# Patient Record
Sex: MALE | Race: WHITE | Employment: UNEMPLOYED | URBAN - METROPOLITAN AREA
[De-identification: names, ages, dates, MRNs, and addresses within clinical notes are randomized per-mention and may not be internally consistent; named-entity substitution may affect disease eponyms.]

---

## 2020-09-15 ENCOUNTER — HOSPITAL ENCOUNTER (INPATIENT)
Age: 85
LOS: 2 days | Discharge: HOME | DRG: 684 | End: 2020-09-17
Attending: INTERNAL MEDICINE | Admitting: INTERNAL MEDICINE
Payer: MEDICARE

## 2020-09-15 PROBLEM — E87.5 HYPERKALEMIA: Status: ACTIVE | Noted: 2020-09-15

## 2020-09-15 PROBLEM — E11.9 TYPE 2 DIABETES MELLITUS, WITH LONG-TERM CURRENT USE OF INSULIN (HCC): Status: ACTIVE | Noted: 2020-09-15

## 2020-09-15 PROBLEM — E78.5 HLD (HYPERLIPIDEMIA): Status: ACTIVE | Noted: 2020-09-15

## 2020-09-15 PROBLEM — Z79.4 TYPE 2 DIABETES MELLITUS, WITH LONG-TERM CURRENT USE OF INSULIN (HCC): Status: ACTIVE | Noted: 2020-09-15

## 2020-09-15 PROBLEM — I25.10 CAD (CORONARY ARTERY DISEASE): Status: ACTIVE | Noted: 2020-09-15

## 2020-09-15 PROBLEM — I10 HTN (HYPERTENSION): Status: ACTIVE | Noted: 2020-09-15

## 2020-09-15 PROBLEM — N17.9 AKI (ACUTE KIDNEY INJURY) (HCC): Status: ACTIVE | Noted: 2020-09-15

## 2020-09-15 PROBLEM — N18.9 CKD (CHRONIC KIDNEY DISEASE): Status: ACTIVE | Noted: 2020-09-15

## 2020-09-15 LAB
ANION GAP SERPL CALCULATED.3IONS-SCNC: 11 MEQ/L (ref 9–15)
BASOPHILS ABSOLUTE: 0.1 K/UL (ref 0–0.2)
BASOPHILS RELATIVE PERCENT: 0.8 %
BUN BLDV-MCNC: 99 MG/DL (ref 8–23)
CALCIUM SERPL-MCNC: 10.2 MG/DL (ref 8.5–9.9)
CHLORIDE BLD-SCNC: 115 MEQ/L (ref 95–107)
CHP ED QC CHECK: NORMAL
CO2: 16 MEQ/L (ref 20–31)
CREAT SERPL-MCNC: 4.58 MG/DL (ref 0.7–1.2)
EKG ATRIAL RATE: 57 BPM
EKG P AXIS: 67 DEGREES
EKG P-R INTERVAL: 224 MS
EKG Q-T INTERVAL: 450 MS
EKG QRS DURATION: 148 MS
EKG QTC CALCULATION (BAZETT): 438 MS
EKG R AXIS: -44 DEGREES
EKG T AXIS: 26 DEGREES
EKG VENTRICULAR RATE: 57 BPM
EOSINOPHILS ABSOLUTE: 0.2 K/UL (ref 0–0.7)
EOSINOPHILS RELATIVE PERCENT: 2.6 %
GFR AFRICAN AMERICAN: 14.8
GFR NON-AFRICAN AMERICAN: 12.2
GLUCOSE BLD-MCNC: 133 MG/DL (ref 70–99)
GLUCOSE BLD-MCNC: 181 MG/DL
GLUCOSE BLD-MCNC: 181 MG/DL (ref 60–115)
GLUCOSE BLD-MCNC: 67 MG/DL (ref 60–115)
HCT VFR BLD CALC: 26.2 % (ref 42–52)
HEMOGLOBIN: 8.7 G/DL (ref 14–18)
LYMPHOCYTES ABSOLUTE: 1.4 K/UL (ref 1–4.8)
LYMPHOCYTES RELATIVE PERCENT: 18.3 %
MCH RBC QN AUTO: 32.6 PG (ref 27–31.3)
MCHC RBC AUTO-ENTMCNC: 33.3 % (ref 33–37)
MCV RBC AUTO: 98.2 FL (ref 80–100)
MONOCYTES ABSOLUTE: 0.6 K/UL (ref 0.2–0.8)
MONOCYTES RELATIVE PERCENT: 7.9 %
NEUTROPHILS ABSOLUTE: 5.2 K/UL (ref 1.4–6.5)
NEUTROPHILS RELATIVE PERCENT: 70.4 %
PDW BLD-RTO: 15.7 % (ref 11.5–14.5)
PERFORMED ON: ABNORMAL
PERFORMED ON: NORMAL
PLATELET # BLD: 153 K/UL (ref 130–400)
POTASSIUM SERPL-SCNC: 6.5 MEQ/L (ref 3.4–4.9)
RBC # BLD: 2.67 M/UL (ref 4.7–6.1)
SODIUM BLD-SCNC: 142 MEQ/L (ref 135–144)
WBC # BLD: 7.4 K/UL (ref 4.8–10.8)

## 2020-09-15 PROCEDURE — 85025 COMPLETE CBC W/AUTO DIFF WBC: CPT

## 2020-09-15 PROCEDURE — 80048 BASIC METABOLIC PNL TOTAL CA: CPT

## 2020-09-15 PROCEDURE — 2060000000 HC ICU INTERMEDIATE R&B

## 2020-09-15 PROCEDURE — 6360000002 HC RX W HCPCS: Performed by: PHYSICIAN ASSISTANT

## 2020-09-15 PROCEDURE — 96374 THER/PROPH/DIAG INJ IV PUSH: CPT

## 2020-09-15 PROCEDURE — 99285 EMERGENCY DEPT VISIT HI MDM: CPT

## 2020-09-15 PROCEDURE — 96375 TX/PRO/DX INJ NEW DRUG ADDON: CPT

## 2020-09-15 PROCEDURE — G0378 HOSPITAL OBSERVATION PER HR: HCPCS

## 2020-09-15 PROCEDURE — 6370000000 HC RX 637 (ALT 250 FOR IP): Performed by: PHYSICIAN ASSISTANT

## 2020-09-15 PROCEDURE — 36415 COLL VENOUS BLD VENIPUNCTURE: CPT

## 2020-09-15 PROCEDURE — 2580000003 HC RX 258: Performed by: PHYSICIAN ASSISTANT

## 2020-09-15 PROCEDURE — 96365 THER/PROPH/DIAG IV INF INIT: CPT

## 2020-09-15 PROCEDURE — 93005 ELECTROCARDIOGRAM TRACING: CPT | Performed by: PHYSICIAN ASSISTANT

## 2020-09-15 PROCEDURE — 2500000003 HC RX 250 WO HCPCS: Performed by: PHYSICIAN ASSISTANT

## 2020-09-15 RX ORDER — NICOTINE POLACRILEX 4 MG
15 LOZENGE BUCCAL PRN
Status: DISCONTINUED | OUTPATIENT
Start: 2020-09-15 | End: 2020-09-16 | Stop reason: SDUPTHER

## 2020-09-15 RX ORDER — DEXTROSE MONOHYDRATE 25 G/50ML
25 INJECTION, SOLUTION INTRAVENOUS ONCE
Status: COMPLETED | OUTPATIENT
Start: 2020-09-15 | End: 2020-09-15

## 2020-09-15 RX ORDER — DEXTROSE MONOHYDRATE 25 G/50ML
12.5 INJECTION, SOLUTION INTRAVENOUS PRN
Status: DISCONTINUED | OUTPATIENT
Start: 2020-09-15 | End: 2020-09-16 | Stop reason: SDUPTHER

## 2020-09-15 RX ORDER — SODIUM POLYSTYRENE SULFONATE 15 G/60ML
15 SUSPENSION ORAL; RECTAL ONCE
Status: COMPLETED | OUTPATIENT
Start: 2020-09-15 | End: 2020-09-15

## 2020-09-15 RX ORDER — DEXTROSE MONOHYDRATE 50 MG/ML
100 INJECTION, SOLUTION INTRAVENOUS PRN
Status: DISCONTINUED | OUTPATIENT
Start: 2020-09-15 | End: 2020-09-16 | Stop reason: SDUPTHER

## 2020-09-15 RX ADMIN — SODIUM POLYSTYRENE SULFONATE 15 G: 15 SUSPENSION ORAL; RECTAL at 21:32

## 2020-09-15 RX ADMIN — SODIUM BICARBONATE 50 MEQ: 84 INJECTION, SOLUTION INTRAVENOUS at 21:31

## 2020-09-15 RX ADMIN — INSULIN HUMAN 10 UNITS: 100 INJECTION, SOLUTION PARENTERAL at 21:32

## 2020-09-15 RX ADMIN — CALCIUM GLUCONATE 1 G: 98 INJECTION, SOLUTION INTRAVENOUS at 22:32

## 2020-09-15 RX ADMIN — DEXTROSE MONOHYDRATE 25 G: 25 INJECTION, SOLUTION INTRAVENOUS at 21:32

## 2020-09-15 SDOH — HEALTH STABILITY: MENTAL HEALTH: HOW OFTEN DO YOU HAVE A DRINK CONTAINING ALCOHOL?: NEVER

## 2020-09-15 ASSESSMENT — ENCOUNTER SYMPTOMS
VOMITING: 0
TROUBLE SWALLOWING: 0
DIARRHEA: 0
BACK PAIN: 0
NAUSEA: 0
SHORTNESS OF BREATH: 0
WHEEZING: 0
CONSTIPATION: 0
ABDOMINAL PAIN: 0
SORE THROAT: 0

## 2020-09-15 NOTE — ED TRIAGE NOTES
Dr. Weston Olea.  Is his Primary
Patient was sent over by Dr. Isa Dee for high potassium. Patient is alert and orientated x 4. Pink, warm and dry. VSS. Afebrile. Respirations are equal and unlabored. Will continue to monitor.
stretcher

## 2020-09-16 ENCOUNTER — APPOINTMENT (OUTPATIENT)
Dept: ULTRASOUND IMAGING | Age: 85
DRG: 684 | End: 2020-09-16
Payer: MEDICARE

## 2020-09-16 LAB
ALBUMIN SERPL-MCNC: 3.4 G/DL (ref 3.5–4.6)
ALP BLD-CCNC: 37 U/L (ref 35–104)
ALT SERPL-CCNC: 10 U/L (ref 0–41)
ANION GAP SERPL CALCULATED.3IONS-SCNC: 11 MEQ/L (ref 9–15)
ANION GAP SERPL CALCULATED.3IONS-SCNC: 12 MEQ/L (ref 9–15)
ANION GAP SERPL CALCULATED.3IONS-SCNC: 12 MEQ/L (ref 9–15)
AST SERPL-CCNC: 11 U/L (ref 0–40)
BILIRUB SERPL-MCNC: <0.2 MG/DL (ref 0.2–0.7)
BUN BLDV-MCNC: 82 MG/DL (ref 8–23)
BUN BLDV-MCNC: 92 MG/DL (ref 8–23)
BUN BLDV-MCNC: 93 MG/DL (ref 8–23)
CALCIUM SERPL-MCNC: 10.1 MG/DL (ref 8.5–9.9)
CALCIUM SERPL-MCNC: 9.8 MG/DL (ref 8.5–9.9)
CALCIUM SERPL-MCNC: 9.9 MG/DL (ref 8.5–9.9)
CHLORIDE BLD-SCNC: 113 MEQ/L (ref 95–107)
CHLORIDE BLD-SCNC: 114 MEQ/L (ref 95–107)
CHLORIDE BLD-SCNC: 115 MEQ/L (ref 95–107)
CO2: 15 MEQ/L (ref 20–31)
CO2: 16 MEQ/L (ref 20–31)
CO2: 16 MEQ/L (ref 20–31)
CREAT SERPL-MCNC: 4.28 MG/DL (ref 0.7–1.2)
CREAT SERPL-MCNC: 4.34 MG/DL (ref 0.7–1.2)
CREAT SERPL-MCNC: 4.54 MG/DL (ref 0.7–1.2)
GFR AFRICAN AMERICAN: 14.9
GFR AFRICAN AMERICAN: 15.7
GFR AFRICAN AMERICAN: 16
GFR NON-AFRICAN AMERICAN: 12.3
GFR NON-AFRICAN AMERICAN: 13
GFR NON-AFRICAN AMERICAN: 13.2
GLOBULIN: 2.6 G/DL (ref 2.3–3.5)
GLUCOSE BLD-MCNC: 109 MG/DL (ref 60–115)
GLUCOSE BLD-MCNC: 114 MG/DL (ref 60–115)
GLUCOSE BLD-MCNC: 122 MG/DL (ref 70–99)
GLUCOSE BLD-MCNC: 123 MG/DL (ref 60–115)
GLUCOSE BLD-MCNC: 136 MG/DL (ref 60–115)
GLUCOSE BLD-MCNC: 147 MG/DL (ref 60–115)
GLUCOSE BLD-MCNC: 148 MG/DL (ref 70–99)
GLUCOSE BLD-MCNC: 162 MG/DL (ref 70–99)
HBA1C MFR BLD: 6.7 % (ref 4.8–5.9)
IRON SATURATION: 49 % (ref 11–46)
IRON: 101 UG/DL (ref 59–158)
PARATHYROID HORMONE INTACT: 45.4 PG/ML (ref 15–65)
PERFORMED ON: ABNORMAL
PERFORMED ON: NORMAL
PERFORMED ON: NORMAL
PHOSPHORUS: 3.8 MG/DL (ref 2.3–4.8)
POTASSIUM REFLEX MAGNESIUM: 5.3 MEQ/L (ref 3.4–4.9)
POTASSIUM SERPL-SCNC: 5.3 MEQ/L (ref 3.4–4.9)
POTASSIUM SERPL-SCNC: 5.9 MEQ/L (ref 3.4–4.9)
SODIUM BLD-SCNC: 140 MEQ/L (ref 135–144)
SODIUM BLD-SCNC: 142 MEQ/L (ref 135–144)
SODIUM BLD-SCNC: 142 MEQ/L (ref 135–144)
TOTAL IRON BINDING CAPACITY: 205 UG/DL (ref 178–450)
TOTAL PROTEIN: 6 G/DL (ref 6.3–8)

## 2020-09-16 PROCEDURE — 83550 IRON BINDING TEST: CPT

## 2020-09-16 PROCEDURE — 83036 HEMOGLOBIN GLYCOSYLATED A1C: CPT

## 2020-09-16 PROCEDURE — 84100 ASSAY OF PHOSPHORUS: CPT

## 2020-09-16 PROCEDURE — 6370000000 HC RX 637 (ALT 250 FOR IP): Performed by: NURSE PRACTITIONER

## 2020-09-16 PROCEDURE — 2580000003 HC RX 258: Performed by: NURSE PRACTITIONER

## 2020-09-16 PROCEDURE — 6370000000 HC RX 637 (ALT 250 FOR IP): Performed by: INTERNAL MEDICINE

## 2020-09-16 PROCEDURE — 6360000002 HC RX W HCPCS: Performed by: INTERNAL MEDICINE

## 2020-09-16 PROCEDURE — 6360000002 HC RX W HCPCS: Performed by: NURSE PRACTITIONER

## 2020-09-16 PROCEDURE — G0378 HOSPITAL OBSERVATION PER HR: HCPCS

## 2020-09-16 PROCEDURE — 83540 ASSAY OF IRON: CPT

## 2020-09-16 PROCEDURE — 76775 US EXAM ABDO BACK WALL LIM: CPT

## 2020-09-16 PROCEDURE — 36415 COLL VENOUS BLD VENIPUNCTURE: CPT

## 2020-09-16 PROCEDURE — 2060000000 HC ICU INTERMEDIATE R&B

## 2020-09-16 PROCEDURE — 51798 US URINE CAPACITY MEASURE: CPT

## 2020-09-16 PROCEDURE — 96372 THER/PROPH/DIAG INJ SC/IM: CPT

## 2020-09-16 PROCEDURE — 80053 COMPREHEN METABOLIC PANEL: CPT

## 2020-09-16 PROCEDURE — 83970 ASSAY OF PARATHORMONE: CPT

## 2020-09-16 PROCEDURE — 82652 VIT D 1 25-DIHYDROXY: CPT

## 2020-09-16 RX ORDER — ATORVASTATIN CALCIUM 10 MG/1
10 TABLET, FILM COATED ORAL DAILY
COMMUNITY

## 2020-09-16 RX ORDER — M-VIT,TX,IRON,MINS/CALC/FOLIC 27MG-0.4MG
1 TABLET ORAL DAILY
COMMUNITY

## 2020-09-16 RX ORDER — ISOSORBIDE MONONITRATE 30 MG/1
30 TABLET, EXTENDED RELEASE ORAL DAILY
COMMUNITY

## 2020-09-16 RX ORDER — ATORVASTATIN CALCIUM 10 MG/1
10 TABLET, FILM COATED ORAL NIGHTLY
Status: DISCONTINUED | OUTPATIENT
Start: 2020-09-16 | End: 2020-09-17 | Stop reason: HOSPADM

## 2020-09-16 RX ORDER — AMLODIPINE BESYLATE 5 MG/1
5 TABLET ORAL DAILY
Status: DISCONTINUED | OUTPATIENT
Start: 2020-09-16 | End: 2020-09-17 | Stop reason: HOSPADM

## 2020-09-16 RX ORDER — SODIUM CHLORIDE 0.9 % (FLUSH) 0.9 %
10 SYRINGE (ML) INJECTION PRN
Status: DISCONTINUED | OUTPATIENT
Start: 2020-09-16 | End: 2020-09-17 | Stop reason: HOSPADM

## 2020-09-16 RX ORDER — ALLOPURINOL 100 MG/1
100 TABLET ORAL DAILY
COMMUNITY

## 2020-09-16 RX ORDER — ACETAMINOPHEN 650 MG/1
650 SUPPOSITORY RECTAL EVERY 6 HOURS PRN
Status: DISCONTINUED | OUTPATIENT
Start: 2020-09-16 | End: 2020-09-17 | Stop reason: HOSPADM

## 2020-09-16 RX ORDER — LISINOPRIL 5 MG/1
5 TABLET ORAL DAILY
Status: ON HOLD | COMMUNITY
End: 2020-09-17 | Stop reason: HOSPADM

## 2020-09-16 RX ORDER — ONDANSETRON 2 MG/ML
4 INJECTION INTRAMUSCULAR; INTRAVENOUS EVERY 6 HOURS PRN
Status: DISCONTINUED | OUTPATIENT
Start: 2020-09-16 | End: 2020-09-17 | Stop reason: HOSPADM

## 2020-09-16 RX ORDER — ASPIRIN 81 MG/1
81 TABLET ORAL DAILY
COMMUNITY

## 2020-09-16 RX ORDER — NICOTINE POLACRILEX 4 MG
15 LOZENGE BUCCAL PRN
Status: DISCONTINUED | OUTPATIENT
Start: 2020-09-16 | End: 2020-09-17 | Stop reason: HOSPADM

## 2020-09-16 RX ORDER — CALCITRIOL 0.25 UG/1
0.25 CAPSULE, LIQUID FILLED ORAL DAILY
COMMUNITY

## 2020-09-16 RX ORDER — SODIUM POLYSTYRENE SULFONATE 15 G/60ML
15 SUSPENSION ORAL; RECTAL ONCE
Status: COMPLETED | OUTPATIENT
Start: 2020-09-16 | End: 2020-09-16

## 2020-09-16 RX ORDER — CLOPIDOGREL BISULFATE 75 MG/1
75 TABLET ORAL DAILY
Status: DISCONTINUED | OUTPATIENT
Start: 2020-09-16 | End: 2020-09-17 | Stop reason: HOSPADM

## 2020-09-16 RX ORDER — FERROUS SULFATE 325(65) MG
325 TABLET ORAL
COMMUNITY

## 2020-09-16 RX ORDER — ISOSORBIDE MONONITRATE 30 MG/1
30 TABLET, EXTENDED RELEASE ORAL DAILY
Status: DISCONTINUED | OUTPATIENT
Start: 2020-09-16 | End: 2020-09-17 | Stop reason: HOSPADM

## 2020-09-16 RX ORDER — CARVEDILOL 12.5 MG/1
12.5 TABLET ORAL 2 TIMES DAILY WITH MEALS
Status: DISCONTINUED | OUTPATIENT
Start: 2020-09-16 | End: 2020-09-17 | Stop reason: HOSPADM

## 2020-09-16 RX ORDER — HEPARIN SODIUM 5000 [USP'U]/ML
5000 INJECTION, SOLUTION INTRAVENOUS; SUBCUTANEOUS EVERY 8 HOURS SCHEDULED
Status: DISCONTINUED | OUTPATIENT
Start: 2020-09-16 | End: 2020-09-17 | Stop reason: HOSPADM

## 2020-09-16 RX ORDER — DEXTROSE MONOHYDRATE 25 G/50ML
12.5 INJECTION, SOLUTION INTRAVENOUS PRN
Status: DISCONTINUED | OUTPATIENT
Start: 2020-09-16 | End: 2020-09-17 | Stop reason: HOSPADM

## 2020-09-16 RX ORDER — CLOPIDOGREL BISULFATE 75 MG/1
75 TABLET ORAL DAILY
COMMUNITY

## 2020-09-16 RX ORDER — ASPIRIN 81 MG/1
81 TABLET, CHEWABLE ORAL DAILY
Status: DISCONTINUED | OUTPATIENT
Start: 2020-09-16 | End: 2020-09-17 | Stop reason: HOSPADM

## 2020-09-16 RX ORDER — CARVEDILOL 12.5 MG/1
12.5 TABLET ORAL 2 TIMES DAILY WITH MEALS
COMMUNITY

## 2020-09-16 RX ORDER — BUMETANIDE 0.5 MG/1
0.5 TABLET ORAL DAILY
COMMUNITY

## 2020-09-16 RX ORDER — ACETAMINOPHEN 325 MG/1
650 TABLET ORAL EVERY 6 HOURS PRN
Status: DISCONTINUED | OUTPATIENT
Start: 2020-09-16 | End: 2020-09-17 | Stop reason: HOSPADM

## 2020-09-16 RX ORDER — INSULIN GLARGINE 100 [IU]/ML
25 INJECTION, SOLUTION SUBCUTANEOUS EVERY MORNING
COMMUNITY

## 2020-09-16 RX ORDER — PROMETHAZINE HYDROCHLORIDE 12.5 MG/1
12.5 TABLET ORAL EVERY 6 HOURS PRN
Status: DISCONTINUED | OUTPATIENT
Start: 2020-09-16 | End: 2020-09-17 | Stop reason: HOSPADM

## 2020-09-16 RX ORDER — ASCORBIC ACID 500 MG
1000 TABLET ORAL DAILY
COMMUNITY

## 2020-09-16 RX ORDER — SODIUM CHLORIDE 0.9 % (FLUSH) 0.9 %
10 SYRINGE (ML) INJECTION EVERY 12 HOURS SCHEDULED
Status: DISCONTINUED | OUTPATIENT
Start: 2020-09-16 | End: 2020-09-17 | Stop reason: HOSPADM

## 2020-09-16 RX ORDER — DEXTROSE MONOHYDRATE 50 MG/ML
100 INJECTION, SOLUTION INTRAVENOUS PRN
Status: DISCONTINUED | OUTPATIENT
Start: 2020-09-16 | End: 2020-09-17 | Stop reason: HOSPADM

## 2020-09-16 RX ORDER — AMLODIPINE BESYLATE 5 MG/1
5 TABLET ORAL DAILY
COMMUNITY

## 2020-09-16 RX ADMIN — ASPIRIN 81 MG: 81 TABLET, CHEWABLE ORAL at 08:49

## 2020-09-16 RX ADMIN — ISOSORBIDE MONONITRATE 30 MG: 30 TABLET, EXTENDED RELEASE ORAL at 08:49

## 2020-09-16 RX ADMIN — INSULIN LISPRO 2 UNITS: 100 INJECTION, SOLUTION INTRAVENOUS; SUBCUTANEOUS at 12:47

## 2020-09-16 RX ADMIN — HEPARIN SODIUM 5000 UNITS: 5000 INJECTION INTRAVENOUS; SUBCUTANEOUS at 08:49

## 2020-09-16 RX ADMIN — CLOPIDOGREL BISULFATE 75 MG: 75 TABLET ORAL at 08:49

## 2020-09-16 RX ADMIN — CARVEDILOL 12.5 MG: 12.5 TABLET, FILM COATED ORAL at 08:49

## 2020-09-16 RX ADMIN — SODIUM POLYSTYRENE SULFONATE 15 G: 15 SUSPENSION ORAL; RECTAL at 11:18

## 2020-09-16 RX ADMIN — HEPARIN SODIUM 5000 UNITS: 5000 INJECTION INTRAVENOUS; SUBCUTANEOUS at 22:54

## 2020-09-16 RX ADMIN — ATORVASTATIN CALCIUM 10 MG: 10 TABLET, FILM COATED ORAL at 20:44

## 2020-09-16 RX ADMIN — AMLODIPINE BESYLATE 5 MG: 5 TABLET ORAL at 08:49

## 2020-09-16 RX ADMIN — HEPARIN SODIUM 5000 UNITS: 5000 INJECTION INTRAVENOUS; SUBCUTANEOUS at 17:49

## 2020-09-16 RX ADMIN — Medication 10 ML: at 20:44

## 2020-09-16 RX ADMIN — EPOETIN ALFA-EPBX 6000 UNITS: 10000 INJECTION, SOLUTION INTRAVENOUS; SUBCUTANEOUS at 11:18

## 2020-09-16 RX ADMIN — CARVEDILOL 12.5 MG: 12.5 TABLET, FILM COATED ORAL at 17:49

## 2020-09-16 ASSESSMENT — ENCOUNTER SYMPTOMS
NAUSEA: 0
ABDOMINAL PAIN: 0
EYE DISCHARGE: 0
APNEA: 0
COUGH: 0
BLOOD IN STOOL: 0
ABDOMINAL DISTENTION: 0
PHOTOPHOBIA: 0
BACK PAIN: 0
SHORTNESS OF BREATH: 0
VOICE CHANGE: 0
ANAL BLEEDING: 0
VOMITING: 0

## 2020-09-16 NOTE — CONSULTS
Renal consult dictated    Hyperkalemia related to CKD-DM and diet  DM type-2  OHDx CAD stents Hx MI  Hyperlipidemia  Anemia    Plan diet management           Hold ACE no benefit at this time lauro low dose              For BP or cardiac stand-point          Obtain PTH iron and PHO4           Jessy-exalte again  No need for dialysis at present but will follow on discharge  Dietician to see for 2Gm potassium diet  One dose Procrit

## 2020-09-16 NOTE — ED NOTES
Patient report called to [address] [place] [address] Patient connected to tele monitor.       Michelle Hahn RN  09/15/20 [id number]

## 2020-09-16 NOTE — ED PROVIDER NOTES
[address] [facility]  eMERGENCY dEPARTMENT eNCOUnter      Pt Name: Gustavo Rivas  MRN: [id number]  [place] 10/9/1933  Date of evaluation: 9/15/2020  Provider: Dima Broussard PA-C    CHIEF COMPLAINT       Chief Complaint   Patient presents with    Other     sent over by Dr. office for high potassium          HISTORY OF PRESENT ILLNESS   (Location/Symptom, Timing/Onset,Context/Setting, Quality, Duration, Modifying Factors, Severity)  Note limiting factors. Gustavo Rivas is a 80 y.o. male who presents to the emergency department patient sent by Dr. Jennifer Gallegos nephrologist at [facility] for increasing potassium. Patient is unaware of what the numbers were he notes that he used to walk 2 to 3 miles a day he can walk less than a mile notes that his legs \"will not walk any further\" patient denies fever chills nausea vomiting symptoms moderate severity nothing improves or worsen symptoms. Patient denies chest pain shortness of breath. Patient does note that he has had dark stools he does take iron. Denies obvious rectal bleeding    HPI    NursingNotes were reviewed. REVIEW OF SYSTEMS    (2-9 systems for level 4, 10 or more for level 5)     Review of Systems   Constitutional: Negative for activity change, appetite change, chills, fever and unexpected weight change. HENT: Negative for ear discharge, nosebleeds and voice change. Eyes: Negative for photophobia and discharge. Respiratory: Negative for apnea, cough and shortness of breath. Cardiovascular: Positive for leg swelling. Negative for chest pain. Gastrointestinal: Negative for abdominal distention, abdominal pain, anal bleeding, blood in stool, nausea and vomiting. Endocrine: Negative for cold intolerance, heat intolerance and polyphagia. Genitourinary: Negative for dysuria and genital sores. Musculoskeletal: Negative for back pain, joint swelling and neck pain. Skin: Negative for pallor.    Allergic/Immunologic: Negative for immunocompromised state.   Neurological: Negative for dizziness, seizures, syncope, facial asymmetry, speech difficulty, numbness and headaches. Hematological: Does not bruise/bleed easily. Psychiatric/Behavioral: Negative for behavioral problems, self-injury and sleep disturbance. All other systems reviewed and are negative. Except as noted above the remainder of the review of systems was reviewed and negative. PAST MEDICAL HISTORY   History reviewed. No pertinent past medical history. SURGICALHISTORY     History reviewed. No pertinent surgical history. CURRENT MEDICATIONS       Current Discharge Medication List      CONTINUE these medications which have NOT CHANGED    Details   bumetanide (BUMEX) 0.5 MG tablet Take 0.5 mg by mouth daily      isosorbide mononitrate (IMDUR) 30 MG extended release tablet Take 30 mg by mouth daily      clopidogrel (PLAVIX) 75 MG tablet Take 75 mg by mouth daily      carvedilol (COREG) 12.5 MG tablet Take 12.5 mg by mouth 2 times daily (with meals)      atorvastatin (LIPITOR) 10 MG tablet Take 10 mg by mouth daily      calcitRIOL (ROCALTROL) 0.25 MCG capsule Take 0.25 mcg by mouth daily      allopurinol (ZYLOPRIM) 100 MG tablet Take 100 mg by mouth daily      insulin glargine (LANTUS) 100 UNIT/ML injection vial Inject 25 Units into the skin every morning      lisinopril (PRINIVIL;ZESTRIL) 5 MG tablet Take 5 mg by mouth daily      amLODIPine (NORVASC) 5 MG tablet Take 5 mg by mouth daily      vitamin C (ASCORBIC ACID) 500 MG tablet Take 1,000 mg by mouth daily      aspirin 81 MG EC tablet Take 81 mg by mouth daily      Multiple Vitamins-Minerals (THERAPEUTIC MULTIVITAMIN-MINERALS) tablet Take 1 tablet by mouth daily      ferrous sulfate (IRON 325) 325 (65 Fe) MG tablet Take 325 mg by mouth daily (with breakfast)             ALLERGIES     Patient has no known allergies. FAMILY HISTORY     History reviewed. No pertinent family history.        SOCIAL HISTORY       Social History Socioeconomic History    Marital status:      Spouse name: None    Number of children: None    Years of education: None    Highest education level: None   Occupational History    None   Social Needs    Financial resource strain: None    Food insecurity     Worry: None     Inability: None    Transportation needs     Medical: None     Non-medical: None   Tobacco Use    Smoking status: Never Smoker    Smokeless tobacco: Never Used   Substance and Sexual Activity    Alcohol use: Never     Frequency: Never    Drug use: Never    Sexual activity: None   Lifestyle    Physical activity     Days per week: None     Minutes per session: None    Stress: None   Relationships    Social connections     Talks on phone: None     Gets together: None     Attends Synagogue service: None     Active member of club or organization: None     Attends meetings of clubs or organizations: None     Relationship status: None    Intimate partner violence     Fear of current or ex partner: None     Emotionally abused: None     Physically abused: None     Forced sexual activity: None   Other Topics Concern    None   Social History Narrative    None       SCREENINGS    Pratima Coma Scale  Eye Opening: Spontaneous  Best Verbal Response: Oriented  Best Motor Response: Obeys commands  Pratima Coma Scale Score: 15 @FLOW([id number])@      PHYSICAL EXAM    (up to 7 for level 4, 8 or more for level 5)     ED Triage Vitals [09/15/20 1916]   BP Temp Temp Source Pulse Resp SpO2 Height Weight   107/65 97.8 °F (36.6 °C) Tympanic 58 18 97 % 5' 9\" (1.753 m) 210 lb (95.3 kg)       Physical Exam  Vitals signs and nursing note reviewed. Constitutional:       General: He is not in acute distress. Appearance: He is well-developed. HENT:      Head: Normocephalic and atraumatic. Right Ear: External ear normal. There is no impacted cerumen. Left Ear: External ear normal. There is no impacted cerumen.       Nose: Nose normal. Mouth/Throat:      Mouth: Mucous membranes are moist.      Pharynx: No oropharyngeal exudate or posterior oropharyngeal erythema. Eyes:      General:         Right eye: No discharge. Left eye: No discharge. Extraocular Movements: Extraocular movements intact. Pupils: Pupils are equal, round, and reactive to light. Neck:      Musculoskeletal: Normal range of motion and neck supple. Cardiovascular:      Rate and Rhythm: Normal rate and regular rhythm. Heart sounds: Normal heart sounds. Pulmonary:      Effort: Pulmonary effort is normal. No respiratory distress. Breath sounds: Normal breath sounds. No stridor. Abdominal:      General: Bowel sounds are normal. There is no distension. Palpations: Abdomen is soft. Tenderness: There is no abdominal tenderness. Genitourinary:     Rectum: Guaiac result negative. Musculoskeletal: Normal range of motion. Right lower leg: Edema present. Left lower leg: Edema present. Skin:     General: Skin is warm. Findings: No erythema. Neurological:      Mental Status: He is alert and oriented to person, place, and time. Psychiatric:         Mood and Affect: Mood normal.         DIAGNOSTIC RESULTS     EKG: All EKG's are interpreted by the Emergency Department Physician who either signs or Co-signsthis chart in the absence of a cardiologist.    EKG sinus bradycardia rate 57- ST segment elevation.  ms    RADIOLOGY:   Non-plain filmimages such as CT, Ultrasound and MRI are read by the radiologist. Plain radiographic images are visualized and preliminarily interpreted by the emergency physician with the below findings:         Interpretation per the Radiologist below, if available at the time ofthis note:    2011 [facility]   Final Result      MODERATE TO MARKED RIGHT RENAL ATROPHY. NORMAL-APPEARING LEFT KIDNEY.       NO EVIDENCE OF URINARY TRACT OBSTRUCTION, OR OTHER FINDINGS OF CONCERN IDENTIFIED, All other components within normal limits   IRON AND TIBC - Abnormal; Notable for the following components:    Iron Saturation 49 (*)     All other components within normal limits   BASIC METABOLIC PANEL - Abnormal; Notable for the following components:    Potassium 5.3 (*)     Chloride 113 (*)     CO2 16 (*)     Glucose 148 (*)     BUN 82 (*)     CREATININE 4.28 (*)     GFR Non- 13.2 (*)     GFR  16.0 (*)     All other components within normal limits    Narrative:     CALL  [address] tel. [phone],  BUN results called to and read back by Glen Morelos, 09/16/2020 19:10, by  Lucila Mccann   COMPREHENSIVE METABOLIC PANEL W/ REFLEX TO MG FOR LOW K - Abnormal; Notable for the following components:    Potassium reflex Magnesium 5.1 (*)     Chloride 113 (*)     CO2 16 (*)     Glucose 104 (*)     BUN 86 (*)     CREATININE 4.20 (*)     GFR Non- 13.5 (*)     GFR  16.3 (*)     Total Protein 6.2 (*)     All other components within normal limits    Narrative:     CALL  [address] tel. [phone],  BUN results called to and read back by Renetta Rogers, 09/17/2020 08:03, by  Wandy Larios   CBC WITH AUTO DIFFERENTIAL - Abnormal; Notable for the following components:    RBC 2.65 (*)     Hemoglobin 8.6 (*)     Hematocrit 26.2 (*)     MCH 32.5 (*)     MCHC 32.8 (*)     RDW 15.5 (*)     All other components within normal limits   BASIC METABOLIC PANEL - Abnormal; Notable for the following components:    Potassium 5.1 (*)     All other components within normal limits    Narrative:     CALL  [address] tel. [phone],  BUN results called to and read back by Renetta Rogers, 09/17/2020 08:03, by  Wandy Larios   POCT GLUCOSE - Abnormal; Notable for the following components:    POC Glucose 181 (*)     All other components within normal limits   POCT GLUCOSE - Abnormal; Notable for the following components:    POC Glucose 136 (*)     All other components within normal limits   POCT GLUCOSE - Abnormal; Notable for the following components:    POC Glucose 147 (*)     All other components within normal limits   POCT GLUCOSE - Abnormal; Notable for the following components:    POC Glucose 123 (*)     All other components within normal limits   POCT GLUCOSE - Abnormal; Notable for the following components:    POC Glucose 134 (*)     All other components within normal limits   POCT GLUCOSE - Normal   PHOSPHORUS   PTH, INTACT   VITAMIN D 1,25 DIHYDROXY   SODIUM, URINE, RANDOM   CREATININE, RANDOM URINE   URINALYSIS   POCT GLUCOSE   POCT GLUCOSE   POCT GLUCOSE   POCT GLUCOSE   POCT GLUCOSE   POCT GLUCOSE   POCT GLUCOSE   POCT GLUCOSE   POCT GLUCOSE   POCT GLUCOSE   POCT GLUCOSE   POCT GLUCOSE   POCT GLUCOSE   POCT GLUCOSE   POCT GLUCOSE   POCT GLUCOSE   POCT GLUCOSE   POCT GLUCOSE   POCT GLUCOSE       All other labs were within normal range or not returned as of this dictation. EMERGENCY DEPARTMENT COURSE and DIFFERENTIAL DIAGNOSIS/MDM:   Vitals:    Vitals:    09/16/20 0719 09/16/20 2052 09/17/20 0451 09/17/20 0727   BP: (!) 129/53 (!) 112/50  (!) 131/54   Pulse: 67 57  58   Resp: 16 18 17   Temp: 97.7 °F (36.5 °C) 98.5 °F (36.9 °C)  97.9 °F (36.6 °C)   TempSrc: Oral Oral  Oral   SpO2: 100% 98%  100%   Weight:   214 lb (97.1 kg)    Height:                MDM  Number of Diagnoses or Management Options  Diagnosis management comments: Patient noted to have elevated creatinine as well as hyperkalemia discussed with Dr. Shen Gardiner from nephrology at [facility] he is familiar with patient notes that patient's creatinine increased a few weeks ago he did discontinued and change medication including Bumex and ACE inhibitors. He notes patient's potassium was 6.2 on his blood draw. Hours is 6.5 nonhemolyzed per lab report discussed with Dr. Pipo Stovall will admit patient. Dr. Sia Linda requests is calcium gluconate 1 g.   Addition to honey implemented hyperkalemia moderate protocol       Amount and/or Complexity of Data Reviewed  Clinical lab tests: reviewed and ordered  Discuss the patient with other providers: yes        CRITICAL CARE TIME   Total Critical Care time was 34  minutes, excluding separately reportableprocedures. There was a high probability of clinicallysignificant/life threatening deterioration in the patient's condition which required my urgent intervention. CONSULTS:  IP CONSULT TO NEPHROLOGY  IP CONSULT TO DIETITIAN  IP CONSULT TO DIETITIAN    PROCEDURES:  Unless otherwise noted below, none     Procedures    FINAL IMPRESSION      1. APRYL (acute kidney injury) ([address]    2. Hyperkalemia          DISPOSITION/PLAN   DISPOSITION Admitted 09/15/2020 10:07:19 PM      PATIENT REFERRED TO:  Wilson Alberts MD  [facility] [address]  [facility] [address] [facility] 6, DO  [address] [facility]  [address]  [address] [facility]  [phone]    Schedule an appointment as soon as possible for a visit on 10/2/2020  For Hospital F/U Appt. @ 9:00am.  Office moved, new address [address] [facility] [address]. Bring MEDS to this visit.        DISCHARGE MEDICATIONS:  Current Discharge Medication List             (Please note that portions of this note were completed with a voice recognition program.  Efforts were made to edit the dictations but occasionally words are mis-transcribed.)    Nelson Cisneros PA-C (electronically signed)  Attending Emergency Physician       Nelson Cisneros PA-C  09/16/20 [address], [place]  09/17/20 3753

## 2020-09-16 NOTE — PROGRESS NOTES
ROS is reviewed and negative except for as above  Medications:  Reviewed    Infusion Medications:    dextrose       Scheduled Medications:    sodium chloride flush  10 mL Intravenous 2 times per day    heparin (porcine)  5,000 Units Subcutaneous 3 times per day    isosorbide mononitrate  30 mg Oral Daily    clopidogrel  75 mg Oral Daily    carvedilol  12.5 mg Oral BID WC    atorvastatin  10 mg Oral Nightly    amLODIPine  5 mg Oral Daily    aspirin  81 mg Oral Daily    insulin lispro  0-12 Units Subcutaneous TID     insulin lispro  0-6 Units Subcutaneous Nightly    epoetin emerson-epbx  6,000 Units Subcutaneous Once per day on Mon Wed Fri     PRN Meds: sodium chloride flush, acetaminophen **OR** acetaminophen, promethazine **OR** ondansetron, glucose, dextrose, glucagon (rDNA), dextrose    Labs:   Recent Labs     09/15/20  2000   WBC 7.4   HGB 8.7*   HCT 26.2*        Recent Labs     09/15/20  2000 09/16/20  0120 09/16/20  0610 09/16/20  0950    142 142  --    K 6.5* 5.9* 5.3*  --    * 114* 115*  --    CO2 16* 16* 15*  --    BUN 99* 93* 92*  --    CREATININE 4.58* 4.54* 4.34*  --    CALCIUM 10.2* 9.9 10.1*  --    PHOS  --   --   --  3.8     Recent Labs     09/16/20  0610   AST 11   ALT 10   BILITOT <0.2   ALKPHOS 37     No results for input(s): INR in the last 72 hours. No results for input(s): Gr[employer] in the last 72 hours. Urinalysis:   No results found for: [place], BACTERIA, RBCUA, BLOODU, [address], [address]    Radiology:   Most recent    Chest CT      WITH CONTRAST:No results found for this or any previous visit. WITHOUT CONTRAST: No results found for this or any previous visit. CXR      2-view: No results found for this or any previous visit. Portable: No results found for this or any previous visit. Echo No results found for this or any previous visit.           Assessment/Plan:    Active Hospital Problems    Diagnosis Date Noted    APRYL (acute kidney injury) ([facility] [N17.9] 09/15/2020    CKD (chronic kidney disease) [N18.9] 09/15/2020    HTN (hypertension) [I10] 09/15/2020    CAD (coronary artery disease) [I25.10] 09/15/2020    Type 2 diabetes mellitus, with long-term current use of insulin ([facility] [E11.9, Z79.4] 09/15/2020    HLD (hyperlipidemia) [E78.5] 09/15/2020    Hyperkalemia [E87.5] 09/15/2020     Acute renal failure with chronic kidney disease: Repeat BMP now. Urine sodium urine creatinine urinalysis. Renal ultrasound. Hold nephrotoxic agents. Hold lisinopril. PTH and iron panel ordered    Hyperkalemia with acute renal failure. Repeat BMP. Temporizing measures with Kayexalate already given. No T wave changes on EKG currently nephrology following. Type 2 diabetes: Sliding scale insulin, home insulin    History of coronary disease with hypertension: Norvasc, statin, Coreg, hold ACE inhibitor continue Imdur    DVT prophylaxis Heparin    Additional work up or/and treatment plan may be added today or then after based on clinical progression. I am managing a portion of pt care. Some medical issues are handled byother specialists. Additional work up and treatment should be done in out pt setting by pt PCP and other out pt providers. In addition to examining and evaluating pt, I spent additional time explaining care, normaland abnormal findings, and treatment plan. All of pt questions were answered. Counseling, diet and education were provided. Case will be discussed with nursing staff when appropriate. Family will be updated if and whenappropriate.       Electronically signed by Alden Dumas DO on 9/16/2020 at 4:52 PM

## 2020-09-16 NOTE — ED NOTES
Patient resting in bed with call light in reach. Breathes are even and unlabored. Skin is warm and dry. Vital signs are stable. Patient remains on bedside monitor. Patient denies any needs at this time.       Sean Hager RN  09/15/20 6893

## 2020-09-16 NOTE — H&P
[facility]  MEDICINE    HISTORY AND PHYSICAL EXAM    PATIENT NAME:  Bay Mo    MRN:  [id number]  SERVICE DATE:  9/15/2020   SERVICE TIME:  10:21 PM    Primary Care Physician: Genesis Armenta MD         SUBJECTIVE  CHIEF COMPLAINT:  APRYL  Hyperkalemia   HPI:  This is a 80 y.o. male w known CKD and hx hyperkalemia, CAD s/p stent, DM2, HTN, who presents with elevated potassium. Had labs w his PCP,  Received call today to go to ED for treatment of high potassium and worsening renal function. States kidneys have been working about 20% for past 3 years. When his labs were drawn last, he was down to 11%. Meds were changed - stopped water pill and cut \"the orange pill\" in half. He has good UO,  No edema or increased abdominal girth, No palpitations or muscle cramps/spasm. He is admitted for management of APRYL w hyperkalemia. PAST MEDICAL HISTORY:    HTN, CKD stage 4, CAD, DM2, Hyperkalemia,     PAST SURGICAL HISTORY:  History reviewed. No pertinent surgical history. FAMILY HISTORY:  History reviewed. No pertinent family history. SOCIAL HISTORY:   Non smoker.    Social History     Socioeconomic History    Marital status:      Spouse name: Not on file    Number of children: Not on file    Years of education: Not on file    Highest education level: Not on file   Occupational History    Not on file   Social Needs    Financial resource strain: Not on file    Food insecurity     Worry: Not on file     Inability: Not on file    Transportation needs     Medical: Not on file     Non-medical: Not on file   Tobacco Use    Smoking status: Never Smoker    Smokeless tobacco: Never Used   Substance and Sexual Activity    Alcohol use: Never     Frequency: Never    Drug use: Never    Sexual activity: Not on file   Lifestyle    Physical activity     Days per week: Not on file     Minutes per session: Not on file    Stress: Not on file   Relationships    Social connections     Talks on phone: Not on file     Gets together: Not on file     Attends [employer] service: Not on file     Active member of club or organization: Not on file     Attends meetings of clubs or organizations: Not on file     Relationship status: Not on file    Intimate partner violence     Fear of current or ex partner: Not on file     Emotionally abused: Not on file     Physically abused: Not on file     Forced sexual activity: Not on file   Other Topics Concern    Not on file   Social History Narrative    Not on file     MEDICATIONS:   Prior to Admission medications    Not on File       ALLERGIES: Patient has no known allergies. REVIEW OF SYSTEM:   Review of Systems   Constitutional: Negative for appetite change, chills, diaphoresis and fever. HENT: Negative for sore throat and trouble swallowing. Eyes: Negative for visual disturbance. Respiratory: Negative for shortness of breath and wheezing. Cardiovascular: Negative for chest pain, palpitations and leg swelling. Gastrointestinal: Negative for abdominal pain, constipation, diarrhea, nausea and vomiting. Endocrine: Negative for polyuria. Genitourinary: Negative for dysuria and hematuria. Musculoskeletal: Negative for back pain and myalgias. Skin: Negative for rash and wound. Neurological: Negative for seizures, syncope and headaches. Hematological: Negative for adenopathy. Psychiatric/Behavioral: Negative for agitation and confusion. The patient is not nervous/anxious. OBJECTIVE  PHYSICAL EXAM: BP (!) 110/59   Pulse 57   Temp 97.8 °F (36.6 °C) (Tympanic)   Resp 16   Ht 5' 9\" (1.753 m)   Wt 210 lb (95.3 kg)   SpO2 97%   BMI 31.01 kg/m²     Physical Exam  Constitutional:       General: He is not in acute distress. Appearance: He is obese. He is not diaphoretic. HENT:      Head: Normocephalic. Nose: No congestion.       Mouth/Throat:      Mouth: Mucous membranes are moist.   Eyes:      Conjunctiva/sclera: Conjunctivae normal. Pupils: Pupils are equal, round, and reactive to light. Neck:      Musculoskeletal: No muscular tenderness. Vascular: No carotid bruit. Cardiovascular:      Rate and Rhythm: Normal rate and regular rhythm. Pulses: Normal pulses. Heart sounds: Normal heart sounds. No murmur. Pulmonary:      Effort: Pulmonary effort is normal.      Breath sounds: No wheezing or rhonchi. Abdominal:      General: There is distension. Tenderness: There is no abdominal tenderness. Musculoskeletal:      Right lower leg: No edema. Left lower leg: No edema. Lymphadenopathy:      Cervical: No cervical adenopathy. Skin:     General: Skin is warm and dry. Capillary Refill: Capillary refill takes less than 2 seconds. Neurological:      Mental Status: He is alert and oriented to person, place, and time. Psychiatric:         Mood and Affect: Mood normal.         Behavior: Behavior normal.       DATA:     Diagnostic tests reviewed for today's visit:    Most recent labs and imaging results reviewed.      LABS:    Recent Results (from the past 24 hour(s))   Basic Metabolic Panel    Collection Time: 09/15/20  8:00 PM   Result Value Ref Range    Sodium 142 135 - 144 mEq/L    Potassium 6.5 (HH) 3.4 - 4.9 mEq/L    Chloride 115 (H) 95 - 107 mEq/L    CO2 16 (L) 20 - 31 mEq/L    Anion Gap 11 9 - 15 mEq/L    Glucose 133 (H) 70 - 99 mg/dL    BUN 99 (HH) 8 - 23 mg/dL    CREATININE 4.58 (H) 0.70 - 1.20 mg/dL    GFR Non-African American 12.2 (L) >60    GFR  14.8 (L) >60    Calcium 10.2 (H) 8.5 - 9.9 mg/dL   CBC Auto Differential    Collection Time: 09/15/20  8:00 PM   Result Value Ref Range    WBC 7.4 4.8 - 10.8 K/uL    RBC 2.67 (L) 4.70 - 6.10 M/uL    Hemoglobin 8.7 (L) 14.0 - 18.0 g/dL    Hematocrit 26.2 (L) 42.0 - 52.0 %    MCV 98.2 80.0 - 100.0 fL    MCH 32.6 (H) 27.0 - 31.3 pg    MCHC 33.3 33.0 - 37.0 %    RDW 15.7 (H) 11.5 - 14.5 %    Platelets 528 049 - 362 K/uL    Neutrophils % 70.4 % Lymphocytes % 18.3 %    Monocytes % 7.9 %    Eosinophils % 2.6 %    Basophils % 0.8 %    Neutrophils Absolute 5.2 1.4 - 6.5 K/uL    Lymphocytes Absolute 1.4 1.0 - 4.8 K/uL    Monocytes Absolute 0.6 0.2 - 0.8 K/uL    Eosinophils Absolute 0.2 0.0 - 0.7 K/uL    Basophils Absolute 0.1 0.0 - 0.2 K/uL   EKG 12 Lead - Chest Pain    Collection Time: 09/15/20  8:13 PM   Result Value Ref Range    Ventricular Rate 57 BPM    Atrial Rate 57 BPM    P-R Interval 224 ms    QRS Duration 148 ms    Q-T Interval 450 ms    QTc Calculation (Bazett) 438 ms    P Axis 67 degrees    R Axis -44 degrees    T Axis 26 degrees   POCT Glucose    Collection Time: 09/15/20  9:58 PM   Result Value Ref Range    POC Glucose 181 (H) 60 - 115 mg/dl    Performed on ACCU-CHEK    POCT Glucose    Collection Time: 09/15/20 10:00 PM   Result Value Ref Range    Glucose 181 mg/dL    QC OK? OK        IMAGING:  No results found. VTE Prophylaxis: low molecular weight heparin -  continue    ASSESSMENT AND PLAN  Active Problems:    APRYL    //   CKD     Long standing renal disease. Baseline B/Scr  52 / 3.4   GFR 17      Apparently recent labs were worse - unable to access in records. K 6.5 - sent here for treatment   B/Scr now 99 / 4.58   Plan: Admit,  Nephrology consult,  Treat K for goal < 5.0      Hyperkalemia    6.5   No palpitations, tetany, cramps or spasm    Plan: Treated in ED -  Awaiting recheck. HTN (hypertension)   Carvedilol,   BP normal,  Heart rate 60s  No HA, blurred vision, CP, edema or dizziness   Normal heart sounds  Good pulses. Plan: continue betablocker. CAD (coronary artery disease)  betablocker,  Statin   Nitrate   No chest pain   Past stent placement  On plavix. Plan: continue meds. Type 2 diabetes mellitus, with long-term current use of insulin   States AM    - 170   Last A1c <7.0 per him. Plan: SSI while inhouse    Resume home home regimen at discharge.          HLD (hyperlipidemia)  Stable on statin

## 2020-09-16 NOTE — CARE COORDINATION
[facility] Case Management Initial Discharge Assessment    Met with Patient to discuss discharge plan. PCP: Kendell Gonslaves MD                                Date of Last Visit: Friday     If no PCP, list provided? N/A    Discharge Planning    Living Arrangements: independently at home    Who do you live with? [address]     Who helps you with your care:  self    If lives at home:     Do you have any barriers navigating in your home? no    Patient can perform ADL? Yes    Current Services (outpatient and in home) :  Cleaning Help    Dialysis: No    Is transportation available to get to your appointments? Yes- SON DRIVES OR HIS WIFE    DME Equipment:  yes - [facility]    Respiratory equipment: None    Respiratory provider:  no     Pharmacy:  yes - [address] with Medication Assistance Program?  No      Patient agreeable to [facility]? Declined    Patient agreeable to SNF/Rehab? N/A    Other discharge needs identified? N/A    Freedom of choice list provided with basic dialogue that supports the patient's individualized plan of care/goals and shares the quality data associated with the providers. Yes    Does Patient Have a High-Risk for Readmission Diagnosis (CHF, PN, MI, COPD)? No      The plan for Transition of Care is related to the following treatment goals: IMPROVED RENAL FUNCTION    Initial Discharge Plan? (Note: please see concurrent daily documentation for any updates after initial note). HOME, DECLINED [facility].  DENIES NEEDS    The Patient and/or patient representative: PATIENT was provided with choice of any post-acute providers for care and equipment and agrees with discharge plan  Yes    LOW RISK FOR READMIT    Electronically signed by Anthony Holly RN on 9/16/2020 at 2:09 PM

## 2020-09-17 VITALS
HEIGHT: 69 IN | SYSTOLIC BLOOD PRESSURE: 131 MMHG | TEMPERATURE: 97.9 F | BODY MASS INDEX: 31.7 KG/M2 | RESPIRATION RATE: 17 BRPM | OXYGEN SATURATION: 100 % | HEART RATE: 58 BPM | DIASTOLIC BLOOD PRESSURE: 54 MMHG | WEIGHT: 214 LBS

## 2020-09-17 LAB
ALBUMIN SERPL-MCNC: 3.5 G/DL (ref 3.5–4.6)
ALP BLD-CCNC: 38 U/L (ref 35–104)
ALT SERPL-CCNC: 10 U/L (ref 0–41)
ANION GAP SERPL CALCULATED.3IONS-SCNC: 12 MEQ/L (ref 9–15)
AST SERPL-CCNC: 11 U/L (ref 0–40)
BASOPHILS ABSOLUTE: 0.1 K/UL (ref 0–0.2)
BASOPHILS RELATIVE PERCENT: 0.8 %
BILIRUB SERPL-MCNC: 0.3 MG/DL (ref 0.2–0.7)
BUN BLDV-MCNC: 86 MG/DL (ref 8–23)
CALCIUM SERPL-MCNC: 9.8 MG/DL (ref 8.5–9.9)
CHLORIDE BLD-SCNC: 113 MEQ/L (ref 95–107)
CO2: 16 MEQ/L (ref 20–31)
CREAT SERPL-MCNC: 4.2 MG/DL (ref 0.7–1.2)
EOSINOPHILS ABSOLUTE: 0.2 K/UL (ref 0–0.7)
EOSINOPHILS RELATIVE PERCENT: 2.4 %
GFR AFRICAN AMERICAN: 16.3
GFR NON-AFRICAN AMERICAN: 13.5
GLOBULIN: 2.7 G/DL (ref 2.3–3.5)
GLUCOSE BLD-MCNC: 104 MG/DL (ref 70–99)
GLUCOSE BLD-MCNC: 106 MG/DL (ref 60–115)
GLUCOSE BLD-MCNC: 134 MG/DL (ref 60–115)
HCT VFR BLD CALC: 26.2 % (ref 42–52)
HEMOGLOBIN: 8.6 G/DL (ref 14–18)
LYMPHOCYTES ABSOLUTE: 1.4 K/UL (ref 1–4.8)
LYMPHOCYTES RELATIVE PERCENT: 21 %
MCH RBC QN AUTO: 32.5 PG (ref 27–31.3)
MCHC RBC AUTO-ENTMCNC: 32.8 % (ref 33–37)
MCV RBC AUTO: 99.1 FL (ref 80–100)
MONOCYTES ABSOLUTE: 0.5 K/UL (ref 0.2–0.8)
MONOCYTES RELATIVE PERCENT: 7.6 %
NEUTROPHILS ABSOLUTE: 4.4 K/UL (ref 1.4–6.5)
NEUTROPHILS RELATIVE PERCENT: 68.2 %
PDW BLD-RTO: 15.5 % (ref 11.5–14.5)
PERFORMED ON: ABNORMAL
PERFORMED ON: NORMAL
PLATELET # BLD: 153 K/UL (ref 130–400)
POTASSIUM REFLEX MAGNESIUM: 5.1 MEQ/L (ref 3.4–4.9)
POTASSIUM SERPL-SCNC: 5.1 MEQ/L (ref 3.4–4.9)
RBC # BLD: 2.65 M/UL (ref 4.7–6.1)
SODIUM BLD-SCNC: 141 MEQ/L (ref 135–144)
TOTAL PROTEIN: 6.2 G/DL (ref 6.3–8)
WBC # BLD: 6.5 K/UL (ref 4.8–10.8)

## 2020-09-17 PROCEDURE — 93010 ELECTROCARDIOGRAM REPORT: CPT | Performed by: INTERNAL MEDICINE

## 2020-09-17 PROCEDURE — 80053 COMPREHEN METABOLIC PANEL: CPT

## 2020-09-17 PROCEDURE — G0378 HOSPITAL OBSERVATION PER HR: HCPCS

## 2020-09-17 PROCEDURE — 36415 COLL VENOUS BLD VENIPUNCTURE: CPT

## 2020-09-17 PROCEDURE — 85025 COMPLETE CBC W/AUTO DIFF WBC: CPT

## 2020-09-17 PROCEDURE — 6370000000 HC RX 637 (ALT 250 FOR IP): Performed by: NURSE PRACTITIONER

## 2020-09-17 RX ADMIN — ASPIRIN 81 MG: 81 TABLET, CHEWABLE ORAL at 08:23

## 2020-09-17 RX ADMIN — CARVEDILOL 12.5 MG: 12.5 TABLET, FILM COATED ORAL at 08:23

## 2020-09-17 RX ADMIN — ISOSORBIDE MONONITRATE 30 MG: 30 TABLET, EXTENDED RELEASE ORAL at 08:23

## 2020-09-17 RX ADMIN — AMLODIPINE BESYLATE 5 MG: 5 TABLET ORAL at 08:23

## 2020-09-17 RX ADMIN — CLOPIDOGREL BISULFATE 75 MG: 75 TABLET ORAL at 08:23

## 2020-09-17 ASSESSMENT — PAIN SCALES - GENERAL
PAINLEVEL_OUTOF10: 0
PAINLEVEL_OUTOF10: 0

## 2020-09-17 NOTE — DISCHARGE SUMMARY
Hospital Medicine Discharge Summary    Evie Griffin  :  10/9/1933  MRN:  [id number]    Admit date:  9/15/2020  Discharge date:  2020    Admitting Physician: Prashanth Shah MD  Primary Care Physician:  Alexa Guerra MD      Discharge Diagnoses: Active Problems:    APRYL (acute kidney injury) ([facility]    CKD (chronic kidney disease)    HTN (hypertension)    CAD (coronary artery disease)    Type 2 diabetes mellitus, with long-term current use of insulin (HCC)    HLD (hyperlipidemia)    Hyperkalemia  Resolved Problems:    * No resolved hospital problems. *      Hospital Course:   Evie Griffin is a 80 y.o. male that was admitted and treated at [facility] for hyperkalemia and acute kidney injury from his nephrology office. Patient apparently has been taking an ACE inhibitor for nephro protection in the setting of diabetes and was found to have a potassium of 6.3 as well as a greater than 4 on lab work by his nephrologist.  He was directed to the emergency department for admission and evaluation. Patient was seen by nephrology and temporizing measures were given for potassium including insulin D50 Kayexalate and sodium bicarbonate. Patient had no EKG changes during this time. Patient will be discharged on [facility] per nephrology and will follow up with a BMP in 1 week. Patient is educated about maintaining proper hydration avoiding nephrotoxic agents. Patient was received low potassium education from dietitian. Patient was seen by the following consultants while admitted to [facility]:   Consults:  Yamilex Katz 761 TO DIETITIAN  IP CONSULT TO DIETITIAN    Physical Exam:   General appearance: No apparent distress, appears stated age and cooperative. HEENT: Pupils equal, round, and reactive to light. Conjunctivae/corneas clear. Neck: Supple, with full range of motion. No jugular venous distention. Trachea midline. Respiratory:  Normal respiratory effort.

## 2020-09-17 NOTE — PROGRESS NOTES
Nutrition Education    Consult recieved for diet education. Met with pt to discuss 2 gm potassium restriction. Pt stated he has been watching his potassium for about 4 years and is fairly familiar with foods high in potassium. Reviewed potassium content of foods with information broken down into High, moderate and low potassium foods. Discussed limiting poassium to around 500-600 mg per meal.  Written information provided for reference and RD contact information provided. · Verbally reviewed information with patient  · Educated on 2 Gm Potassium diet  · Written educational materials provided. · Contact name and number provided. · Refer to Patient Education activity for more details.     Electronically signed by Alex Garibay RD, LD on 9/17/20 at 12:53 PM EDT

## 2020-09-17 NOTE — CONSULTS
[address]                      [address], [address] [facility]                                  CONSULTATION    PATIENT NAME: Lisseth Gregory                         :        10/09/1933  MED REC NO:   [id number]                            ROOM:       W175  ACCOUNT NO:   [de-identified]                           ADMIT DATE: 09/15/2020  PROVIDER:     Susie Haywrad DO    CONSULT DATE:  2020    RENAL CONSULT    HISTORY OF PRESENT ILLNESS:  An 70-year-old  male in no  distress, being seen because of progressive azotemia. The patient is  followed by Dr. Nicolasa Deluna, primary care and Dr. Olen Litten, nephrologist in  [facility]. The patient was advised to go to the hospital by primary  care due to an elevated potassium and progressive decline in renal  function. The patient on questioning states that his GFR ranges between 15 and 20  mL per minute. He was told that until his renal function gets below 10  mL per minute, he would not require dialysis support. The patient is  diabetic and his blood sugar was normal on admission. The patient's hemoglobin was 8.7 with a white count of 7400. The patient does have a history of underlying organic heart disease,  diabetes mellitus type 2, hyperlipidemia, and hypertension. The patient denies any gross hematuria, kidney stone disease, or  frequent urinary tract infections. He does urinate with a slow stream.    PAST MEDICAL HISTORY:  As noted above CKD IV, hypertension,  hyperlipidemia, diabetes mellitus type 2, and presently on admission  hyperkalemia. PAST SURGICAL HISTORY:  Negative. FAMILY HISTORY:  Noncontributory. HABITS:  No smoking. No alcohol. No opioids or nonsteroidals. MEDICATIONS:  At time of his admission; Bumex, Imdur, Plavix, Coreg,  Lipitor, Rocaltrol, Zyloprim, Lantus, Zestril, amlodipine, multiple  vitamins. ALLERGIES TO MEDICATIONS:  None.     REVIEW OF SYSTEMS: Noncontributory. PHYSICAL EXAMINATION:  VITAL SIGNS:  The patient is 5 feet 9 inches, 215 pounds, blood pressure  is 124/60, heart rate 60, respirations 17, afebrile. HEENT:  Normocephalic. Pupils equal and react to light. Extraocular  muscles intact. NECK:  Supple. No JVD, bruits, or adenopathy. CHEST:  Lungs are clear. CARDIOVASCULAR:  Heart is regular with 1/6 systolic murmur. ABDOMEN:  Slightly obese. No guarding or rigidity. No distention. Bowel sounds are present. EXTREMITIES:  Show no edema. SKIN:  Warm and dry. IMPRESSION:  1.  CKD IV due to diabetes and hypertension. 2.  Hyperkalemia related to diet, CKD III, possible ACE inhibitor use. 3.  Organic heart disease, history of myocardial infarction. 4.  Hypertension. 5.  Diabetes mellitus type 2.  6.  Hyperlipidemia. PLAN:  The patient will be given Kayexalate one more treatment for the  potassium elevation. Dietitian to see for 2K potassium diet  restriction. Discontinue ACE inhibitor as this is not effective at  present dose for cardiac disorder and unwilling to use for hypertension  in light of his hyperkalemic episode. Control blood sugars and blood  pressure. Increase activity. If stable, follow as an outpatient. We  will give one dose of Procrit today in light of his anemia. Obtain iron  levels, PTH, phosphorus.       Tejinder Farris DO    D: 09/16/2020 16:17:57       T: 09/16/2020 16:25:22     [email or web address]  Job#: [id number]     Doc#: [id number]    CC:

## 2020-09-17 NOTE — ADT AUTH CERT
Renal Failure, Acute - Care Day 2 (9/16/2020) by Maynor Santos RN         Review Status  Review Entered    Completed  9/16/2020 15:18        Criteria Review       Care Day: 2 Care Date: 9/16/2020 Level of Care: Telemetry    Guideline Day 2    Level Of Care    (X) Floor    9/16/2020 3:18 PM EDT by Elke Wyatt      inpt floor tele unit renal diet tele monitor up with assist    Clinical Status    ( ) * Electrolyte abnormalities absent or improved    9/16/2020 3:18 PM EDT by Tawanna Escobar      k 5.3  cl 115  co2 15  bun 92  creat 4.34  gfr 13  gluc 122  ca 10.1  tp 6  alb 3.4    ( ) * Acid-base abnormalities absent or improved    9/16/2020 3:18 PM EDT by Elke Wyatt      not improved    (X) * Hypotension absent    9/16/2020 3:18 PM EDT by Tawanna Escobar      temp 97.7, pulse 67, tele nsr, resp 16, bp 129/53, pulse ox 100 % on ra    Activity    (X) Activity as tolerated    9/16/2020 3:18 PM EDT by Elke Wyatt      up with assist    Routes    (X) Parenteral or oral hydration    9/16/2020 3:18 PM EDT by Leydi Escobar      oral hydration    (X) Renal diet as tolerated    9/16/2020 3:18 PM EDT by Leydi Escobar      deit renal    Interventions    (X) Monitor electrolytes, renal function tests, acid-base, and volume status    Medications    (X) Possible medical therapies    9/16/2020 3:18 PM EDT by Leydi Escobar      kayexalate 15 gm po x1 this day   iv- hl'd  prn none given thus far this day    * Milestone    Additional Notes    9/16/2020  care day 2       Physical Exam    Alert, oriented, no distress, normal lung sounds bilaterally with no wheezing, normal heart sounds with no murmur, 2+ lower extremity edema bilaterally, no abdominal distention       Meds- names, dose, route, rate    Scheduled Meds:    heparin (porcine), 5,000 Units, Subcutaneous, 3 times per day    isosorbide mononitrate, 30 mg, Oral, Daily    clopidogrel, 75 mg, Oral, Daily    carvedilol, 12.5 mg, Oral, BID WC    atorvastatin, 10 mg, Oral, Nightly    amLODIPine, 5 mg, Oral, Daily    aspirin, 81 mg, Oral, Daily    insulin lispro, 0-12 Units, Subcutaneous, TID WC    insulin lispro, 0-6 Units, Subcutaneous, Nightly    epoetin emerson-epbx, 6,000 Units, Subcutaneous, Once per day on Mon Wed Fri       Treatment plan attending/ consults    Renal impression    Hyperkalemia related to CKD-DM and diet    DM type-2    OHDx CAD stents Hx MI    Hyperlipidemia    Anemia         Plan diet management            Hold ACE no benefit at this time lauro low dose            Obtain PTH iron and PHO4            Jessy-exalte again  No need for dialysis at present but will follow on discharge  Dietician to see for 2Gm potassium diet    One dose Procrit

## 2020-09-17 NOTE — PROGRESS NOTES
BILITOT 0.3     Iron:  No results for input(s): IRONS, FERRITIN in the last 72 hours. Invalid input(s): LABIRONS  Urinalysis: No results for input(s): UA in the last 72 hours.     Objective:  Vitals: BP (!) 131/54   Pulse 58   Temp 97.9 °F (36.6 °C) (Oral)   Resp 17   Ht 5' 9\" (1.753 m)   Wt 214 lb (97.1 kg)   SpO2 100%   BMI 31.60 kg/m²    Wt Readings from Last 3 Encounters:   09/17/20 214 lb (97.1 kg)      24HR INTAKE/OUTPUT:      Intake/Output Summary (Last 24 hours) at 9/17/2020 0847  Last data filed at 9/17/2020 0519  Gross per 24 hour   Intake 1090 ml   Output 1550 ml   Net -460 ml       General: alert, in no apparent distress  HEENT: normocephalic, atraumatic, anicteric  Neck: supple, no mass  Lungs: non-labored respirations, clear to auscultation bilaterally  Heart: regular rate and rhythm, no murmurs or rubs  Abdomen: soft, non-tender, non-distended  Ext: no cyanosis, no peripheral edema  Neuro: alert and oriented, no gross abnormalities  Psych: normal mood and affect  Skin: no rash      Electronically signed by Denzel Raymond MD

## 2020-09-18 LAB — VITAMIN D 1,25-DIHYDROXY: 25.5 PG/ML (ref 19.9–79.3)

## 2023-01-25 PROBLEM — M15.4 EROSIVE (OSTEO)ARTHRITIS: Status: ACTIVE | Noted: 2023-01-25

## 2023-01-25 PROBLEM — I25.10 CAD (CORONARY ARTERY DISEASE): Status: ACTIVE | Noted: 2023-01-25

## 2023-01-25 PROBLEM — I10 ESSENTIAL HYPERTENSION: Status: ACTIVE | Noted: 2023-01-25

## 2023-01-25 PROBLEM — M62.449: Status: ACTIVE | Noted: 2023-01-25

## 2023-01-25 PROBLEM — N25.81 HYPERPARATHYROIDISM DUE TO RENAL INSUFFICIENCY (MULTI): Status: ACTIVE | Noted: 2023-01-25

## 2023-01-25 PROBLEM — I73.9 PVD (PERIPHERAL VASCULAR DISEASE) (CMS-HCC): Status: ACTIVE | Noted: 2023-01-25

## 2023-01-25 PROBLEM — E83.52 HYPERCALCEMIA: Status: ACTIVE | Noted: 2023-01-25

## 2023-01-25 PROBLEM — E78.5 HYPERLIPIDEMIA: Status: ACTIVE | Noted: 2023-01-25

## 2023-01-25 PROBLEM — N18.4 STAGE 4 CHRONIC KIDNEY DISEASE (MULTI): Status: ACTIVE | Noted: 2023-01-25

## 2023-01-25 PROBLEM — H35.89 RETINAL PIGMENT EPITHELIAL MOTTLING OF MACULA: Status: ACTIVE | Noted: 2023-01-25

## 2023-01-25 PROBLEM — E11.40 DIABETIC NEUROPATHY (MULTI): Status: ACTIVE | Noted: 2023-01-25

## 2023-01-25 PROBLEM — E87.5 HYPERKALEMIA, DIMINISHED RENAL EXCRETION: Status: ACTIVE | Noted: 2023-01-25

## 2023-01-25 PROBLEM — R91.1 LUNG NODULE: Status: ACTIVE | Noted: 2023-01-25

## 2023-01-25 PROBLEM — C44.91 BASAL CELL CARCINOMA: Status: ACTIVE | Noted: 2023-01-25

## 2023-01-25 PROBLEM — I87.2 VENOUS INSUFFICIENCY: Status: ACTIVE | Noted: 2023-01-25

## 2023-01-25 PROBLEM — H52.203 ASTIGMATISM, BILATERAL: Status: ACTIVE | Noted: 2023-01-25

## 2023-01-25 PROBLEM — H52.4 BILATERAL PRESBYOPIA: Status: ACTIVE | Noted: 2023-01-25

## 2023-01-25 PROBLEM — R60.0 EDEMA LEG: Status: ACTIVE | Noted: 2023-01-25

## 2023-01-25 PROBLEM — H18.413 ARCUS SENILIS, BILATERAL: Status: ACTIVE | Noted: 2023-01-25

## 2023-01-25 PROBLEM — D18.01 ANGIOMA OF SKIN: Status: ACTIVE | Noted: 2023-01-25

## 2023-01-25 PROBLEM — E78.5 DYSLIPIDEMIA: Status: ACTIVE | Noted: 2023-01-25

## 2023-01-25 PROBLEM — N18.9 ANEMIA IN CKD (CHRONIC KIDNEY DISEASE): Status: ACTIVE | Noted: 2023-01-25

## 2023-01-25 PROBLEM — Z96.1 BILATERAL PSEUDOPHAKIA: Status: ACTIVE | Noted: 2023-01-25

## 2023-01-25 PROBLEM — B35.1 ONYCHOMYCOSIS: Status: ACTIVE | Noted: 2023-01-25

## 2023-01-25 PROBLEM — D63.1 ANEMIA IN CKD (CHRONIC KIDNEY DISEASE): Status: ACTIVE | Noted: 2023-01-25

## 2023-01-25 PROBLEM — C61 MALIGNANT NEOPLASM OF PROSTATE (MULTI): Status: ACTIVE | Noted: 2023-01-25

## 2023-01-25 PROBLEM — H43.813 PVD (POSTERIOR VITREOUS DETACHMENT), BOTH EYES: Status: ACTIVE | Noted: 2023-01-25

## 2023-01-25 PROBLEM — E11.9 TYPE 2 DIABETES MELLITUS (MULTI): Status: ACTIVE | Noted: 2023-01-25

## 2023-01-25 PROBLEM — N40.0 BPH (BENIGN PROSTATIC HYPERPLASIA): Status: ACTIVE | Noted: 2023-01-25

## 2023-01-25 PROBLEM — I25.5 ISCHEMIC CARDIOMYOPATHY: Status: ACTIVE | Noted: 2023-01-25

## 2023-01-25 RX ORDER — SODIUM BICARBONATE 650 MG/1
650 TABLET ORAL 2 TIMES DAILY
COMMUNITY

## 2023-01-25 RX ORDER — ASPIRIN 81 MG/1
81 TABLET ORAL DAILY
COMMUNITY

## 2023-01-25 RX ORDER — IBUPROFEN 100 MG/5ML
1000 SUSPENSION, ORAL (FINAL DOSE FORM) ORAL DAILY
COMMUNITY

## 2023-01-25 RX ORDER — CLOPIDOGREL BISULFATE 75 MG/1
75 TABLET ORAL DAILY
COMMUNITY
End: 2024-03-14 | Stop reason: SDUPTHER

## 2023-01-25 RX ORDER — ALLOPURINOL 100 MG/1
100 TABLET ORAL DAILY
COMMUNITY
End: 2023-11-27 | Stop reason: SDUPTHER

## 2023-01-25 RX ORDER — TORSEMIDE 20 MG/1
20 TABLET ORAL DAILY
COMMUNITY
Start: 2022-08-16

## 2023-01-25 RX ORDER — LISINOPRIL 5 MG/1
5 TABLET ORAL DAILY
COMMUNITY
End: 2024-06-07

## 2023-01-25 RX ORDER — ISOSORBIDE MONONITRATE 30 MG/1
30 TABLET, EXTENDED RELEASE ORAL DAILY
COMMUNITY
Start: 2017-01-12

## 2023-01-25 RX ORDER — PATIROMER 8.4 G/1
8.4 POWDER, FOR SUSPENSION ORAL DAILY
COMMUNITY
Start: 2021-03-17

## 2023-01-25 RX ORDER — GLUCOSAMINE/CHONDR SU A SOD 750-600 MG
1500 TABLET ORAL 2 TIMES DAILY
COMMUNITY

## 2023-01-25 RX ORDER — AMLODIPINE BESYLATE 5 MG/1
5 TABLET ORAL DAILY
COMMUNITY
End: 2023-11-01 | Stop reason: SDUPTHER

## 2023-01-25 RX ORDER — ATORVASTATIN CALCIUM 10 MG/1
10 TABLET, FILM COATED ORAL NIGHTLY
COMMUNITY
Start: 2020-09-24

## 2023-01-25 RX ORDER — CALCITRIOL 0.25 UG/1
0.25 CAPSULE ORAL DAILY
COMMUNITY
Start: 2016-12-30 | End: 2023-12-26 | Stop reason: SDUPTHER

## 2023-01-25 RX ORDER — CARVEDILOL 6.25 MG/1
6.25 TABLET ORAL 2 TIMES DAILY
COMMUNITY
End: 2024-03-14 | Stop reason: SDUPTHER

## 2023-01-25 RX ORDER — NITROGLYCERIN 0.4 MG/1
0.4 TABLET SUBLINGUAL EVERY 5 MIN PRN
COMMUNITY
Start: 2021-07-19 | End: 2024-04-23 | Stop reason: SDUPTHER

## 2023-01-26 PROBLEM — E66.811 CLASS 1 OBESITY WITH BODY MASS INDEX (BMI) OF 30.0 TO 30.9 IN ADULT: Status: ACTIVE | Noted: 2023-01-26

## 2023-01-26 PROBLEM — E66.9 CLASS 1 OBESITY WITH BODY MASS INDEX (BMI) OF 30.0 TO 30.9 IN ADULT: Status: ACTIVE | Noted: 2023-01-26

## 2023-03-08 ENCOUNTER — OFFICE VISIT (OUTPATIENT)
Dept: PRIMARY CARE | Facility: CLINIC | Age: 88
End: 2023-03-08
Payer: MEDICARE

## 2023-03-08 VITALS
BODY MASS INDEX: 26.48 KG/M2 | HEART RATE: 56 BPM | DIASTOLIC BLOOD PRESSURE: 58 MMHG | TEMPERATURE: 97.8 F | HEIGHT: 70 IN | WEIGHT: 185 LBS | SYSTOLIC BLOOD PRESSURE: 124 MMHG | RESPIRATION RATE: 14 BRPM

## 2023-03-08 DIAGNOSIS — N18.4 STAGE 4 CHRONIC KIDNEY DISEASE (MULTI): ICD-10-CM

## 2023-03-08 DIAGNOSIS — Z00.00 ROUTINE GENERAL MEDICAL EXAMINATION AT HEALTH CARE FACILITY: Primary | ICD-10-CM

## 2023-03-08 DIAGNOSIS — C61 MALIGNANT NEOPLASM OF PROSTATE (MULTI): ICD-10-CM

## 2023-03-08 DIAGNOSIS — E11.42 DIABETIC POLYNEUROPATHY ASSOCIATED WITH TYPE 2 DIABETES MELLITUS (MULTI): ICD-10-CM

## 2023-03-08 DIAGNOSIS — M77.8 RIGHT WRIST TENDONITIS: ICD-10-CM

## 2023-03-08 DIAGNOSIS — I73.9 PVD (PERIPHERAL VASCULAR DISEASE) (CMS-HCC): ICD-10-CM

## 2023-03-08 DIAGNOSIS — E11.9 TYPE 2 DIABETES MELLITUS WITHOUT COMPLICATION, WITHOUT LONG-TERM CURRENT USE OF INSULIN (MULTI): ICD-10-CM

## 2023-03-08 DIAGNOSIS — N25.81 HYPERPARATHYROIDISM DUE TO RENAL INSUFFICIENCY (MULTI): ICD-10-CM

## 2023-03-08 LAB — POC HEMOGLOBIN A1C: 7.9 % (ref 4.2–6.5)

## 2023-03-08 PROCEDURE — 1036F TOBACCO NON-USER: CPT | Performed by: FAMILY MEDICINE

## 2023-03-08 PROCEDURE — 1159F MED LIST DOCD IN RCRD: CPT | Performed by: FAMILY MEDICINE

## 2023-03-08 PROCEDURE — 83036 HEMOGLOBIN GLYCOSYLATED A1C: CPT | Performed by: FAMILY MEDICINE

## 2023-03-08 PROCEDURE — 3078F DIAST BP <80 MM HG: CPT | Performed by: FAMILY MEDICINE

## 2023-03-08 PROCEDURE — 1170F FXNL STATUS ASSESSED: CPT | Performed by: FAMILY MEDICINE

## 2023-03-08 PROCEDURE — G0439 PPPS, SUBSEQ VISIT: HCPCS | Performed by: FAMILY MEDICINE

## 2023-03-08 PROCEDURE — 3074F SYST BP LT 130 MM HG: CPT | Performed by: FAMILY MEDICINE

## 2023-03-08 RX ORDER — DICLOFENAC SODIUM 10 MG/G
4 GEL TOPICAL 4 TIMES DAILY PRN
Qty: 100 G | Refills: 2 | Status: SHIPPED | OUTPATIENT
Start: 2023-03-08 | End: 2023-04-07

## 2023-03-08 ASSESSMENT — ACTIVITIES OF DAILY LIVING (ADL)
TAKING_MEDICATION: INDEPENDENT
DOING_HOUSEWORK: NEEDS ASSISTANCE
MANAGING_FINANCES: INDEPENDENT
DRESSING: INDEPENDENT
BATHING: INDEPENDENT
GROCERY_SHOPPING: NEEDS ASSISTANCE

## 2023-03-08 ASSESSMENT — ENCOUNTER SYMPTOMS
LOSS OF SENSATION IN FEET: 1
OCCASIONAL FEELINGS OF UNSTEADINESS: 1
DEPRESSION: 0

## 2023-03-08 ASSESSMENT — PATIENT HEALTH QUESTIONNAIRE - PHQ9
SUM OF ALL RESPONSES TO PHQ9 QUESTIONS 1 AND 2: 0
2. FEELING DOWN, DEPRESSED OR HOPELESS: NOT AT ALL
1. LITTLE INTEREST OR PLEASURE IN DOING THINGS: NOT AT ALL

## 2023-03-08 NOTE — ASSESSMENT & PLAN NOTE
A1c is a little worse at 7.9 but at age adjusted goal.  He will work on exercise and diet and follow up in 6 months

## 2023-03-08 NOTE — PROGRESS NOTES
"Subjective   Reason for Visit: Jamaal Balbuena is an 89 y.o. male here for a Medicare Wellness visit.     Past Medical, Surgical, and Family History reviewed and updated in chart.    Reviewed all medications by prescribing practitioner or clinical pharmacist (such as prescriptions, OTCs, herbal therapies and supplements) and documented in the medical record.    Wrist Injury   Incident onset: about 6 months. There was no injury mechanism. The pain is present in the right hand. The pain radiates to the right arm (follows extensor tendon). The pain is moderate. The pain has been Fluctuating since the incident. The symptoms are aggravated by movement.       Patient Self Assessment of Health Status  Patient Self Assessment: Good    Nutrition and Exercise  Current Diet: Well Balanced Diet  Adequate Fluid Intake: Yes  Caffeine: No  Exercise Frequency: No Exercise    Functional Ability/Level of Safety  Cognitive Impairment Observed: No cognitive impairment observed    Home Safety Risk Factors: None    Patient Care Team:  Mark Sandoval MD as PCP - General  Mark Sandoval MD as PCP - [employer] PCP     Review of Systems    Objective   Vitals:  /58   Pulse 56   Temp 36.6 °C (97.8 °F)   Resp 14   Ht 1.778 m (5' 10\")   Wt 83.9 kg (185 lb)   BMI 26.54 kg/m²       Physical Exam  Constitutional:       Appearance: Normal appearance.   HENT:      Head: Normocephalic.   Eyes:      Conjunctiva/sclera: Conjunctivae normal.   Cardiovascular:      Rate and Rhythm: Normal rate and regular rhythm.   Pulmonary:      Effort: Pulmonary effort is normal.      Breath sounds: Normal breath sounds.   Musculoskeletal:      Cervical back: Neck supple.      Right lower leg: Edema present.      Left lower leg: Edema present.      Comments: There is pain demonstrated in the base of the right thumb on the extensor side radiating up the forearm about correction.  There is no redness or edema or induration.  There is a positive " Finkelstein test when he makes a fist with the thumb on the inside.  He also has pain on extending the thumb.   Skin:     General: Skin is warm and dry.   Neurological:      Mental Status: He is alert.         Assessment/Plan   Problem List Items Addressed This Visit       Diabetic neuropathy (CMS/[facility])    Current Assessment & Plan     This problem is well controlled. We will continue the current treatment plan.            Hyperparathyroidism due to renal insufficiency (CMS/[facility])    Current Assessment & Plan     This problem is well controlled. We will continue the current treatment plan.            Malignant neoplasm of prostate (CMS/[facility])    Current Assessment & Plan     resolved         PVD (peripheral vascular disease) (CMS/HCC)    Current Assessment & Plan     This problem is well controlled. We will continue the current treatment plan.            Stage 4 chronic kidney disease (CMS/HCC)    Current Assessment & Plan     GFR has dropped to 14 but he is following with nephrology on this already and will keep follow up next week         Type 2 diabetes mellitus (CMS/[facility])    Current Assessment & Plan     A1c is a little worse at 7.9 but at age adjusted goal.  He will work on exercise and diet and follow up in 6 months         Relevant Orders    POCT glycosylated hemoglobin (Hb A1C) manually resulted (Completed)     Other Visit Diagnoses       Routine general medical examination at health care facility    -  Primary    Right wrist tendonitis        Relevant Medications    diclofenac sodium (Voltaren) 1 % gel gel          This is classic tenosynovitis of the right extensor tendon. He will apply ice, rest and I will use some Voltaren gel. This is topical and not systemically absorbed so should be okay given his ckd

## 2023-03-08 NOTE — ASSESSMENT & PLAN NOTE
GFR has dropped to 14 but he is following with nephrology on this already and will keep follow up next week

## 2023-03-13 LAB
ANION GAP IN SER/PLAS: 14 MMOL/L (ref 10–20)
CALCIUM (MG/DL) IN SER/PLAS: 10.7 MG/DL (ref 8.6–10.3)
CARBON DIOXIDE, TOTAL (MMOL/L) IN SER/PLAS: 24 MMOL/L (ref 21–32)
CHLORIDE (MMOL/L) IN SER/PLAS: 109 MMOL/L (ref 98–107)
CREATININE (MG/DL) IN SER/PLAS: 3.92 MG/DL (ref 0.5–1.3)
GFR MALE: 14 ML/MIN/1.73M2
GLUCOSE (MG/DL) IN SER/PLAS: 291 MG/DL (ref 74–99)
PHOSPHATE (MG/DL) IN SER/PLAS: 3.8 MG/DL (ref 2.5–4.9)
POTASSIUM (MMOL/L) IN SER/PLAS: 4.5 MMOL/L (ref 3.5–5.3)
SODIUM (MMOL/L) IN SER/PLAS: 142 MMOL/L (ref 136–145)
UREA NITROGEN (MG/DL) IN SER/PLAS: 94 MG/DL (ref 6–23)

## 2023-03-14 LAB — PARATHYRIN INTACT (PG/ML) IN SER/PLAS: 96.8 PG/ML (ref 18.5–88)

## 2023-05-19 ENCOUNTER — PATIENT OUTREACH (OUTPATIENT)
Dept: CARE COORDINATION | Facility: CLINIC | Age: 88
End: 2023-05-19
Payer: MEDICARE

## 2023-05-19 NOTE — PROGRESS NOTES
Called to get his wife scheduled for [facility] - he is a patient as well. Spoke with him about my service.     I introduced myself as the Patient Navigator. I am here to help with any obstacles that are in the way of receiving the proper care. I am able to assist with appointments issues, medication coverage issues, community programs which can include help with meals, medical supplies, senior programs, housing issues and/or transportation.    I went over any need to help schedule any referral and/or testing that was ordered at appointment.     At this time the patient states no need for any assistance. I gave my direct number [phone] if anything arises in the future they may contact me directly.

## 2023-06-20 ENCOUNTER — APPOINTMENT (OUTPATIENT)
Dept: LAB | Facility: LAB | Age: 88
End: 2023-06-20
Payer: MEDICARE

## 2023-06-20 LAB
ANION GAP IN SER/PLAS: 13 MMOL/L (ref 10–20)
CALCIUM (MG/DL) IN SER/PLAS: 9.9 MG/DL (ref 8.6–10.3)
CARBON DIOXIDE, TOTAL (MMOL/L) IN SER/PLAS: 24 MMOL/L (ref 21–32)
CHLORIDE (MMOL/L) IN SER/PLAS: 108 MMOL/L (ref 98–107)
CREATININE (MG/DL) IN SER/PLAS: 4.11 MG/DL (ref 0.5–1.3)
GFR MALE: 13 ML/MIN/1.73M2
GLUCOSE (MG/DL) IN SER/PLAS: 229 MG/DL (ref 74–99)
PARATHYRIN INTACT (PG/ML) IN SER/PLAS: 124.7 PG/ML (ref 18.5–88)
PHOSPHATE (MG/DL) IN SER/PLAS: 3.7 MG/DL (ref 2.5–4.9)
POTASSIUM (MMOL/L) IN SER/PLAS: 4.3 MMOL/L (ref 3.5–5.3)
SODIUM (MMOL/L) IN SER/PLAS: 141 MMOL/L (ref 136–145)
UREA NITROGEN (MG/DL) IN SER/PLAS: 89 MG/DL (ref 6–23)

## 2023-07-10 ENCOUNTER — OFFICE VISIT (OUTPATIENT)
Dept: PRIMARY CARE | Facility: CLINIC | Age: 88
End: 2023-07-10
Payer: MEDICARE

## 2023-07-10 VITALS
HEIGHT: 70 IN | HEART RATE: 56 BPM | SYSTOLIC BLOOD PRESSURE: 143 MMHG | DIASTOLIC BLOOD PRESSURE: 71 MMHG | TEMPERATURE: 98.2 F | BODY MASS INDEX: 27.77 KG/M2 | WEIGHT: 194 LBS

## 2023-07-10 DIAGNOSIS — I73.9 PVD (PERIPHERAL VASCULAR DISEASE) (CMS-HCC): ICD-10-CM

## 2023-07-10 DIAGNOSIS — N25.81 HYPERPARATHYROIDISM DUE TO RENAL INSUFFICIENCY (MULTI): Primary | ICD-10-CM

## 2023-07-10 DIAGNOSIS — N18.4 STAGE 4 CHRONIC KIDNEY DISEASE (MULTI): ICD-10-CM

## 2023-07-10 DIAGNOSIS — E83.52 HYPERCALCEMIA: ICD-10-CM

## 2023-07-10 DIAGNOSIS — E11.9 TYPE 2 DIABETES MELLITUS WITHOUT COMPLICATION, WITHOUT LONG-TERM CURRENT USE OF INSULIN (MULTI): ICD-10-CM

## 2023-07-10 DIAGNOSIS — I15.0 RENOVASCULAR HYPERTENSION: ICD-10-CM

## 2023-07-10 PROBLEM — I10 HTN (HYPERTENSION): Status: ACTIVE | Noted: 2020-09-15

## 2023-07-10 PROBLEM — R60.0 EDEMA LEG: Status: RESOLVED | Noted: 2023-01-25 | Resolved: 2023-07-10

## 2023-07-10 PROBLEM — E66.9 CLASS 1 OBESITY WITH BODY MASS INDEX (BMI) OF 30.0 TO 30.9 IN ADULT: Status: RESOLVED | Noted: 2023-01-26 | Resolved: 2023-07-10

## 2023-07-10 PROBLEM — M15.4 EROSIVE (OSTEO)ARTHRITIS: Status: RESOLVED | Noted: 2023-01-25 | Resolved: 2023-07-10

## 2023-07-10 PROBLEM — E66.811 CLASS 1 OBESITY WITH BODY MASS INDEX (BMI) OF 30.0 TO 30.9 IN ADULT: Status: RESOLVED | Noted: 2023-01-26 | Resolved: 2023-07-10

## 2023-07-10 PROBLEM — E78.5 HLD (HYPERLIPIDEMIA): Status: ACTIVE | Noted: 2020-09-15

## 2023-07-10 PROBLEM — E78.5 DYSLIPIDEMIA: Status: RESOLVED | Noted: 2023-01-25 | Resolved: 2023-07-10

## 2023-07-10 LAB — POC HEMOGLOBIN A1C: 7.5 % (ref 4.2–6.5)

## 2023-07-10 PROCEDURE — 1159F MED LIST DOCD IN RCRD: CPT | Performed by: FAMILY MEDICINE

## 2023-07-10 PROCEDURE — 3077F SYST BP >= 140 MM HG: CPT | Performed by: FAMILY MEDICINE

## 2023-07-10 PROCEDURE — 83036 HEMOGLOBIN GLYCOSYLATED A1C: CPT | Performed by: FAMILY MEDICINE

## 2023-07-10 PROCEDURE — 3078F DIAST BP <80 MM HG: CPT | Performed by: FAMILY MEDICINE

## 2023-07-10 PROCEDURE — 1036F TOBACCO NON-USER: CPT | Performed by: FAMILY MEDICINE

## 2023-07-10 PROCEDURE — 99214 OFFICE O/P EST MOD 30 MIN: CPT | Performed by: FAMILY MEDICINE

## 2023-07-10 RX ORDER — FLASH GLUCOSE SENSOR
KIT MISCELLANEOUS
Qty: 6 EACH | Refills: 3 | Status: SHIPPED | OUTPATIENT
Start: 2023-07-10 | End: 2024-06-03

## 2023-07-10 ASSESSMENT — ENCOUNTER SYMPTOMS
POLYDIPSIA: 0
VISUAL CHANGE: 0

## 2023-07-10 NOTE — ASSESSMENT & PLAN NOTE
Blood pressure remains at age-adjusted goal with 4 separate agents.  We will continue at their current dosages

## 2023-07-10 NOTE — ASSESSMENT & PLAN NOTE
We reviewed notes from his cardiologist who is monitoring but has not made any changes in his medication regimen.

## 2023-07-10 NOTE — ASSESSMENT & PLAN NOTE
He follows with nephrology and has labs done every 2 to 3 months.  I reviewed the last several sets of labs showing a fairly consistent GFR between 12 and 15.  He will continue to avoid nephrotoxins and monitor with nephrology

## 2023-07-10 NOTE — ASSESSMENT & PLAN NOTE
Lab Results   Component Value Date    HGBA1C 7.5 (A) 07/10/2023     This remains at age adjusted goal with diet intervention alone.  He is using a CGM and getting consistent readings

## 2023-09-19 LAB
ANION GAP IN SER/PLAS: 15 MMOL/L (ref 10–20)
CALCIUM (MG/DL) IN SER/PLAS: 10.8 MG/DL (ref 8.6–10.3)
CARBON DIOXIDE, TOTAL (MMOL/L) IN SER/PLAS: 24 MMOL/L (ref 21–32)
CHLORIDE (MMOL/L) IN SER/PLAS: 109 MMOL/L (ref 98–107)
CREATININE (MG/DL) IN SER/PLAS: 4.09 MG/DL (ref 0.5–1.3)
GFR MALE: 13 ML/MIN/1.73M2
GLUCOSE (MG/DL) IN SER/PLAS: 199 MG/DL (ref 74–99)
PHOSPHATE (MG/DL) IN SER/PLAS: 4.3 MG/DL (ref 2.5–4.9)
POTASSIUM (MMOL/L) IN SER/PLAS: 3.7 MMOL/L (ref 3.5–5.3)
SODIUM (MMOL/L) IN SER/PLAS: 144 MMOL/L (ref 136–145)
UREA NITROGEN (MG/DL) IN SER/PLAS: 82 MG/DL (ref 6–23)

## 2023-09-20 LAB — PARATHYRIN INTACT (PG/ML) IN SER/PLAS: 92 PG/ML (ref 18.5–88)

## 2023-10-17 DIAGNOSIS — I10 HYPERTENSION, UNSPECIFIED TYPE: ICD-10-CM

## 2023-10-18 RX ORDER — AMLODIPINE BESYLATE 5 MG/1
5 TABLET ORAL DAILY
Qty: 90 TABLET | Refills: 3 | OUTPATIENT
Start: 2023-10-18

## 2023-11-01 DIAGNOSIS — I10 HYPERTENSION, UNSPECIFIED TYPE: ICD-10-CM

## 2023-11-02 RX ORDER — AMLODIPINE BESYLATE 5 MG/1
5 TABLET ORAL DAILY
Qty: 90 TABLET | Refills: 3 | Status: SHIPPED | OUTPATIENT
Start: 2023-11-02 | End: 2024-03-05 | Stop reason: SDUPTHER

## 2023-11-09 ENCOUNTER — OFFICE VISIT (OUTPATIENT)
Dept: PRIMARY CARE | Facility: CLINIC | Age: 88
End: 2023-11-09
Payer: MEDICARE

## 2023-11-09 DIAGNOSIS — N18.5 CKD (CHRONIC KIDNEY DISEASE), STAGE V (MULTI): ICD-10-CM

## 2023-11-09 DIAGNOSIS — J47.9 BRONCHIECTASIS, UNCOMPLICATED (MULTI): ICD-10-CM

## 2023-11-09 DIAGNOSIS — Z23 NEED FOR INFLUENZA VACCINATION: ICD-10-CM

## 2023-11-09 DIAGNOSIS — G31.9 DEGENERATIVE DISEASE OF NERVOUS SYSTEM, UNSPECIFIED (CMS-HCC): Primary | ICD-10-CM

## 2023-11-09 PROCEDURE — 1036F TOBACCO NON-USER: CPT | Performed by: FAMILY MEDICINE

## 2023-11-09 PROCEDURE — 3078F DIAST BP <80 MM HG: CPT | Performed by: FAMILY MEDICINE

## 2023-11-09 PROCEDURE — 1159F MED LIST DOCD IN RCRD: CPT | Performed by: FAMILY MEDICINE

## 2023-11-09 PROCEDURE — 90662 IIV NO PRSV INCREASED AG IM: CPT | Performed by: FAMILY MEDICINE

## 2023-11-09 PROCEDURE — G0008 ADMIN INFLUENZA VIRUS VAC: HCPCS | Performed by: FAMILY MEDICINE

## 2023-11-09 PROCEDURE — 3077F SYST BP >= 140 MM HG: CPT | Performed by: FAMILY MEDICINE

## 2023-11-27 DIAGNOSIS — I10 ESSENTIAL HYPERTENSION: Primary | ICD-10-CM

## 2023-11-27 RX ORDER — ALLOPURINOL 100 MG/1
100 TABLET ORAL DAILY
Qty: 90 TABLET | Refills: 3 | Status: SHIPPED | OUTPATIENT
Start: 2023-11-27 | End: 2024-11-26

## 2023-12-01 ENCOUNTER — OFFICE VISIT (OUTPATIENT)
Dept: PRIMARY CARE | Facility: CLINIC | Age: 88
End: 2023-12-01
Payer: MEDICARE

## 2023-12-01 ENCOUNTER — LAB (OUTPATIENT)
Dept: LAB | Facility: LAB | Age: 88
End: 2023-12-01
Payer: MEDICARE

## 2023-12-01 ENCOUNTER — ANCILLARY PROCEDURE (OUTPATIENT)
Dept: RADIOLOGY | Facility: CLINIC | Age: 88
End: 2023-12-01
Payer: MEDICARE

## 2023-12-01 VITALS
RESPIRATION RATE: 18 BRPM | OXYGEN SATURATION: 95 % | BODY MASS INDEX: 26.4 KG/M2 | DIASTOLIC BLOOD PRESSURE: 56 MMHG | SYSTOLIC BLOOD PRESSURE: 113 MMHG | HEART RATE: 80 BPM | WEIGHT: 184 LBS | TEMPERATURE: 98 F

## 2023-12-01 DIAGNOSIS — M25.561 ACUTE PAIN OF RIGHT KNEE: Primary | ICD-10-CM
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
## 2023-12-01 DIAGNOSIS — M25.561 ACUTE PAIN OF RIGHT KNEE: ICD-10-CM

## 2023-12-01 DIAGNOSIS — N18.5 CKD (CHRONIC KIDNEY DISEASE), STAGE V (MULTI): ICD-10-CM

## 2023-12-01 DIAGNOSIS — M79.662 PAIN IN LEFT SHIN: ICD-10-CM

## 2023-12-01 LAB
25(OH)D3 SERPL-MCNC: 27 NG/ML (ref 30–100)
ANION GAP SERPL CALC-SCNC: 13 MMOL/L (ref 10–20)
BASOPHILS # BLD AUTO: 0.07 X10*3/UL (ref 0–0.1)
BASOPHILS NFR BLD AUTO: 0.8 %
BUN SERPL-MCNC: 86 MG/DL (ref 6–23)
CALCIUM SERPL-MCNC: 10.9 MG/DL (ref 8.6–10.3)
CHLORIDE SERPL-SCNC: 110 MMOL/L (ref 98–107)
CO2 SERPL-SCNC: 26 MMOL/L (ref 21–32)
CREAT SERPL-MCNC: 3.77 MG/DL (ref 0.5–1.3)
EOSINOPHIL # BLD AUTO: 0 X10*3/UL (ref 0–0.4)
EOSINOPHIL NFR BLD AUTO: 0 %
ERYTHROCYTE [DISTWIDTH] IN BLOOD BY AUTOMATED COUNT: 13.8 % (ref 11.5–14.5)
GFR SERPL CREATININE-BSD FRML MDRD: 15 ML/MIN/1.73M*2
GLUCOSE SERPL-MCNC: 193 MG/DL (ref 74–99)
HCT VFR BLD AUTO: 30.5 % (ref 41–52)
HGB BLD-MCNC: 9.7 G/DL (ref 13.5–17.5)
IMM GRANULOCYTES # BLD AUTO: 0.02 X10*3/UL (ref 0–0.5)
IMM GRANULOCYTES NFR BLD AUTO: 0.2 % (ref 0–0.9)
LYMPHOCYTES # BLD AUTO: 1.25 X10*3/UL (ref 0.8–3)
LYMPHOCYTES NFR BLD AUTO: 14.1 %
MCH RBC QN AUTO: 32 PG (ref 26–34)
MCHC RBC AUTO-ENTMCNC: 31.8 G/DL (ref 32–36)
MCV RBC AUTO: 101 FL (ref 80–100)
MONOCYTES # BLD AUTO: 0.64 X10*3/UL (ref 0.05–0.8)
MONOCYTES NFR BLD AUTO: 7.2 %
NEUTROPHILS # BLD AUTO: 6.91 X10*3/UL (ref 1.6–5.5)
NEUTROPHILS NFR BLD AUTO: 77.7 %
NRBC BLD-RTO: 0 /100 WBCS (ref 0–0)
PLATELET # BLD AUTO: 173 X10*3/UL (ref 150–450)
POTASSIUM SERPL-SCNC: 4.5 MMOL/L (ref 3.5–5.3)
RBC # BLD AUTO: 3.03 X10*6/UL (ref 4.5–5.9)
SODIUM SERPL-SCNC: 144 MMOL/L (ref 136–145)
WBC # BLD AUTO: 8.9 X10*3/UL (ref 4.4–11.3)

## 2023-12-01 PROCEDURE — 85025 COMPLETE CBC W/AUTO DIFF WBC: CPT

## 2023-12-01 PROCEDURE — 72110 X-RAY EXAM L-2 SPINE 4/>VWS: CPT | Performed by: RADIOLOGY

## 2023-12-01 PROCEDURE — 72220 X-RAY EXAM SACRUM TAILBONE: CPT

## 2023-12-01 PROCEDURE — 99214 OFFICE O/P EST MOD 30 MIN: CPT | Performed by: STUDENT IN AN ORGANIZED HEALTH CARE EDUCATION/TRAINING PROGRAM

## 2023-12-01 PROCEDURE — 72110 X-RAY EXAM L-2 SPINE 4/>VWS: CPT

## 2023-12-01 PROCEDURE — 80048 BASIC METABOLIC PNL TOTAL CA: CPT

## 2023-12-01 PROCEDURE — 36415 COLL VENOUS BLD VENIPUNCTURE: CPT

## 2023-12-01 PROCEDURE — 1036F TOBACCO NON-USER: CPT | Performed by: STUDENT IN AN ORGANIZED HEALTH CARE EDUCATION/TRAINING PROGRAM

## 2023-12-01 PROCEDURE — 3074F SYST BP LT 130 MM HG: CPT | Performed by: STUDENT IN AN ORGANIZED HEALTH CARE EDUCATION/TRAINING PROGRAM

## 2023-12-01 PROCEDURE — 73564 X-RAY EXAM KNEE 4 OR MORE: CPT | Mod: RIGHT SIDE | Performed by: RADIOLOGY

## 2023-12-01 PROCEDURE — 72220 X-RAY EXAM SACRUM TAILBONE: CPT | Performed by: RADIOLOGY

## 2023-12-01 PROCEDURE — 1159F MED LIST DOCD IN RCRD: CPT | Performed by: STUDENT IN AN ORGANIZED HEALTH CARE EDUCATION/TRAINING PROGRAM

## 2023-12-01 PROCEDURE — 73590 X-RAY EXAM OF LOWER LEG: CPT | Mod: LEFT SIDE | Performed by: RADIOLOGY

## 2023-12-01 PROCEDURE — 3078F DIAST BP <80 MM HG: CPT | Performed by: STUDENT IN AN ORGANIZED HEALTH CARE EDUCATION/TRAINING PROGRAM

## 2023-12-01 PROCEDURE — 1160F RVW MEDS BY RX/DR IN RCRD: CPT | Performed by: STUDENT IN AN ORGANIZED HEALTH CARE EDUCATION/TRAINING PROGRAM

## 2023-12-01 PROCEDURE — 73564 X-RAY EXAM KNEE 4 OR MORE: CPT | Mod: RT

## 2023-12-01 PROCEDURE — 82306 VITAMIN D 25 HYDROXY: CPT

## 2023-12-01 PROCEDURE — 73590 X-RAY EXAM OF LOWER LEG: CPT | Mod: LT

## 2023-12-01 ASSESSMENT — ENCOUNTER SYMPTOMS
COUGH: 0
NUMBNESS: 1
HEADACHES: 0
DIZZINESS: 0
BACK PAIN: 0
LIGHT-HEADEDNESS: 0
LEG PAIN: 1
WEAKNESS: 1
SHORTNESS OF BREATH: 0

## 2023-12-01 NOTE — PROGRESS NOTES
Subjective   Jamaal Balbuena is a 89 y.o. male who presents for Leg Pain (Bilateral leg pain and weakness since approx 11/18/23. He was walking a mile a day before that and now can not walk. He feels his brain isn't telling his legs what to do. He has had many falls.).  Leg Pain   Associated symptoms include numbness.     Pt here for gait problem. Says that he previously could walk a mile per day, but suddenly he could not walk on 11/18/23. Has sharp pain in R knee, worse with standing.     He has fallen several times since 11/18/23, last yesterday. Fell on back yesterday, but his R leg was bent under him. Also fell in shower on 11/18/23, hit L lorenz on some objects in shower. Denies syncope, pre-syncope, headaches, dizziness, lightheadedness or head trauma. Reports clumsiness and lack of proprioception in feet. Endorses weakness below knees b/l, mild anorexia, weight loss. Reports chronic foot numbness. Denies low back pain, saddle anesthesia, fevers, bowel/bladder incontinence/retention. No new medication changes. Was in [place] 11/17-11/27.    Review of Systems   Respiratory:  Negative for cough and shortness of breath.    Cardiovascular:  Negative for chest pain.   Musculoskeletal:  Positive for gait problem. Negative for back pain.   Neurological:  Positive for weakness and numbness. Negative for dizziness, syncope, light-headedness and headaches.       Visit Vitals  /56 (BP Location: Right arm, Patient Position: Sitting)   Pulse 80   Temp 36.7 °C (98 °F)   Resp 18       Objective   Physical Exam  Vitals reviewed.   Constitutional:       General: He is not in acute distress.     Appearance: Normal appearance.      Comments: In wheelchair   HENT:      Mouth/Throat:      Mouth: Mucous membranes are moist.      Pharynx: Oropharynx is clear.   Eyes:      Conjunctiva/sclera: Conjunctivae normal.      Pupils: Pupils are equal, round, and reactive to light.   Cardiovascular:      Rate and Rhythm: Normal rate and  regular rhythm.      Pulses: Normal pulses.   Pulmonary:      Effort: Pulmonary effort is normal.      Breath sounds: Normal breath sounds.   Musculoskeletal:      Lumbar back: No tenderness or bony tenderness.      Right knee: Bony tenderness (peripatellar, fibular head) present. No swelling, deformity or effusion. Normal range of motion. Tenderness present over the medial joint line and lateral joint line. No LCL laxity or MCL laxity.      Instability Tests: Anterior drawer test negative. Posterior drawer test negative. Medial Maco test positive and lateral Maco test positive.      Left knee: Normal.      Right lower leg: No edema.      Left lower leg: Bony tenderness (mid-distal shaft tibia, mid fibula) present. No edema.      Comments: Negative slump test b/l.   Skin:     General: Skin is warm and dry.   Neurological:      Mental Status: He is alert.      Gait: Gait abnormal (hesistant to bear weight on R leg, short, shuffling gait).      Comments: Hip abduction/adduction and flexion/extension are 5/5 b/l. Knee flexion/extension is 5/5 b/l. Ankle plantarflexion/dorsiflexion is 5/5 b/l. Decreased sensation to light touch b/l distal to ankles.          Assessment/Plan   Problem List Items Addressed This Visit       CKD (chronic kidney disease), stage V (CMS/HCC)    Relevant Orders    Vitamin D 25-Hydroxy,Total (for eval of Vitamin D levels)     Other Visit Diagnoses       Acute pain of right knee    -  Primary    Relevant Orders    XR knee right 4+ views    Pain in left shin        Relevant Orders    XR tibia fibula left 2 views    Fall, initial encounter        Relevant Orders    Referral to Falls Clinic    CBC and Auto Differential    Basic metabolic panel    Vitamin D 25-Hydroxy,Total (for eval of Vitamin D levels)    XR sacrum coccyx 2+ views          89-year-old male with gait problem and recurrent falls.  Falls do not sound cardiac in nature.  Will order laboratory work-up with CBC, BMP, and vitamin  D.  Could have a potential compression fracture causing intermittent lower extremity weakness and numbness.  Lumbosacral x-rays ordered.  Cannot rule out R knee or L tibia/fibula fractures, x-rays ordered due to point tenderness.  Recommend referral to falls clinic for comprehensive approach to fall prevention including evaluation and physical therapy.  Discussed potential option of acute rehab at skilled nursing facility with patient and son.  If he has any further falls, recommend going to the ER for evaluation and likely placement at acute rehab.    Sinan Miller MD  PGY-3  Family Medicine

## 2023-12-04 NOTE — RESULT ENCOUNTER NOTE
Jamaal Balbuena was called and informed renal function is actually slightly improved and anemia is stable.

## 2023-12-04 NOTE — PROGRESS NOTES
I saw and evaluated the patient. I personally obtained the key and critical portions of the history and physical exam or was physically present for key and critical portions performed by the resident/fellow. I reviewed the resident/fellow's documentation and discussed the patient with the resident/fellow. I agree with the resident/fellow's medical decision making as documented in the note.    Mark Sandoval MD

## 2023-12-04 NOTE — RESULT ENCOUNTER NOTE
Jamaal Balbuena was called and informed about the nondisplaced sacral - coxxyx fracture.  He is not currently having any pain there so I advised that he proceed with the planned physical therapy.  I also gave him the results of all the other films which were normal including the lumbar film which did not show any compression fractures

## 2023-12-26 ENCOUNTER — OFFICE VISIT (OUTPATIENT)
Dept: NEPHROLOGY | Facility: CLINIC | Age: 88
End: 2023-12-26
Payer: MEDICARE

## 2023-12-26 VITALS
BODY MASS INDEX: 28.44 KG/M2 | SYSTOLIC BLOOD PRESSURE: 131 MMHG | WEIGHT: 192 LBS | HEIGHT: 69 IN | HEART RATE: 71 BPM | DIASTOLIC BLOOD PRESSURE: 64 MMHG | TEMPERATURE: 99 F

## 2023-12-26 DIAGNOSIS — E08.22 DIABETES MELLITUS DUE TO UNDERLYING CONDITION WITH DIABETIC CHRONIC KIDNEY DISEASE, UNSPECIFIED CKD STAGE, UNSPECIFIED WHETHER LONG TERM INSULIN USE (MULTI): ICD-10-CM

## 2023-12-26 DIAGNOSIS — E21.3 HYPERPARATHYROIDISM (MULTI): ICD-10-CM

## 2023-12-26 DIAGNOSIS — I10 ESSENTIAL HYPERTENSION: ICD-10-CM

## 2023-12-26 DIAGNOSIS — N18.4 CKD (CHRONIC KIDNEY DISEASE) STAGE 4, GFR 15-29 ML/MIN (MULTI): Primary | ICD-10-CM

## 2023-12-26 PROCEDURE — 3078F DIAST BP <80 MM HG: CPT | Performed by: INTERNAL MEDICINE

## 2023-12-26 PROCEDURE — 99214 OFFICE O/P EST MOD 30 MIN: CPT | Performed by: INTERNAL MEDICINE

## 2023-12-26 PROCEDURE — 1160F RVW MEDS BY RX/DR IN RCRD: CPT | Performed by: INTERNAL MEDICINE

## 2023-12-26 PROCEDURE — 1036F TOBACCO NON-USER: CPT | Performed by: INTERNAL MEDICINE

## 2023-12-26 PROCEDURE — 1159F MED LIST DOCD IN RCRD: CPT | Performed by: INTERNAL MEDICINE

## 2023-12-26 PROCEDURE — 3075F SYST BP GE 130 - 139MM HG: CPT | Performed by: INTERNAL MEDICINE

## 2023-12-26 RX ORDER — CALCITRIOL 0.25 UG/1
0.25 CAPSULE ORAL EVERY OTHER DAY
Qty: 15 CAPSULE | Refills: 3 | Status: SHIPPED | OUTPATIENT
Start: 2023-12-26 | End: 2024-04-17

## 2023-12-26 NOTE — PROGRESS NOTES
Jamaal OLGUIN Sree   89 y.o.    @@  [facility]/Room: [id number]/Room/bed info not found    Subjective:   The patient is being seen for a routine clinic follow-up of chronic kidney disease. Recently, the disease has been stable. Disease complications:  No hyperkalemia, no hypocalcemia, no hyperphosphatemia, no metabolic acidosis, no coagulopathy, no uremic encephalopathy, no neuropathy and no renal osteodystrophy. The patient is currently asymptomatic. No associated symptoms are reported.       Meds:   Current Outpatient Medications   Medication Sig Dispense Refill    allopurinol (Zyloprim) 100 mg tablet Take 1 tablet (100 mg) by mouth once daily. 90 tablet 3    amLODIPine (Norvasc) 5 mg tablet Take 1 tablet (5 mg) by mouth once daily. 90 tablet 3    ascorbic acid (Vitamin C) 1,000 mg tablet Take 1 tablet (1,000 mg) by mouth once daily.      aspirin 81 mg EC tablet Take 1 tablet (81 mg) by mouth once daily.      atorvastatin (Lipitor) 10 mg tablet Take 1 tablet (10 mg) by mouth once daily at bedtime.      calcitriol (Rocaltrol) 0.25 mcg capsule Take 1 capsule (0.25 mcg) by mouth once daily.      carvedilol (Coreg) 6.25 mg tablet Take 1 tablet (6.25 mg) by mouth 2 times a day.      clopidogrel (Plavix) 75 mg tablet Take 1 tablet (75 mg) by mouth once daily.      FreeStyle Alphonso sensor system (FreeStyle Alphonso 14 Day Sensor) kit Use as instructed 6 each 3    glucosamine HCl 1,500 mg tablet Take 1,500 mg by mouth in the morning and at bedtime.      isosorbide mononitrate ER (Imdur) 30 mg 24 hr tablet Take 1 tablet (30 mg) by mouth once daily.      lisinopril 5 mg tablet Take 1 tablet (5 mg) by mouth once daily.      nitroglycerin (Nitrostat) 0.4 mg SL tablet Place 1 tablet (0.4 mg) under the tongue.      patiromer calcium sorbitex (Veltassa) 8.4 gram powder in packet Take 8.4 g by mouth.      sodium bicarbonate 650 mg tablet Take 1 tablet (650 mg) by mouth 2 times a day. Take 2 tablets PO twice daily with meals      torsemide  (Demadex) 20 mg tablet Take 1 tablet (20 mg) by mouth once daily.       No current facility-administered medications for this visit.          ROS:  The patient is awake and oriented. No dizziness or lightheadedness. No chills and no fever. No headaches. No nausea and no vomiting. No shortness of breath. No cough. No sputum. No chest pain. No chest tightness. No abdominal pain. No diarrhea and no constipation. No hematemesis or hemoptysis. No hematuria. No rectal bleeding. No melena. No epistaxis. No urinary symptoms. No flank pain. No leg edema. No leg pain. No weakness. No itching. Overall, the rest of the review of systems is also negative.  12 point review of systems otherwise negative as stated in HPI.        Physical Examination:        Vitals:    12/26/23 1408   BP: 131/64   Pulse: 71   Temp: 37.2 °C (99 °F)     General: The patient is awake, oriented, and is not in any distress.  Head and Neck: Normocephalic. No periorbital edema.  Eyes: non-icteric  Respiratory: Symmetric air entry. Symmetric chest expansion.No respiratory distress.  Cardiovascular: Symmetric peripheral pulses.  Skin: No maculopapular rash.  Abdomen: soft, nt/nd  Musculoskeletal: No peripheral edema in both left and right upper extremities.  No edema in either left or right lower extremities.  Neuro Exam: Speech is fluent. Moves extremities.    Imaging:  === 10/08/20 ===    US RENAL COMPLETE    - Impression -  Overall stable appearance of the kidneys including asymmetric right  renal atrophy with findings of medical renal disease, without  hydronephrosis.       Blood Labs:  No results found for this or any previous visit (from the past 24 hour(s)).   Lab Results   Component Value Date    PTH 92.0 (H) 09/19/2023    PROTUR 9 12/08/2022    PHOS 4.3 09/19/2023      Lab Results   Component Value Date    GLUCOSE 193 (H) 12/01/2023    CALCIUM 10.9 (H) 12/01/2023     12/01/2023    K 4.5 12/01/2023    CO2 26 12/01/2023     (H) 12/01/2023     BUN 86 (H) 12/01/2023    CREATININE 3.77 (H) 12/01/2023         Assessment and Plan:  1. CKD stage 4. His last creatinine level is 3.77. Creatinine level is stable. Potassium level is fine. Volume status is good. Blood pressure is under control.  Previously, I talked to him about need for renal replacement therapy. He is very clear that he does not want any kind of renal replacement therapy.     2. Hypertension. Blood pressure is controlled. Continue the current medications.     3. Diabetes. No proteinuria based on a spot urine protein to creatinine ratio.     4. Dyslipidemia. The patient is on a statin and he is tolerating his statin well.     5. Metabolic acidosis. Normal bicarb level on Sodium bicarb     6.  Hypercalcemia.  He has secondary hyperparathyroidism for which she is calcitriol.  I decreased his calcitriol dose.    He will be seen in my office in about 3-4 months for followup.           Onel Murillo MD
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
## 2024-01-10 ENCOUNTER — APPOINTMENT (OUTPATIENT)
Dept: PRIMARY CARE | Facility: CLINIC | Age: 89
End: 2024-01-10
Payer: MEDICARE

## 2024-01-11 ENCOUNTER — APPOINTMENT (OUTPATIENT)
Dept: OTOLARYNGOLOGY | Facility: CLINIC | Age: 89
End: 2024-01-11
Payer: MEDICARE

## 2024-01-11 ENCOUNTER — APPOINTMENT (OUTPATIENT)
Dept: GERIATRIC MEDICINE | Facility: CLINIC | Age: 89
End: 2024-01-11
Payer: MEDICARE

## 2024-03-05 DIAGNOSIS — I10 HYPERTENSION, UNSPECIFIED TYPE: ICD-10-CM

## 2024-03-05 RX ORDER — AMLODIPINE BESYLATE 5 MG/1
5 TABLET ORAL DAILY
Qty: 90 TABLET | Refills: 0 | Status: SHIPPED | OUTPATIENT
Start: 2024-03-05 | End: 2024-06-03

## 2024-03-13 DIAGNOSIS — I25.10 CORONARY ARTERY DISEASE INVOLVING NATIVE CORONARY ARTERY OF NATIVE HEART, UNSPECIFIED WHETHER ANGINA PRESENT: ICD-10-CM

## 2024-03-13 DIAGNOSIS — I10 PRIMARY HYPERTENSION: ICD-10-CM

## 2024-03-13 NOTE — TELEPHONE ENCOUNTER
The patient called into the office and left a voicemail stating that he has been out of his medications for three weeks. He stated that the pharmacy told him that they have been trying to contact our office. The patient did not leave the name of medications he is talking about. Called and was unable to get a hold of the patient. Left a voicemail for the patient to call the office back. Ric ALONZO

## 2024-03-14 RX ORDER — CARVEDILOL 6.25 MG/1
6.25 TABLET ORAL 2 TIMES DAILY
Qty: 180 TABLET | Refills: 3 | Status: SHIPPED | OUTPATIENT
Start: 2024-03-14 | End: 2025-03-14

## 2024-03-14 RX ORDER — CLOPIDOGREL BISULFATE 75 MG/1
75 TABLET ORAL DAILY
Qty: 90 TABLET | Refills: 3 | Status: SHIPPED | OUTPATIENT
Start: 2024-03-14 | End: 2025-03-14

## 2024-03-14 NOTE — TELEPHONE ENCOUNTER
Patient returned call stating he needs refills on Carvedilol and Clopidogrel. Please authorize. Thank you.  Renate Mclain MA

## 2024-04-03 ENCOUNTER — HOSPITAL ENCOUNTER (EMERGENCY)
Age: 89
Discharge: HOME | End: 2024-04-03
Payer: MEDICARE

## 2024-04-03 ENCOUNTER — APPOINTMENT (OUTPATIENT)
Dept: CT IMAGING | Age: 89
End: 2024-04-03
Payer: MEDICARE

## 2024-04-03 VITALS
RESPIRATION RATE: 16 BRPM | BODY MASS INDEX: 27.43 KG/M2 | HEART RATE: 56 BPM | TEMPERATURE: 97.7 F | SYSTOLIC BLOOD PRESSURE: 113 MMHG | OXYGEN SATURATION: 96 % | DIASTOLIC BLOOD PRESSURE: 58 MMHG | HEIGHT: 68 IN | WEIGHT: 181 LBS

## 2024-04-03 DIAGNOSIS — S09.90XA CLOSED HEAD INJURY, INITIAL ENCOUNTER: Primary | ICD-10-CM

## 2024-04-03 DIAGNOSIS — S01.01XA LACERATION OF SCALP, INITIAL ENCOUNTER: ICD-10-CM

## 2024-04-03 PROCEDURE — 70450 CT HEAD/BRAIN W/O DYE: CPT

## 2024-04-03 PROCEDURE — 99285 EMERGENCY DEPT VISIT HI MDM: CPT

## 2024-04-03 ASSESSMENT — ENCOUNTER SYMPTOMS
RHINORRHEA: 0
ABDOMINAL PAIN: 0
SHORTNESS OF BREATH: 0
COUGH: 0
VOMITING: 0
NAUSEA: 0
BLOOD IN STOOL: 0
SORE THROAT: 0

## 2024-04-03 ASSESSMENT — PAIN - FUNCTIONAL ASSESSMENT: PAIN_FUNCTIONAL_ASSESSMENT: 0-10

## 2024-04-03 ASSESSMENT — PAIN DESCRIPTION - ONSET: ONSET: ON-GOING

## 2024-04-03 ASSESSMENT — PAIN DESCRIPTION - PAIN TYPE: TYPE: ACUTE PAIN

## 2024-04-03 ASSESSMENT — PAIN SCALES - GENERAL: PAINLEVEL_OUTOF10: 3

## 2024-04-03 ASSESSMENT — LIFESTYLE VARIABLES
HOW OFTEN DO YOU HAVE A DRINK CONTAINING ALCOHOL: NEVER
HOW MANY STANDARD DRINKS CONTAINING ALCOHOL DO YOU HAVE ON A TYPICAL DAY: PATIENT DOES NOT DRINK

## 2024-04-03 ASSESSMENT — PAIN DESCRIPTION - FREQUENCY: FREQUENCY: CONTINUOUS

## 2024-04-03 ASSESSMENT — PAIN DESCRIPTION - DESCRIPTORS: DESCRIPTORS: ACHING

## 2024-04-03 ASSESSMENT — PAIN DESCRIPTION - LOCATION: LOCATION: HEAD

## 2024-04-03 ASSESSMENT — PAIN DESCRIPTION - ORIENTATION: ORIENTATION: POSTERIOR

## 2024-04-03 NOTE — ED PROVIDER NOTES
The patient has been accepted under pink sheet to [facility] by Dr. Jared Wise, Eyal DICK MD  04/03/24 8242    
light.   Neck:      Trachea: No tracheal deviation.      Comments: No midline C, T, L spinous process tenderness, no paraspinal muscle tenderness, no signs of trauma  Cardiovascular:      Heart sounds: Normal heart sounds.   Pulmonary:      Effort: Pulmonary effort is normal. No respiratory distress.      Breath sounds: Normal breath sounds. No stridor.   Abdominal:      General: Bowel sounds are normal. There is no distension.      Palpations: Abdomen is soft. There is no mass.      Tenderness: There is no abdominal tenderness. There is no guarding or rebound.   Musculoskeletal:         General: Normal range of motion.      Cervical back: Normal range of motion and neck supple. No tenderness.   Skin:     General: Skin is warm and dry.      Capillary Refill: Capillary refill takes less than 2 seconds.      Findings: No rash.   Neurological:      Mental Status: He is alert and oriented to person, place, and time.      Deep Tendon Reflexes: Reflexes are normal and symmetric.   Psychiatric:         Behavior: Behavior normal.         Thought Content: Thought content normal.         Judgment: Judgment normal.         DIAGNOSTIC RESULTS     EKG:All EKG's are interpreted by the Emergency Department Physician who either signs or Co-signs this chart in the absence of a cardiologist.        RADIOLOGY:   Non-plain film images such as CT, Ultrasound and MRI are read by theradiologist. Plain radiographic images are visualized and preliminarily interpreted by the emergency physician with the below findings:    Interpretation per theRadiologist below, if available at the time of this note:    CT Head W/O Contrast   Final Result   1. No acute intracranial abnormality.   2. Moderate age-appropriate atrophy and mild small vessel ischemia.   3. Prominent chronic sphenoid sinusitis.                 LABS:  Labs Reviewed - No data to display    All other labs were within normal range or not returned as of this dictation.    EMERGENCY

## 2024-04-04 NOTE — ED NOTES
Discharge instructions reviewed with pt.   Pt verbalized understanding with no questions or concerns.  Resps even, non labored.  Skin p/w/d.  No acute distress  noted.  Pt is wheeled out via wheelchair with the assistance of son.  VSS  ABCs intact.

## 2024-04-16 DIAGNOSIS — N18.4 CKD (CHRONIC KIDNEY DISEASE) STAGE 4, GFR 15-29 ML/MIN (MULTI): ICD-10-CM

## 2024-04-16 DIAGNOSIS — E08.22 DIABETES MELLITUS DUE TO UNDERLYING CONDITION WITH DIABETIC CHRONIC KIDNEY DISEASE, UNSPECIFIED CKD STAGE, UNSPECIFIED WHETHER LONG TERM INSULIN USE (MULTI): ICD-10-CM

## 2024-04-16 DIAGNOSIS — I10 ESSENTIAL HYPERTENSION: ICD-10-CM

## 2024-04-16 DIAGNOSIS — E21.3 HYPERPARATHYROIDISM (MULTI): ICD-10-CM

## 2024-04-17 RX ORDER — CALCITRIOL 0.25 UG/1
CAPSULE ORAL
Qty: 45 CAPSULE | Refills: 3 | Status: SHIPPED | OUTPATIENT
Start: 2024-04-17 | End: 2024-04-30 | Stop reason: WASHOUT

## 2024-04-23 ENCOUNTER — OFFICE VISIT (OUTPATIENT)
Dept: CARDIOLOGY | Facility: CLINIC | Age: 89
End: 2024-04-23
Payer: MEDICARE

## 2024-04-23 VITALS
WEIGHT: 191.6 LBS | HEIGHT: 69 IN | SYSTOLIC BLOOD PRESSURE: 124 MMHG | HEART RATE: 70 BPM | DIASTOLIC BLOOD PRESSURE: 54 MMHG | BODY MASS INDEX: 28.38 KG/M2

## 2024-04-23 DIAGNOSIS — R60.9 EDEMA, UNSPECIFIED TYPE: ICD-10-CM

## 2024-04-23 DIAGNOSIS — I10 PRIMARY HYPERTENSION: ICD-10-CM

## 2024-04-23 DIAGNOSIS — I25.10 CORONARY ARTERY DISEASE INVOLVING NATIVE CORONARY ARTERY OF NATIVE HEART, UNSPECIFIED WHETHER ANGINA PRESENT: ICD-10-CM

## 2024-04-23 PROCEDURE — 1036F TOBACCO NON-USER: CPT | Performed by: INTERNAL MEDICINE

## 2024-04-23 PROCEDURE — 3074F SYST BP LT 130 MM HG: CPT | Performed by: INTERNAL MEDICINE

## 2024-04-23 PROCEDURE — 3078F DIAST BP <80 MM HG: CPT | Performed by: INTERNAL MEDICINE

## 2024-04-23 PROCEDURE — 1159F MED LIST DOCD IN RCRD: CPT | Performed by: INTERNAL MEDICINE

## 2024-04-23 PROCEDURE — 99214 OFFICE O/P EST MOD 30 MIN: CPT | Performed by: INTERNAL MEDICINE

## 2024-04-23 RX ORDER — CARVEDILOL 6.25 MG/1
6.25 TABLET ORAL 2 TIMES DAILY
Qty: 180 TABLET | Refills: 1 | Status: CANCELLED | OUTPATIENT
Start: 2024-04-23 | End: 2025-04-23

## 2024-04-23 RX ORDER — LISINOPRIL 5 MG/1
TABLET ORAL
Qty: 90 TABLET | Refills: 1 | Status: CANCELLED | OUTPATIENT
Start: 2024-04-23

## 2024-04-23 RX ORDER — AMLODIPINE BESYLATE 5 MG/1
TABLET ORAL
Qty: 90 TABLET | Refills: 1 | Status: CANCELLED | OUTPATIENT
Start: 2024-04-23

## 2024-04-23 RX ORDER — NITROGLYCERIN 0.4 MG/1
0.4 TABLET SUBLINGUAL EVERY 5 MIN PRN
Qty: 45 TABLET | Refills: 5 | Status: SHIPPED | OUTPATIENT
Start: 2024-04-23

## 2024-04-23 RX ORDER — ISOSORBIDE MONONITRATE 30 MG/1
TABLET, EXTENDED RELEASE ORAL
Qty: 90 TABLET | Refills: 1 | Status: CANCELLED | OUTPATIENT
Start: 2024-04-23

## 2024-04-23 RX ORDER — CLOPIDOGREL BISULFATE 75 MG/1
75 TABLET ORAL DAILY
Qty: 90 TABLET | Refills: 1 | Status: CANCELLED | OUTPATIENT
Start: 2024-04-23 | End: 2025-04-23

## 2024-04-23 RX ORDER — TORSEMIDE 20 MG/1
TABLET ORAL
Qty: 90 TABLET | Refills: 1 | Status: CANCELLED | OUTPATIENT
Start: 2024-04-23

## 2024-04-23 RX ORDER — ATORVASTATIN CALCIUM 10 MG/1
10 TABLET, FILM COATED ORAL NIGHTLY
Qty: 90 TABLET | Refills: 1 | Status: CANCELLED | OUTPATIENT
Start: 2024-04-23

## 2024-04-23 NOTE — PROGRESS NOTES
Patient:  Jamaal Balbuena  YOB: 1933  MRN: [id number]       Impression/Plan:     Diagnoses and all orders for this visit:  Primary hypertension  -     Well controlled    Coronary artery disease involving native coronary artery of native heart, unspecified whether angina present  -   No angina/chf. Reasonable functional capacity for age.    Edema, unspecified type  -     Related to venous insufficiency, fairly sedentary lifestyle and amlodipine.  Blood pressures at home he says have been reasonable.  Would decrease amlodipine to 2.5 a day.  He will resume it at 5 a day if his blood pressures in the afternoon are in excess of 1 4.  He will continue using support stockings for the venous insufficiency component.      Chief Complaint/Active Symptoms:       Jamaal Balbuena is a 89 y.o. male who presents with coronary angioplasty at the time of infarct in 2014. He also has ischemic cardiomyopathy ejection fraction 40 to 45%. He has not wished further evaluation unless significantly symptomatic. He also has progressive kidney disease creatinine is in excess of 3. He has chronic lower extremity edema likely related to his kidney disease and use of amlodipine but he feels well on the medical regimen and prefers to put up with the edema he says it is minimally uncomfortable to him. He tolerates low-dose lisinopril only as any increase causes hyperkalemia.     9/19/2023 at last office visit here was walking a mile a day.  He thinks he was walking a little bit slower at that time.  Also decided at that time not to pursue dialysis should kidney function worsen    12/1/2023 sodium 144 potassium 4.5 BUN of 86 creatinine 3.77 actually improved in comparison to previously.  Hemoglobin stable at 9.7 platelet count 173    4/3/2024 presented to [facility] with a fall after falling backwards when he stood up from the table hitting his head.  No loss of consciousness.  CT of the brain unremarkable.  Blood  pressure 122/60 heart rate 55 respiratory rate 18.  Wound treated with local pressure and dressing did not require staples or suture.    He denies angina, dyspnea, palpitation, lightheadedness or syncope.  He has had no symptoms of claudication or neurologic deterioration.  There have been no hospitalizations or emergency room visits since last office visit.    He describes his fall is clearly mechanical.  He was sitting on a chair with wheels and when he bent over to  something the chair slipped and he fell backwards.  He had no loss of consciousness or palpitations.  Has had no cardiovascular symptoms except edema.  He says it bothers him quite a bit stockings seem to help.  He has had no infections or ulcerations.                 Review of Systems: Unremarkable except as noted above    Meds     Current Outpatient Medications   Medication Instructions    allopurinol (ZYLOPRIM) 100 mg, oral, Daily    amLODIPine (NORVASC) 5 mg, oral, Daily    ascorbic acid (VITAMIN C) 1,000 mg, oral, Daily    aspirin 81 mg, oral, Daily    atorvastatin (LIPITOR) 10 mg, oral, Nightly    calcitriol (Rocaltrol) 0.25 mcg capsule To be filled by Provider    carvedilol (COREG) 6.25 mg, oral, 2 times daily    clopidogrel (PLAVIX) 75 mg, oral, Daily    FreeStyle Alphonso sensor system (FreeStyle Alphonso 14 Day Sensor) kit Use as instructed    glucosamine HCl 1,500 mg, oral, 2 times daily    isosorbide mononitrate ER (IMDUR) 30 mg, oral, Daily    lisinopril 5 mg, oral, Daily    nitroglycerin (NITROSTAT) 0.4 mg, sublingual, Every 5 min PRN    sodium bicarbonate 650 mg, oral, 2 times daily, Take 2 tablets PO twice daily with meals    torsemide (DEMADEX) 20 mg, oral, Daily    Veltassa 8.4 g, oral, Daily        Allergies     Allergies   Allergen Reactions    Ace Inhibitors Other    Other Other     Angiotensin Receptor Blockers         Annotated Problems     Specialty Problems          Cardiology Problems    HLD (hyperlipidemia)    HTN  "(hypertension)    CAD (coronary artery disease)    Ischemic cardiomyopathy    PVD (peripheral vascular disease) (CMS-HCC)    Venous insufficiency        Problem List     Patient Active Problem List    Diagnosis Date Noted    Degenerative disease of nervous system, unspecified (CMS-HCC) 11/09/2023    CKD (chronic kidney disease), stage V (Multi) 11/09/2023    Bronchiectasis, uncomplicated (Multi) 11/09/2023    Anemia in CKD (chronic kidney disease) 01/25/2023    Angioma of skin 01/25/2023    Arcus senilis, bilateral 01/25/2023    Astigmatism, bilateral 01/25/2023    Basal cell carcinoma 01/25/2023    Bilateral presbyopia 01/25/2023    Bilateral pseudophakia 01/25/2023    BPH (benign prostatic hyperplasia) 01/25/2023    CAD (coronary artery disease) 01/25/2023    Diabetic neuropathy (Multi) 01/25/2023    Hyperkalemia, diminished renal excretion 01/25/2023    Ischemic cardiomyopathy 01/25/2023    Hyperparathyroidism due to renal insufficiency (Multi) 01/25/2023    Lung nodule 01/25/2023    Malignant neoplasm of prostate (Multi) 01/25/2023    Onychomycosis 01/25/2023    PVD (peripheral vascular disease) (CMS-HCC) 01/25/2023    PVD (posterior vitreous detachment), both eyes 01/25/2023    Type 2 diabetes mellitus (Multi) 01/25/2023    Venous insufficiency 01/25/2023    Hypercalcemia 01/25/2023    Contracture of muscle of hand 01/25/2023    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/2023    HTN (hypertension) 09/15/2020    HLD (hyperlipidemia) 09/15/2020    Stage 4 chronic kidney disease (Multi) 09/15/2020       Objective:     Vitals:    04/23/24 1354   BP: 124/54   BP Location: Right arm   Patient Position: Sitting   Pulse: 70   Weight: 86.9 kg (191 lb 9.6 oz)   Height: 1.753 m (5' 9\")      Wt Readings from Last 4 Encounters:   04/23/24 86.9 kg (191 lb 9.6 oz)   12/26/23 87.1 kg (192 lb)   12/01/23 83.5 kg (184 lb)   09/26/23 90.4 kg (199 lb 6 oz)           LAB:     Lab Results   Component Value Date    WBC 8.9 " 12/01/2023    HGB 9.7 (L) 12/01/2023    HCT 30.5 (L) 12/01/2023     12/01/2023    ALT 16 01/02/2022    AST 24 01/02/2022     12/01/2023    K 4.5 12/01/2023     (H) 12/01/2023    CREATININE 3.77 (H) 12/01/2023    BUN 86 (H) 12/01/2023    CO2 26 12/01/2023    INR 1.0 12/27/2021    HGBA1C 7.5 (A) 07/10/2023       Diagnostic Studies:     Patient was never admitted.      Radiology:     No orders to display       Physical Exam     General Appearance: alert and oriented to person, place and time, in no acute distress  Cardiovascular: normal rate, regular rhythm, normal S1 and S2, no murmurs, rubs, clicks, or gallops,  no JVD  Pulmonary/Chest: clear to auscultation bilaterally- no wheezes, rales or rhonchi, normal air movement, no respiratory distress  Abdomen: soft, non-tender, non-distended, normal bowel sounds, no masses   Extremities: no cyanosis, clubbing. 2-3+  Skin: warm and dry, no rash or erythema  Eyes: EOMI  Neck: supple and non-tender without mass, no thyromegaly   Neurological: alert, oriented, normal speech, no focal findings or movement disorder noted          Scribe Attestation  By signing my name below, Kimmie HAIR CMA   , Scribe   attest that this documentation has been prepared under the direction and in the presence of Kelvin Oro MD.

## 2024-04-23 NOTE — PATIENT INSTRUCTIONS
Please bring all medicines, vitamins, and herbal supplements with you in original bottles to every appointment!    Prescriptions will not be filled unless you are compliant with your follow up appointments or have a follow up appointment scheduled as per instruction of your physician. Refills should be requested at the time of your visit.    TAKE AMLODIPINE 5 MG 1/2 TABLET (2.5 MG) DAILY.  RESUME 1 FULL TABLET IF TOP NUMBER BP CONSISTENTLY GREATER THAN 140    PER YOUR REQUEST MEDICATION RENEWALS WERE NOT DONE AT YOUR APPOINTMENT WITH THE EXCEPTION OF YOUR NITROGLYCERIN

## 2024-04-30 ENCOUNTER — OFFICE VISIT (OUTPATIENT)
Dept: NEPHROLOGY | Facility: CLINIC | Age: 89
End: 2024-04-30
Payer: MEDICARE

## 2024-04-30 VITALS
SYSTOLIC BLOOD PRESSURE: 118 MMHG | BODY MASS INDEX: 27.99 KG/M2 | HEIGHT: 69 IN | HEART RATE: 54 BPM | WEIGHT: 189 LBS | DIASTOLIC BLOOD PRESSURE: 59 MMHG

## 2024-04-30 DIAGNOSIS — I10 ESSENTIAL HYPERTENSION: ICD-10-CM

## 2024-04-30 DIAGNOSIS — E08.22 DIABETES MELLITUS DUE TO UNDERLYING CONDITION WITH DIABETIC CHRONIC KIDNEY DISEASE, UNSPECIFIED CKD STAGE, UNSPECIFIED WHETHER LONG TERM INSULIN USE (MULTI): ICD-10-CM

## 2024-04-30 DIAGNOSIS — N18.4 CKD (CHRONIC KIDNEY DISEASE) STAGE 4, GFR 15-29 ML/MIN (MULTI): Primary | ICD-10-CM

## 2024-04-30 DIAGNOSIS — E21.3 HYPERPARATHYROIDISM (MULTI): ICD-10-CM

## 2024-04-30 PROCEDURE — 1036F TOBACCO NON-USER: CPT | Performed by: INTERNAL MEDICINE

## 2024-04-30 PROCEDURE — 3074F SYST BP LT 130 MM HG: CPT | Performed by: INTERNAL MEDICINE

## 2024-04-30 PROCEDURE — 99214 OFFICE O/P EST MOD 30 MIN: CPT | Performed by: INTERNAL MEDICINE

## 2024-04-30 PROCEDURE — 1159F MED LIST DOCD IN RCRD: CPT | Performed by: INTERNAL MEDICINE

## 2024-04-30 PROCEDURE — 3078F DIAST BP <80 MM HG: CPT | Performed by: INTERNAL MEDICINE

## 2024-04-30 NOTE — PROGRESS NOTES
Jamaal SHARIF Balbuena   89 y.o.    @@  [facility]/Room: [id number]/Room/bed info not found    Subjective:   The patient is being seen for a routine clinic follow-up of chronic kidney disease. Recently, the disease has been stable. Disease complications:  No hyperkalemia, no hypocalcemia, no hyperphosphatemia, no metabolic acidosis, no coagulopathy, no uremic encephalopathy, no neuropathy and no renal osteodystrophy. The patient is currently asymptomatic. No associated symptoms are reported.       Meds:   Current Outpatient Medications   Medication Sig Dispense Refill    allopurinol (Zyloprim) 100 mg tablet Take 1 tablet (100 mg) by mouth once daily. 90 tablet 3    amLODIPine (Norvasc) 5 mg tablet Take 1 tablet (5 mg) by mouth once daily. (Patient taking differently: Take 1 tablet (5 mg) by mouth once daily. 1/2 tablet daily) 90 tablet 0    ascorbic acid (Vitamin C) 1,000 mg tablet Take 1 tablet (1,000 mg) by mouth once daily.      aspirin 81 mg EC tablet Take 1 tablet (81 mg) by mouth once daily.      atorvastatin (Lipitor) 10 mg tablet Take 1 tablet (10 mg) by mouth once daily at bedtime.      carvedilol (Coreg) 6.25 mg tablet Take 1 tablet (6.25 mg) by mouth 2 times a day. 180 tablet 3    clopidogrel (Plavix) 75 mg tablet Take 1 tablet (75 mg) by mouth once daily. 90 tablet 3    FreeStyle Alphonso sensor system (FreeStyle Alphonso 14 Day Sensor) kit Use as instructed 6 each 3    glucosamine HCl 1,500 mg tablet Take 1,500 mg by mouth in the morning and at bedtime.      isosorbide mononitrate ER (Imdur) 30 mg 24 hr tablet Take 1 tablet (30 mg) by mouth once daily.      lisinopril 5 mg tablet Take 1 tablet (5 mg) by mouth once daily.      nitroglycerin (Nitrostat) 0.4 mg SL tablet Place 1 tablet (0.4 mg) under the tongue every 5 minutes if needed for chest pain. 45 tablet 5    patiromer calcium sorbitex (Veltassa) 8.4 gram powder in packet Take 8.4 g by mouth early in the morning..      sodium bicarbonate 650 mg tablet Take 1 tablet (650  mg) by mouth 2 times a day. Take 2 tablets PO twice daily with meals      torsemide (Demadex) 20 mg tablet Take 1 tablet (20 mg) by mouth once daily.       No current facility-administered medications for this visit.          ROS:  The patient is awake and oriented. No dizziness or lightheadedness. No chills and no fever. No headaches. No nausea and no vomiting. No shortness of breath. No cough. No sputum. No chest pain. No chest tightness. No abdominal pain. No diarrhea and no constipation. No hematemesis or hemoptysis. No hematuria. No rectal bleeding. No melena. No epistaxis. No urinary symptoms. No flank pain. No leg edema. No leg pain. No weakness. No itching. Overall, the rest of the review of systems is also negative.  12 point review of systems otherwise negative as stated in HPI.        Physical Examination:        Vitals:    04/30/24 1330   BP: 118/59   Pulse: 54     General: The patient is awake, oriented, and is not in any distress.  Head and Neck: Normocephalic. No periorbital edema.  Eyes: non-icteric  Respiratory: Symmetric air entry. Symmetric chest expansion.No respiratory distress.  Cardiovascular: Symmetric peripheral pulses.  Skin: No maculopapular rash.  Abdomen: soft, nt/nd  Musculoskeletal: No peripheral edema in both left and right upper extremities.  No edema in either left or right lower extremities.  Neuro Exam: Speech is fluent. Moves extremities.    Imaging:  === 10/08/20 ===    US RENAL COMPLETE    - Impression -  Overall stable appearance of the kidneys including asymmetric right  renal atrophy with findings of medical renal disease, without  hydronephrosis.       Blood Labs:  No results found for this or any previous visit (from the past 24 hour(s)).   Lab Results   Component Value Date    PTH 92.0 (H) 09/19/2023    PROTUR 9 12/08/2022    PHOS 4.3 09/19/2023      Lab Results   Component Value Date    GLUCOSE 193 (H) 12/01/2023    CALCIUM 10.9 (H) 12/01/2023     12/01/2023    K  4.5 12/01/2023    CO2 26 12/01/2023     (H) 12/01/2023    BUN 86 (H) 12/01/2023    CREATININE 3.77 (H) 12/01/2023         Assessment and Plan:  1. CKD stage 4. His last creatinine level from December 2023 is 3.77.  Has her basic metabolic panel to be done today.    2. Hypertension. Blood pressure is controlled. Continue the current medications.     3. Diabetes. No proteinuria based on a spot urine protein to creatinine ratio.     4. Dyslipidemia. The patient is on a statin and he is tolerating his statin well.     5. Metabolic acidosis. Normal bicarb level on Sodium bicarb        He will be seen in my office in about 3-4 months for followup.           Onel Murillo MD  Senior Attending Physician  Director of Onco-Nephrology Program  Division of Nephrology & Hypertension  [facility]

## 2024-05-01 ENCOUNTER — LAB (OUTPATIENT)
Dept: LAB | Facility: LAB | Age: 89
End: 2024-05-01
Payer: MEDICARE

## 2024-05-01 DIAGNOSIS — E08.22 DIABETES MELLITUS DUE TO UNDERLYING CONDITION WITH DIABETIC CHRONIC KIDNEY DISEASE, UNSPECIFIED CKD STAGE, UNSPECIFIED WHETHER LONG TERM INSULIN USE (MULTI): ICD-10-CM

## 2024-05-01 DIAGNOSIS — N18.4 CKD (CHRONIC KIDNEY DISEASE) STAGE 4, GFR 15-29 ML/MIN (MULTI): ICD-10-CM

## 2024-05-01 DIAGNOSIS — I10 ESSENTIAL HYPERTENSION: ICD-10-CM

## 2024-05-01 DIAGNOSIS — E21.3 HYPERPARATHYROIDISM (MULTI): ICD-10-CM

## 2024-05-01 DIAGNOSIS — E55.9 VITAMIN D DEFICIENCY: Primary | ICD-10-CM

## 2024-05-01 LAB
25(OH)D3 SERPL-MCNC: 18 NG/ML (ref 30–100)
ANION GAP SERPL CALC-SCNC: 17 MMOL/L (ref 10–20)
BUN SERPL-MCNC: 95 MG/DL (ref 6–23)
CALCIUM SERPL-MCNC: 10.2 MG/DL (ref 8.6–10.3)
CHLORIDE SERPL-SCNC: 113 MMOL/L (ref 98–107)
CO2 SERPL-SCNC: 17 MMOL/L (ref 21–32)
CREAT SERPL-MCNC: 3.89 MG/DL (ref 0.5–1.3)
CREAT UR-MCNC: 40.2 MG/DL (ref 20–370)
EGFRCR SERPLBLD CKD-EPI 2021: 14 ML/MIN/1.73M*2
GLUCOSE SERPL-MCNC: 178 MG/DL (ref 74–99)
POTASSIUM SERPL-SCNC: 4.7 MMOL/L (ref 3.5–5.3)
PROT UR-ACNC: 13 MG/DL (ref 5–25)
PROT/CREAT UR: 0.32 MG/MG CREAT (ref 0–0.17)
PTH-INTACT SERPL-MCNC: 143.4 PG/ML (ref 18.5–88)
SODIUM SERPL-SCNC: 142 MMOL/L (ref 136–145)

## 2024-05-01 PROCEDURE — 36415 COLL VENOUS BLD VENIPUNCTURE: CPT

## 2024-05-01 PROCEDURE — 80048 BASIC METABOLIC PNL TOTAL CA: CPT

## 2024-05-01 PROCEDURE — 83970 ASSAY OF PARATHORMONE: CPT

## 2024-05-01 PROCEDURE — 82570 ASSAY OF URINE CREATININE: CPT

## 2024-05-01 PROCEDURE — 84156 ASSAY OF PROTEIN URINE: CPT

## 2024-05-01 PROCEDURE — 82306 VITAMIN D 25 HYDROXY: CPT

## 2024-05-02 RX ORDER — ERGOCALCIFEROL 1.25 MG/1
50000 CAPSULE ORAL
Qty: 6 CAPSULE | Refills: 2 | Status: SHIPPED | OUTPATIENT
Start: 2024-05-02

## 2024-06-02 DIAGNOSIS — E11.9 TYPE 2 DIABETES MELLITUS WITHOUT COMPLICATION, WITHOUT LONG-TERM CURRENT USE OF INSULIN (MULTI): ICD-10-CM

## 2024-06-03 RX ORDER — FLASH GLUCOSE SENSOR
KIT MISCELLANEOUS
Qty: 6 EACH | Refills: 1 | Status: SHIPPED | OUTPATIENT
Start: 2024-06-03

## 2024-06-03 RX ORDER — EPINEPHRINE 0.22MG
AEROSOL WITH ADAPTER (ML) INHALATION
COMMUNITY

## 2024-06-05 DIAGNOSIS — I10 ESSENTIAL HYPERTENSION: Primary | ICD-10-CM

## 2024-06-07 RX ORDER — LISINOPRIL 5 MG/1
5 TABLET ORAL DAILY
Qty: 90 TABLET | Refills: 3 | Status: SHIPPED | OUTPATIENT
Start: 2024-06-07

## 2024-07-12 DIAGNOSIS — I10 ESSENTIAL HYPERTENSION: Primary | ICD-10-CM

## 2024-07-15 RX ORDER — TORSEMIDE 20 MG/1
20 TABLET ORAL DAILY
Qty: 90 TABLET | Refills: 3 | Status: SHIPPED | OUTPATIENT
Start: 2024-07-15

## 2024-07-27 ENCOUNTER — HOSPITAL ENCOUNTER (EMERGENCY)
Facility: HOSPITAL | Age: 89
Discharge: HOME | End: 2024-07-27
Payer: MEDICARE

## 2024-07-27 VITALS
HEART RATE: 56 BPM | HEIGHT: 69 IN | RESPIRATION RATE: 17 BRPM | TEMPERATURE: 97.2 F | DIASTOLIC BLOOD PRESSURE: 66 MMHG | SYSTOLIC BLOOD PRESSURE: 142 MMHG | OXYGEN SATURATION: 97 % | BODY MASS INDEX: 28.29 KG/M2 | WEIGHT: 191 LBS

## 2024-07-27 DIAGNOSIS — R04.0 EPISTAXIS: Primary | ICD-10-CM

## 2024-07-27 PROCEDURE — 2500000001 HC RX 250 WO HCPCS SELF ADMINISTERED DRUGS (ALT 637 FOR MEDICARE OP)

## 2024-07-27 PROCEDURE — 99282 EMERGENCY DEPT VISIT SF MDM: CPT

## 2024-07-27 RX ORDER — OXYMETAZOLINE HCL 0.05 %
2 SPRAY, NON-AEROSOL (ML) NASAL EVERY 12 HOURS PRN
Status: DISCONTINUED | OUTPATIENT
Start: 2024-07-27 | End: 2024-07-27 | Stop reason: HOSPADM

## 2024-07-27 RX ADMIN — OXYMETAZOLINE HCL 2 SPRAY: 0.05 SPRAY NASAL at 20:35

## 2024-07-27 ASSESSMENT — LIFESTYLE VARIABLES
HAVE YOU EVER FELT YOU SHOULD CUT DOWN ON YOUR DRINKING: NO
TOTAL SCORE: 0
EVER HAD A DRINK FIRST THING IN THE MORNING TO STEADY YOUR NERVES TO GET RID OF A HANGOVER: NO
HAVE PEOPLE ANNOYED YOU BY CRITICIZING YOUR DRINKING: NO
EVER FELT BAD OR GUILTY ABOUT YOUR DRINKING: NO

## 2024-07-27 ASSESSMENT — COLUMBIA-SUICIDE SEVERITY RATING SCALE - C-SSRS
1. IN THE PAST MONTH, HAVE YOU WISHED YOU WERE DEAD OR WISHED YOU COULD GO TO SLEEP AND NOT WAKE UP?: NO
6. HAVE YOU EVER DONE ANYTHING, STARTED TO DO ANYTHING, OR PREPARED TO DO ANYTHING TO END YOUR LIFE?: NO
2. HAVE YOU ACTUALLY HAD ANY THOUGHTS OF KILLING YOURSELF?: NO

## 2024-07-27 ASSESSMENT — PAIN - FUNCTIONAL ASSESSMENT: PAIN_FUNCTIONAL_ASSESSMENT: 0-10

## 2024-07-27 ASSESSMENT — PAIN SCALES - GENERAL: PAINLEVEL_OUTOF10: 0 - NO PAIN

## 2024-07-28 NOTE — ED PROVIDER NOTES
HPI   Chief Complaint   Patient presents with    Epistaxis (Nose Bleed)     Nose bleed on and off for 2 months. Nose is not currently bleeding. Pt is on Plavix.        History provided by: Patient and family    Limitations to history: None    CC: Nosebleed    HPI: 89-year-old male with a history of CAD on Plavix, hypertension, hyperlipidemia, TIA, and CKD presents emergency department to be evaluated for epistaxis.  Patient and his family states that he has had intermittent right nare epistaxis for the last 2 months.  It has been occurring at least weekly.  His most recent episode of episode of cyanosis occurred earlier today and lasted for about an hour.  It has resolved without any intervention.  Patient is on Plavix due to his history of TIA and stroke.  He states that it always presents from the right nare.  Denies any blood going down his throat or any hematemesis or hemoptysis.  Denies lightheadedness and dizziness.  Denies weakness and fatigue.  Denies trauma to the area.  He does admit that he gets cold at night chronically so he does use a heater in his room.  He has never tried a humidifier.  Denies history of easy bleeding or bruising.  Denies blood in the urine or stool.  Denies chest pain and difficulty breathing.  Denies all other systemic symptoms    ROS: Negative unless mentioned in HPI    Social Hx: Denies tobacco, alcohol, drug use    Medical Hx: Allergy to ACE inhibitors.  Immunizations up-to-date.    Physical exam physical exam:    Constitutional: Patient is well-nourished and well-developed.  Sitting comfortably in the room and in no distress.  Oriented to person, place, time, and situation.    HEENT: Head is normocephalic, atraumatic. Patient's airway is patent.  Tympanic membranes are clear bilaterally.  Nasal mucosa clear.  No active epistaxis or blood in the oropharynx mouth with normal mucosa.  Throat is not erythematous and there are no oropharyngeal exudates, uvula is midline.  No obvious  facial deformities.    Eyes: Clear bilaterally.  Pupils are equal round and reactive to light and accommodation.  Extraocular movements intact.      Cardiac: Regular rate, regular rhythm.  Heart sounds S1, S2.  No murmurs, rubs, or gallops.  PMI nondisplaced.  No JVD.    Respiratory: Regular respiratory rate and effort.  Breath sounds are clear and equal bilaterally, no adventitious lung sounds.  Patient is speaking in full sentences and is in no apparent respiratory distress. No use of accessory muscles.      Gastrointestinal: Abdomen is soft, nondistended, and nontender.  There are no obvious deformities.  No rebound tenderness or guarding.  Bowel sounds are normal active.    Genitourinary: No CVA or flank tenderness.    Musculoskeletal: No reproducible tenderness.  No obvious skin or bony deformities.  Patient has equal range of motion in all extremities and no strength deficiencies.  No muscle or joint tenderness. No back or neck tenderness.  Capillary refill less than 3 seconds.  Strong peripheral pulses.  No sensory deficits.    Neurological: Patient is alert and oriented.  No focal deficits.  5/5 strength in all extremities.  Cranial nerves II through XII intact. GCS15.     Skin: Skin is normal color for race and is warm, dry, and intact.  No evidence of trauma.  No lesions, rashes, bruising, jaundice, or masses.    Psych: Appropriate mood and affect.  No apparent risk to self or others.    Heme/lymph: No adenopathy, lymphadenopathy, or splenomegaly    Physical exam is otherwise negative unless stated above or in history of present illness.  Patient updated on plan for lab testing, IV insertion, radiology imaging, and medications to be administered while in the ER (if indicated). Patient updated on expected wait times for testing and results. Patient provided my name and told to ask any staff member for questions or concerns if they should arise. Electronic medical record reviewed.     MDM    Upon initial  assessment, patient was healthy non-toxic appearing and in no apparent distress.     Patient presented to the emergency department with the chief complaint of epistaxis.Head is normocephalic, atraumatic. Patient's airway is patent.  Tympanic membranes are clear bilaterally.  Nasal mucosa clear.  No active epistaxis or blood in the oropharynx mouth with normal mucosa.  Throat is not erythematous and there are no oropharyngeal exudates, uvula is midline.  No obvious facial deformities. on arrival to the emergency department, vital signs were within normal limits    Low suspicion for significant anemia given that the patient states that the epistaxis has been intermittent and mild.    At this time patient does not require any further intervention given that there is no active epistaxis.  I discussed ways to address epistaxis if it returns including applying pressure and leaning forward and patient be discharged with Afrin to be used if the bleeding still persist even after pressure.  If the bleeding continues to persist after the Afrin I recommended they present to the emergency department for further evaluation.  Will have registration set him up with an appointment to follow-up with ENT.  Discussed getting a humidifier for the patient's bedroom and turning down the space heater as this could be drying out the air causing his epistaxis.  All questions and concerns addressed.  Reasons to return to ER discussed.  Patient verbalized understanding and agreement with the treatment plan and they remained hemodynamically stable in the ER.    This note was dictated using a speech recognition program.  While an attempt was made at proof-reading to minimize errors, minor errors in transcription may be present                Patient History   Past Medical History:   Diagnosis Date    Atherosclerotic heart disease of native coronary artery without angina pectoris     CAD, multiple vessel    Encounter for screening for eye and ear  disorders     Diabetic retinopathy screening    Old myocardial infarction     History of myocardial infarction    Personal history of other diseases of the circulatory system 08/27/2014    History of hypertension    Personal history of other diseases of the circulatory system     History of cardiac disorder    Personal history of other diseases of urinary system     History of kidney disease    Personal history of other endocrine, nutritional and metabolic disease     History of diabetes mellitus    Personal history of other specified conditions     History of bradycardia    Personal history of other specified conditions     History of chest pain    Personal history of transient ischemic attack (TIA), and cerebral infarction without residual deficits     History of stroke    Presence of intraocular lens 05/19/2017    Pseudophakia     Past Surgical History:   Procedure Laterality Date    CATARACT EXTRACTION  05/19/2017    Cataract Extraction    MR ABDOMEN ANGIO W IV CONTRAST  9/16/2014    MR ABDOMEN ANGIO W IV CONTRAST 9/16/2014 [facility]    MR PELVIS ANGIO W IV CONTRAST  9/16/2014    MR PELVIS ANGIO W IV CONTRAST 9/16/2014 [facility]    OTHER SURGICAL HISTORY  08/27/2014    Enteroscopic Polypectomy    OTHER SURGICAL HISTORY  04/12/2022    Mohs surgery    OTHER SURGICAL HISTORY  06/01/2022    Cath Placement Of Stent 2    OTHER SURGICAL HISTORY  11/14/2021    Complete colonoscopy    OTHER SURGICAL HISTORY  11/14/2021    Tonsillectomy with adenoidectomy    OTHER SURGICAL HISTORY  11/14/2021    Cataract surgery    OTHER SURGICAL HISTORY  11/14/2021    Tooth extraction    TONSILLECTOMY  08/27/2014    Tonsillectomy     Family History   Problem Relation Name Age of Onset    Other (malignant neoplasm) Mother      Other (cardiac disorder) Father       Social History     Tobacco Use    Smoking status: Never    Smokeless tobacco: Never   Substance Use Topics    Alcohol use: Never    Drug use: Never        Physical Exam   ED Triage Vitals [07/27/24 1951]   Temperature Heart Rate Respirations BP   36.2 °C (97.2 °F) 56 17 142/66      Pulse Ox Temp Source Heart Rate Source Patient Position   97 % Temporal Monitor Sitting      BP Location FiO2 (%)     Right arm --       Physical Exam      ED Course & MDM   Diagnoses as of 07/27/24 2011   Epistaxis                       Shimon Coma Scale Score: 15                        Medical Decision Making      Procedure  Procedures     Abilio Sahni PA-C  07/27/24 2015

## 2024-07-30 ENCOUNTER — APPOINTMENT (OUTPATIENT)
Dept: OTOLARYNGOLOGY | Facility: CLINIC | Age: 89
End: 2024-07-30
Payer: MEDICARE

## 2024-07-30 VITALS — BODY MASS INDEX: 29.68 KG/M2 | HEIGHT: 69 IN | WEIGHT: 200.4 LBS

## 2024-07-30 DIAGNOSIS — R04.0 EPISTAXIS: Primary | ICD-10-CM

## 2024-07-30 PROCEDURE — 31231 NASAL ENDOSCOPY DX: CPT | Performed by: OTOLARYNGOLOGY

## 2024-07-30 PROCEDURE — 1159F MED LIST DOCD IN RCRD: CPT | Performed by: OTOLARYNGOLOGY

## 2024-07-30 PROCEDURE — 1036F TOBACCO NON-USER: CPT | Performed by: OTOLARYNGOLOGY

## 2024-07-30 PROCEDURE — 99203 OFFICE O/P NEW LOW 30 MIN: CPT | Performed by: OTOLARYNGOLOGY

## 2024-07-30 PROCEDURE — 1126F AMNT PAIN NOTED NONE PRSNT: CPT | Performed by: OTOLARYNGOLOGY

## 2024-07-30 PROCEDURE — 1160F RVW MEDS BY RX/DR IN RCRD: CPT | Performed by: OTOLARYNGOLOGY

## 2024-07-30 ASSESSMENT — PAIN SCALES - GENERAL: PAINLEVEL: 0-NO PAIN

## 2024-07-30 NOTE — PROGRESS NOTES
"Chief Complaint:  Epistaxis    Referring Provider: Abilio Sahni PA-C    History of Present Illness:    Jamaal Balbuena is a 89 y.o. male, presenting for evaluation of epistaxis.  He states for the last 2 months he has been having recurrent nosebleeds on the right side.  This past weekend he presented to the emergency room for evaluation after 2 episodes of epistaxis back tobacco for 48 hours.  He was discharged on fluticasone per his report.  He has not had any bleeding since then.  He has not trialed any nasal emollients or other interventions.  He is on Plavix for cerebrovascular issues.  He has a follow-up with his cardiologist next week.  No other pertinent medical history.  No other procedures on the nose.    ?  Review of Systems:    Review of symptoms was negative except for those stated including Cardiopulmonary, Genitourinary, Gastrointestinal, Psychological, Sleep pattern, Endocrine, Eyes, Neurologic, Musculoskeletal, Skin, Hematologic/Lymphatic and Allergic/Immunologic.     Medical History:    I have reviewed the patient's updated past medical history, surgical history, family history, social history, as well as current medications and allergies as of 7/30/2024. Changes to these items have been updated and marked as reviewed in the electronic medical record.    Physical Exam:    Vitals:  height is 1.753 m (5' 9\") and weight is 90.9 kg (200 lb 6.4 oz).   General: Patient doing well overall and is in no apparent distress.  Psych: Pleasant affect, and answers questions appropriately.  Head & Face: Symmetric facial movements  Eyes: Pupils equal, round, reactive.  Extraocular movements intact without gaze restrictions or nystagmus. No epiphora.  Ears:  External auditory canals are normal.  Tympanic membranes are clear.  No middle ear effusion is seen.  All middle ear landmarks are normal.  Nose: Anterior rhinoscopy revealed normal sinonasal mucosa. More posterior areas of the nasal cavity could not be " completely examined.  Oral Cavity/Oropharynx:  Without lesions or masses to visual exam.  Neck: Supple without lymphadenopathy.  Lungs: Non-labored, and without evidence of stridor.  Cardiac: Pulses are strong, well-perfused.  Extremities: Without gross evidence of clubbing, cyanosis, or edema.  Neuro: Cranial nerves II-XII grossly intact; Intact facial movements.      Procedure:  Rigid nasal endoscopy (34508)  Pre-procedure diagnosis/Indication for procedure:  To evaluate areas not visualized on anterior rhinoscopy   Anesthesia:  None  Description:  A 0-degree 3-mm rigid nasal endoscope was used to examine the left and right nasal cavities.  The nasal valve areas were examined for abnormalities or collapse.  The inferior and middle turbinates were evaluated.  The middle and superior meatuses, and the sphenoethmoid recesses were examined and inspected for mucopurulence and polyps. Once the endoscope was withdrawn, the patient was noted to have tolerated the procedure well without complications and was returned to ambulatory status.    Findings:    There is an exposed blood vessel on the right nasal septum fairly anteriorly.  There is small crust overlying the vessel consistent with his recent bleeding episodes.  There is no active bleeding.  The left septum is normal.  The rest of the nasal cavity is normal.  The middle meatus is clear.  The nasopharynx is clear.  The sphenoethmoidal recess is clear.  No masses or lesions.  He tolerated the procedure well.    Assessment:     Jamaal Balbuena is a 89 y.o. male with epistaxis.    Plan:      Will plan to have Jamaal use saline gel 2-3 times per day and I instructed him on the proper methodology to stop an episode of epistaxis if he experiences 1.  Will see him back in 2 to 3 weeks.  I explained to him that he would be a candidate for an office cauterization of the blood vessel however it would be safest if we asked his cardiologist to keep him off of his Plavix for 48  hours prior to the procedure.      Bud Harrison MD, M.Eng.   of Otolaryngology - Head & Neck Surgery  Division of Rhinology and Endoscopic Skull Base Surgery  [facility]

## 2024-08-20 ENCOUNTER — APPOINTMENT (OUTPATIENT)
Dept: CARDIOLOGY | Facility: CLINIC | Age: 89
End: 2024-08-20
Payer: MEDICARE

## 2024-08-20 ENCOUNTER — OFFICE VISIT (OUTPATIENT)
Dept: OTOLARYNGOLOGY | Facility: CLINIC | Age: 89
End: 2024-08-20
Payer: MEDICARE

## 2024-08-20 VITALS
HEIGHT: 69 IN | SYSTOLIC BLOOD PRESSURE: 120 MMHG | BODY MASS INDEX: 28.58 KG/M2 | DIASTOLIC BLOOD PRESSURE: 60 MMHG | WEIGHT: 193 LBS | HEART RATE: 61 BPM

## 2024-08-20 DIAGNOSIS — R04.0 EPISTAXIS: ICD-10-CM

## 2024-08-20 DIAGNOSIS — R04.0 EPISTAXIS: Primary | ICD-10-CM

## 2024-08-20 DIAGNOSIS — I10 PRIMARY HYPERTENSION: Primary | ICD-10-CM

## 2024-08-20 DIAGNOSIS — I25.10 CORONARY ARTERY DISEASE INVOLVING NATIVE CORONARY ARTERY OF NATIVE HEART, UNSPECIFIED WHETHER ANGINA PRESENT: ICD-10-CM

## 2024-08-20 DIAGNOSIS — R60.9 EDEMA, UNSPECIFIED TYPE: ICD-10-CM

## 2024-08-20 PROCEDURE — G2211 COMPLEX E/M VISIT ADD ON: HCPCS | Performed by: INTERNAL MEDICINE

## 2024-08-20 PROCEDURE — 1036F TOBACCO NON-USER: CPT | Performed by: INTERNAL MEDICINE

## 2024-08-20 PROCEDURE — 99214 OFFICE O/P EST MOD 30 MIN: CPT | Performed by: INTERNAL MEDICINE

## 2024-08-20 PROCEDURE — 1159F MED LIST DOCD IN RCRD: CPT | Performed by: OTOLARYNGOLOGY

## 2024-08-20 PROCEDURE — 1159F MED LIST DOCD IN RCRD: CPT | Performed by: INTERNAL MEDICINE

## 2024-08-20 PROCEDURE — 3074F SYST BP LT 130 MM HG: CPT | Performed by: INTERNAL MEDICINE

## 2024-08-20 PROCEDURE — 99213 OFFICE O/P EST LOW 20 MIN: CPT | Performed by: OTOLARYNGOLOGY

## 2024-08-20 PROCEDURE — 3078F DIAST BP <80 MM HG: CPT | Performed by: INTERNAL MEDICINE

## 2024-08-20 RX ORDER — ATORVASTATIN CALCIUM 10 MG/1
10 TABLET, FILM COATED ORAL NIGHTLY
Qty: 90 TABLET | Refills: 1 | Status: SHIPPED | OUTPATIENT
Start: 2024-08-20

## 2024-08-20 RX ORDER — ISOSORBIDE MONONITRATE 30 MG/1
TABLET, EXTENDED RELEASE ORAL
Qty: 90 TABLET | Refills: 1 | Status: SHIPPED | OUTPATIENT
Start: 2024-08-20

## 2024-08-20 RX ORDER — CARVEDILOL 6.25 MG/1
6.25 TABLET ORAL 2 TIMES DAILY
Qty: 180 TABLET | Refills: 1 | Status: SHIPPED | OUTPATIENT
Start: 2024-08-20 | End: 2025-08-20

## 2024-08-20 RX ORDER — CLOPIDOGREL BISULFATE 75 MG/1
75 TABLET ORAL DAILY
Qty: 90 TABLET | Refills: 1 | Status: SHIPPED | OUTPATIENT
Start: 2024-08-20 | End: 2025-08-20

## 2024-08-20 NOTE — PATIENT INSTRUCTIONS
STOP ASPIRIN    STOP AMLODIPINE    Please bring all medicines, vitamins, and herbal supplements with you in original bottles to every appointment! This is the best way to ensure your medication list in your chart is accurate.    Prescriptions will not be filled unless you are compliant with your follow up appointments or have a follow up appointment scheduled as per instruction of your physician. Refills should be requested at the time of your visit.

## 2024-08-20 NOTE — PROGRESS NOTES
Patient:  Jamaal Balbuena  YOB: 1933  MRN: [id number]       Impression/Plan:     Diagnoses and all orders for this visit:  Primary hypertension  Edema, unspecified type  -     Blood pressure is well-controlled in fact could discontinue amlodipine and I suspect pressure will still remain less than 135 systolic.  The amlodipine is contributing to the edema and I think he would be well served without it  -     He has follow-up with Dr. Murillo in the next few weeks and certainly if blood pressure is elevated a prescription for 2.5 amlodipine could be resumed.  Coronary artery disease involving native coronary artery of native heart, unspecified whether angina present  -     Reasonable functional capacity for 89 years of age continue current medications except would not continue dual antiplatelet therapy at this point as noted below.  Will discontinue aspirin  Epistaxis  -    Describes somewhat improved.  I think he would likely do better on single agent antiplatelet as opposed to dual antiplatelet therapy at this point in time with probable less likelihood of epistaxis.  Will discontinue aspirin.  He could safely hold his Plavix for 2 or 3 days if cauterization were necessary.  Anemia: Hemoglobin 9.5 quite similar to that of December of last year likely related to renal insufficiency      Chief Complaint/Active Symptoms:       Jamaal Balbuena is a 89 y.o. male who presents with coronary angioplasty at the time of infarct in 2014. He also has ischemic cardiomyopathy ejection fraction 40 to 45%. He has not wished further evaluation unless significantly symptomatic. He also has progressive kidney disease creatinine is in excess of 3. He has chronic lower extremity edema likely related to his kidney disease and use of amlodipine but he feels well on the medical regimen and prefers to put up with the edema he says it is minimally uncomfortable to him. He tolerates low-dose lisinopril only as any increase causes  hyperkalemia.     I had last seen him 4/23/2024 at which time from a cardiovascular standpoint was free of symptoms.  He had had a mechanical fall earlier in the month without sequelae.  His amlodipine was decreased at that time because of lower extremity edema with the plan of increasing of blood pressure were to increase further he he was to continue using support stockings.    Seen by nephrology 4/30/2024 vital signs were stable blood pressure well-controlled no further medication adjustments were made at that time    5/1/2024 sodium 142 potassium 4.7 BUN of 95 creatinine 3.89.  Most recent CBC December 2023 hemoglobin 9.7    7/27/2024 in emergency room with epistaxis he did not require packing or intervention is no bleeding in the emergency room and he was referred on to ENT.  Follow-up there he was encouraged to use a saline gel a few times a day.  He is going to have follow-up in 2 or 3 weeks with consideration of the office cauterization.  Also recommended he stop Plavix 48 hours prior to an office cauterization.    [facility] 8[facility]  LFT normal.  AIC 7.7      Hgb 9.5 Plt 172   Creat 3.6 Normal lytes     He denies angina, dyspnea, palpitation, lightheadedness or syncope.  He has had no symptoms of claudication or neurologic deterioration.  There have been no hospitalizations or emergency room visits since last office visit.    Has been doing fairly well from a cardiovascular standpoint he describes walking a mile a day but not all at 1 time.  He can walk a half a mile without stopping but walks slowly.  He has not had shortness of breath but does tire out easily.  No chest tightness or pressure no palpitation.  He does note continued lower extremity edema he says sometimes it seems a bit less.  He has been taking the 2.5 amlodipine as can be seen blood pressure is quite well-controlled at this time.    He has much less epistaxis but still intermittent.  He has a follow-up with ENT later today.                Review of Systems: Unremarkable except as noted above    Meds     Current Outpatient Medications   Medication Instructions    allopurinol (ZYLOPRIM) 100 mg, oral, Daily    ascorbic acid (VITAMIN C) 500 mg, oral, Daily    atorvastatin (LIPITOR) 10 mg, oral, Nightly    carvedilol (COREG) 6.25 mg, oral, 2 times daily    clopidogrel (PLAVIX) 75 mg, oral, Daily    FreeStyle Alphonso 14 Day Sensor kit USE AS INSTRUCTED    glucosamine HCl 1,500 mg, oral, 2 times daily    isosorbide mononitrate ER (IMDUR) 30 mg, oral, Daily    lisinopril 5 mg, oral, Daily    nitroglycerin (NITROSTAT) 0.4 mg, sublingual, Every 5 min PRN    sodium bicarbonate 650 mg, oral, 2 times daily, Take 2 tablets PO twice daily with meals    torsemide (DEMADEX) 20 mg, oral, Daily    Veltassa 8.4 g, oral, Daily        Allergies     Allergies   Allergen Reactions    Ace Inhibitors Other    Other Other     Angiotensin Receptor Blockers         Annotated Problems     Specialty Problems          Cardiology Problems    HLD (hyperlipidemia)    HTN (hypertension)    CAD (coronary artery disease)    Ischemic cardiomyopathy    PVD (peripheral vascular disease) (CMS-HCC)    Venous insufficiency        Problem List     Patient Active Problem List    Diagnosis Date Noted    Edema 04/23/2024    Degenerative disease of nervous system, unspecified (CMS-HCC) 11/09/2023    CKD (chronic kidney disease), stage V (Multi) 11/09/2023    Bronchiectasis, uncomplicated (Multi) 11/09/2023    Anemia in CKD (chronic kidney disease) 01/25/2023    Angioma of skin 01/25/2023    Arcus senilis, bilateral 01/25/2023    Astigmatism, bilateral 01/25/2023    Basal cell carcinoma 01/25/2023    Bilateral presbyopia 01/25/2023    Bilateral pseudophakia 01/25/2023    BPH (benign prostatic hyperplasia) 01/25/2023    CAD (coronary artery disease) 01/25/2023    Diabetic neuropathy (Multi) 01/25/2023    Hyperkalemia, diminished renal excretion 01/25/2023    Ischemic cardiomyopathy 01/25/2023     "Hyperparathyroidism due to renal insufficiency (Multi) 01/25/2023    Lung nodule 01/25/2023    Malignant neoplasm of prostate (Multi) 01/25/2023    Onychomycosis 01/25/2023    PVD (peripheral vascular disease) (CMS-[facility]) 01/25/2023    PVD (posterior vitreous detachment), both eyes 01/25/2023    Type 2 diabetes mellitus (Multi) 01/25/2023    Venous insufficiency 01/25/2023    Hypercalcemia 01/25/2023    Contracture of muscle of hand 01/25/2023    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/2023    HTN (hypertension) 09/15/2020    HLD (hyperlipidemia) 09/15/2020    Stage 4 chronic kidney disease (Multi) 09/15/2020       Objective:     Vitals:    08/20/24 1227   BP: 120/60   BP Location: Left arm   Patient Position: Sitting   Pulse: 61   Weight: 87.5 kg (193 lb)   Height: 1.753 m (5' 9\")      Wt Readings from Last 4 Encounters:   08/20/24 87.5 kg (193 lb)   07/30/24 90.9 kg (200 lb 6.4 oz)   07/27/24 86.6 kg (191 lb)   04/30/24 85.7 kg (189 lb)           LAB:     Lab Results   Component Value Date    WBC 8.9 12/01/2023    HGB 9.7 (L) 12/01/2023    HCT 30.5 (L) 12/01/2023     12/01/2023    ALT 16 01/02/2022    AST 24 01/02/2022     05/01/2024    K 4.7 05/01/2024     (H) 05/01/2024    CREATININE 3.89 (H) 05/01/2024    BUN 95 (HH) 05/01/2024    CO2 17 (L) 05/01/2024    INR 1.0 12/27/2021    HGBA1C 7.5 (A) 07/10/2023       Diagnostic Studies:     No results found.      Radiology:     No orders to display       Physical Exam     General Appearance: alert and oriented to person, place and time, in no acute distress  Cardiovascular: normal rate, regular rhythm, normal S1 and S2, no murmurs, rubs, clicks, or gallops,  no JVD  Pulmonary/Chest: clear to auscultation bilaterally- no wheezes, rales or rhonchi, normal air movement, no respiratory distress  Abdomen: soft, non-tender, non-distended, normal bowel sounds, no masses   Extremities: no cyanosis, clubbing.  2+ edema though seems less than " previously  Skin: warm and dry, no rash or erythema  Eyes: EOMI  Neck: supple and non-tender without mass, no thyromegaly   Neurological: alert, oriented, normal speech, no focal findings or movement disorder noted

## 2024-08-26 NOTE — PROGRESS NOTES
Chief Complaint:  Epistaxis    Referring Provider: No ref. provider found    History of Present Illness:    Jamaal Balbuena is a 89 y.o. male, presenting for evaluation of Epistaxis.  Since his last visit he has been doing well.  No additional bleeding episodes.  His nasal emollient regimen is working.    ?  Review of Systems:    Review of symptoms was negative except for those stated including Cardiopulmonary, Genitourinary, Gastrointestinal, Psychological, Sleep pattern, Endocrine, Eyes, Neurologic, Musculoskeletal, Skin, Hematologic/Lymphatic and Allergic/Immunologic.     Medical History:    I have reviewed the patient's updated past medical history, surgical history, family history, social history, as well as current medications and allergies as of 8/20/2024. Changes to these items have been updated and marked as reviewed in the electronic medical record.    Physical Exam:    Vitals:  vitals were not taken for this visit.   General: Patient doing well overall and is in no apparent distress.  Psych: Pleasant affect, and answers questions appropriately.  Head & Face: Symmetric facial movements  Eyes: Pupils equal, round, reactive.  Extraocular movements intact without gaze restrictions or nystagmus. No epiphora.  Ears:  External auditory canals are normal.  Tympanic membranes are clear.  No middle ear effusion is seen.  All middle ear landmarks are normal.  Nose: Anterior rhinoscopy revealed normal sinonasal mucosa. More posterior areas of the nasal cavity could not be completely examined.  Oral Cavity/Oropharynx:  Without lesions or masses to visual exam.  Neck: Supple without lymphadenopathy.  Lungs: Non-labored, and without evidence of stridor.  Cardiac: Pulses are strong, well-perfused.  Extremities: Without gross evidence of clubbing, cyanosis, or edema.  Neuro: Cranial nerves II-XII grossly intact; Intact facial movements.    Assessment:     Jamaal Balbuena is a 89 y.o. male with epistaxis now responding to nasal  emollients.    Plan:      Jamaal is doing well.  He will continue with his saline gel and saline and follow-up with me in 2 months or sooner if needed.      Bud Harrison MD, M.Eng.   of Otolaryngology - Head & Neck Surgery  Division of Rhinology and Endoscopic Skull Base Surgery  [facility]

## 2024-08-29 ENCOUNTER — APPOINTMENT (OUTPATIENT)
Dept: NEPHROLOGY | Facility: CLINIC | Age: 89
End: 2024-08-29
Payer: MEDICARE

## 2024-08-29 VITALS
BODY MASS INDEX: 28.73 KG/M2 | HEART RATE: 47 BPM | SYSTOLIC BLOOD PRESSURE: 144 MMHG | DIASTOLIC BLOOD PRESSURE: 69 MMHG | WEIGHT: 194 LBS | HEIGHT: 69 IN

## 2024-08-29 DIAGNOSIS — E55.9 VITAMIN D DEFICIENCY: ICD-10-CM

## 2024-08-29 DIAGNOSIS — E08.22 DIABETES MELLITUS DUE TO UNDERLYING CONDITION WITH DIABETIC CHRONIC KIDNEY DISEASE, UNSPECIFIED CKD STAGE, UNSPECIFIED WHETHER LONG TERM INSULIN USE (MULTI): ICD-10-CM

## 2024-08-29 DIAGNOSIS — N18.4 CKD (CHRONIC KIDNEY DISEASE) STAGE 4, GFR 15-29 ML/MIN (MULTI): Primary | ICD-10-CM

## 2024-08-29 DIAGNOSIS — I10 ESSENTIAL HYPERTENSION: ICD-10-CM

## 2024-08-29 DIAGNOSIS — E21.3 HYPERPARATHYROIDISM (MULTI): ICD-10-CM

## 2024-08-29 PROCEDURE — 3077F SYST BP >= 140 MM HG: CPT | Performed by: INTERNAL MEDICINE

## 2024-08-29 PROCEDURE — 1036F TOBACCO NON-USER: CPT | Performed by: INTERNAL MEDICINE

## 2024-08-29 PROCEDURE — 3078F DIAST BP <80 MM HG: CPT | Performed by: INTERNAL MEDICINE

## 2024-08-29 PROCEDURE — 99213 OFFICE O/P EST LOW 20 MIN: CPT | Performed by: INTERNAL MEDICINE

## 2024-08-29 PROCEDURE — 1159F MED LIST DOCD IN RCRD: CPT | Performed by: INTERNAL MEDICINE

## 2024-08-29 NOTE — PROGRESS NOTES
Jamaal OLGUIN Sree   89 y.o.    @@  [facility]/Room: [id number]/Room/bed info not found    Subjective:   The patient is being seen for a routine clinic follow-up of chronic kidney disease. Recently, the disease has been stable. Disease complications:  No hyperkalemia, no hypocalcemia, no hyperphosphatemia, no metabolic acidosis, no coagulopathy, no uremic encephalopathy, no neuropathy and no renal osteodystrophy. The patient is currently asymptomatic. No associated symptoms are reported.       Meds:   Current Outpatient Medications   Medication Sig Dispense Refill    allopurinol (Zyloprim) 100 mg tablet Take 1 tablet (100 mg) by mouth once daily. 90 tablet 3    ascorbic acid (Vitamin C) 1,000 mg tablet Take 0.5 tablets (500 mg) by mouth once daily.      atorvastatin (Lipitor) 10 mg tablet Take 1 tablet (10 mg) by mouth once daily at bedtime. 90 tablet 1    carvedilol (Coreg) 6.25 mg tablet Take 1 tablet (6.25 mg) by mouth 2 times a day. 180 tablet 1    clopidogrel (Plavix) 75 mg tablet Take 1 tablet (75 mg) by mouth once daily. 90 tablet 1    FreeStyle Alphonso 14 Day Sensor kit USE AS INSTRUCTED 6 each 1    glucosamine HCl 1,500 mg tablet Take 1,500 mg by mouth in the morning and at bedtime.      isosorbide mononitrate ER (Imdur) 30 mg 24 hr tablet 1 TABLET DAILY 90 tablet 1    lisinopril 5 mg tablet TAKE 1 TABLET DAILY 90 tablet 3    nitroglycerin (Nitrostat) 0.4 mg SL tablet Place 1 tablet (0.4 mg) under the tongue every 5 minutes if needed for chest pain. 45 tablet 5    patiromer calcium sorbitex (Veltassa) 8.4 gram powder in packet Take 8.4 g by mouth early in the morning..      sodium bicarbonate 650 mg tablet Take 1 tablet (650 mg) by mouth 2 times a day. Take 2 tablets PO twice daily with meals      torsemide (Demadex) 20 mg tablet TAKE 1 TABLET ONCE DAILY 90 tablet 3     No current facility-administered medications for this visit.          ROS:  The patient is awake and oriented. No dizziness or lightheadedness. No chills  and no fever. No headaches. No nausea and no vomiting. No shortness of breath. No cough. No sputum. No chest pain. No chest tightness. No abdominal pain. No diarrhea and no constipation. No hematemesis or hemoptysis. No hematuria. No rectal bleeding. No melena. No epistaxis. No urinary symptoms. No flank pain. No leg edema. No leg pain. No weakness. No itching. Overall, the rest of the review of systems is also negative.  12 point review of systems otherwise negative as stated in HPI.        Physical Examination:        Vitals:    08/29/24 1223   BP: 144/69   Pulse: (!) 47     General: The patient is awake, oriented, and is not in any distress.  Head and Neck: Normocephalic. No periorbital edema.  Eyes: non-icteric  Respiratory: Symmetric air entry. Symmetric chest expansion.No respiratory distress.  Cardiovascular: Symmetric peripheral pulses.  Skin: No maculopapular rash.  Abdomen: soft, nt/nd  Musculoskeletal: No peripheral edema in both left and right upper extremities.  No edema in either left or right lower extremities.  Neuro Exam: Speech is fluent. Moves extremities.    Imaging:  === 10/08/20 ===    US RENAL COMPLETE    - Impression -  Overall stable appearance of the kidneys including asymmetric right  renal atrophy with findings of medical renal disease, without  hydronephrosis.       Blood Labs:  No results found for this or any previous visit (from the past 24 hour(s)).   Lab Results   Component Value Date    .4 (H) 05/01/2024    PROTUR 9 12/08/2022    PHOS 4.3 09/19/2023      Lab Results   Component Value Date    GLUCOSE 178 (H) 05/01/2024    CALCIUM 10.2 05/01/2024     05/01/2024    K 4.7 05/01/2024    CO2 17 (L) 05/01/2024     (H) 05/01/2024    BUN 95 (HH) 05/01/2024    CREATININE 3.89 (H) 05/01/2024         Assessment and Plan:  1. CKD stage 4. His last creatinine level is 3.6.  Labile kidney function.  Normal potassium level.    2. Hypertension. Blood pressure is higher than the  goal.  He checks his blood pressure at home and his systolic blood pressure is usually around 120s to 130.  I asked him to bring his home blood pressure readings for me.    3. Diabetes. No proteinuria based on a spot urine protein to creatinine ratio.     4. Dyslipidemia. The patient is on a statin and he is tolerating his statin well.     5. Metabolic acidosis.  Continue sodium bicarb.     He will be seen in my office in about 3-4 months for followup.           Onel Murillo MD  Senior Attending Physician  Director of Onco-Nephrology Program  Division of Nephrology & Hypertension  [facility]

## 2024-10-09 ENCOUNTER — APPOINTMENT (OUTPATIENT)
Dept: PRIMARY CARE | Facility: CLINIC | Age: 89
End: 2024-10-09
Payer: MEDICARE

## 2024-10-09 VITALS
RESPIRATION RATE: 18 BRPM | DIASTOLIC BLOOD PRESSURE: 74 MMHG | TEMPERATURE: 98.3 F | SYSTOLIC BLOOD PRESSURE: 166 MMHG | WEIGHT: 191 LBS | HEART RATE: 70 BPM | BODY MASS INDEX: 28.29 KG/M2 | HEIGHT: 69 IN

## 2024-10-09 DIAGNOSIS — J47.9 BRONCHIECTASIS, UNCOMPLICATED (MULTI): ICD-10-CM

## 2024-10-09 DIAGNOSIS — E11.9 TYPE 2 DIABETES MELLITUS WITHOUT COMPLICATION, WITHOUT LONG-TERM CURRENT USE OF INSULIN (MULTI): ICD-10-CM

## 2024-10-09 DIAGNOSIS — Z23 NEED FOR VACCINATION: ICD-10-CM

## 2024-10-09 DIAGNOSIS — G31.9 DEGENERATIVE DISEASE OF NERVOUS SYSTEM, UNSPECIFIED: ICD-10-CM

## 2024-10-09 DIAGNOSIS — Z00.00 ROUTINE GENERAL MEDICAL EXAMINATION AT HEALTH CARE FACILITY: Primary | ICD-10-CM

## 2024-10-09 DIAGNOSIS — Z13.5 SCREENING FOR DIABETIC RETINOPATHY: ICD-10-CM

## 2024-10-09 DIAGNOSIS — C61 MALIGNANT NEOPLASM OF PROSTATE (MULTI): ICD-10-CM

## 2024-10-09 DIAGNOSIS — I73.9 PVD (PERIPHERAL VASCULAR DISEASE) (CMS-HCC): ICD-10-CM

## 2024-10-09 DIAGNOSIS — N18.5 STAGE 5 CHRONIC KIDNEY DISEASE NOT ON CHRONIC DIALYSIS (MULTI): ICD-10-CM

## 2024-10-09 DIAGNOSIS — E11.319 TYPE 2 DIABETES MELLITUS WITH RETINOPATHY, WITHOUT LONG-TERM CURRENT USE OF INSULIN, MACULAR EDEMA PRESENCE UNSPECIFIED, UNSPECIFIED LATERALITY, UNSPECIFIED RETINOPATHY SEVERITY: ICD-10-CM

## 2024-10-09 DIAGNOSIS — I10 HTN (HYPERTENSION), BENIGN: ICD-10-CM

## 2024-10-09 DIAGNOSIS — N18.5 CKD (CHRONIC KIDNEY DISEASE), STAGE V (MULTI): ICD-10-CM

## 2024-10-09 LAB — POC HEMOGLOBIN A1C: 8.1 % (ref 4.2–6.5)

## 2024-10-09 ASSESSMENT — PATIENT HEALTH QUESTIONNAIRE - PHQ9
1. LITTLE INTEREST OR PLEASURE IN DOING THINGS: NOT AT ALL
SUM OF ALL RESPONSES TO PHQ9 QUESTIONS 1 AND 2: 2
SUM OF ALL RESPONSES TO PHQ9 QUESTIONS 1 AND 2: 0
2. FEELING DOWN, DEPRESSED OR HOPELESS: NOT AT ALL
10. IF YOU CHECKED OFF ANY PROBLEMS, HOW DIFFICULT HAVE THESE PROBLEMS MADE IT FOR YOU TO DO YOUR WORK, TAKE CARE OF THINGS AT HOME, OR GET ALONG WITH OTHER PEOPLE: NOT DIFFICULT AT ALL
2. FEELING DOWN, DEPRESSED OR HOPELESS: SEVERAL DAYS
1. LITTLE INTEREST OR PLEASURE IN DOING THINGS: SEVERAL DAYS
10. IF YOU CHECKED OFF ANY PROBLEMS, HOW DIFFICULT HAVE THESE PROBLEMS MADE IT FOR YOU TO DO YOUR WORK, TAKE CARE OF THINGS AT HOME, OR GET ALONG WITH OTHER PEOPLE: NOT DIFFICULT AT ALL

## 2024-10-09 ASSESSMENT — ENCOUNTER SYMPTOMS
OCCASIONAL FEELINGS OF UNSTEADINESS: 1
LOSS OF SENSATION IN FEET: 1
DEPRESSION: 0

## 2024-10-09 ASSESSMENT — ACTIVITIES OF DAILY LIVING (ADL)
DOING_HOUSEWORK: NEEDS ASSISTANCE
MANAGING_FINANCES: NEEDS ASSISTANCE
DRESSING: INDEPENDENT
GROCERY_SHOPPING: NEEDS ASSISTANCE
BATHING: INDEPENDENT
TAKING_MEDICATION: NEEDS ASSISTANCE

## 2024-10-09 NOTE — ASSESSMENT & PLAN NOTE
Unfortunately blood pressure is uncontrolled.  It is probably due to the fact that his cardiologist discontinued the calcium channel blocker and he self discontinued the ACE inhibitor just last week.  I strongly advised him to resume the ACE inhibitor as elevated blood pressure is most likely to further progress his chronic kidney disease.

## 2024-10-09 NOTE — ASSESSMENT & PLAN NOTE
Kidney disease has been stable but severe for the last several years.  His current GFR is 14 with a creatinine of 3.89.  I reviewed notes from his nephrologist with whom he visits regularly.  He recommended continuing low-dose ACE inhibitor which I have strongly advised the patient to resume.

## 2024-10-09 NOTE — PROGRESS NOTES
"Subjective   Reason for Visit: Jamaal Balbuena is an 89 y.o. male here for a Medicare Wellness visit.     Past Medical, Surgical, and Family History reviewed and updated in chart.    Reviewed all medications by prescribing practitioner or clinical pharmacist (such as prescriptions, OTCs, herbal therapies and supplements) and documented in the medical record.    This patient is accompanied by his adult daughter who contributes to the history.  In addition to his Medicare physical he is here to follow-up on his chronic kidney disease, hypertension, diabetes.  He has been reading about side effects of lisinopril and decided to self discontinued this about a week ago.        Patient Care Team:  Mark Sandoval MD as PCP - General  Mark Sandoval MD as PCP - [employer]age PCP  Kelvin Oro MD as Consulting Physician (Cardiology)  Onel Murillo MD as Nephrologist (Nephrology)     Review of Systems    Objective   Vitals:  Ht 1.753 m (5' 9\")   BMI 28.65 kg/m²       Physical Exam  Constitutional:       Appearance: Normal appearance.   HENT:      Head: Normocephalic.   Eyes:      Conjunctiva/sclera: Conjunctivae normal.   Cardiovascular:      Rate and Rhythm: Normal rate and regular rhythm.      Heart sounds: Normal heart sounds.   Pulmonary:      Effort: Pulmonary effort is normal.      Breath sounds: Normal breath sounds.   Musculoskeletal:      Cervical back: Neck supple.   Skin:     General: Skin is warm and dry.   Neurological:      Mental Status: He is alert.         Assessment/Plan   Assessment & Plan  Routine general medical examination at health care facility    Orders:    1 Year Follow Up In [facility] - PCP - Wellness Exam; Future    Type 2 diabetes mellitus without complication, without long-term current use of insulin (Multi)  A1c has risen to 8.1%.  He is not taking any medications.  Based on age adjusted goals the risks may outweigh the benefits of initiating hypoglycemic medications at " this time.  We will continue to monitor  Orders:    POCT glycosylated hemoglobin (Hb A1C) manually resulted    Screening for diabetic retinopathy  Screening in the office is positive for diabetic retinopathy.  He will be referred to ophthalmology for further evaluation and discussion of treatment options  Orders:    Diabetic Retinopathy Luminetics; Future    Diabetic Retinopathy Luminetics    Need for vaccination    Orders:    Flu vaccine, high dose    CKD (chronic kidney disease), stage V (Multi)  Kidney disease has been stable but severe for the last several years.  His current GFR is 14 with a creatinine of 3.89.  I reviewed notes from his nephrologist with whom he visits regularly.  He recommended continuing low-dose ACE inhibitor which I have strongly advised the patient to resume.       Degenerative disease of nervous system, unspecified         PVD (peripheral vascular disease) (CMS-[facility])  Continue Plavix for prophylaxis       Bronchiectasis, uncomplicated (Multi)  This is not clinically symptomatic currently       Malignant neoplasm of prostate (Multi)  He is in permanent remission after treatment       Type 2 diabetes mellitus with retinopathy, without long-term current use of insulin, macular edema presence unspecified, unspecified laterality, unspecified retinopathy severity    Orders:    Referral to Ophthalmology; Future    Stage 5 chronic kidney disease not on chronic dialysis (Multi)  >>ASSESSMENT AND PLAN FOR CKD (CHRONIC KIDNEY DISEASE), STAGE V (MULTI) WRITTEN ON 10/9/2024  5:15 PM BY ALESSANDRO RINCON MD           HTN (hypertension), benign  Unfortunately blood pressure is uncontrolled.  It is probably due to the fact that his cardiologist discontinued the calcium channel blocker and he self discontinued the ACE inhibitor just last week.  I strongly advised him to resume the ACE inhibitor as elevated blood pressure is most likely to further progress his chronic kidney disease.            Health  Counseling    Advance Directives Discussion  16 - 20 minutes were spent discussing Advanced Care Planning (including a Living Will, Medical Power Of , as well as specific end of life choices and/or directives). The details of that discussion were documented in Advanced Directives Discussion section of the medical record.

## 2024-10-09 NOTE — ASSESSMENT & PLAN NOTE
A1c has risen to 8.1%.  He is not taking any medications.  Based on age adjusted goals the risks may outweigh the benefits of initiating hypoglycemic medications at this time.  We will continue to monitor  Orders:    POCT glycosylated hemoglobin (Hb A1C) manually resulted

## 2024-10-09 NOTE — ASSESSMENT & PLAN NOTE
>>ASSESSMENT AND PLAN FOR CKD (CHRONIC KIDNEY DISEASE), STAGE V (MULTI) WRITTEN ON 10/9/2024  5:15 PM BY ALESSANDRO RINCON MD

## 2024-10-09 NOTE — PATIENT INSTRUCTIONS
I recommend that you restart taking the lisinopril to bring the BP back down and also to protect your kidneys

## 2024-10-22 ENCOUNTER — APPOINTMENT (OUTPATIENT)
Dept: OTOLARYNGOLOGY | Facility: CLINIC | Age: 89
End: 2024-10-22
Payer: MEDICARE

## 2024-10-29 ENCOUNTER — APPOINTMENT (OUTPATIENT)
Dept: GENERAL RADIOLOGY | Age: 89
DRG: 064 | End: 2024-10-29
Payer: MEDICARE

## 2024-10-29 ENCOUNTER — APPOINTMENT (OUTPATIENT)
Dept: CT IMAGING | Age: 89
DRG: 064 | End: 2024-10-29
Payer: MEDICARE

## 2024-10-29 ENCOUNTER — HOSPITAL ENCOUNTER (INPATIENT)
Age: 89
LOS: 6 days | Discharge: NURSING, REHAB OR LONG-TERM CARE | DRG: 064 | End: 2024-11-05
Attending: STUDENT IN AN ORGANIZED HEALTH CARE EDUCATION/TRAINING PROGRAM | Admitting: INTERNAL MEDICINE
Payer: MEDICARE

## 2024-10-29 DIAGNOSIS — R41.82 ALTERED MENTAL STATUS, UNSPECIFIED ALTERED MENTAL STATUS TYPE: Primary | ICD-10-CM

## 2024-10-29 DIAGNOSIS — D64.9 CHRONIC ANEMIA: ICD-10-CM

## 2024-10-29 DIAGNOSIS — I63.512 CEREBROVASCULAR ACCIDENT (CVA) DUE TO STENOSIS OF LEFT MIDDLE CEREBRAL ARTERY (HCC): ICD-10-CM

## 2024-10-29 DIAGNOSIS — R55 SYNCOPE, UNSPECIFIED SYNCOPE TYPE: ICD-10-CM

## 2024-10-29 DIAGNOSIS — N18.9 CHRONIC KIDNEY DISEASE, UNSPECIFIED CKD STAGE: ICD-10-CM

## 2024-10-29 DIAGNOSIS — E87.0 HYPERNATREMIA: ICD-10-CM

## 2024-10-29 DIAGNOSIS — R29.898 RIGHT LEG WEAKNESS: ICD-10-CM

## 2024-10-29 LAB
ALBUMIN SERPL-MCNC: 4.4 G/DL (ref 3.5–4.6)
ALP SERPL-CCNC: 124 U/L (ref 35–104)
ALT SERPL-CCNC: 9 U/L (ref 0–41)
ANION GAP SERPL CALCULATED.3IONS-SCNC: 12 MEQ/L (ref 9–15)
AST SERPL-CCNC: 23 U/L (ref 0–40)
B-OH-BUTYR SERPL-SCNC: 4.4 MG/DL (ref 0.2–2.8)
BACTERIA URNS QL MICRO: NEGATIVE /HPF
BASOPHILS # BLD: 0.1 K/UL (ref 0–0.2)
BASOPHILS NFR BLD: 0.4 %
BILIRUB SERPL-MCNC: 0.6 MG/DL (ref 0.2–0.7)
BILIRUB UR QL STRIP: NEGATIVE
BUN SERPL-MCNC: 70 MG/DL (ref 8–23)
CALCIUM SERPL-MCNC: 10.8 MG/DL (ref 8.5–9.9)
CHLORIDE SERPL-SCNC: 113 MEQ/L (ref 95–107)
CK SERPL-CCNC: 656 U/L (ref 0–190)
CLARITY UR: CLEAR
CO2 SERPL-SCNC: 20 MEQ/L (ref 20–31)
COLOR UR: YELLOW
CREAT SERPL-MCNC: 3.95 MG/DL (ref 0.7–1.2)
EOSINOPHIL # BLD: 0 K/UL (ref 0–0.7)
EOSINOPHIL NFR BLD: 0 %
EPI CELLS #/AREA URNS AUTO: ABNORMAL /HPF (ref 0–5)
ERYTHROCYTE [DISTWIDTH] IN BLOOD BY AUTOMATED COUNT: 14 % (ref 11.5–14.5)
GLOBULIN SER CALC-MCNC: 3.4 G/DL (ref 2.3–3.5)
GLUCOSE SERPL-MCNC: 242 MG/DL (ref 70–99)
GLUCOSE UR STRIP-MCNC: 500 MG/DL
HCT VFR BLD AUTO: 36.8 % (ref 42–52)
HGB BLD-MCNC: 11.6 G/DL (ref 14–18)
HGB UR QL STRIP: ABNORMAL
HYALINE CASTS #/AREA URNS AUTO: ABNORMAL /HPF (ref 0–5)
KETONES UR STRIP-MCNC: NEGATIVE MG/DL
LEUKOCYTE ESTERASE UR QL STRIP: NEGATIVE
LYMPHOCYTES # BLD: 1 K/UL (ref 1–4.8)
LYMPHOCYTES NFR BLD: 7.9 %
MCH RBC QN AUTO: 31 PG (ref 27–31.3)
MCHC RBC AUTO-ENTMCNC: 31.5 % (ref 33–37)
MCV RBC AUTO: 98.4 FL (ref 79–92.2)
MONOCYTES # BLD: 0.8 K/UL (ref 0.2–0.8)
MONOCYTES NFR BLD: 6.5 %
NEUTROPHILS # BLD: 10.7 K/UL (ref 1.4–6.5)
NEUTS SEG NFR BLD: 84.9 %
NITRITE UR QL STRIP: NEGATIVE
PH UR STRIP: 5.5 [PH] (ref 5–9)
PLATELET # BLD AUTO: 181 K/UL (ref 130–400)
POTASSIUM SERPL-SCNC: 4.4 MEQ/L (ref 3.4–4.9)
PROT SERPL-MCNC: 7.8 G/DL (ref 6.3–8)
PROT UR STRIP-MCNC: 100 MG/DL
RBC # BLD AUTO: 3.74 M/UL (ref 4.7–6.1)
RBC #/AREA URNS AUTO: ABNORMAL /HPF (ref 0–5)
SODIUM SERPL-SCNC: 145 MEQ/L (ref 135–144)
SP GR UR STRIP: 1.01 (ref 1–1.03)
URINE REFLEX TO CULTURE: ABNORMAL
UROBILINOGEN UR STRIP-ACNC: 0.2 E.U./DL
WBC # BLD AUTO: 12.6 K/UL (ref 4.8–10.8)
WBC #/AREA URNS AUTO: ABNORMAL /HPF (ref 0–5)

## 2024-10-29 PROCEDURE — 2580000003 HC RX 258: Performed by: STUDENT IN AN ORGANIZED HEALTH CARE EDUCATION/TRAINING PROGRAM

## 2024-10-29 PROCEDURE — 93005 ELECTROCARDIOGRAM TRACING: CPT | Performed by: STUDENT IN AN ORGANIZED HEALTH CARE EDUCATION/TRAINING PROGRAM

## 2024-10-29 PROCEDURE — 82550 ASSAY OF CK (CPK): CPT

## 2024-10-29 PROCEDURE — 80053 COMPREHEN METABOLIC PANEL: CPT

## 2024-10-29 PROCEDURE — 81001 URINALYSIS AUTO W/SCOPE: CPT

## 2024-10-29 PROCEDURE — 82010 KETONE BODYS QUAN: CPT

## 2024-10-29 PROCEDURE — 85025 COMPLETE CBC W/AUTO DIFF WBC: CPT

## 2024-10-29 PROCEDURE — 99285 EMERGENCY DEPT VISIT HI MDM: CPT

## 2024-10-29 PROCEDURE — 70450 CT HEAD/BRAIN W/O DYE: CPT

## 2024-10-29 PROCEDURE — 71045 X-RAY EXAM CHEST 1 VIEW: CPT

## 2024-10-29 RX ORDER — 0.9 % SODIUM CHLORIDE 0.9 %
500 INTRAVENOUS SOLUTION INTRAVENOUS ONCE
Status: COMPLETED | OUTPATIENT
Start: 2024-10-29 | End: 2024-10-30

## 2024-10-29 RX ADMIN — SODIUM CHLORIDE 500 ML: 9 INJECTION, SOLUTION INTRAVENOUS at 23:42

## 2024-10-29 ASSESSMENT — LIFESTYLE VARIABLES
HOW MANY STANDARD DRINKS CONTAINING ALCOHOL DO YOU HAVE ON A TYPICAL DAY: PATIENT DOES NOT DRINK
HOW OFTEN DO YOU HAVE A DRINK CONTAINING ALCOHOL: NEVER

## 2024-10-29 ASSESSMENT — PAIN - FUNCTIONAL ASSESSMENT: PAIN_FUNCTIONAL_ASSESSMENT: ADULT NONVERBAL PAIN SCALE (NPVS)

## 2024-10-30 ENCOUNTER — APPOINTMENT (OUTPATIENT)
Dept: GENERAL RADIOLOGY | Age: 89
DRG: 064 | End: 2024-10-30
Payer: MEDICARE

## 2024-10-30 ENCOUNTER — APPOINTMENT (OUTPATIENT)
Age: 89
DRG: 064 | End: 2024-10-30
Attending: INTERNAL MEDICINE
Payer: MEDICARE

## 2024-10-30 PROBLEM — N25.81 HYPERPARATHYROIDISM DUE TO RENAL INSUFFICIENCY (HCC): Status: ACTIVE | Noted: 2024-10-30

## 2024-10-30 PROBLEM — R41.82 ALTERED MENTAL STATUS: Status: ACTIVE | Noted: 2024-10-30
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
## 2024-10-30 LAB
ALBUMIN SERPL-MCNC: 3.6 G/DL (ref 3.5–4.6)
ALP SERPL-CCNC: 106 U/L (ref 35–104)
ALT SERPL-CCNC: 10 U/L (ref 0–41)
ANION GAP SERPL CALCULATED.3IONS-SCNC: 14 MEQ/L (ref 9–15)
AST SERPL-CCNC: 26 U/L (ref 0–40)
BILIRUB DIRECT SERPL-MCNC: <0.2 MG/DL (ref 0–0.4)
BILIRUB INDIRECT SERPL-MCNC: ABNORMAL MG/DL (ref 0–0.6)
BILIRUB SERPL-MCNC: 0.6 MG/DL (ref 0.2–0.7)
BNP BLD-MCNC: NORMAL PG/ML
BUN SERPL-MCNC: 64 MG/DL (ref 8–23)
CALCIUM SERPL-MCNC: 10.3 MG/DL (ref 8.5–9.9)
CHLORIDE SERPL-SCNC: 116 MEQ/L (ref 95–107)
CK SERPL-CCNC: 823 U/L (ref 0–190)
CK SERPL-CCNC: 825 U/L (ref 0–190)
CO2 SERPL-SCNC: 15 MEQ/L (ref 20–31)
CREAT SERPL-MCNC: 3.58 MG/DL (ref 0.7–1.2)
ECHO AO ROOT DIAM: 3.9 CM
ECHO AO ROOT INDEX: 1.94 CM/M2
ECHO AV AREA PEAK VELOCITY: 3.1 CM2
ECHO AV AREA VTI: 3.3 CM2
ECHO AV AREA/BSA PEAK VELOCITY: 1.5 CM2/M2
ECHO AV AREA/BSA VTI: 1.6 CM2/M2
ECHO AV MEAN GRADIENT: 3 MMHG
ECHO AV MEAN VELOCITY: 0.8 M/S
ECHO AV PEAK GRADIENT: 5 MMHG
ECHO AV PEAK VELOCITY: 1.2 M/S
ECHO AV VELOCITY RATIO: 0.75
ECHO AV VTI: 25.4 CM
ECHO BSA: 2.05 M2
ECHO EST RA PRESSURE: 3 MMHG
ECHO LA DIAMETER INDEX: 2.44 CM/M2
ECHO LA DIAMETER: 4.9 CM
ECHO LA TO AORTIC ROOT RATIO: 1.26
ECHO LA VOL A-L A2C: 80 ML (ref 18–58)
ECHO LA VOL A-L A4C: 85 ML (ref 18–58)
ECHO LA VOL MOD A2C: 78 ML (ref 18–58)
ECHO LA VOL MOD A4C: 84 ML (ref 18–58)
ECHO LA VOLUME AREA LENGTH: 96 ML
ECHO LA VOLUME INDEX A-L A2C: 40 ML/M2 (ref 16–34)
ECHO LA VOLUME INDEX A-L A4C: 42 ML/M2 (ref 16–34)
ECHO LA VOLUME INDEX AREA LENGTH: 48 ML/M2 (ref 16–34)
ECHO LA VOLUME INDEX MOD A2C: 39 ML/M2 (ref 16–34)
ECHO LA VOLUME INDEX MOD A4C: 42 ML/M2 (ref 16–34)
ECHO LV EF PHYSICIAN: 30 %
ECHO LV FRACTIONAL SHORTENING: 19 % (ref 28–44)
ECHO LV INTERNAL DIMENSION DIASTOLE INDEX: 2.84 CM/M2
ECHO LV INTERNAL DIMENSION DIASTOLIC: 5.7 CM (ref 4.2–5.9)
ECHO LV INTERNAL DIMENSION SYSTOLIC INDEX: 2.29 CM/M2
ECHO LV INTERNAL DIMENSION SYSTOLIC: 4.6 CM
ECHO LV IVSD: 1.4 CM (ref 0.6–1)
ECHO LV IVSS: 1.5 CM
ECHO LV MASS 2D: 357.5 G (ref 88–224)
ECHO LV MASS INDEX 2D: 177.8 G/M2 (ref 49–115)
ECHO LV POSTERIOR WALL DIASTOLIC: 1.4 CM (ref 0.6–1)
ECHO LV POSTERIOR WALL SYSTOLIC: 1.8 CM
ECHO LV RELATIVE WALL THICKNESS RATIO: 0.49
ECHO LVOT AREA: 4.2 CM2
ECHO LVOT AV VTI INDEX: 0.8
ECHO LVOT DIAM: 2.3 CM
ECHO LVOT MEAN GRADIENT: 1 MMHG
ECHO LVOT PEAK GRADIENT: 2 MMHG
ECHO LVOT PEAK VELOCITY: 0.9 M/S
ECHO LVOT STROKE VOLUME INDEX: 41.7 ML/M2
ECHO LVOT SV: 83.9 ML
ECHO LVOT VTI: 20.2 CM
ECHO MV A VELOCITY: 0.42 M/S
ECHO MV E DECELERATION TIME (DT): 539.3 MS
ECHO MV E VELOCITY: 0.51 M/S
ECHO MV E/A RATIO: 1.21
ECHO MV REGURGITANT PEAK GRADIENT: 58 MMHG
ECHO MV REGURGITANT PEAK VELOCITY: 3.8 M/S
ECHO PV MAX VELOCITY: 0.8 M/S
ECHO PV PEAK GRADIENT: 3 MMHG
ECHO RIGHT VENTRICULAR SYSTOLIC PRESSURE (RVSP): 34 MMHG
ECHO RV INTERNAL DIMENSION: 1.3 CM
ECHO RV TAPSE: 2.7 CM (ref 1.7–?)
ECHO TV REGURGITANT MAX VELOCITY: 2.77 M/S
ECHO TV REGURGITANT PEAK GRADIENT: 31 MMHG
EKG ATRIAL RATE: 83 BPM
EKG P AXIS: 58 DEGREES
EKG P-R INTERVAL: 252 MS
EKG Q-T INTERVAL: 432 MS
EKG QRS DURATION: 152 MS
EKG QTC CALCULATION (BAZETT): 507 MS
EKG R AXIS: -64 DEGREES
EKG T AXIS: -2 DEGREES
EKG VENTRICULAR RATE: 83 BPM
FOLATE: 6.2 NG/ML (ref 4.8–24.2)
GLUCOSE BLD-MCNC: 112 MG/DL (ref 70–99)
GLUCOSE BLD-MCNC: 161 MG/DL (ref 70–99)
GLUCOSE BLD-MCNC: 168 MG/DL (ref 70–99)
GLUCOSE BLD-MCNC: 178 MG/DL (ref 70–99)
GLUCOSE BLD-MCNC: 259 MG/DL (ref 70–99)
GLUCOSE SERPL-MCNC: 183 MG/DL (ref 70–99)
PERFORMED ON: ABNORMAL
PHOSPHATE SERPL-MCNC: 2.5 MG/DL (ref 2.3–4.8)
POTASSIUM SERPL-SCNC: 4.3 MEQ/L (ref 3.4–4.9)
PREALB SERPL-MCNC: 18.3 MG/DL (ref 20–40)
PROCALCITONIN SERPL IA-MCNC: 0.18 NG/ML (ref 0–0.15)
PROT SERPL-MCNC: 6.6 G/DL (ref 6.3–8)
PTH-INTACT SERPL-MCNC: 108.9 PG/ML (ref 15–65)
SODIUM SERPL-SCNC: 145 MEQ/L (ref 135–144)
TSH REFLEX: 0.74 UIU/ML (ref 0.44–3.86)
VITAMIN B-12: 416 PG/ML (ref 232–1245)

## 2024-10-30 PROCEDURE — 80076 HEPATIC FUNCTION PANEL: CPT

## 2024-10-30 PROCEDURE — 6370000000 HC RX 637 (ALT 250 FOR IP): Performed by: INTERNAL MEDICINE

## 2024-10-30 PROCEDURE — 84100 ASSAY OF PHOSPHORUS: CPT

## 2024-10-30 PROCEDURE — 36415 COLL VENOUS BLD VENIPUNCTURE: CPT

## 2024-10-30 PROCEDURE — 99223 1ST HOSP IP/OBS HIGH 75: CPT | Performed by: INTERNAL MEDICINE

## 2024-10-30 PROCEDURE — 93010 ELECTROCARDIOGRAM REPORT: CPT | Performed by: INTERNAL MEDICINE

## 2024-10-30 PROCEDURE — 84443 ASSAY THYROID STIM HORMONE: CPT

## 2024-10-30 PROCEDURE — 82746 ASSAY OF FOLIC ACID SERUM: CPT

## 2024-10-30 PROCEDURE — 6360000002 HC RX W HCPCS: Performed by: INTERNAL MEDICINE

## 2024-10-30 PROCEDURE — 73120 X-RAY EXAM OF HAND: CPT

## 2024-10-30 PROCEDURE — 2580000003 HC RX 258: Performed by: INTERNAL MEDICINE

## 2024-10-30 PROCEDURE — 99222 1ST HOSP IP/OBS MODERATE 55: CPT | Performed by: INTERNAL MEDICINE

## 2024-10-30 PROCEDURE — 83970 ASSAY OF PARATHORMONE: CPT

## 2024-10-30 PROCEDURE — 97163 PT EVAL HIGH COMPLEX 45 MIN: CPT

## 2024-10-30 PROCEDURE — 80048 BASIC METABOLIC PNL TOTAL CA: CPT

## 2024-10-30 PROCEDURE — 93005 ELECTROCARDIOGRAM TRACING: CPT | Performed by: INTERNAL MEDICINE

## 2024-10-30 PROCEDURE — 95816 EEG AWAKE AND DROWSY: CPT

## 2024-10-30 PROCEDURE — 84134 ASSAY OF PREALBUMIN: CPT

## 2024-10-30 PROCEDURE — 83880 ASSAY OF NATRIURETIC PEPTIDE: CPT

## 2024-10-30 PROCEDURE — 82607 VITAMIN B-12: CPT

## 2024-10-30 PROCEDURE — 97167 OT EVAL HIGH COMPLEX 60 MIN: CPT

## 2024-10-30 PROCEDURE — 84145 PROCALCITONIN (PCT): CPT

## 2024-10-30 PROCEDURE — 82550 ASSAY OF CK (CPK): CPT

## 2024-10-30 PROCEDURE — C8929 TTE W OR WO FOL WCON,DOPPLER: HCPCS

## 2024-10-30 PROCEDURE — 93306 TTE W/DOPPLER COMPLETE: CPT | Performed by: INTERNAL MEDICINE

## 2024-10-30 PROCEDURE — 1210000000 HC MED SURG R&B

## 2024-10-30 PROCEDURE — 99223 1ST HOSP IP/OBS HIGH 75: CPT | Performed by: PSYCHIATRY & NEUROLOGY

## 2024-10-30 RX ORDER — ISOSORBIDE MONONITRATE 60 MG/1
30 TABLET, EXTENDED RELEASE ORAL DAILY
Status: DISCONTINUED | OUTPATIENT
Start: 2024-10-30 | End: 2024-11-05 | Stop reason: HOSPADM

## 2024-10-30 RX ORDER — ACETAMINOPHEN 650 MG/1
650 SUPPOSITORY RECTAL EVERY 6 HOURS PRN
Status: DISCONTINUED | OUTPATIENT
Start: 2024-10-30 | End: 2024-11-05 | Stop reason: HOSPADM

## 2024-10-30 RX ORDER — HEPARIN SODIUM 5000 [USP'U]/ML
5000 INJECTION, SOLUTION INTRAVENOUS; SUBCUTANEOUS EVERY 8 HOURS SCHEDULED
Status: DISCONTINUED | OUTPATIENT
Start: 2024-10-30 | End: 2024-11-05 | Stop reason: HOSPADM

## 2024-10-30 RX ORDER — INSULIN LISPRO 100 [IU]/ML
0-4 INJECTION, SOLUTION INTRAVENOUS; SUBCUTANEOUS
Status: DISCONTINUED | OUTPATIENT
Start: 2024-10-30 | End: 2024-10-30

## 2024-10-30 RX ORDER — SODIUM CHLORIDE, SODIUM LACTATE, POTASSIUM CHLORIDE, CALCIUM CHLORIDE 600; 310; 30; 20 MG/100ML; MG/100ML; MG/100ML; MG/100ML
INJECTION, SOLUTION INTRAVENOUS CONTINUOUS
Status: DISPENSED | OUTPATIENT
Start: 2024-10-30 | End: 2024-10-30

## 2024-10-30 RX ORDER — ASPIRIN 81 MG/1
81 TABLET, CHEWABLE ORAL DAILY
Status: DISCONTINUED | OUTPATIENT
Start: 2024-10-30 | End: 2024-10-31

## 2024-10-30 RX ORDER — DEXTROSE MONOHYDRATE 100 MG/ML
INJECTION, SOLUTION INTRAVENOUS CONTINUOUS PRN
Status: DISCONTINUED | OUTPATIENT
Start: 2024-10-30 | End: 2024-11-05 | Stop reason: HOSPADM

## 2024-10-30 RX ORDER — SODIUM CHLORIDE 0.9 % (FLUSH) 0.9 %
5-40 SYRINGE (ML) INJECTION EVERY 12 HOURS SCHEDULED
Status: DISCONTINUED | OUTPATIENT
Start: 2024-10-30 | End: 2024-11-05 | Stop reason: HOSPADM

## 2024-10-30 RX ORDER — SODIUM CHLORIDE, SODIUM LACTATE, POTASSIUM CHLORIDE, CALCIUM CHLORIDE 600; 310; 30; 20 MG/100ML; MG/100ML; MG/100ML; MG/100ML
INJECTION, SOLUTION INTRAVENOUS CONTINUOUS
Status: DISPENSED | OUTPATIENT
Start: 2024-10-30 | End: 2024-10-31

## 2024-10-30 RX ORDER — ATORVASTATIN CALCIUM 10 MG/1
10 TABLET, FILM COATED ORAL DAILY
Status: DISCONTINUED | OUTPATIENT
Start: 2024-10-30 | End: 2024-11-01

## 2024-10-30 RX ORDER — INSULIN LISPRO 100 [IU]/ML
0-8 INJECTION, SOLUTION INTRAVENOUS; SUBCUTANEOUS
Status: DISCONTINUED | OUTPATIENT
Start: 2024-10-30 | End: 2024-11-05 | Stop reason: HOSPADM

## 2024-10-30 RX ORDER — SODIUM BICARBONATE 650 MG/1
650 TABLET ORAL 2 TIMES DAILY
Status: ON HOLD | COMMUNITY

## 2024-10-30 RX ORDER — POLYETHYLENE GLYCOL 3350 17 G/17G
17 POWDER, FOR SOLUTION ORAL DAILY PRN
Status: DISCONTINUED | OUTPATIENT
Start: 2024-10-30 | End: 2024-11-05 | Stop reason: HOSPADM

## 2024-10-30 RX ORDER — CALCITRIOL 0.25 UG/1
0.25 CAPSULE, LIQUID FILLED ORAL DAILY
Status: DISCONTINUED | OUTPATIENT
Start: 2024-10-30 | End: 2024-11-05 | Stop reason: HOSPADM

## 2024-10-30 RX ORDER — SODIUM CHLORIDE 9 MG/ML
INJECTION, SOLUTION INTRAVENOUS PRN
Status: DISCONTINUED | OUTPATIENT
Start: 2024-10-30 | End: 2024-11-05 | Stop reason: HOSPADM

## 2024-10-30 RX ORDER — GLUCAGON 1 MG/ML
1 KIT INJECTION PRN
Status: DISCONTINUED | OUTPATIENT
Start: 2024-10-30 | End: 2024-11-05 | Stop reason: HOSPADM

## 2024-10-30 RX ORDER — ACETAMINOPHEN 325 MG/1
650 TABLET ORAL EVERY 6 HOURS PRN
Status: DISCONTINUED | OUTPATIENT
Start: 2024-10-30 | End: 2024-11-05 | Stop reason: HOSPADM

## 2024-10-30 RX ORDER — HYDRALAZINE HYDROCHLORIDE 20 MG/ML
10 INJECTION INTRAMUSCULAR; INTRAVENOUS EVERY 6 HOURS PRN
Status: DISCONTINUED | OUTPATIENT
Start: 2024-10-30 | End: 2024-11-05 | Stop reason: HOSPADM

## 2024-10-30 RX ORDER — SODIUM CHLORIDE 0.9 % (FLUSH) 0.9 %
5-40 SYRINGE (ML) INJECTION PRN
Status: DISCONTINUED | OUTPATIENT
Start: 2024-10-30 | End: 2024-11-05 | Stop reason: HOSPADM

## 2024-10-30 RX ORDER — CARVEDILOL 12.5 MG/1
12.5 TABLET ORAL 2 TIMES DAILY WITH MEALS
Status: DISCONTINUED | OUTPATIENT
Start: 2024-10-30 | End: 2024-11-05 | Stop reason: HOSPADM

## 2024-10-30 RX ORDER — TORSEMIDE 20 MG/1
20 TABLET ORAL DAILY
Status: ON HOLD | COMMUNITY

## 2024-10-30 RX ORDER — ONDANSETRON 4 MG/1
4 TABLET, ORALLY DISINTEGRATING ORAL EVERY 8 HOURS PRN
Status: DISCONTINUED | OUTPATIENT
Start: 2024-10-30 | End: 2024-11-05 | Stop reason: HOSPADM

## 2024-10-30 RX ORDER — ALLOPURINOL 100 MG/1
100 TABLET ORAL DAILY
Status: DISCONTINUED | OUTPATIENT
Start: 2024-10-30 | End: 2024-11-05 | Stop reason: HOSPADM

## 2024-10-30 RX ORDER — CLOPIDOGREL BISULFATE 75 MG/1
75 TABLET ORAL DAILY
Status: DISCONTINUED | OUTPATIENT
Start: 2024-10-30 | End: 2024-11-05 | Stop reason: HOSPADM

## 2024-10-30 RX ORDER — SODIUM BICARBONATE 650 MG/1
650 TABLET ORAL 2 TIMES DAILY
Status: DISCONTINUED | OUTPATIENT
Start: 2024-10-30 | End: 2024-11-05 | Stop reason: HOSPADM

## 2024-10-30 RX ORDER — ASPIRIN 300 MG/1
300 SUPPOSITORY RECTAL DAILY
Status: DISCONTINUED | OUTPATIENT
Start: 2024-10-30 | End: 2024-10-31

## 2024-10-30 RX ORDER — ONDANSETRON 2 MG/ML
4 INJECTION INTRAMUSCULAR; INTRAVENOUS EVERY 6 HOURS PRN
Status: DISCONTINUED | OUTPATIENT
Start: 2024-10-30 | End: 2024-11-05 | Stop reason: HOSPADM

## 2024-10-30 RX ADMIN — CARVEDILOL 12.5 MG: 12.5 TABLET, FILM COATED ORAL at 16:26

## 2024-10-30 RX ADMIN — Medication 10 ML: at 11:41

## 2024-10-30 RX ADMIN — CLOPIDOGREL BISULFATE 75 MG: 75 TABLET ORAL at 23:08

## 2024-10-30 RX ADMIN — ATORVASTATIN CALCIUM 10 MG: 10 TABLET, FILM COATED ORAL at 23:08

## 2024-10-30 RX ADMIN — HEPARIN SODIUM 5000 UNITS: 5000 INJECTION INTRAVENOUS; SUBCUTANEOUS at 13:20

## 2024-10-30 RX ADMIN — CARVEDILOL 12.5 MG: 12.5 TABLET, FILM COATED ORAL at 11:37

## 2024-10-30 RX ADMIN — HEPARIN SODIUM 5000 UNITS: 5000 INJECTION INTRAVENOUS; SUBCUTANEOUS at 23:07

## 2024-10-30 RX ADMIN — INSULIN LISPRO 4 UNITS: 100 INJECTION, SOLUTION INTRAVENOUS; SUBCUTANEOUS at 17:34

## 2024-10-30 RX ADMIN — SODIUM CHLORIDE, SODIUM LACTATE, POTASSIUM CHLORIDE, AND CALCIUM CHLORIDE: .6; .31; .03; .02 INJECTION, SOLUTION INTRAVENOUS at 13:25

## 2024-10-30 RX ADMIN — ALLOPURINOL 100 MG: 100 TABLET ORAL at 23:11

## 2024-10-30 RX ADMIN — Medication 10 ML: at 23:08

## 2024-10-30 RX ADMIN — SODIUM BICARBONATE 650 MG: 650 TABLET ORAL at 23:07

## 2024-10-30 RX ADMIN — ISOSORBIDE MONONITRATE 30 MG: 60 TABLET, EXTENDED RELEASE ORAL at 23:07

## 2024-10-30 RX ADMIN — CALCITRIOL 0.25 MCG: 0.25 CAPSULE ORAL at 23:11

## 2024-10-30 RX ADMIN — ASPIRIN 81 MG 81 MG: 81 TABLET ORAL at 16:26

## 2024-10-30 RX ADMIN — SODIUM CHLORIDE, POTASSIUM CHLORIDE, SODIUM LACTATE AND CALCIUM CHLORIDE: 600; 310; 30; 20 INJECTION, SOLUTION INTRAVENOUS at 01:43

## 2024-10-30 ASSESSMENT — PAIN SCALES - GENERAL
PAINLEVEL_OUTOF10: 0
PAINLEVEL_OUTOF10: 0

## 2024-10-30 NOTE — FLOWSHEET NOTE
Son notified RN that pt has metal in hands d/t old injury from past employment, updated MRI screening and received order for bilateral hand xry before going forward with MRI. Electronically signed by Jesika Bishop RN on 10/30/2024 at 7:33 PM

## 2024-10-30 NOTE — ACP (ADVANCE CARE PLANNING)
Advance Care Planning   Healthcare Decision Maker:    Primary Decision Maker: Hema Ware - Child - [phone]    Primary Decision Maker: little ware - Child - [phone]    Click here to complete Healthcare Decision Makers including selection of the Healthcare Decision Maker Relationship (ie \"Primary\").  Today we documented Decision Maker(s) consistent with Legal Next of Kin hierarchy.       Electronically signed by MOO Barbosa on 10/30/2024 at 3:01 PM

## 2024-10-30 NOTE — ED PROVIDER NOTES
[facility] ED  Emergency Department Encounter  Emergency Medicine      Pt Name: Modesto Burgess  MRN:[id number]  Birthdate 10/9/1933  Date of evaluation: 10/29/24  Time: 9:29 PM EDT  PCP:  Cb Rolon MD    CHIEF COMPLAINT       Chief Complaint   Patient presents with    Altered Mental Status     Patient found to be not aware.    Fall     Patient found on floor.  Patient last known well, last night  patient unaware of length on floor.       HISTORY OF PRESENT ILLNESS  (Location/Symptom, Timing/Onset, Context/Setting, Quality, Duration, ModifyingFactors, Severity.)      Modesto Burgess is a 89 y.o. male from home by EMS with concern for altered mental status and found on the floor.  Report was from family to EMS that they found him on the floor unsure of how long he was there.  They said last known well was 2 days ago.  He said he was not aware of his surroundings when they had picked him up off the ground.  Patient arrives alert to self and location unsure of time, following commands, denies being in any pain.  He denies any headache or numbness, he is a limited historian however    No other information at this time, family is on their way  PAST MEDICAL / SURGICAL / SOCIAL /FAMILY HISTORY      has no past medical history on file.  No other pertinent PMH on review with patient/guardian.     has no past surgical history on file.  No other pertinent PSH on review with patient/guardian.  Social History     Socioeconomic History    Marital status:      Spouse name: Not on file    Number of children: Not on file    Years of education: Not on file    Highest education level: Not on file   Occupational History    Not on file   Tobacco Use    Smoking status: Never    Smokeless tobacco: Never   Substance and Sexual Activity    Alcohol use: Never    Drug use: Never    Sexual activity: Not on file   Other Topics Concern    Not on file   Social History Narrative    Not on file     Social Determinants of Health

## 2024-10-30 NOTE — H&P
DEPARTMENT OF HOSPITAL MEDICINE    HISTORY AND PHYSICAL EXAM    PATIENT NAME:  Modesto Burgess    MRN:  [id number]  SERVICE DATE:  10/30/2024   SERVICE TIME:  12:13 AM    Primary Care Physician: Cb Rolon MD     SUBJECTIVE  CHIEF COMPLAINT: Fall at home    HPI:  Modesto Burgess is a 89 y.o. male who presents with above complaints.    Patient is a 89-year-old male brought in by EMS after being found down on the ground by home by family members.  Last known well approximately 2 days before.  Unknown how long he been stuck down on the floor.  Family reports that patient is typically \"spry\" and independent at baseline.  Workup in the ED is significant for mild dehydration, mild hyperglycemia in setting of DM2, mild rhabdomyolysis, and encephalopathy.  Patient denied any focal pain or neurologic deficit, but is confused and encephalopathic on admission and only able to answer basic questions.  Family does report that patient has had difficulty with reported worsening right leg weakness over the past several weeks of unclear cause.  They denied any known prior history of stroke or focal neurologic deficit aside from this.  Patient was found to have very mild leukocytosis ~12 in the ED with no obvious infectious source.  CT head was negative for acute findings.  Chest x-ray was negative for acute findings.  EKG did demonstrate sinus rhythm with first-degree AV block at 81 bpm with bifascicular block.  Only prior EKG available on file from 2020 demonstrates RBBB only, suggesting that LAFB portion is new since that time.  Patient denies any angina.  He has no prior echocardiogram on file.  ED provider administered 500 cc IV fluid resuscitation.  Hospitalist service consulted for admission.  Per family, patient is a full code.      On examination, patient is found to be awake but still grossly encephalopathic.  Answers some very simple questions appears to have very poor recall.  Responses are delayed.  He denies any acute pain.  He

## 2024-10-31 ENCOUNTER — APPOINTMENT (OUTPATIENT)
Dept: MRI IMAGING | Age: 89
DRG: 064 | End: 2024-10-31
Attending: PSYCHIATRY & NEUROLOGY
Payer: MEDICARE

## 2024-10-31 PROBLEM — Z78.9 FULL CODE STATUS: Status: ACTIVE | Noted: 2024-10-31

## 2024-10-31 PROBLEM — Z71.89 ADVANCED CARE PLANNING/COUNSELING DISCUSSION: Status: ACTIVE | Noted: 2024-10-31

## 2024-10-31 PROBLEM — Z71.89 GOALS OF CARE, COUNSELING/DISCUSSION: Status: ACTIVE | Noted: 2024-10-31

## 2024-10-31 PROBLEM — Z51.5 PALLIATIVE CARE ENCOUNTER: Status: ACTIVE | Noted: 2024-10-31

## 2024-10-31 LAB
ANION GAP SERPL CALCULATED.3IONS-SCNC: 11 MEQ/L (ref 9–15)
BASOPHILS # BLD: 0.1 K/UL (ref 0–0.2)
BASOPHILS NFR BLD: 0.5 %
BUN SERPL-MCNC: 60 MG/DL (ref 8–23)
CALCIUM SERPL-MCNC: 10.2 MG/DL (ref 8.5–9.9)
CHLORIDE SERPL-SCNC: 118 MEQ/L (ref 95–107)
CK SERPL-CCNC: 171 U/L (ref 0–190)
CK SERPL-CCNC: 265 U/L (ref 0–190)
CO2 SERPL-SCNC: 21 MEQ/L (ref 20–31)
CREAT SERPL-MCNC: 3.51 MG/DL (ref 0.7–1.2)
EKG ATRIAL RATE: 71 BPM
EKG P AXIS: 62 DEGREES
EKG P-R INTERVAL: 232 MS
EKG Q-T INTERVAL: 422 MS
EKG QRS DURATION: 142 MS
EKG QTC CALCULATION (BAZETT): 458 MS
EKG R AXIS: -50 DEGREES
EKG T AXIS: -69 DEGREES
EKG VENTRICULAR RATE: 71 BPM
EOSINOPHIL # BLD: 0 K/UL (ref 0–0.7)
EOSINOPHIL NFR BLD: 0 %
ERYTHROCYTE [DISTWIDTH] IN BLOOD BY AUTOMATED COUNT: 14.1 % (ref 11.5–14.5)
GLUCOSE BLD-MCNC: 155 MG/DL (ref 70–99)
GLUCOSE BLD-MCNC: 199 MG/DL (ref 70–99)
GLUCOSE BLD-MCNC: 213 MG/DL (ref 70–99)
GLUCOSE BLD-MCNC: 214 MG/DL (ref 70–99)
GLUCOSE SERPL-MCNC: 208 MG/DL (ref 70–99)
HCT VFR BLD AUTO: 30 % (ref 42–52)
HGB BLD-MCNC: 9.8 G/DL (ref 14–18)
LYMPHOCYTES # BLD: 1.3 K/UL (ref 1–4.8)
LYMPHOCYTES NFR BLD: 13.3 %
MCH RBC QN AUTO: 31.8 PG (ref 27–31.3)
MCHC RBC AUTO-ENTMCNC: 32.7 % (ref 33–37)
MCV RBC AUTO: 97.4 FL (ref 79–92.2)
MONOCYTES # BLD: 0.7 K/UL (ref 0.2–0.8)
MONOCYTES NFR BLD: 7.1 %
NEUTROPHILS # BLD: 7.6 K/UL (ref 1.4–6.5)
NEUTS SEG NFR BLD: 78.8 %
PERFORMED ON: ABNORMAL
PLATELET # BLD AUTO: 148 K/UL (ref 130–400)
POTASSIUM SERPL-SCNC: 5 MEQ/L (ref 3.4–4.9)
RBC # BLD AUTO: 3.08 M/UL (ref 4.7–6.1)
SODIUM SERPL-SCNC: 150 MEQ/L (ref 135–144)
WBC # BLD AUTO: 9.7 K/UL (ref 4.8–10.8)

## 2024-10-31 PROCEDURE — 6370000000 HC RX 637 (ALT 250 FOR IP): Performed by: INTERNAL MEDICINE

## 2024-10-31 PROCEDURE — 82550 ASSAY OF CK (CPK): CPT

## 2024-10-31 PROCEDURE — 80048 BASIC METABOLIC PNL TOTAL CA: CPT

## 2024-10-31 PROCEDURE — 92523 SPEECH SOUND LANG COMPREHEN: CPT

## 2024-10-31 PROCEDURE — 36415 COLL VENOUS BLD VENIPUNCTURE: CPT

## 2024-10-31 PROCEDURE — 99222 1ST HOSP IP/OBS MODERATE 55: CPT

## 2024-10-31 PROCEDURE — 2580000003 HC RX 258: Performed by: INTERNAL MEDICINE

## 2024-10-31 PROCEDURE — 6360000002 HC RX W HCPCS: Performed by: INTERNAL MEDICINE

## 2024-10-31 PROCEDURE — 93010 ELECTROCARDIOGRAM REPORT: CPT | Performed by: INTERNAL MEDICINE

## 2024-10-31 PROCEDURE — 72148 MRI LUMBAR SPINE W/O DYE: CPT

## 2024-10-31 PROCEDURE — 92610 EVALUATE SWALLOWING FUNCTION: CPT

## 2024-10-31 PROCEDURE — 99232 SBSQ HOSP IP/OBS MODERATE 35: CPT | Performed by: INTERNAL MEDICINE

## 2024-10-31 PROCEDURE — 85025 COMPLETE CBC W/AUTO DIFF WBC: CPT

## 2024-10-31 PROCEDURE — 70551 MRI BRAIN STEM W/O DYE: CPT

## 2024-10-31 PROCEDURE — 1210000000 HC MED SURG R&B

## 2024-10-31 RX ORDER — HYDRALAZINE HYDROCHLORIDE 25 MG/1
25 TABLET, FILM COATED ORAL EVERY 12 HOURS SCHEDULED
Status: DISCONTINUED | OUTPATIENT
Start: 2024-10-31 | End: 2024-11-02

## 2024-10-31 RX ORDER — ASPIRIN 81 MG/1
162 TABLET, CHEWABLE ORAL DAILY
Status: DISCONTINUED | OUTPATIENT
Start: 2024-11-01 | End: 2024-11-05 | Stop reason: HOSPADM

## 2024-10-31 RX ORDER — ASPIRIN 300 MG/1
300 SUPPOSITORY RECTAL DAILY
Status: DISCONTINUED | OUTPATIENT
Start: 2024-11-01 | End: 2024-11-05 | Stop reason: HOSPADM

## 2024-10-31 RX ADMIN — Medication 10 ML: at 21:33

## 2024-10-31 RX ADMIN — ASPIRIN 81 MG 81 MG: 81 TABLET ORAL at 09:22

## 2024-10-31 RX ADMIN — SODIUM BICARBONATE 650 MG: 650 TABLET ORAL at 21:32

## 2024-10-31 RX ADMIN — CLOPIDOGREL BISULFATE 75 MG: 75 TABLET ORAL at 09:22

## 2024-10-31 RX ADMIN — CARVEDILOL 12.5 MG: 12.5 TABLET, FILM COATED ORAL at 09:22

## 2024-10-31 RX ADMIN — HYDRALAZINE HYDROCHLORIDE 25 MG: 25 TABLET ORAL at 21:32

## 2024-10-31 RX ADMIN — INSULIN LISPRO 2 UNITS: 100 INJECTION, SOLUTION INTRAVENOUS; SUBCUTANEOUS at 21:32

## 2024-10-31 RX ADMIN — INSULIN LISPRO 2 UNITS: 100 INJECTION, SOLUTION INTRAVENOUS; SUBCUTANEOUS at 14:00

## 2024-10-31 RX ADMIN — CALCITRIOL 0.25 MCG: 0.25 CAPSULE ORAL at 09:22

## 2024-10-31 RX ADMIN — Medication 10 ML: at 09:22

## 2024-10-31 RX ADMIN — ISOSORBIDE MONONITRATE 30 MG: 60 TABLET, EXTENDED RELEASE ORAL at 09:22

## 2024-10-31 RX ADMIN — ATORVASTATIN CALCIUM 10 MG: 10 TABLET, FILM COATED ORAL at 09:22

## 2024-10-31 RX ADMIN — HEPARIN SODIUM 5000 UNITS: 5000 INJECTION INTRAVENOUS; SUBCUTANEOUS at 14:00

## 2024-10-31 RX ADMIN — SODIUM ZIRCONIUM CYCLOSILICATE 10 G: 10 POWDER, FOR SUSPENSION ORAL at 14:00

## 2024-10-31 RX ADMIN — ALLOPURINOL 100 MG: 100 TABLET ORAL at 09:22

## 2024-10-31 RX ADMIN — INSULIN LISPRO 2 UNITS: 100 INJECTION, SOLUTION INTRAVENOUS; SUBCUTANEOUS at 17:06

## 2024-10-31 RX ADMIN — SODIUM BICARBONATE 650 MG: 650 TABLET ORAL at 09:22

## 2024-10-31 RX ADMIN — CARVEDILOL 12.5 MG: 12.5 TABLET, FILM COATED ORAL at 17:07

## 2024-10-31 RX ADMIN — HEPARIN SODIUM 5000 UNITS: 5000 INJECTION INTRAVENOUS; SUBCUTANEOUS at 06:17

## 2024-10-31 RX ADMIN — HEPARIN SODIUM 5000 UNITS: 5000 INJECTION INTRAVENOUS; SUBCUTANEOUS at 21:32

## 2024-10-31 ASSESSMENT — PAIN SCALES - GENERAL
PAINLEVEL_OUTOF10: 0

## 2024-10-31 NOTE — DISCHARGE INSTR - COC
applicable)   Name:  Address:  Dialysis Schedule:  Phone:  Fax:    / signature: Electronically signed by MOO Galeana on 10/31/24 at 11:15 AM EDT    PHYSICIAN SECTION    Prognosis: {Prognosis:0939124507}    Condition at Discharge: {MH Patient Condition:517190152}    Rehab Potential (if transferring to Rehab): {Prognosis:4942542202}    Recommended Labs or Other Treatments After Discharge: ***    Physician Certification: I certify the above information and transfer of Modesto Burgess  is necessary for the continuing treatment of the diagnosis listed and that he requires Skilled Nursing Facility for less 30 days.   The individual is being discharged to a nursing facility directly from the hospital after receiving acute patient care at the hospital; and    Requires nursing facility services for the condition for which the patient received care in the hospital; and    As the attending physician, certifies no later than the date of discharge, the individual requires fewer than 30 days of nursing facility care   Update Admission H&P: {CHP DME Changes in HandP:881752818}    PHYSICIAN SIGNATURE:  Electronically signed by Love Torre DO on 11/4/24 at 11:41 AM EST

## 2024-10-31 NOTE — PLAN OF CARE
Problem: Chronic Conditions and Co-morbidities  Goal: Patient's chronic conditions and co-morbidity symptoms are monitored and maintained or improved  10/31/2024 0317 by Shawna Driscoll RN  Outcome: Progressing  10/30/2024 1457 by Jesika Bishop RN  Outcome: Progressing     Problem: Discharge Planning  Goal: Discharge to home or other facility with appropriate resources  10/31/2024 0317 by Shawna Driscoll RN  Outcome: Progressing  10/30/2024 1457 by Jesika Bishop RN  Outcome: Progressing     Problem: Pain  Goal: Verbalizes/displays adequate comfort level or baseline comfort level  10/31/2024 0317 by Shawna Driscoll RN  Outcome: Progressing  10/30/2024 1457 by Jesika Bishop RN  Outcome: Progressing     Problem: Safety - Adult  Goal: Free from fall injury  10/31/2024 0317 by Shawna Driscoll RN  Outcome: Progressing  10/30/2024 1457 by Jesika Bishop RN  Outcome: Progressing     Problem: ABCDS Injury Assessment  Goal: Absence of physical injury  Outcome: Progressing     Problem: Skin/Tissue Integrity  Goal: Absence of new skin breakdown  Description: 1.  Monitor for areas of redness and/or skin breakdown  2.  Assess vascular access sites hourly  3.  Every 4-6 hours minimum:  Change oxygen saturation probe site  4.  Every 4-6 hours:  If on nasal continuous positive airway pressure, respiratory therapy assess nares and determine need for appliance change or resting period.  Outcome: Progressing

## 2024-10-31 NOTE — PLAN OF CARE
Problem: Chronic Conditions and Co-morbidities  Goal: Patient's chronic conditions and co-morbidity symptoms are monitored and maintained or improved  10/31/2024 1052 by Angelina Kaye RN  Outcome: Progressing  10/31/2024 0317 by Shawna Driscoll RN  Outcome: Progressing     Problem: Discharge Planning  Goal: Discharge to home or other facility with appropriate resources  10/31/2024 1052 by Angelina Kaye RN  Outcome: Progressing  10/31/2024 0317 by Shawna Driscoll RN  Outcome: Progressing     Problem: Pain  Goal: Verbalizes/displays adequate comfort level or baseline comfort level  10/31/2024 1052 by Angelina Kaye RN  Outcome: Progressing  10/31/2024 0317 by Shawna Driscoll RN  Outcome: Progressing     Problem: Safety - Adult  Goal: Free from fall injury  10/31/2024 1052 by Angelina Kaye RN  Outcome: Progressing  10/31/2024 0317 by Shawna Driscoll RN  Outcome: Progressing     Problem: ABCDS Injury Assessment  Goal: Absence of physical injury  10/31/2024 1052 by Angelina Kaye RN  Outcome: Progressing  10/31/2024 0317 by Shawna Driscoll RN  Outcome: Progressing     Problem: Skin/Tissue Integrity  Goal: Absence of new skin breakdown  Description: 1.  Monitor for areas of redness and/or skin breakdown  2.  Assess vascular access sites hourly  3.  Every 4-6 hours minimum:  Change oxygen saturation probe site  4.  Every 4-6 hours:  If on nasal continuous positive airway pressure, respiratory therapy assess nares and determine need for appliance change or resting period.  10/31/2024 1052 by Angelina Kaye RN  Outcome: Progressing  10/31/2024 0317 by Shawna Driscoll RN  Outcome: Progressing

## 2024-11-01 ENCOUNTER — APPOINTMENT (OUTPATIENT)
Dept: ULTRASOUND IMAGING | Age: 89
DRG: 064 | End: 2024-11-01
Payer: MEDICARE

## 2024-11-01 PROBLEM — R29.818 PARKINSONIAN FEATURES: Status: ACTIVE | Noted: 2024-11-01

## 2024-11-01 PROBLEM — S06.9XAS LATE EFFECT OF BRAIN INJURY: Status: ACTIVE | Noted: 2024-11-01

## 2024-11-01 PROBLEM — I69.90 LATE EFFECTS OF CVA (CEREBROVASCULAR ACCIDENT): Status: ACTIVE | Noted: 2024-11-01

## 2024-11-01 PROBLEM — Z78.9 IMPAIRED MOBILITY AND ACTIVITIES OF DAILY LIVING: Status: ACTIVE | Noted: 2024-11-01

## 2024-11-01 PROBLEM — L57.0 ACTINIC KERATOSIS: Status: ACTIVE | Noted: 2019-09-05

## 2024-11-01 PROBLEM — Z74.09 IMPAIRED MOBILITY AND ACTIVITIES OF DAILY LIVING: Status: ACTIVE | Noted: 2024-11-01

## 2024-11-01 PROBLEM — H91.90 HOH (HARD OF HEARING): Status: ACTIVE | Noted: 2024-11-01

## 2024-11-01 PROBLEM — H43.813 PVD (POSTERIOR VITREOUS DETACHMENT), BOTH EYES: Status: ACTIVE | Noted: 2023-01-25

## 2024-11-01 PROBLEM — I63.9 CVA (CEREBRAL VASCULAR ACCIDENT) (HCC): Status: ACTIVE | Noted: 2024-11-01

## 2024-11-01 LAB
ANION GAP SERPL CALCULATED.3IONS-SCNC: 11 MEQ/L (ref 9–15)
BASOPHILS # BLD: 0 K/UL (ref 0–0.2)
BASOPHILS NFR BLD: 0.5 %
BUN SERPL-MCNC: 62 MG/DL (ref 8–23)
CALCIUM SERPL-MCNC: 10 MG/DL (ref 8.5–9.9)
CHLORIDE SERPL-SCNC: 115 MEQ/L (ref 95–107)
CHOLEST SERPL-MCNC: 129 MG/DL (ref 0–199)
CK SERPL-CCNC: 133 U/L (ref 0–190)
CO2 SERPL-SCNC: 19 MEQ/L (ref 20–31)
CREAT SERPL-MCNC: 3.34 MG/DL (ref 0.7–1.2)
EOSINOPHIL # BLD: 0 K/UL (ref 0–0.7)
EOSINOPHIL NFR BLD: 0 %
ERYTHROCYTE [DISTWIDTH] IN BLOOD BY AUTOMATED COUNT: 14.2 % (ref 11.5–14.5)
ESTIMATED AVERAGE GLUCOSE: 194 MG/DL
GLUCOSE BLD-MCNC: 166 MG/DL (ref 70–99)
GLUCOSE BLD-MCNC: 179 MG/DL (ref 70–99)
GLUCOSE BLD-MCNC: 190 MG/DL (ref 70–99)
GLUCOSE BLD-MCNC: 263 MG/DL (ref 70–99)
GLUCOSE SERPL-MCNC: 203 MG/DL (ref 70–99)
HBA1C MFR BLD: 8.4 % (ref 4–6)
HCT VFR BLD AUTO: 30.3 % (ref 42–52)
HDLC SERPL-MCNC: 34 MG/DL (ref 40–59)
HGB BLD-MCNC: 10.2 G/DL (ref 14–18)
LDLC SERPL CALC-MCNC: 70 MG/DL (ref 0–129)
LYMPHOCYTES # BLD: 1.2 K/UL (ref 1–4.8)
LYMPHOCYTES NFR BLD: 14.3 %
MCH RBC QN AUTO: 32.7 PG (ref 27–31.3)
MCHC RBC AUTO-ENTMCNC: 33.7 % (ref 33–37)
MCV RBC AUTO: 97.1 FL (ref 79–92.2)
MONOCYTES # BLD: 0.6 K/UL (ref 0.2–0.8)
MONOCYTES NFR BLD: 6.7 %
NEUTROPHILS # BLD: 6.8 K/UL (ref 1.4–6.5)
NEUTS SEG NFR BLD: 78.3 %
PERFORMED ON: ABNORMAL
PLATELET # BLD AUTO: 144 K/UL (ref 130–400)
POTASSIUM SERPL-SCNC: 4.3 MEQ/L (ref 3.4–4.9)
RBC # BLD AUTO: 3.12 M/UL (ref 4.7–6.1)
REASON FOR REJECTION: NORMAL
REJECTED TEST: NORMAL
SODIUM SERPL-SCNC: 145 MEQ/L (ref 135–144)
TRIGL SERPL-MCNC: 124 MG/DL (ref 0–150)
WBC # BLD AUTO: 8.7 K/UL (ref 4.8–10.8)

## 2024-11-01 PROCEDURE — 2580000003 HC RX 258: Performed by: INTERNAL MEDICINE

## 2024-11-01 PROCEDURE — 97535 SELF CARE MNGMENT TRAINING: CPT

## 2024-11-01 PROCEDURE — 6370000000 HC RX 637 (ALT 250 FOR IP): Performed by: PSYCHIATRY & NEUROLOGY

## 2024-11-01 PROCEDURE — 82550 ASSAY OF CK (CPK): CPT

## 2024-11-01 PROCEDURE — 93880 EXTRACRANIAL BILAT STUDY: CPT

## 2024-11-01 PROCEDURE — 99233 SBSQ HOSP IP/OBS HIGH 50: CPT | Performed by: INTERNAL MEDICINE

## 2024-11-01 PROCEDURE — 85025 COMPLETE CBC W/AUTO DIFF WBC: CPT

## 2024-11-01 PROCEDURE — 6360000002 HC RX W HCPCS: Performed by: INTERNAL MEDICINE

## 2024-11-01 PROCEDURE — 99221 1ST HOSP IP/OBS SF/LOW 40: CPT | Performed by: PHYSICAL MEDICINE & REHABILITATION

## 2024-11-01 PROCEDURE — 80061 LIPID PANEL: CPT

## 2024-11-01 PROCEDURE — 6370000000 HC RX 637 (ALT 250 FOR IP): Performed by: INTERNAL MEDICINE

## 2024-11-01 PROCEDURE — 36415 COLL VENOUS BLD VENIPUNCTURE: CPT

## 2024-11-01 PROCEDURE — 80048 BASIC METABOLIC PNL TOTAL CA: CPT

## 2024-11-01 PROCEDURE — 99232 SBSQ HOSP IP/OBS MODERATE 35: CPT | Performed by: PSYCHIATRY & NEUROLOGY

## 2024-11-01 PROCEDURE — 1210000000 HC MED SURG R&B

## 2024-11-01 PROCEDURE — 83036 HEMOGLOBIN GLYCOSYLATED A1C: CPT

## 2024-11-01 RX ORDER — ATORVASTATIN CALCIUM 40 MG/1
40 TABLET, FILM COATED ORAL DAILY
Status: DISCONTINUED | OUTPATIENT
Start: 2024-11-02 | End: 2024-11-05 | Stop reason: HOSPADM

## 2024-11-01 RX ORDER — INSULIN GLARGINE 100 [IU]/ML
13 INJECTION, SOLUTION SUBCUTANEOUS NIGHTLY
Status: DISCONTINUED | OUTPATIENT
Start: 2024-11-01 | End: 2024-11-05 | Stop reason: HOSPADM

## 2024-11-01 RX ADMIN — INSULIN LISPRO 4 UNITS: 100 INJECTION, SOLUTION INTRAVENOUS; SUBCUTANEOUS at 11:55

## 2024-11-01 RX ADMIN — ALLOPURINOL 100 MG: 100 TABLET ORAL at 10:01

## 2024-11-01 RX ADMIN — Medication 10 ML: at 21:06

## 2024-11-01 RX ADMIN — HYDRALAZINE HYDROCHLORIDE 25 MG: 25 TABLET ORAL at 21:05

## 2024-11-01 RX ADMIN — ATORVASTATIN CALCIUM 10 MG: 10 TABLET, FILM COATED ORAL at 10:01

## 2024-11-01 RX ADMIN — HEPARIN SODIUM 5000 UNITS: 5000 INJECTION INTRAVENOUS; SUBCUTANEOUS at 05:29

## 2024-11-01 RX ADMIN — HYDRALAZINE HYDROCHLORIDE 25 MG: 25 TABLET ORAL at 10:00

## 2024-11-01 RX ADMIN — Medication 10 ML: at 10:01

## 2024-11-01 RX ADMIN — CALCITRIOL 0.25 MCG: 0.25 CAPSULE ORAL at 10:00

## 2024-11-01 RX ADMIN — INSULIN GLARGINE 13 UNITS: 100 INJECTION, SOLUTION SUBCUTANEOUS at 21:16

## 2024-11-01 RX ADMIN — CARVEDILOL 12.5 MG: 12.5 TABLET, FILM COATED ORAL at 16:44

## 2024-11-01 RX ADMIN — Medication 10 ML: at 21:08

## 2024-11-01 RX ADMIN — INSULIN LISPRO 2 UNITS: 100 INJECTION, SOLUTION INTRAVENOUS; SUBCUTANEOUS at 16:44

## 2024-11-01 RX ADMIN — SODIUM BICARBONATE 650 MG: 650 TABLET ORAL at 21:05

## 2024-11-01 RX ADMIN — HEPARIN SODIUM 5000 UNITS: 5000 INJECTION INTRAVENOUS; SUBCUTANEOUS at 21:05

## 2024-11-01 RX ADMIN — HEPARIN SODIUM 5000 UNITS: 5000 INJECTION INTRAVENOUS; SUBCUTANEOUS at 14:17

## 2024-11-01 RX ADMIN — ISOSORBIDE MONONITRATE 30 MG: 60 TABLET, EXTENDED RELEASE ORAL at 10:00

## 2024-11-01 RX ADMIN — ASPIRIN 162 MG: 81 TABLET, CHEWABLE ORAL at 10:00

## 2024-11-01 RX ADMIN — SODIUM BICARBONATE 650 MG: 650 TABLET ORAL at 10:01

## 2024-11-01 RX ADMIN — CLOPIDOGREL BISULFATE 75 MG: 75 TABLET ORAL at 10:01

## 2024-11-01 RX ADMIN — CARVEDILOL 12.5 MG: 12.5 TABLET, FILM COATED ORAL at 10:01

## 2024-11-01 ASSESSMENT — PAIN SCALES - GENERAL: PAINLEVEL_OUTOF10: 0

## 2024-11-01 NOTE — CONSULTS
[facility]                   [address] [place] [address]                              CONSULTATION      PATIENT NAME: PAUL CLAYTON                   : 10/09/1933  MED REC NO: [id number]                        ROOM: [id number]  ACCOUNT NO: [id number]                       ADMIT DATE: 10/29/2024  PROVIDER: Sal Jacome MD    ENDOCRINE CONSULT    CONSULT DATE: 10/30/2024    REFERRING PHYSICIAN:  Brandyn Vasquez MD      REASON FOR CONSULT:  Hyperparathyroidism, increased PTH.    HISTORY OF PRESENT ILLNESS:  Obtained through prior H and P.  The patient is very drowsy, unable to provide history.  The patient is a 89-year-old male admitted with fall at home.  Otherwise, has been fairly independent, found to have mild dehydration with hyperglycemia, rhabdomyolysis, and encephalopathy.  Initial admitting diagnoses were fall with rhabdomyolysis, chronic kidney disease stage 3, encephalopathy.  The patient's PTH was elevated.  Labs show sodium 145, potassium 4.3, chloride 116, CO2 was 15, BUN 64, creatinine 3.58.  CPK was 823.  PTH was 108.  TSH was 0.744.  Has had diabetes with A1c between 6.7 to 7.2 over the last many years.  Blood sugars here are staying between 100 to 200 range.  The patient was started on Ringer's lactate, started also on Rocaltrol 0.25 mcg daily, calcium was 10.3 to 10.8 range.  The patient is seen by Neurology and Cardiology here.  Reviewed consults.  Cardiology consult for bifascicular block management, had right bundle-branch block.  Neuro consult, possible CVA with mild traumatic brain injury.  Plan to have MRI and EEG.    PAST MEDICAL HISTORY:  Significant for type 2 diabetes.    SURGICAL HISTORY:  Reviewed, noncontributory.    FAMILY HISTORY:  Reviewed, noncontributory.    SOCIAL HISTORY:  Denies any smoking, substance abuse.    ALLERGIES:  NONE.      MEDICATIONS:  Here included allopurinol, Lipitor, Rocaltrol, Coreg, Plavix, low-dose Humalog coverage, Imdur, Ringer's 
Inpatient consult to Cardiology  Consult performed by: Mateus Lazo MD  Consult ordered by: Brandyn Vasquez MD      Patient Name: Modesto Burgess  Admit Date: 10/29/2024  9:28 PM  MR #: [id number]  : 10/9/1933    Attending Physician: Brandyn Vasquez MD  Reason for consult: Bifascicular block management    History of Presenting Illness:      Modesto Burgess is a 89 y.o. male on hospital day 0 with a history of CAD with prior PCI's, hypertension, hyperlipidemia, diabetes, CKD, who was admitted to the hospital after being found down by family members.. History Obtained From:  patient, electronic medical record    Unknown how long patient was found  Apparently last time patient was found normal was 2 days prior    EKG showing sinus rhythm RBBB QTc 507  CK elevated  BNP 2100  CT of the head negative  Chest x-ray normal cardiopulmonary process    History:      No past medical history on file.  No past surgical history on file.  Family History  No family history on file.  [] Unable to obtain due to ventilated and/ or neurologic status  Social History     Socioeconomic History    Marital status:      Spouse name: Not on file    Number of children: Not on file    Years of education: Not on file    Highest education level: Not on file   Occupational History    Not on file   Tobacco Use    Smoking status: Never    Smokeless tobacco: Never   Substance and Sexual Activity    Alcohol use: Never    Drug use: Never    Sexual activity: Not on file   Other Topics Concern    Not on file   Social History Narrative    Not on file     Social Determinants of Health     Financial Resource Strain: Not on file   Food Insecurity: Not on file   Transportation Needs: Not on file   Physical Activity: Not on file   Stress: Not on file   Social Connections: Not on file   Intimate Partner Violence: Not on file   Housing Stability: Not on file      [] Unable to obtain due to ventilated and/ or neurologic status    Home Medications:      Medications 
Subjective:      Patient ID: Modesto Burgess is a 89 y.o. male who presents today for fall..    HPI 89-year-old gentleman who lives alone at home was found down and last seen 2 days ago.  It was very unclear how long he was stuck on the floor.  Family reports that he is typically independent at baseline and walks though he tells me that he does not walk much.  Patient was noted to have mild rhabdomyolysis and encephalopathy.  He denies any focal pain.  He has weakness in his lower extreme and is reported that he has some right leg weakness in the past several weeks of unclear etiology.  There is no reports of any strokes..    When seen he appears to be not agitated but quite engrossed in covering himself she reports that he is feeling cold.  He knows he is in the hospital but does not know anything else further than this.    Review of Systems   Unable to perform ROS: Mental status change       No past medical history on file.  No past surgical history on file.  Social History     Socioeconomic History    Marital status:      Spouse name: Not on file    Number of children: Not on file    Years of education: Not on file    Highest education level: Not on file   Occupational History    Not on file   Tobacco Use    Smoking status: Never    Smokeless tobacco: Never   Substance and Sexual Activity    Alcohol use: Never    Drug use: Never    Sexual activity: Not on file   Other Topics Concern    Not on file   Social History Narrative    Not on file     Social Determinants of Health     Financial Resource Strain: Not on file   Food Insecurity: Not on file   Transportation Needs: Not on file   Physical Activity: Not on file   Stress: Not on file   Social Connections: Not on file   Intimate Partner Violence: Not on file   Housing Stability: Not on file     No family history on file.  No Known Allergies  Current Facility-Administered Medications   Medication Dose Route Frequency Provider Last Rate Last Admin    sodium 
B12 shot times one, adding Protein supplements and push PO fluids.    Treat and monitor for higher level cognitive deficits, focus on difficulty with sequencing and problem-solving.    Focus on higher-level balance and falls risk issues focusing on balance training and monitoring for orthostasis.      Above recommendations are indicated to address medical complexity and need for appropriate rehab services.  Will tailor individual care and rehab plan per individuals needs re address cognitive deficits.    Focus of today's plan-await full neurologic workup and then acute      Required Certification Data (potential inpatient rehabilitation facility patient's only)    Deficits:impaired gait, high risk for falls, frequent falls, balance, ADL's, cognitive deficits , and oropharyngeal dysphagia    Disability:mobility, locomotion, self care, bowel/ bladder status, and cognitive    Potential barriers to progress/discharge:complex medical conditions and complex social situations         It was my pleasure to evaluate Modesto Burgess today.  Please call [phone] with questions.    Christina Robison, DO     FAAPMR

## 2024-11-01 NOTE — PLAN OF CARE
Problem: Chronic Conditions and Co-morbidities  Goal: Patient's chronic conditions and co-morbidity symptoms are monitored and maintained or improved  11/1/2024 1936 by Bhaskar Blanco RN  Outcome: Progressing  11/1/2024 1348 by Kristy Gonzalez RN  Outcome: Progressing  Flowsheets (Taken 11/1/2024 0800)  Care Plan - Patient's Chronic Conditions and Co-Morbidity Symptoms are Monitored and Maintained or Improved:   Monitor and assess patient's chronic conditions and comorbid symptoms for stability, deterioration, or improvement   Collaborate with multidisciplinary team to address chronic and comorbid conditions and prevent exacerbation or deterioration   Update acute care plan with appropriate goals if chronic or comorbid symptoms are exacerbated and prevent overall improvement and discharge     Problem: Discharge Planning  Goal: Discharge to home or other facility with appropriate resources  11/1/2024 1936 by Bhaskar Blanco RN  Outcome: Progressing  11/1/2024 1348 by Kristy Gonzalez RN  Outcome: Progressing  Flowsheets (Taken 11/1/2024 0800)  Discharge to home or other facility with appropriate resources:   Identify barriers to discharge with patient and caregiver   Arrange for needed discharge resources and transportation as appropriate   Identify discharge learning needs (meds, wound care, etc)   Arrange for interpreters to assist at discharge as needed   Refer to discharge planning if patient needs post-hospital services based on physician order or complex needs related to functional status, cognitive ability or social support system     Problem: Pain  Goal: Verbalizes/displays adequate comfort level or baseline comfort level  11/1/2024 1936 by Bhaskar lBanco RN  Outcome: Progressing  11/1/2024 1348 by Kristy Gonzalez RN  Outcome: Progressing  Flowsheets (Taken 11/1/2024 0758)  Verbalizes/displays adequate comfort level or baseline comfort level:   Encourage patient to monitor pain and request assistance   Assess pain using

## 2024-11-01 NOTE — PLAN OF CARE
Problem: Chronic Conditions and Co-morbidities  Goal: Patient's chronic conditions and co-morbidity symptoms are monitored and maintained or improved  11/1/2024 1348 by Kristy Gonzalez RN  Outcome: Progressing  Flowsheets (Taken 11/1/2024 0800)  Care Plan - Patient's Chronic Conditions and Co-Morbidity Symptoms are Monitored and Maintained or Improved:   Monitor and assess patient's chronic conditions and comorbid symptoms for stability, deterioration, or improvement   Collaborate with multidisciplinary team to address chronic and comorbid conditions and prevent exacerbation or deterioration   Update acute care plan with appropriate goals if chronic or comorbid symptoms are exacerbated and prevent overall improvement and discharge  11/1/2024 0134 by Lio Sánchez RN  Outcome: Progressing     Problem: Discharge Planning  Goal: Discharge to home or other facility with appropriate resources  11/1/2024 1348 by Kristy Gonzalez RN  Outcome: Progressing  Flowsheets (Taken 11/1/2024 0800)  Discharge to home or other facility with appropriate resources:   Identify barriers to discharge with patient and caregiver   Arrange for needed discharge resources and transportation as appropriate   Identify discharge learning needs (meds, wound care, etc)   Arrange for interpreters to assist at discharge as needed   Refer to discharge planning if patient needs post-hospital services based on physician order or complex needs related to functional status, cognitive ability or social support system  11/1/2024 0134 by Lio Sánchez RN  Outcome: Progressing     Problem: Pain  Goal: Verbalizes/displays adequate comfort level or baseline comfort level  11/1/2024 1348 by Kristy Gonzalez RN  Outcome: Progressing  Flowsheets (Taken 11/1/2024 0758)  Verbalizes/displays adequate comfort level or baseline comfort level:   Encourage patient to monitor pain and request assistance   Assess pain using appropriate pain scale   Administer analgesics based on

## 2024-11-02 LAB
ANION GAP SERPL CALCULATED.3IONS-SCNC: 11 MEQ/L (ref 9–15)
BASOPHILS # BLD: 0 K/UL (ref 0–0.2)
BASOPHILS NFR BLD: 0.5 %
BUN SERPL-MCNC: 66 MG/DL (ref 8–23)
CALCIUM SERPL-MCNC: 9.7 MG/DL (ref 8.5–9.9)
CHLORIDE SERPL-SCNC: 115 MEQ/L (ref 95–107)
CO2 SERPL-SCNC: 19 MEQ/L (ref 20–31)
CREAT SERPL-MCNC: 3.45 MG/DL (ref 0.7–1.2)
EOSINOPHIL # BLD: 0 K/UL (ref 0–0.7)
EOSINOPHIL NFR BLD: 0 %
ERYTHROCYTE [DISTWIDTH] IN BLOOD BY AUTOMATED COUNT: 14.1 % (ref 11.5–14.5)
GLUCOSE BLD-MCNC: 158 MG/DL (ref 70–99)
GLUCOSE BLD-MCNC: 160 MG/DL (ref 70–99)
GLUCOSE BLD-MCNC: 262 MG/DL (ref 70–99)
GLUCOSE BLD-MCNC: 345 MG/DL (ref 70–99)
GLUCOSE SERPL-MCNC: 166 MG/DL (ref 70–99)
HCT VFR BLD AUTO: 29.4 % (ref 42–52)
HGB BLD-MCNC: 9.9 G/DL (ref 14–18)
LYMPHOCYTES # BLD: 1.2 K/UL (ref 1–4.8)
LYMPHOCYTES NFR BLD: 15.7 %
MCH RBC QN AUTO: 32.7 PG (ref 27–31.3)
MCHC RBC AUTO-ENTMCNC: 33.7 % (ref 33–37)
MCV RBC AUTO: 97 FL (ref 79–92.2)
MONOCYTES # BLD: 0.5 K/UL (ref 0.2–0.8)
MONOCYTES NFR BLD: 6.9 %
NEUTROPHILS # BLD: 6 K/UL (ref 1.4–6.5)
NEUTS SEG NFR BLD: 76.8 %
PERFORMED ON: ABNORMAL
PLATELET # BLD AUTO: 141 K/UL (ref 130–400)
POTASSIUM SERPL-SCNC: 4.6 MEQ/L (ref 3.4–4.9)
RBC # BLD AUTO: 3.03 M/UL (ref 4.7–6.1)
SODIUM SERPL-SCNC: 145 MEQ/L (ref 135–144)
WBC # BLD AUTO: 7.8 K/UL (ref 4.8–10.8)

## 2024-11-02 PROCEDURE — 85025 COMPLETE CBC W/AUTO DIFF WBC: CPT

## 2024-11-02 PROCEDURE — 6370000000 HC RX 637 (ALT 250 FOR IP): Performed by: PSYCHIATRY & NEUROLOGY

## 2024-11-02 PROCEDURE — 6370000000 HC RX 637 (ALT 250 FOR IP): Performed by: INTERNAL MEDICINE

## 2024-11-02 PROCEDURE — 1210000000 HC MED SURG R&B

## 2024-11-02 PROCEDURE — 99232 SBSQ HOSP IP/OBS MODERATE 35: CPT | Performed by: INTERNAL MEDICINE

## 2024-11-02 PROCEDURE — 6360000002 HC RX W HCPCS: Performed by: INTERNAL MEDICINE

## 2024-11-02 PROCEDURE — 36415 COLL VENOUS BLD VENIPUNCTURE: CPT

## 2024-11-02 PROCEDURE — 2580000003 HC RX 258: Performed by: INTERNAL MEDICINE

## 2024-11-02 PROCEDURE — 80048 BASIC METABOLIC PNL TOTAL CA: CPT

## 2024-11-02 RX ORDER — HYDRALAZINE HYDROCHLORIDE 25 MG/1
50 TABLET, FILM COATED ORAL 2 TIMES DAILY
Status: DISCONTINUED | OUTPATIENT
Start: 2024-11-02 | End: 2024-11-05 | Stop reason: HOSPADM

## 2024-11-02 RX ADMIN — CARVEDILOL 12.5 MG: 12.5 TABLET, FILM COATED ORAL at 16:15

## 2024-11-02 RX ADMIN — CALCITRIOL 0.25 MCG: 0.25 CAPSULE ORAL at 08:35

## 2024-11-02 RX ADMIN — HEPARIN SODIUM 5000 UNITS: 5000 INJECTION INTRAVENOUS; SUBCUTANEOUS at 16:14

## 2024-11-02 RX ADMIN — HYDRALAZINE HYDROCHLORIDE 50 MG: 25 TABLET ORAL at 21:59

## 2024-11-02 RX ADMIN — INSULIN LISPRO 4 UNITS: 100 INJECTION, SOLUTION INTRAVENOUS; SUBCUTANEOUS at 18:07

## 2024-11-02 RX ADMIN — SODIUM BICARBONATE 650 MG: 650 TABLET ORAL at 21:59

## 2024-11-02 RX ADMIN — SODIUM BICARBONATE 650 MG: 650 TABLET ORAL at 08:35

## 2024-11-02 RX ADMIN — ISOSORBIDE MONONITRATE 30 MG: 60 TABLET, EXTENDED RELEASE ORAL at 08:36

## 2024-11-02 RX ADMIN — INSULIN GLARGINE 13 UNITS: 100 INJECTION, SOLUTION SUBCUTANEOUS at 22:00

## 2024-11-02 RX ADMIN — INSULIN LISPRO 6 UNITS: 100 INJECTION, SOLUTION INTRAVENOUS; SUBCUTANEOUS at 22:00

## 2024-11-02 RX ADMIN — ALLOPURINOL 100 MG: 100 TABLET ORAL at 08:35

## 2024-11-02 RX ADMIN — Medication 10 ML: at 10:15

## 2024-11-02 RX ADMIN — ASPIRIN 162 MG: 81 TABLET, CHEWABLE ORAL at 08:35

## 2024-11-02 RX ADMIN — HEPARIN SODIUM 5000 UNITS: 5000 INJECTION INTRAVENOUS; SUBCUTANEOUS at 21:59

## 2024-11-02 RX ADMIN — CLOPIDOGREL BISULFATE 75 MG: 75 TABLET ORAL at 08:36

## 2024-11-02 RX ADMIN — CARVEDILOL 12.5 MG: 12.5 TABLET, FILM COATED ORAL at 08:36

## 2024-11-02 RX ADMIN — HYDRALAZINE HYDROCHLORIDE 10 MG: 20 INJECTION INTRAMUSCULAR; INTRAVENOUS at 08:33

## 2024-11-02 RX ADMIN — Medication 10 ML: at 12:12

## 2024-11-02 RX ADMIN — HEPARIN SODIUM 5000 UNITS: 5000 INJECTION INTRAVENOUS; SUBCUTANEOUS at 06:30

## 2024-11-02 RX ADMIN — HYDRALAZINE HYDROCHLORIDE 50 MG: 25 TABLET ORAL at 08:35

## 2024-11-02 RX ADMIN — Medication 10 ML: at 22:00

## 2024-11-02 RX ADMIN — ATORVASTATIN CALCIUM 40 MG: 40 TABLET, FILM COATED ORAL at 08:36

## 2024-11-02 ASSESSMENT — PAIN SCALES - GENERAL: PAINLEVEL_OUTOF10: 0

## 2024-11-03 ENCOUNTER — APPOINTMENT (OUTPATIENT)
Dept: CT IMAGING | Age: 89
DRG: 064 | End: 2024-11-03
Payer: MEDICARE

## 2024-11-03 ENCOUNTER — APPOINTMENT (OUTPATIENT)
Dept: GENERAL RADIOLOGY | Age: 89
DRG: 064 | End: 2024-11-03
Payer: MEDICARE

## 2024-11-03 LAB
BASE EXCESS ARTERIAL: -6 (ref -3–3)
BASOPHILS # BLD: 0 K/UL (ref 0–0.2)
BASOPHILS NFR BLD: 0.4 %
CALCIUM IONIZED: 1.5 MMOL/L (ref 1.12–1.32)
EOSINOPHIL # BLD: 0 K/UL (ref 0–0.7)
EOSINOPHIL NFR BLD: 0 %
ERYTHROCYTE [DISTWIDTH] IN BLOOD BY AUTOMATED COUNT: 14 % (ref 11.5–14.5)
GLUCOSE BLD-MCNC: 137 MG/DL (ref 70–99)
GLUCOSE BLD-MCNC: 155 MG/DL (ref 70–99)
GLUCOSE BLD-MCNC: 221 MG/DL (ref 70–99)
GLUCOSE BLD-MCNC: 229 MG/DL (ref 70–99)
GLUCOSE BLD-MCNC: 245 MG/DL (ref 70–99)
HCO3 ARTERIAL: 19.9 MMOL/L (ref 21–29)
HCT VFR BLD AUTO: 31.6 % (ref 42–52)
HCT VFR BLD AUTO: 32 % (ref 41–53)
HGB BLD CALC-MCNC: 10.8 GM/DL (ref 13.5–17.5)
HGB BLD-MCNC: 10.4 G/DL (ref 14–18)
LACTATE: 0.85 MMOL/L (ref 0.4–2)
LYMPHOCYTES # BLD: 0.5 K/UL (ref 1–4.8)
LYMPHOCYTES NFR BLD: 6.1 %
MCH RBC QN AUTO: 31.7 PG (ref 27–31.3)
MCHC RBC AUTO-ENTMCNC: 32.9 % (ref 33–37)
MCV RBC AUTO: 96.3 FL (ref 79–92.2)
MONOCYTES # BLD: 0.6 K/UL (ref 0.2–0.8)
MONOCYTES NFR BLD: 7.2 %
NEUTROPHILS # BLD: 6.6 K/UL (ref 1.4–6.5)
NEUTS SEG NFR BLD: 86 %
O2 SAT, ARTERIAL: 95 % (ref 93–100)
PCO2 ARTERIAL: 35 MM HG (ref 35–45)
PERFORMED ON: ABNORMAL
PH ARTERIAL: 7.36 (ref 7.35–7.45)
PLATELET # BLD AUTO: 141 K/UL (ref 130–400)
PO2 ARTERIAL: 78 MM HG (ref 75–108)
POC CHLORIDE: 117 MEQ/L (ref 99–110)
POC CREATININE: 3.4 MG/DL (ref 0.8–1.3)
POC FIO2: 21
POC SAMPLE TYPE: ABNORMAL
POTASSIUM SERPL-SCNC: 4.7 MEQ/L (ref 3.5–5.1)
RBC # BLD AUTO: 3.28 M/UL (ref 4.7–6.1)
SODIUM BLD-SCNC: 145 MEQ/L (ref 136–145)
TCO2 ARTERIAL: 21 MMOL/L (ref 21–32)
WBC # BLD AUTO: 7.7 K/UL (ref 4.8–10.8)

## 2024-11-03 PROCEDURE — 36600 WITHDRAWAL OF ARTERIAL BLOOD: CPT

## 2024-11-03 PROCEDURE — 82803 BLOOD GASES ANY COMBINATION: CPT

## 2024-11-03 PROCEDURE — 84295 ASSAY OF SERUM SODIUM: CPT

## 2024-11-03 PROCEDURE — 6360000002 HC RX W HCPCS: Performed by: INTERNAL MEDICINE

## 2024-11-03 PROCEDURE — 70450 CT HEAD/BRAIN W/O DYE: CPT

## 2024-11-03 PROCEDURE — 6370000000 HC RX 637 (ALT 250 FOR IP): Performed by: INTERNAL MEDICINE

## 2024-11-03 PROCEDURE — 82330 ASSAY OF CALCIUM: CPT

## 2024-11-03 PROCEDURE — 85025 COMPLETE CBC W/AUTO DIFF WBC: CPT

## 2024-11-03 PROCEDURE — 2580000003 HC RX 258: Performed by: INTERNAL MEDICINE

## 2024-11-03 PROCEDURE — 36415 COLL VENOUS BLD VENIPUNCTURE: CPT

## 2024-11-03 PROCEDURE — 82435 ASSAY OF BLOOD CHLORIDE: CPT

## 2024-11-03 PROCEDURE — 71046 X-RAY EXAM CHEST 2 VIEWS: CPT

## 2024-11-03 PROCEDURE — 85014 HEMATOCRIT: CPT

## 2024-11-03 PROCEDURE — 83605 ASSAY OF LACTIC ACID: CPT

## 2024-11-03 PROCEDURE — 84132 ASSAY OF SERUM POTASSIUM: CPT

## 2024-11-03 PROCEDURE — 82565 ASSAY OF CREATININE: CPT

## 2024-11-03 PROCEDURE — 1210000000 HC MED SURG R&B

## 2024-11-03 PROCEDURE — 99233 SBSQ HOSP IP/OBS HIGH 50: CPT | Performed by: INTERNAL MEDICINE

## 2024-11-03 PROCEDURE — 6370000000 HC RX 637 (ALT 250 FOR IP): Performed by: PSYCHIATRY & NEUROLOGY

## 2024-11-03 RX ADMIN — ISOSORBIDE MONONITRATE 30 MG: 60 TABLET, EXTENDED RELEASE ORAL at 10:49

## 2024-11-03 RX ADMIN — HYDRALAZINE HYDROCHLORIDE 50 MG: 25 TABLET ORAL at 21:47

## 2024-11-03 RX ADMIN — SODIUM BICARBONATE 650 MG: 650 TABLET ORAL at 21:47

## 2024-11-03 RX ADMIN — CARVEDILOL 12.5 MG: 12.5 TABLET, FILM COATED ORAL at 10:51

## 2024-11-03 RX ADMIN — Medication 10 ML: at 11:21

## 2024-11-03 RX ADMIN — Medication 10 ML: at 21:48

## 2024-11-03 RX ADMIN — INSULIN GLARGINE 13 UNITS: 100 INJECTION, SOLUTION SUBCUTANEOUS at 21:47

## 2024-11-03 RX ADMIN — HEPARIN SODIUM 5000 UNITS: 5000 INJECTION INTRAVENOUS; SUBCUTANEOUS at 06:21

## 2024-11-03 RX ADMIN — ALLOPURINOL 100 MG: 100 TABLET ORAL at 10:49

## 2024-11-03 RX ADMIN — HYDRALAZINE HYDROCHLORIDE 50 MG: 25 TABLET ORAL at 10:49

## 2024-11-03 RX ADMIN — Medication 10 ML: at 10:48

## 2024-11-03 RX ADMIN — ASPIRIN 162 MG: 81 TABLET, CHEWABLE ORAL at 10:49

## 2024-11-03 RX ADMIN — INSULIN LISPRO 4 UNITS: 100 INJECTION, SOLUTION INTRAVENOUS; SUBCUTANEOUS at 21:48

## 2024-11-03 RX ADMIN — CLOPIDOGREL BISULFATE 75 MG: 75 TABLET ORAL at 10:49

## 2024-11-03 RX ADMIN — INSULIN LISPRO 2 UNITS: 100 INJECTION, SOLUTION INTRAVENOUS; SUBCUTANEOUS at 18:58

## 2024-11-03 RX ADMIN — CARVEDILOL 12.5 MG: 12.5 TABLET, FILM COATED ORAL at 18:58

## 2024-11-03 RX ADMIN — ATORVASTATIN CALCIUM 40 MG: 40 TABLET, FILM COATED ORAL at 10:49

## 2024-11-03 RX ADMIN — SODIUM BICARBONATE 650 MG: 650 TABLET ORAL at 10:49

## 2024-11-03 RX ADMIN — CALCITRIOL 0.25 MCG: 0.25 CAPSULE ORAL at 10:49

## 2024-11-04 PROBLEM — I42.9 CARDIOMYOPATHY (HCC): Status: ACTIVE | Noted: 2024-11-04

## 2024-11-04 LAB
ANION GAP SERPL CALCULATED.3IONS-SCNC: 11 MEQ/L (ref 9–15)
BASOPHILS # BLD: 0 K/UL (ref 0–0.2)
BASOPHILS NFR BLD: 0.6 %
BUN SERPL-MCNC: 68 MG/DL (ref 8–23)
CALCIUM SERPL-MCNC: 9.6 MG/DL (ref 8.5–9.9)
CHLORIDE SERPL-SCNC: 112 MEQ/L (ref 95–107)
CO2 SERPL-SCNC: 18 MEQ/L (ref 20–31)
CREAT SERPL-MCNC: 3.6 MG/DL (ref 0.7–1.2)
EOSINOPHIL # BLD: 0 K/UL (ref 0–0.7)
EOSINOPHIL NFR BLD: 0 %
ERYTHROCYTE [DISTWIDTH] IN BLOOD BY AUTOMATED COUNT: 14.3 % (ref 11.5–14.5)
GLUCOSE BLD-MCNC: 146 MG/DL (ref 70–99)
GLUCOSE BLD-MCNC: 162 MG/DL (ref 70–99)
GLUCOSE BLD-MCNC: 220 MG/DL (ref 70–99)
GLUCOSE BLD-MCNC: 284 MG/DL (ref 70–99)
GLUCOSE SERPL-MCNC: 138 MG/DL (ref 70–99)
HCT VFR BLD AUTO: 29.9 % (ref 42–52)
HGB BLD-MCNC: 9.6 G/DL (ref 14–18)
LYMPHOCYTES # BLD: 0.3 K/UL (ref 1–4.8)
LYMPHOCYTES NFR BLD: 4.1 %
MCH RBC QN AUTO: 31 PG (ref 27–31.3)
MCHC RBC AUTO-ENTMCNC: 32.1 % (ref 33–37)
MCV RBC AUTO: 96.5 FL (ref 79–92.2)
MONOCYTES # BLD: 0.5 K/UL (ref 0.2–0.8)
MONOCYTES NFR BLD: 7.5 %
NEUTROPHILS # BLD: 6.2 K/UL (ref 1.4–6.5)
NEUTS SEG NFR BLD: 87.2 %
PERFORMED ON: ABNORMAL
PLATELET # BLD AUTO: 129 K/UL (ref 130–400)
POTASSIUM SERPL-SCNC: 4.4 MEQ/L (ref 3.4–4.9)
RBC # BLD AUTO: 3.1 M/UL (ref 4.7–6.1)
SODIUM SERPL-SCNC: 141 MEQ/L (ref 135–144)
WBC # BLD AUTO: 7.1 K/UL (ref 4.8–10.8)

## 2024-11-04 PROCEDURE — APPSS30 APP SPLIT SHARED TIME 16-30 MINUTES: Performed by: NURSE PRACTITIONER

## 2024-11-04 PROCEDURE — 99233 SBSQ HOSP IP/OBS HIGH 50: CPT | Performed by: INTERNAL MEDICINE

## 2024-11-04 PROCEDURE — 99233 SBSQ HOSP IP/OBS HIGH 50: CPT

## 2024-11-04 PROCEDURE — 85025 COMPLETE CBC W/AUTO DIFF WBC: CPT

## 2024-11-04 PROCEDURE — 2580000003 HC RX 258: Performed by: INTERNAL MEDICINE

## 2024-11-04 PROCEDURE — 6370000000 HC RX 637 (ALT 250 FOR IP): Performed by: INTERNAL MEDICINE

## 2024-11-04 PROCEDURE — 97129 THER IVNTJ 1ST 15 MIN: CPT

## 2024-11-04 PROCEDURE — 97535 SELF CARE MNGMENT TRAINING: CPT

## 2024-11-04 PROCEDURE — 1210000000 HC MED SURG R&B

## 2024-11-04 PROCEDURE — 99233 SBSQ HOSP IP/OBS HIGH 50: CPT | Performed by: PSYCHIATRY & NEUROLOGY

## 2024-11-04 PROCEDURE — 36415 COLL VENOUS BLD VENIPUNCTURE: CPT

## 2024-11-04 PROCEDURE — 80048 BASIC METABOLIC PNL TOTAL CA: CPT

## 2024-11-04 RX ORDER — SODIUM CHLORIDE 9 MG/ML
INJECTION, SOLUTION INTRAVENOUS PRN
Status: CANCELLED | OUTPATIENT
Start: 2024-11-04

## 2024-11-04 RX ORDER — ONDANSETRON 4 MG/1
4 TABLET, ORALLY DISINTEGRATING ORAL EVERY 8 HOURS PRN
Status: CANCELLED | OUTPATIENT
Start: 2024-11-04

## 2024-11-04 RX ORDER — SODIUM CHLORIDE 0.9 % (FLUSH) 0.9 %
5-40 SYRINGE (ML) INJECTION EVERY 12 HOURS SCHEDULED
Status: CANCELLED | OUTPATIENT
Start: 2024-11-04

## 2024-11-04 RX ORDER — INSULIN LISPRO 100 [IU]/ML
0-8 INJECTION, SOLUTION INTRAVENOUS; SUBCUTANEOUS
Status: CANCELLED | OUTPATIENT
Start: 2024-11-04

## 2024-11-04 RX ORDER — SODIUM BICARBONATE 650 MG/1
650 TABLET ORAL 2 TIMES DAILY
Status: CANCELLED | OUTPATIENT
Start: 2024-11-04

## 2024-11-04 RX ORDER — HYDRALAZINE HYDROCHLORIDE 20 MG/ML
10 INJECTION INTRAMUSCULAR; INTRAVENOUS EVERY 6 HOURS PRN
Status: CANCELLED | OUTPATIENT
Start: 2024-11-04

## 2024-11-04 RX ORDER — ISOSORBIDE MONONITRATE 60 MG/1
30 TABLET, EXTENDED RELEASE ORAL DAILY
Status: CANCELLED | OUTPATIENT
Start: 2024-11-05

## 2024-11-04 RX ORDER — ONDANSETRON 2 MG/ML
4 INJECTION INTRAMUSCULAR; INTRAVENOUS EVERY 6 HOURS PRN
Status: CANCELLED | OUTPATIENT
Start: 2024-11-04

## 2024-11-04 RX ORDER — CALCITRIOL 0.25 UG/1
0.25 CAPSULE, LIQUID FILLED ORAL DAILY
Status: CANCELLED | OUTPATIENT
Start: 2024-11-05

## 2024-11-04 RX ORDER — ALLOPURINOL 100 MG/1
100 TABLET ORAL DAILY
Status: CANCELLED | OUTPATIENT
Start: 2024-11-05

## 2024-11-04 RX ORDER — ATORVASTATIN CALCIUM 40 MG/1
40 TABLET, FILM COATED ORAL DAILY
Status: CANCELLED | OUTPATIENT
Start: 2024-11-05

## 2024-11-04 RX ORDER — INSULIN GLARGINE 100 [IU]/ML
13 INJECTION, SOLUTION SUBCUTANEOUS NIGHTLY
Status: CANCELLED | OUTPATIENT
Start: 2024-11-04

## 2024-11-04 RX ORDER — CARVEDILOL 12.5 MG/1
12.5 TABLET ORAL 2 TIMES DAILY WITH MEALS
Status: CANCELLED | OUTPATIENT
Start: 2024-11-04

## 2024-11-04 RX ORDER — DEXTROSE MONOHYDRATE 100 MG/ML
INJECTION, SOLUTION INTRAVENOUS CONTINUOUS PRN
Status: CANCELLED | OUTPATIENT
Start: 2024-11-04

## 2024-11-04 RX ORDER — GLUCAGON 1 MG/ML
1 KIT INJECTION PRN
Status: CANCELLED | OUTPATIENT
Start: 2024-11-04

## 2024-11-04 RX ORDER — POLYETHYLENE GLYCOL 3350 17 G/17G
17 POWDER, FOR SOLUTION ORAL DAILY PRN
Status: CANCELLED | OUTPATIENT
Start: 2024-11-04

## 2024-11-04 RX ORDER — ACETAMINOPHEN 650 MG/1
650 SUPPOSITORY RECTAL EVERY 6 HOURS PRN
Status: CANCELLED | OUTPATIENT
Start: 2024-11-04

## 2024-11-04 RX ORDER — ACETAMINOPHEN 325 MG/1
650 TABLET ORAL EVERY 6 HOURS PRN
Status: CANCELLED | OUTPATIENT
Start: 2024-11-04

## 2024-11-04 RX ORDER — HYDRALAZINE HYDROCHLORIDE 25 MG/1
50 TABLET, FILM COATED ORAL 2 TIMES DAILY
Status: CANCELLED | OUTPATIENT
Start: 2024-11-04

## 2024-11-04 RX ADMIN — INSULIN GLARGINE 13 UNITS: 100 INJECTION, SOLUTION SUBCUTANEOUS at 21:05

## 2024-11-04 RX ADMIN — INSULIN LISPRO 2 UNITS: 100 INJECTION, SOLUTION INTRAVENOUS; SUBCUTANEOUS at 21:06

## 2024-11-04 RX ADMIN — HYDRALAZINE HYDROCHLORIDE 50 MG: 25 TABLET ORAL at 14:07

## 2024-11-04 RX ADMIN — Medication 10 ML: at 08:32

## 2024-11-04 RX ADMIN — Medication 10 ML: at 10:51

## 2024-11-04 RX ADMIN — CARVEDILOL 12.5 MG: 12.5 TABLET, FILM COATED ORAL at 14:08

## 2024-11-04 RX ADMIN — SODIUM BICARBONATE 650 MG: 650 TABLET ORAL at 14:08

## 2024-11-04 RX ADMIN — ALLOPURINOL 100 MG: 100 TABLET ORAL at 14:09

## 2024-11-04 RX ADMIN — CALCITRIOL 0.25 MCG: 0.25 CAPSULE ORAL at 14:06

## 2024-11-04 RX ADMIN — INSULIN LISPRO 4 UNITS: 100 INJECTION, SOLUTION INTRAVENOUS; SUBCUTANEOUS at 18:02

## 2024-11-04 RX ADMIN — SODIUM BICARBONATE 650 MG: 650 TABLET ORAL at 21:05

## 2024-11-04 RX ADMIN — CARVEDILOL 12.5 MG: 12.5 TABLET, FILM COATED ORAL at 18:01

## 2024-11-04 RX ADMIN — ISOSORBIDE MONONITRATE 30 MG: 60 TABLET, EXTENDED RELEASE ORAL at 08:28

## 2024-11-04 RX ADMIN — Medication 10 ML: at 21:06

## 2024-11-04 RX ADMIN — ATORVASTATIN CALCIUM 40 MG: 40 TABLET, FILM COATED ORAL at 14:05

## 2024-11-04 RX ADMIN — HYDRALAZINE HYDROCHLORIDE 50 MG: 25 TABLET ORAL at 21:06

## 2024-11-04 ASSESSMENT — ENCOUNTER SYMPTOMS
CHOKING: 0
VOMITING: 0
COLOR CHANGE: 0
SHORTNESS OF BREATH: 0

## 2024-11-04 NOTE — DISCHARGE SUMMARY
MRI TECHNOLOGIST PROVIDED HISTORY: Reason for exam:->checking for metal objects for MRI What reading provider will be dictating this exam?->CRC FINDINGS: There is mild deformity of the base of the 1st metacarpal from an old, healed intra-articular fracture. 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 There is mild primary osteoarthritis of the 4th and 5th D IP joints. The soft tissues are normal in appearance with no sign of swelling or radiopaque foreign body. There is no sign of any metallic foreign body to preclude MRI.     1. No sign of any metallic foreign body to preclude MRI. 2. 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 3. Mild primary osteoarthritis of the 4th and 5th DIP joints.     Echo (TTE) complete (PRN contrast/bubble/strain/3D)    Result Date: 10/30/2024    Left Ventricle: Severely reduced left ventricular systolic function with a visually estimated EF of 30 - 35%. Left ventricle is moderately dilated. Mildly increased wall thickness. Severe global hypokinesis present. Normal diastolic function.   Right Ventricle: Normal systolic function.   Mitral Valve: Mild regurgitation.   Tricuspid Valve: Mild regurgitation. Normal RVSP. The estimated RVSP is 34 mmHg.   Pericardium: No pericardial effusion.   Image quality is technically difficult. Contrast used: Definity.     CT Head W/O Contrast    Result Date: 10/29/2024  EXAMINATION: CT OF THE HEAD WITHOUT CONTRAST  10/29/2024 10:32 pm TECHNIQUE: CT of the head was performed without the administration of intravenous contrast. Automated exposure control, iterative reconstruction, and/or weight based adjustment of the mA/kV was utilized to reduce the radiation dose to as low as reasonably achievable. COMPARISON: 04/03/2024 HISTORY: ORDERING SYSTEM PROVIDED HISTORY: ams TECHNOLOGIST PROVIDED HISTORY: Reason for exam:->ams Has

## 2024-11-04 NOTE — FLOWSHEET NOTE
Living will and POA paperwork received from son. Emergency contact list updated and copy of paperwork placed in chart. MD made aware of code status at this time

## 2024-11-04 NOTE — PLAN OF CARE
Problem: Discharge Planning  Goal: Discharge to home or other facility with appropriate resources  Outcome: HH/HSPC Resolved Not Met     Problem: Chronic Conditions and Co-morbidities  Goal: Patient's chronic conditions and co-morbidity symptoms are monitored and maintained or improved  Outcome: Not Progressing

## 2024-11-04 NOTE — FLOWSHEET NOTE
Pt son at bedside at this time. Pt more awake at this time, consumed lunch. Morning medications given at this time with no difficulties.

## 2024-11-04 NOTE — FLOWSHEET NOTE
Pt increasingly lethargic today. Multiple attempts to assist in feeding pt meals, pt continuously refusing. Multiple attempts to provide morning medications to pt and pt refuses. MD made aware. VSS. Call light within reach. Video monitoring in progress for safety.

## 2024-11-05 ENCOUNTER — APPOINTMENT (OUTPATIENT)
Facility: CLINIC | Age: 89
End: 2024-11-05
Payer: MEDICARE

## 2024-11-05 ENCOUNTER — HOSPITAL ENCOUNTER (INPATIENT)
Age: 89
LOS: 18 days | Discharge: HOME HEALTH | End: 2024-11-23
Attending: PHYSICAL MEDICINE & REHABILITATION | Admitting: PHYSICAL MEDICINE & REHABILITATION
Payer: MEDICARE

## 2024-11-05 ENCOUNTER — APPOINTMENT (OUTPATIENT)
Dept: CT IMAGING | Age: 89
DRG: 064 | End: 2024-11-05
Payer: MEDICARE

## 2024-11-05 VITALS
HEART RATE: 65 BPM | BODY MASS INDEX: 31.27 KG/M2 | HEIGHT: 68 IN | OXYGEN SATURATION: 97 % | SYSTOLIC BLOOD PRESSURE: 108 MMHG | WEIGHT: 206.35 LBS | TEMPERATURE: 98.1 F | DIASTOLIC BLOOD PRESSURE: 62 MMHG | RESPIRATION RATE: 17 BRPM

## 2024-11-05 DIAGNOSIS — Z78.9 IMPAIRED MOBILITY AND ADLS: Primary | ICD-10-CM

## 2024-11-05 DIAGNOSIS — Z74.09 IMPAIRED MOBILITY AND ADLS: Primary | ICD-10-CM

## 2024-11-05 DIAGNOSIS — Z78.9 IMPAIRED MOBILITY AND ACTIVITIES OF DAILY LIVING: ICD-10-CM

## 2024-11-05 DIAGNOSIS — Z74.09 IMPAIRED MOBILITY AND ACTIVITIES OF DAILY LIVING: ICD-10-CM

## 2024-11-05 LAB
ANION GAP SERPL CALCULATED.3IONS-SCNC: 10 MEQ/L (ref 9–15)
BASOPHILS # BLD: 0 K/UL (ref 0–0.2)
BASOPHILS NFR BLD: 0.5 %
BUN SERPL-MCNC: 72 MG/DL (ref 8–23)
CALCIUM SERPL-MCNC: 9.4 MG/DL (ref 8.5–9.9)
CHLORIDE SERPL-SCNC: 113 MEQ/L (ref 95–107)
CO2 SERPL-SCNC: 19 MEQ/L (ref 20–31)
CREAT SERPL-MCNC: 3.57 MG/DL (ref 0.7–1.2)
EOSINOPHIL # BLD: 0 K/UL (ref 0–0.7)
EOSINOPHIL NFR BLD: 0 %
ERYTHROCYTE [DISTWIDTH] IN BLOOD BY AUTOMATED COUNT: 14.3 % (ref 11.5–14.5)
GLUCOSE BLD-MCNC: 143 MG/DL (ref 70–99)
GLUCOSE BLD-MCNC: 252 MG/DL (ref 70–99)
GLUCOSE BLD-MCNC: 334 MG/DL (ref 70–99)
GLUCOSE SERPL-MCNC: 147 MG/DL (ref 70–99)
HCT VFR BLD AUTO: 29.9 % (ref 42–52)
HGB BLD-MCNC: 9.9 G/DL (ref 14–18)
LYMPHOCYTES # BLD: 0.8 K/UL (ref 1–4.8)
LYMPHOCYTES NFR BLD: 18.8 %
MCH RBC QN AUTO: 32.5 PG (ref 27–31.3)
MCHC RBC AUTO-ENTMCNC: 33.1 % (ref 33–37)
MCV RBC AUTO: 98 FL (ref 79–92.2)
MONOCYTES # BLD: 0.8 K/UL (ref 0.2–0.8)
MONOCYTES NFR BLD: 19.6 %
NEUTROPHILS # BLD: 2.5 K/UL (ref 1.4–6.5)
NEUTS SEG NFR BLD: 60.6 %
PERFORMED ON: ABNORMAL
PLATELET # BLD AUTO: 126 K/UL (ref 130–400)
POTASSIUM SERPL-SCNC: 4.5 MEQ/L (ref 3.4–4.9)
RBC # BLD AUTO: 3.05 M/UL (ref 4.7–6.1)
SODIUM SERPL-SCNC: 142 MEQ/L (ref 135–144)
WBC # BLD AUTO: 4 K/UL (ref 4.8–10.8)

## 2024-11-05 PROCEDURE — 6370000000 HC RX 637 (ALT 250 FOR IP): Performed by: INTERNAL MEDICINE

## 2024-11-05 PROCEDURE — 70450 CT HEAD/BRAIN W/O DYE: CPT

## 2024-11-05 PROCEDURE — 36415 COLL VENOUS BLD VENIPUNCTURE: CPT

## 2024-11-05 PROCEDURE — 85025 COMPLETE CBC W/AUTO DIFF WBC: CPT

## 2024-11-05 PROCEDURE — 80048 BASIC METABOLIC PNL TOTAL CA: CPT

## 2024-11-05 PROCEDURE — 1180000000 HC REHAB R&B

## 2024-11-05 PROCEDURE — 2580000003 HC RX 258: Performed by: INTERNAL MEDICINE

## 2024-11-05 PROCEDURE — APPSS15 APP SPLIT SHARED TIME 0-15 MINUTES: Performed by: NURSE PRACTITIONER

## 2024-11-05 PROCEDURE — 99232 SBSQ HOSP IP/OBS MODERATE 35: CPT | Performed by: PSYCHIATRY & NEUROLOGY

## 2024-11-05 PROCEDURE — 99232 SBSQ HOSP IP/OBS MODERATE 35: CPT | Performed by: PHYSICAL MEDICINE & REHABILITATION

## 2024-11-05 RX ORDER — ACETAMINOPHEN 325 MG/1
650 TABLET ORAL EVERY 6 HOURS PRN
Status: DISCONTINUED | OUTPATIENT
Start: 2024-11-05 | End: 2024-11-06

## 2024-11-05 RX ORDER — SODIUM CHLORIDE 9 MG/ML
INJECTION, SOLUTION INTRAVENOUS PRN
Status: DISCONTINUED | OUTPATIENT
Start: 2024-11-05 | End: 2024-11-23 | Stop reason: HOSPADM

## 2024-11-05 RX ORDER — ALLOPURINOL 100 MG/1
100 TABLET ORAL DAILY
Status: DISCONTINUED | OUTPATIENT
Start: 2024-11-06 | End: 2024-11-21

## 2024-11-05 RX ORDER — DEXTROSE MONOHYDRATE 100 MG/ML
INJECTION, SOLUTION INTRAVENOUS CONTINUOUS PRN
Status: DISCONTINUED | OUTPATIENT
Start: 2024-11-05 | End: 2024-11-23 | Stop reason: HOSPADM

## 2024-11-05 RX ORDER — ISOSORBIDE MONONITRATE 30 MG/1
30 TABLET, EXTENDED RELEASE ORAL DAILY
Status: DISCONTINUED | OUTPATIENT
Start: 2024-11-06 | End: 2024-11-23 | Stop reason: HOSPADM

## 2024-11-05 RX ORDER — SODIUM BICARBONATE 650 MG/1
650 TABLET ORAL 2 TIMES DAILY
Status: DISCONTINUED | OUTPATIENT
Start: 2024-11-05 | End: 2024-11-11

## 2024-11-05 RX ORDER — HYDRALAZINE HYDROCHLORIDE 50 MG/1
50 TABLET, FILM COATED ORAL 2 TIMES DAILY
Status: DISCONTINUED | OUTPATIENT
Start: 2024-11-05 | End: 2024-11-23 | Stop reason: HOSPADM

## 2024-11-05 RX ORDER — ONDANSETRON 2 MG/ML
4 INJECTION INTRAMUSCULAR; INTRAVENOUS EVERY 6 HOURS PRN
Status: DISCONTINUED | OUTPATIENT
Start: 2024-11-05 | End: 2024-11-23 | Stop reason: HOSPADM

## 2024-11-05 RX ORDER — SODIUM CHLORIDE 0.9 % (FLUSH) 0.9 %
5-40 SYRINGE (ML) INJECTION EVERY 12 HOURS SCHEDULED
Status: DISCONTINUED | OUTPATIENT
Start: 2024-11-05 | End: 2024-11-10 | Stop reason: ALTCHOICE

## 2024-11-05 RX ORDER — CALCITRIOL 0.25 UG/1
0.25 CAPSULE, LIQUID FILLED ORAL DAILY
Status: DISCONTINUED | OUTPATIENT
Start: 2024-11-06 | End: 2024-11-23 | Stop reason: HOSPADM

## 2024-11-05 RX ORDER — CARVEDILOL 12.5 MG/1
12.5 TABLET ORAL 2 TIMES DAILY WITH MEALS
Status: DISCONTINUED | OUTPATIENT
Start: 2024-11-06 | End: 2024-11-23 | Stop reason: HOSPADM

## 2024-11-05 RX ORDER — ATORVASTATIN CALCIUM 40 MG/1
40 TABLET, FILM COATED ORAL DAILY
Status: DISCONTINUED | OUTPATIENT
Start: 2024-11-06 | End: 2024-11-23 | Stop reason: HOSPADM

## 2024-11-05 RX ORDER — GLUCAGON 1 MG/ML
1 KIT INJECTION PRN
Status: DISCONTINUED | OUTPATIENT
Start: 2024-11-05 | End: 2024-11-23 | Stop reason: HOSPADM

## 2024-11-05 RX ORDER — INSULIN LISPRO 100 [IU]/ML
0-8 INJECTION, SOLUTION INTRAVENOUS; SUBCUTANEOUS
Status: DISCONTINUED | OUTPATIENT
Start: 2024-11-06 | End: 2024-11-20

## 2024-11-05 RX ORDER — ACETAMINOPHEN 650 MG/1
650 SUPPOSITORY RECTAL EVERY 6 HOURS PRN
Status: DISCONTINUED | OUTPATIENT
Start: 2024-11-05 | End: 2024-11-06

## 2024-11-05 RX ORDER — HYDRALAZINE HYDROCHLORIDE 20 MG/ML
10 INJECTION INTRAMUSCULAR; INTRAVENOUS EVERY 6 HOURS PRN
Status: DISCONTINUED | OUTPATIENT
Start: 2024-11-05 | End: 2024-11-23 | Stop reason: HOSPADM

## 2024-11-05 RX ORDER — ONDANSETRON 4 MG/1
4 TABLET, ORALLY DISINTEGRATING ORAL EVERY 8 HOURS PRN
Status: DISCONTINUED | OUTPATIENT
Start: 2024-11-05 | End: 2024-11-23 | Stop reason: HOSPADM

## 2024-11-05 RX ORDER — INSULIN GLARGINE 100 [IU]/ML
13 INJECTION, SOLUTION SUBCUTANEOUS NIGHTLY
Status: DISCONTINUED | OUTPATIENT
Start: 2024-11-05 | End: 2024-11-06

## 2024-11-05 RX ORDER — POLYETHYLENE GLYCOL 3350 17 G/17G
17 POWDER, FOR SOLUTION ORAL DAILY PRN
Status: DISCONTINUED | OUTPATIENT
Start: 2024-11-05 | End: 2024-11-10

## 2024-11-05 RX ADMIN — ISOSORBIDE MONONITRATE 30 MG: 60 TABLET, EXTENDED RELEASE ORAL at 09:28

## 2024-11-05 RX ADMIN — SODIUM BICARBONATE 650 MG TABLET 650 MG: at 22:44

## 2024-11-05 RX ADMIN — CARVEDILOL 12.5 MG: 12.5 TABLET, FILM COATED ORAL at 09:28

## 2024-11-05 RX ADMIN — HYDRALAZINE HYDROCHLORIDE 50 MG: 50 TABLET ORAL at 22:43

## 2024-11-05 RX ADMIN — Medication 10 ML: at 22:44

## 2024-11-05 RX ADMIN — SODIUM BICARBONATE 650 MG: 650 TABLET ORAL at 09:28

## 2024-11-05 RX ADMIN — INSULIN LISPRO 6 UNITS: 100 INJECTION, SOLUTION INTRAVENOUS; SUBCUTANEOUS at 16:55

## 2024-11-05 RX ADMIN — HYDRALAZINE HYDROCHLORIDE 50 MG: 25 TABLET ORAL at 09:28

## 2024-11-05 RX ADMIN — CARVEDILOL 12.5 MG: 12.5 TABLET, FILM COATED ORAL at 16:55

## 2024-11-05 RX ADMIN — Medication 10 ML: at 09:29

## 2024-11-05 RX ADMIN — ALLOPURINOL 100 MG: 100 TABLET ORAL at 09:28

## 2024-11-05 RX ADMIN — ATORVASTATIN CALCIUM 40 MG: 40 TABLET, FILM COATED ORAL at 09:28

## 2024-11-05 RX ADMIN — CALCITRIOL 0.25 MCG: 0.25 CAPSULE ORAL at 09:28

## 2024-11-05 RX ADMIN — INSULIN LISPRO 4 UNITS: 100 INJECTION, SOLUTION INTRAVENOUS; SUBCUTANEOUS at 13:00

## 2024-11-05 ASSESSMENT — ENCOUNTER SYMPTOMS
SHORTNESS OF BREATH: 0
CHOKING: 0
COLOR CHANGE: 0
VOMITING: 0

## 2024-11-05 NOTE — PLAN OF CARE
Problem: Chronic Conditions and Co-morbidities  Goal: Patient's chronic conditions and co-morbidity symptoms are monitored and maintained or improved  11/4/2024 2311 by Trice Funk RN  Outcome: Progressing  11/4/2024 1045 by Francine Teresa RN  Outcome: Progressing     Problem: Discharge Planning  Goal: Discharge to home or other facility with appropriate resources  11/4/2024 2311 by Trice Funk RN  Outcome: Progressing  11/4/2024 1045 by Francine Teresa RN  Outcome: Progressing     Problem: Pain  Goal: Verbalizes/displays adequate comfort level or baseline comfort level  11/4/2024 2311 by Trice Funk RN  Outcome: Progressing  11/4/2024 1045 by Francine Teresa RN  Outcome: Progressing     Problem: Safety - Adult  Goal: Free from fall injury  11/4/2024 2311 by Trice Funk RN  Outcome: Progressing  11/4/2024 1045 by Francine Teresa RN  Outcome: Progressing     Problem: ABCDS Injury Assessment  Goal: Absence of physical injury  11/4/2024 2311 by Trice Funk RN  Outcome: Progressing  11/4/2024 1045 by Francine Teresa RN  Outcome: Progressing     Problem: Skin/Tissue Integrity  Goal: Absence of new skin breakdown  Description: 1.  Monitor for areas of redness and/or skin breakdown  2.  Assess vascular access sites hourly  3.  Every 4-6 hours minimum:  Change oxygen saturation probe site  4.  Every 4-6 hours:  If on nasal continuous positive airway pressure, respiratory therapy assess nares and determine need for appliance change or resting period.  11/4/2024 2311 by Trice Funk RN  Outcome: Progressing  11/4/2024 1045 by Francine Teresa RN  Outcome: Progressing

## 2024-11-05 NOTE — CARE COORDINATION
2W CM updated that patient can admit to room 246 pending medical clearance.  Electronically signed by Brianda Slade on 11/5/2024 at 10:56 AM    
Case Management Assessment  Initial Evaluation    Date/Time of Evaluation: 10/30/2024 3:03 PM  Assessment Completed by: MOO Barbosa    If patient is discharged prior to next notation, then this note serves as note for discharge by case management.    Patient Name: Modesto Ware                   YOB: 1933  Diagnosis: Hypernatremia [E87.0]  Right leg weakness [R29.898]  Chronic anemia [D64.9]  Fall at home, initial encounter [W19.XXXA, Y92.009]  Altered mental status, unspecified altered mental status type [R41.82]  Chronic kidney disease, unspecified CKD stage [N18.9]                   Date / Time: 10/29/2024  9:28 PM    Patient Admission Status: Inpatient   Readmission Risk (Low < 19, Mod (19-27), High > 27): Readmission Risk Score: 19.9    Current PCP: Cb Rolon MD  PCP verified by CM?      Chart Reviewed: Yes      History Provided by: Child/Family  Patient Orientation: Self    Patient Cognition: Other (see comment) (confuse)    Hospitalization in the last 30 days (Readmission):  No    If yes, Readmission Assessment in CM Navigator will be completed.    Advance Directives:      Code Status: Full Code   Patient's Primary Decision Maker is: Legal Next of Kin    Primary Decision Maker: Hema Ware - Child - [phone]    Primary Decision Maker: little ware - Child - [phone]    Discharge Planning:    Patient lives with:   Type of Home:    Primary Care Giver: Self  Patient Support Systems include: Children, Family Members   Current Financial resources:    Current community resources:    Current services prior to admission:              Current DME:              Type of Home Care services:       ADLS  Prior functional level:    Current functional level:      PT AM-PAC: 6 /24  OT AM-PAC: 6 /24    Family can provide assistance at DC:    Would you like Case Management to discuss the discharge plan with any other family members/significant others, and if so, who?    Plans to Return to Present Housing: 
Fax received from [facility] indicating approval for IRF admission. Approval effective starting today 11/4-11/17. NRD 11/15. Auth# [id number]. Will update care coordination team. Patient will need medical clearance including neuro prior to admission to rehab.   Electronically signed by Brianda Slade on 11/4/2024 at 11:03 AM    
Inpatient Rehab referral received. Met with patient and daughter to discuss [facility] rehab and therapy needs. Patient slow to respond but did appear to respond appropriately. Patient is reportedly independent at baseline. Patient appears to have good social support from his adult children and grandchildren. Rehab program discussed at length. I reviewed and explained [facility] Rehab program and requirements, including 3 hours of intense therapy daily, anticipated length of stay, weekly team meetings and goal of discharge to home. All questions answered and patient along with daughter verbalized understanding. Goal is for patient to regain as much independence as possible with the goal of returning home. Advised of needing prior auth approval. Freedom of choice provided and patient along with dtr choosing [facility] rehab.  Electronically signed by Brianda Slade on 11/1/2024 at 1:38 PM    
LSW SPOKE WITH THE PT'S SON ODALYS WITH WHOM PT LIVES.  ODALYS SAID THAT PRIOR TO THIS ADMISSION THE PT WAS AMBULATING WITH A WALKER AND GETTING OUT TO SEE HIS GRANDCHILDREN PLAY IN THE MARCHING BAND.  SON WOULD LIKE TO SEE IF THE PT CAN GET BACK TO HIS BASELINE AND THEN BACK HOME WITH FAMILY.  WE DISCUSSED OPTIONS AND FIRST CHOICE IS [facility].  REFERRAL WAS CALLED TO SIMA AT [facility].    
Moo met with the pt and his daughter at the bedside to discuss the DC plan.  We discussed options and they are leaning toward [facility] as first choice.  MOO spoke with Tete at [facility] and she will review.    
Norberto Romo, Gabbie Mancilla and myself discussed appropriateness for this patient's needs, upon discharge from [facility].  We feel patient will benefit from a slow paced rehab, after thorough review of clinicals.  Angela CORTÉS is aware.  
Paged PT to request treatment today.   
Patient can admit to acute inpatient rehab when medially cleared to room 246 updated 2WT CM.  Electronically signed by Yamini Limon RN on 11/4/24 at 12:20 PM EST   
Per LSW [facility] has declined the pt. Freedom of choice was given and pt and family would like [facility]   
Per Yamini with [facility], precert was approved and pt can admit to room 246 when medically cleared.   Per physician rounds pt is not medically cleared today. Pt to have CT of the head tomorrow per Neuro and new \[facility]\"" care consult.   Per Tara with [facility] she spoke with son Sukhwinder who states they have HPOA paperwork and will bring in.     
Precert for IRF initiated via [employer]. Reference# [id number]. Will follow for response.  Electronically signed by Brianda Slade on 11/1/2024 at 3:29 PM    
Precert for IRF, Ref# [id number] is \"PENDING\". Will continue to monitor for response. Electronically signed by Fadi Rosado RN on 11/2/24 at 8:12 AM EDT    Precert for IRF, Ref# [id number] is \"PENDING\". Will continue to monitor for response. Electronically signed by Fadi Rosado RN on 11/3/24 at 7:45 AM EST    
RECEIVED CALL FROM SKYE WITH [facility] INQUIRING ABOUT D/C PLAN. IF PAT[facility] @ [phone].   
[facility]ERT STILL PENDING.   
SPOKE WITH BERONICA ON [facility] REHAB AND PRECERT IS PENDING.   
Dressing: Dependent/Total (10/30/24 1354)  LE Dressing: Dependent/Total (10/30/24 1354)  Toileting: Dependent/Total (10/30/24 1354)  Additional Comments: ADL tasks simulated/completed as above stated. pt with limited active movements and difficulty following commands (10/30/24 1354)  Toilet Transfers  Toilet Transfer: Unable to assess (10/30/24 1356)  Toilet Transfers Comments: unable to safely assess - anticipate 2 person (10/30/24 1356)            Speech Language Pathology      Comprehension: Exceptions  Verbal Expression: Exceptions to functional limits  Diet/Swallow:        Dysphagia Outcome Severity Scale: Level 7: Normal in all situations  Compensatory Swallowing Strategies : Alternate solids and liquids, Upright as possible for all oral intake, Small bites/sips, Eat/Feed slowly         Current Conditions Requiring Inpatient Rehabilitation  Bowel/Bladder Dysfunction: Yes  Intervention Required = Frequent toileting, Bowel program, and Check post void residual  Risk for Medical/Clinical Complications = high  Skin Healing/Breakdown Risk: Yes  Intervention Required = Side to side turns  Risk for Medical/Clinical Complications = moderate  Nutrition/Hydration Deficiency: Yes  Intervention Required = Monitor I&Os, Check Labs, and Dietary Eval  Risk for Medical/Clinical Complications = high  Medical Comorbidities: Yes  Intervention Required = DVT risk, CAD, Renal disease, and HTN, PD, PVD, Cardiomyopathy  Risk for Medical/Clinical Complications = high    Rehab/Skilled Needs:   900 minutes of Intensive Acute Rehab therapy over 7 days/week  PT Treatment Time:  1.5 hrs/day  OT Treatment Time: 1.5 hrs/day  SLP Treatment Time: 0.5 hrs/day  Rehabilitation Nursing   Case management/Social work  Dietitian/Nutrition    Cultural needs:   Ethnic, Cultural, Spiritual, and Gnosticism Needs  Do you have any ethnic, cultural, Taoist practices or restrictions we should know about during your stay in the hospital?  : No  Are you

## 2024-11-06 PROBLEM — Z74.09 IMPAIRED MOBILITY AND ADLS: Status: ACTIVE | Noted: 2024-11-06

## 2024-11-06 PROBLEM — Z78.9 IMPAIRED MOBILITY AND ADLS: Status: ACTIVE | Noted: 2024-11-06

## 2024-11-06 PROBLEM — Z78.9 IMPAIRED MOBILITY AND ADLS: Status: RESOLVED | Noted: 2024-11-06 | Resolved: 2024-11-06

## 2024-11-06 PROBLEM — Z74.09 IMPAIRED MOBILITY AND ADLS: Status: RESOLVED | Noted: 2024-11-06 | Resolved: 2024-11-06

## 2024-11-06 LAB
GLUCOSE BLD-MCNC: 225 MG/DL (ref 70–99)
GLUCOSE BLD-MCNC: 232 MG/DL (ref 70–99)
GLUCOSE BLD-MCNC: 274 MG/DL (ref 70–99)
GLUCOSE BLD-MCNC: 292 MG/DL (ref 70–99)
PERFORMED ON: ABNORMAL

## 2024-11-06 PROCEDURE — 6370000000 HC RX 637 (ALT 250 FOR IP): Performed by: INTERNAL MEDICINE

## 2024-11-06 PROCEDURE — 97129 THER IVNTJ 1ST 15 MIN: CPT

## 2024-11-06 PROCEDURE — 97112 NEUROMUSCULAR REEDUCATION: CPT

## 2024-11-06 PROCEDURE — 97535 SELF CARE MNGMENT TRAINING: CPT

## 2024-11-06 PROCEDURE — 6360000002 HC RX W HCPCS: Performed by: PHYSICAL MEDICINE & REHABILITATION

## 2024-11-06 PROCEDURE — 92523 SPEECH SOUND LANG COMPREHEN: CPT

## 2024-11-06 PROCEDURE — 92610 EVALUATE SWALLOWING FUNCTION: CPT

## 2024-11-06 PROCEDURE — 99223 1ST HOSP IP/OBS HIGH 75: CPT | Performed by: PHYSICAL MEDICINE & REHABILITATION

## 2024-11-06 PROCEDURE — 1180000000 HC REHAB R&B

## 2024-11-06 PROCEDURE — 6370000000 HC RX 637 (ALT 250 FOR IP)

## 2024-11-06 PROCEDURE — 2580000003 HC RX 258: Performed by: INTERNAL MEDICINE

## 2024-11-06 PROCEDURE — 97163 PT EVAL HIGH COMPLEX 45 MIN: CPT

## 2024-11-06 PROCEDURE — 97167 OT EVAL HIGH COMPLEX 60 MIN: CPT

## 2024-11-06 PROCEDURE — 6370000000 HC RX 637 (ALT 250 FOR IP): Performed by: PHYSICAL MEDICINE & REHABILITATION

## 2024-11-06 RX ORDER — VITAMIN B COMPLEX
2000 TABLET ORAL
Status: DISCONTINUED | OUTPATIENT
Start: 2024-11-06 | End: 2024-11-23 | Stop reason: HOSPADM

## 2024-11-06 RX ORDER — UBIDECARENONE 100 MG
100 CAPSULE ORAL DAILY
Status: DISCONTINUED | OUTPATIENT
Start: 2024-11-06 | End: 2024-11-23 | Stop reason: HOSPADM

## 2024-11-06 RX ORDER — ACETAMINOPHEN 325 MG/1
650 TABLET ORAL EVERY 4 HOURS PRN
Status: DISCONTINUED | OUTPATIENT
Start: 2024-11-06 | End: 2024-11-23 | Stop reason: HOSPADM

## 2024-11-06 RX ORDER — INSULIN GLARGINE 100 [IU]/ML
20 INJECTION, SOLUTION SUBCUTANEOUS NIGHTLY
Status: DISCONTINUED | OUTPATIENT
Start: 2024-11-06 | End: 2024-11-14

## 2024-11-06 RX ORDER — BISACODYL 10 MG
10 SUPPOSITORY, RECTAL RECTAL DAILY PRN
Status: DISCONTINUED | OUTPATIENT
Start: 2024-11-06 | End: 2024-11-23 | Stop reason: HOSPADM

## 2024-11-06 RX ORDER — LIDOCAINE 4 G/G
3 PATCH TOPICAL DAILY
Status: DISCONTINUED | OUTPATIENT
Start: 2024-11-06 | End: 2024-11-23 | Stop reason: HOSPADM

## 2024-11-06 RX ORDER — SODIUM PHOSPHATE, DIBASIC AND SODIUM PHOSPHATE, MONOBASIC 7; 19 G/230ML; G/230ML
1 ENEMA RECTAL DAILY PRN
Status: DISCONTINUED | OUTPATIENT
Start: 2024-11-06 | End: 2024-11-23 | Stop reason: HOSPADM

## 2024-11-06 RX ORDER — CYANOCOBALAMIN 1000 UG/ML
1000 INJECTION, SOLUTION INTRAMUSCULAR; SUBCUTANEOUS WEEKLY
Status: DISCONTINUED | OUTPATIENT
Start: 2024-11-06 | End: 2024-11-23 | Stop reason: HOSPADM

## 2024-11-06 RX ORDER — NEOMYCIN/BACITRACIN/POLYMYXINB 3.5-400-5K
OINTMENT (GRAM) TOPICAL 2 TIMES DAILY
Status: DISCONTINUED | OUTPATIENT
Start: 2024-11-06 | End: 2024-11-23 | Stop reason: HOSPADM

## 2024-11-06 RX ADMIN — HYDRALAZINE HYDROCHLORIDE 50 MG: 50 TABLET ORAL at 08:49

## 2024-11-06 RX ADMIN — SODIUM BICARBONATE 650 MG TABLET 650 MG: at 08:49

## 2024-11-06 RX ADMIN — INSULIN LISPRO 4 UNITS: 100 INJECTION, SOLUTION INTRAVENOUS; SUBCUTANEOUS at 21:28

## 2024-11-06 RX ADMIN — Medication 2000 UNITS: at 16:54

## 2024-11-06 RX ADMIN — CALCITRIOL 0.25 MCG: 0.25 CAPSULE ORAL at 08:48

## 2024-11-06 RX ADMIN — CARVEDILOL 12.5 MG: 12.5 TABLET, FILM COATED ORAL at 16:54

## 2024-11-06 RX ADMIN — CYANOCOBALAMIN 1000 MCG: 1000 INJECTION, SOLUTION INTRAMUSCULAR; SUBCUTANEOUS at 08:49

## 2024-11-06 RX ADMIN — ALLOPURINOL 100 MG: 100 TABLET ORAL at 08:49

## 2024-11-06 RX ADMIN — Medication 10 ML: at 21:31

## 2024-11-06 RX ADMIN — ATORVASTATIN CALCIUM 40 MG: 40 TABLET, FILM COATED ORAL at 08:49

## 2024-11-06 RX ADMIN — HYDRALAZINE HYDROCHLORIDE 50 MG: 50 TABLET ORAL at 21:27

## 2024-11-06 RX ADMIN — Medication 100 MG: at 08:49

## 2024-11-06 RX ADMIN — SODIUM BICARBONATE 650 MG TABLET 650 MG: at 21:27

## 2024-11-06 RX ADMIN — ISOSORBIDE MONONITRATE 30 MG: 30 TABLET, EXTENDED RELEASE ORAL at 08:49

## 2024-11-06 RX ADMIN — CARVEDILOL 12.5 MG: 12.5 TABLET, FILM COATED ORAL at 08:49

## 2024-11-06 RX ADMIN — ACETAMINOPHEN 325MG 650 MG: 325 TABLET ORAL at 08:48

## 2024-11-06 RX ADMIN — INSULIN GLARGINE 20 UNITS: 100 INJECTION, SOLUTION SUBCUTANEOUS at 21:28

## 2024-11-06 RX ADMIN — BACITRACIN ZINC, NEOMYCIN, POLYMYXIN B 1 G: 400; 3.5; 5 OINTMENT TOPICAL at 22:47

## 2024-11-06 RX ADMIN — INSULIN LISPRO 2 UNITS: 100 INJECTION, SOLUTION INTRAVENOUS; SUBCUTANEOUS at 12:09

## 2024-11-06 RX ADMIN — INSULIN LISPRO 2 UNITS: 100 INJECTION, SOLUTION INTRAVENOUS; SUBCUTANEOUS at 16:53

## 2024-11-06 RX ADMIN — INSULIN LISPRO 4 UNITS: 100 INJECTION, SOLUTION INTRAVENOUS; SUBCUTANEOUS at 06:22

## 2024-11-06 RX ADMIN — Medication 10 ML: at 08:50

## 2024-11-06 ASSESSMENT — PAIN SCALES - GENERAL
PAINLEVEL_OUTOF10: 3
PAINLEVEL_OUTOF10: 0
PAINLEVEL_OUTOF10: 1

## 2024-11-06 ASSESSMENT — PAIN DESCRIPTION - LOCATION: LOCATION: BACK

## 2024-11-06 NOTE — PROGRESS NOTES
Facility/Department: [facility]  Rehabilitation Initial Assessment: Occupational Therapy  Room: R2/R246-01    NAME: Modesto Burgess  : 10/9/1933  MRN: [id number]    Date of Service: 2024    Rehab Diagnosis(es): Impaired mobility and ADL's d/t left thalamic CVA  Patient Active Problem List    Diagnosis Date Noted    Cardiomyopathy (HCC) 2024    Altered mental status 10/30/2024    CVA  L Thalamic 2024    Impaired mobility ADLs dt L thal CVA 2024    Late effect of brain injury 2024    Late effects of CVA (cerebrovascular accident) 2024    [place] (hard of hearing) 2024    Parkinsonian features 2024    Palliative care encounter 10/31/2024    Goals of care, counseling/discussion 10/31/2024    Advanced care planning/counseling discussion 10/31/2024    Full code status 10/31/2024    Fall at home, initial encounter 10/30/2024    Hyperparathyroidism due to renal insufficiency (HCC) 10/30/2024    PVD (posterior vitreous detachment), both eyes 2023    APRYL (acute kidney injury) ([facility]) 09/15/2020    CKD (chronic kidney disease) 09/15/2020    HTN (hypertension) 09/15/2020    CAD (coronary artery disease) 09/15/2020    Type 2 diabetes mellitus, with long-term current use of insulin (HCC) 09/15/2020    HLD (hyperlipidemia) 09/15/2020    Hyperkalemia 09/15/2020    Actinic keratosis 2019       Past Medical History:   Diagnosis Date    CAD (coronary artery disease) 09/15/2020    HLD (hyperlipidemia) 09/15/2020    [place] (hard of hearing) 2024    Hyperparathyroidism due to renal insufficiency (HCC) 10/30/2024    Late effects of CVA (cerebrovascular accident) 2024    Lumbosacral disc disease     Osteoarthritis     Type 2 diabetes mellitus, with long-term current use of insulin ([facility]) 09/15/2020     No past surgical history on file.    Restrictions:  Restrictions/Precautions  Restrictions/Precautions: Fall Risk    Subjective:  General  Chart Reviewed: Yes  Referring

## 2024-11-06 NOTE — PROGRESS NOTES
Physician Progress Note      PATIENT:               PAUL CLAYTON  CSN #:                  [id number]  :                       10/9/1933  ADMIT DATE:       10/29/2024 9:28 PM  DISCH DATE:  RESPONDING  PROVIDER #:        Mateus Lazo MD          QUERY TEXT:    Pt admitted with acute thalamic infarct and has systolic and diastolic CHF   noted per attending. LVEF 30-35% per 10/30 echo. If possible, please document   in progress notes and discharge summary further specificity regarding the type   and acuity of CHF:    The medical record reflects the following:  Risk Factors: HTN, CAD, HLD, DM, CKD  Clinical Indicators: 10/30 echo with EF 30-35%.  Left ventricle is moderately   dilated. Mildly increased wall thickness. Severe global hypokinesis present.   Normal diastolic function. NT Pro-BNP 56479. The lungs are clear, with no sign   of any infiltrate or effusion per CXR.  Treatment: Coreg, Imdur, ASA, Lipitor, Hydralazine, No Entresto ACE or ARB in   the setting of APRYL on CKD, echo    Thank you,  Katia Cohen   Clinical Documentation Improvement Specialist  W: [phone]  Options provided:  -- Chronic Systolic and Diastolic CHF  -- Chronic Systolic CHF/HFrEF  -- Other - I will add my own diagnosis  -- Disagree - Not applicable / Not valid  -- Disagree - Clinically unable to determine / Unknown  -- Refer to Clinical Documentation Reviewer    PROVIDER RESPONSE TEXT:    This patient has chronic systolic and diastolic CHF.    Query created by: Marianne Cohen on 2024 11:35 AM      Electronically signed by:  Mateus Lazo MD 2024 7:20 AM          
0800 PRN hydralazine 10 mg given for hypertenstion of 183/86.     0851 BP rechecked 155/67.     Patient a & 0 x2   
0800 Patient a & o  x 1 very tired. Resting in bed comfortably denies pain. Incontinent of urine.   Patient has delayed response and keeps falling asleep during assessment. Will continue to monitor.     1145 Sent perfect serve to Dr. Rob  and Dr. Zapata. Patient mental status change since 0800 Neuro check. Patient lethargic. Is taking  lengthly of a time following commands. Unable to complete the NIH. Is confused A/oX1. Has blank stare at times when asking him to do something. Daughter at bedside . States this is how he was when he came in.       1200 Patient Very lethargic and will not keep attention focused on nurse NIH increased due to increased aphagia.   A stat CT was ordered.     1315 Called with urgent CT results. mall amount of subarachnoid hemorrhage in the right parietal area.  Evolving lacunar infarct of the left thalamus.Notified Dr. Rob via perfect serve.     Dr. Rob responded Not of concern. Hold asa and lovenox     1600 Patient continues to be disoriented lethargic and aphasic. Grimaces with any movement.      1630 Noticed during bed change that patients iv In L ac was tender and red. D?C'd and attempted to place 2 Iv's with no success.     1700 Called ICU for US guided IV placement.       
Called lab to come re-draw cbc/bmp when patient returns from ultrasound.   
Cardiology progress note    Patient Name: Modesto Burgess  Admit Date: 10/29/2024  9:28 PM  MR #: [id number]  : 10/9/1933    Attending Physician: Brandyn Vasquez MD  Reason for consult: Bifascicular block management    History of Presenting Illness:      Modesto Burgess is a 89 y.o. male on hospital day 1 with a history of CAD with prior PCI's, hypertension, hyperlipidemia, diabetes, CKD, who was admitted to the hospital after being found down by family members.. History Obtained From:  patient, electronic medical record    Unknown how long patient was found  Apparently last time patient was found normal was 2 days prior    EKG showing sinus rhythm RBBB QTc 507  CK elevated  BNP 2100  CT of the head negative  Chest x-ray normal cardiopulmonary process  ==============  Hospital course  10/31/2024  Patient laying in bed sleeping per family members patient is more awake and alert  Telemetry sinus rhythm no major arrhythmias  TTE yesterday EF 30 to 35%    History:      History reviewed. No pertinent past medical history.  History reviewed. No pertinent surgical history.  Family History  History reviewed. No pertinent family history.  [] Unable to obtain due to ventilated and/ or neurologic status  Social History     Socioeconomic History    Marital status:      Spouse name: Not on file    Number of children: Not on file    Years of education: Not on file    Highest education level: Not on file   Occupational History    Not on file   Tobacco Use    Smoking status: Never    Smokeless tobacco: Never   Substance and Sexual Activity    Alcohol use: Never    Drug use: Never    Sexual activity: Not on file   Other Topics Concern    Not on file   Social History Narrative    Not on file     Social Determinants of Health     Financial Resource Strain: Not on file   Food Insecurity: No Food Insecurity (10/30/2024)    Hunger Vital Sign     Worried About Running Out of Food in the Last Year: Never true     Ran Out of Food in the Last 
Cardiology progress note    Patient Name: Modesto Burgess  Admit Date: 10/29/2024  9:28 PM  MR #: [id number]  : 10/9/1933    Attending Physician: Love Torre DO  Reason for consult: Bifascicular block management    History of Presenting Illness:      Modesto Burgess is a 89 y.o. male on hospital day 5 with a history of CAD with prior PCI's, hypertension, hyperlipidemia, diabetes, CKD, who was admitted to the hospital after being found down by family members.. History Obtained From:  patient, electronic medical record    Unknown how long patient was found  Apparently last time patient was found normal was 2 days prior    EKG showing sinus rhythm RBBB QTc 507  CK elevated  BNP 2100  CT of the head negative  Chest x-ray normal cardiopulmonary process  ==============  Hospital course  2024  Patient laying in bed looks more tired compared to yesterday  Sleeping  Daughter Justo at bedside updated on cardiac plan  Per review of chart patient was found with an evolving stroke and possible small subdural hematoma    11/3/2024  Patient laying in bed looks less alert compared to yesterday  Daughter Justo at bedside who reports that she is the POA  Son-in-law also at bedside  Per daughter she was not aware of patient's clinical condition.  Brother Hema who I have been talking to has not been communicating with her  Per daughter, father lives with his brother because she has to work.  However she is the decision-maker  I had a long conversation with the patient, daughter and son-in-law  I discussed current cardiac condition including patient's cardiomyopathy and current plan  Daughter agreed on medical therapy plan for now, avoiding coronary angiogram  A.m. nurse present during my evaluation    2024  Patient laying in bed looks comfortable  Patient more awake and alert  Denies chest pain or shortness of breath  Telemetry sinus rhythm in the 60s      2024  Patient sitting in bed  Looks more awake and 
Hemorrhagic Stroke/Intracerebral Hemorrhage (excludes Subdural/Epidural Hematoma)    VTE Prophylaxis: Yes: SCD    Antiplatelet: Yes: Held for bleed      Statin if LDL Greater Than or Equal to100: Yes: Lipitor    BP Parameters: Less Than 140/90    Controlled With: Scheduled PO    Dysphagia Screen Completed: Yes: Pass    Patient has PEG, NG Tube, Feeding Tube: No    Medication orders per above route: Yes    Nutrition Status: PO    NIH Stroke Scale Complete: Yes: q-4    Frequency of Vital Signs: Every 4 hours    Frequency of Neuro Checks: Every 4 hours    Daily Education/Care Plan Updated: Yes    Christina Rubio RN    
Hospitalist Progress Note      PCP: Cb Rolon MD    Date of Admission: 10/29/2024    Chief Complaint:    Chief Complaint   Patient presents with    Altered Mental Status     Patient found to be not aware.    Fall     Patient found on floor.  Patient last known well, last night  patient unaware of length on floor.       Subjective:  Patient is awake; alert; not well orientated but cooperative      Medications:  Reviewed    Infusion Medications    sodium chloride      dextrose      sodium chloride       Scheduled Medications    sodium zirconium cyclosilicate  10 g Oral Once    sodium chloride flush  5-40 mL IntraVENous 2 times per day    heparin (porcine)  5,000 Units SubCUTAneous 3 times per day    insulin lispro  0-8 Units SubCUTAneous 4x Daily AC & HS    carvedilol  12.5 mg Oral BID WC    sodium chloride flush  5-40 mL IntraVENous 2 times per day    aspirin  81 mg Oral Daily    Or    aspirin  300 mg Rectal Daily    allopurinol  100 mg Oral Daily    atorvastatin  10 mg Oral Daily    calcitRIOL  0.25 mcg Oral Daily    clopidogrel  75 mg Oral Daily    isosorbide mononitrate  30 mg Oral Daily    sodium bicarbonate  650 mg Oral BID     PRN Meds: sodium chloride flush, sodium chloride, ondansetron **OR** ondansetron, polyethylene glycol, acetaminophen **OR** acetaminophen, glucose, dextrose bolus **OR** dextrose bolus, glucagon (rDNA), dextrose, hydrALAZINE, sodium chloride flush, sodium chloride    No intake or output data in the 24 hours ending 10/31/24 1137    Exam:    BP (!) 155/70   Pulse 62   Temp 98.4 °F (36.9 °C) (Oral)   Resp 18   Ht 1.727 m (5' 7.99\")   Wt 87.5 kg (193 lb)   SpO2 98%   BMI 29.35 kg/m²     General appearance: No apparent distress, appears stated age and cooperative.  HEENT:  Conjunctivae/corneas clear.  Neck: Trachea midline.  Respiratory:  Normal respiratory effort. Clear to auscultation  Cardiovascular: Regular rate and rhythm  Abdomen: Soft, non-tender, non-distended with normal 
Ischemic Stroke without Activase/TIA    VTE Prophylaxis: Yes: Heparin    Antiplatelet: Yes: Aspirin/ Plavix    Statin if LDL Greater Than or Equal to100: Yes: Lipitor    BP Parameters: Less Than 220/120 for 24 hours, then consult MD for parameters    Controlled With:Scheduled PO and PRN - IV    Dysphagia Screen Completed: Yes: Pass    Patient has PEG, NG Tube, Feeding Tube: No    Medication orders per above route: Yes    Nutrition Status:  Easy Chew low potassium    NIH Stroke Scale Complete: Yes:      Frequency of Vital Signs: Every 4 hours     Frequency of Neuro Checks: Every 4 hours times 6 occurrences and then every shift/handoff    Daily Education/Care Plan Updated: Yes    Yennifer Roa RN    
[facility] OCCUPATIONAL THERAPY EVALUATION - ACUTE     NAME: Modesto Burgess  : 10/9/1933 (89 y.o.)  MRN: [id number]  CODE STATUS: Full Code  Room: 76/W176-01    Date of Service: 10/30/2024    Patient Diagnosis(es): Hypernatremia [E87.0]  Right leg weakness [R29.898]  Chronic anemia [D64.9]  Fall at home, initial encounter [W19.XXXA, Y92.009]  Altered mental status, unspecified altered mental status type [R41.82]  Chronic kidney disease, unspecified CKD stage [N18.9]   Patient Active Problem List    Diagnosis Date Noted    Fall at home, initial encounter 10/30/2024    APRYL (acute kidney injury) (HCC) 09/15/2020    CKD (chronic kidney disease) 09/15/2020    HTN (hypertension) 09/15/2020    CAD (coronary artery disease) 09/15/2020    Type 2 diabetes mellitus, with long-term current use of insulin (HCC) 09/15/2020    HLD (hyperlipidemia) 09/15/2020    Hyperkalemia 09/15/2020        No past medical history on file.  No past surgical history on file.     Restrictions  Restrictions/Precautions: Fall Risk              Safety Devices: Safety Devices  Type of Devices: All fall risk precautions in place;Bed alarm in place;Call light within reach;Left in bed     Patient's date of birth confirmed: Yes    General:  Patient assessed for rehabilitation services?: Yes  Family / Caregiver Present: Yes    Subjective          Pain at start of treatment: Yes: Pt. unable to rate pain- pt unable to rate pain however demos intermittent pain with movement - unable to pinpoint where pain was located    Pain at end of treatment: Yes: Pt. unable to rate pain- continued, though pt shakes head no at rest      Prior Level of Function:  Social/Functional History  Lives With: Son (2 sons and DIL)  Type of Home: House  Home Layout: Two level, Able to Live on Main level with bedroom/bathroom (pt stays on main floor)  Home Access: Stairs to enter with rails  Entrance Stairs - Number of Steps: 2-3  Entrance Stairs - Rails: Both  Bathroom Shower/Tub: 
[facility]   Facility/Department: [facility] 2W ORTHO TELE  Speech Language Pathology  Treatment Note      Modesto Burgess  10/9/1933  W267/W267-01  [x]   confirmed      Date: 2024    Hypernatremia [E87.0]  Right leg weakness [R29.898]  Chronic anemia [D64.9]  Fall at home, initial encounter [W19.XXXA, Y92.009]  Altered mental status, unspecified altered mental status type [R41.82]  Chronic kidney disease, unspecified CKD stage [N18.9]    Restrictions/Precautions: Fall Risk    ADULT DIET; Easy to Chew; Low Potassium (Less than 3000 mg/day)     Respiratory Status:  room air  No active isolations    Subjective:  Lethargic and Confused        Interventions used this date:  Cognitive Skill Development      Objective/Assessment:  Patient progressing towards goals:  Short Term Goals  Time Frame for Short Term Goals: 1 week  Goal 1: To address patient's cognitive deficits and promote orientation, patient will state name of facility, time within 1 hour, reason in hospital, current month and year with 100% accuracy with min assist, with use of external aid.  Pt with eyes closed upon arrival. RN had reported increased lethargy.   Pt aroused to verbal and tactile stimulation for short periods of time.  Pt was oriented x1 Independently to name and . Pt oriented to hospital with verbal choices. Unable to orient to month or year given moderate verbal cues.  Goal 2: Pt will follow  1 step directions given orally with 90% accuracy with min cues to increase the pt's ability to follow directions provided by caregivers for safe follow through with ADLs.  Goal 3: Pt will answer mid level yes/no and WH- questions with 90% accuracy with min cues to assist the caregiver in obtaining important information regarding the patient's personal, medical, and safety needs.    ELDA Escamilla had approached SLP with concerns for patients recent decreased PO intake. SLP attempted to give patient water and he refused after moderate encouragement. Pt not 
[facility]   Facility/Department: [address] TELE  Speech Language Pathology  Clinical Bedside Swallow Evaluation    NAME:Modesto Burgess  : 10/9/1933 (89 y.o.)   [x]   confirmed    MRN: [id number]  ROOM: [place] [id number]-  ADMISSION DATE: 10/29/2024  PATIENT DIAGNOSIS(ES): Hypernatremia [E87.0]  Right leg weakness [R29.898]  Chronic anemia [D64.9]  Fall at home, initial encounter [W19.XXXA, Y92.009]  Altered mental status, unspecified altered mental status type [R41.82]  Chronic kidney disease, unspecified CKD stage [N18.9]  Chief Complaint   Patient presents with    Altered Mental Status     Patient found to be not aware.    Fall     Patient found on floor.  Patient last known well, last night  patient unaware of length on floor.     Patient Active Problem List    Diagnosis Date Noted    Altered mental status 10/30/2024    Fall at home, initial encounter 10/30/2024    Hyperparathyroidism due to renal insufficiency (HCC) 10/30/2024    APRYL (acute kidney injury) (HCC) 09/15/2020    CKD (chronic kidney disease) 09/15/2020    HTN (hypertension) 09/15/2020    CAD (coronary artery disease) 09/15/2020    Type 2 diabetes mellitus, with long-term current use of insulin (HCC) 09/15/2020    HLD (hyperlipidemia) 09/15/2020    Hyperkalemia 09/15/2020     History reviewed. No pertinent past medical history.  History reviewed. No pertinent surgical history.  No Known Allergies    DATE ONSET: 10/29/24    Date of Evaluation: 10/31/2024   Evaluating Therapist: STORMY Ruelas    Dysphagia Diagnosis  Dysphagia Diagnosis: Swallow function appears WFL;No clinical indicators of dysphagia  Dysphagia Impression : No clinical indicators of oral dysphagia or pharyngeal dysphagia. Patient's prognosis for diet tolerance is good.  Per the results of this assessment, recommend patient to resume baseline diet of ETC solids and thin liquids.    Recommended Diet     Diet Solids Recommendation: Easy to Chew  Liquid Consistency Recommendation: 
Neurology Follow up    SUBJECTIVE: Patient actually voices no complaints son at the bedside findings discussed.  Patient was down on the floor for given time.  Normally he is at baseline and independent.    11/1  Patient still showing some sign of confusion.  He is intermittent and is very hard of hearing as well.  He has not yet gotten out of bed on his own  Current Facility-Administered Medications   Medication Dose Route Frequency Provider Last Rate Last Admin    hydrALAZINE (APRESOLINE) tablet 25 mg  25 mg Oral 2 times per day Mateus Lazo MD   25 mg at 11/01/24 1000    aspirin chewable tablet 162 mg  162 mg Oral Daily Ollie Rob MD   162 mg at 11/01/24 1000    Or    aspirin suppository 300 mg  300 mg Rectal Daily Ollie Rob MD        sodium chloride flush 0.9 % injection 5-40 mL  5-40 mL IntraVENous 2 times per day Fanny Arboleda, DO   10 mL at 11/01/24 1001    sodium chloride flush 0.9 % injection 5-40 mL  5-40 mL IntraVENous PRN Fanny Arboleda, DO        0.9 % sodium chloride infusion   IntraVENous PRN Fanny Arboleda,         heparin (porcine) injection 5,000 Units  5,000 Units SubCUTAneous 3 times per day Fanny Arboleda, DO   5,000 Units at 11/01/24 1417    ondansetron (ZOFRAN-ODT) disintegrating tablet 4 mg  4 mg Oral Q8H PRN Fanny Arboleda,         Or    ondansetron (ZOFRAN) injection 4 mg  4 mg IntraVENous Q6H PRN Fanny Arboleda, DO        polyethylene glycol (GLYCOLAX) packet 17 g  17 g Oral Daily PRN Fanny Arboleda, DO        acetaminophen (TYLENOL) tablet 650 mg  650 mg Oral Q6H PRN Fanny Arboleda,         Or    acetaminophen (TYLENOL) suppository 650 mg  650 mg Rectal Q6H PRN Fanny Arboleda, DO        glucose chewable tablet 16 g  4 tablet Oral PRN Fanny Arboleda, DO        dextrose bolus 10% 125 mL  125 mL IntraVENous PRN Fanny Arboleda,         Or    dextrose bolus 10% 250 mL  250 mL IntraVENous PRN Fanny Arboleda, DO        glucagon injection 1 mg  1 mg SubCUTAneous PRN Robles 
Palliative Care Consult Note  Patient: Modesto Burgess  Gender: male  YOB: 1933  Unit/Bed: W267/W267-01  CodeStatus: Full Code  Inpatient Treatment Team: Treatment Team:   Brandyn Vasquez MD Patel, Dhruv R, MD Jeet, Anant, MD Cortes, Manuel, MD Diaz, Kimberly, RN Cullom, Katherine, Tawanna Castle RN  Admit Date:  10/29/2024    Chief Complaint: Fall and AMS    History of Presenting Illness:      Modesto Burgess is a 89 y.o. male on hospital day 1 with a history of CKD, HTN, CAD, T2DM, HLD.    Patient was brought to the emergency room with fall with altered mental status. Patient was admitted in the setting of fall with mild rhabdo myelosis, CKD stage IIIb-IV, encephalopathy, right leg weakness and mild leukocytosis.    Palliative care was consulted by Dr. Brandyn Vasquez MD for goals of care, overwhelming symptoms and psychosocial distress.     Patient was previously living at patient's sons home with multiple family per EMR documentation.    Upon entering the room I find the patient alert and oriented x 0-1 with son and DIL at bedside.  Patient's previous functional status is alert and oriented x 4 up independently per conversation with family at bedside. Patient denies any unintentional weight loss or decreased appetite.  Patient has had slight functional decline with recent illness.  Reviewed patient's goals of care, CODE STATUS, current condition and benefits of palliative care in the outpatient setting.  Patient's family is hopeful that the patient will make a full recovery and regain all of his strength and abilities.     Most recent notable labs: Sodium 150, potassium 5.0, chloride 118, BUN 60, creatinine 3.51, estimated GFR 15.7, POC glucose 213, calcium 10.2, total CK8 25, alkaline phosphatase 106, RBC 3.08, hemoglobin 9.8, hematocrit 30      Review of Systems:       Review of Systems   Constitutional:  Negative for unexpected weight change.   Neurological:  Positive for weakness.       Physical 
Physical Therapy  Facility/Department: [facility] MED SURG W267/W267-01  Physical Therapy Discharge      NAME: Modesto Burgess    : 10/9/1933 (89 y.o.)  MRN: [id number]    Account: [id number]  Gender: male      Patient has been discharged from acute care hospital. DC patient from current PT program.      Electronically signed by Christina Regan PT on 24 at 1:26 PM EST    
Physical Therapy Med Surg Daily Treatment Note  Facility/Department: [facility] 2[facility] ORTHO TELE  Room: [place] [address]       NAME: Modesto Burgess  : 10/9/1933 (89 y.o.)  MRN: [id number]  CODE STATUS: Full Code    Date of Service: 2024    Patient Diagnosis(es): Hypernatremia [E87.0]  Right leg weakness [R29.898]  Chronic anemia [D64.9]  Fall at home, initial encounter [W19.XXXA, Y92.009]  Altered mental status, unspecified altered mental status type [R41.82]  Chronic kidney disease, unspecified CKD stage [N18.9]   Chief Complaint   Patient presents with    Altered Mental Status     Patient found to be not aware.    Fall     Patient found on floor.  Patient last known well, last night  patient unaware of length on floor.     Patient Active Problem List    Diagnosis Date Noted    Altered mental status 10/30/2024    CVA  L Thalamic 2024    Impaired mobility ADLs dt L thal CVA 2024    Late effect of brain injury 2024    Late effects of CVA (cerebrovascular accident) 2024    [place] (hard of hearing) 2024    Parkinsonian features 2024    Palliative care encounter 10/31/2024    Goals of care, counseling/discussion 10/31/2024    Advanced care planning/counseling discussion 10/31/2024    Full code status 10/31/2024    Fall at home, initial encounter 10/30/2024    Hyperparathyroidism due to renal insufficiency (HCC) 10/30/2024    PVD (posterior vitreous detachment), both eyes 2023    APRYL (acute kidney injury) (HCC) 09/15/2020    CKD (chronic kidney disease) 09/15/2020    HTN (hypertension) 09/15/2020    CAD (coronary artery disease) 09/15/2020    Type 2 diabetes mellitus, with long-term current use of insulin (HCC) 09/15/2020    HLD (hyperlipidemia) 09/15/2020    Hyperkalemia 09/15/2020    Actinic keratosis 2019        History reviewed. No pertinent past medical history.  History reviewed. No pertinent surgical history.    Chart Reviewed: Yes    Restrictions:  Restrictions/Precautions: Fall 
Physical Therapy Med Surg Initial Assessment  Facility/Department[facility] [place] [facility] TELEMETRY  [facility]       NAME: Modesto Burgess  : 10/9/1933 (89 y.o.)  MRN: [id number]  CODE STATUS: Full Code    Date of Service: 10/30/2024    Patient Diagnosis(es): Hypernatremia [E87.0]  Right leg weakness [R29.898]  Chronic anemia [D64.9]  Fall at home, initial encounter [W19.XXXA, Y92.009]  Altered mental status, unspecified altered mental status type [R41.82]  Chronic kidney disease, unspecified CKD stage [N18.9]   Chief Complaint   Patient presents with    Altered Mental Status     Patient found to be not aware.    Fall     Patient found on floor.  Patient last known well, last night  patient unaware of length on floor.     Patient Active Problem List    Diagnosis Date Noted    Fall at home, initial encounter 10/30/2024    APRYL (acute kidney injury) (HCC) 09/15/2020    CKD (chronic kidney disease) 09/15/2020    HTN (hypertension) 09/15/2020    CAD (coronary artery disease) 09/15/2020    Type 2 diabetes mellitus, with long-term current use of insulin (HCC) 09/15/2020    HLD (hyperlipidemia) 09/15/2020    Hyperkalemia 09/15/2020        No past medical history on file.  No past surgical history on file.    Chart Reviewed: Yes  Patient assessed for rehabilitation services?: Yes  Family / Caregiver Present: Yes (son)    Restrictions:  Restrictions/Precautions: Fall Risk     SUBJECTIVE:        Pain   Pt appears without pain at rest - did not answer question. Pt did present with wincing and guarding throughout all mobility with inability to indicate location of pain    Prior Level of Function:  Social/Functional History  Lives With: Son (2 sons and DIL)  Type of Home: House  Home Layout: Two level, Able to Live on Main level with bedroom/bathroom (pt stays on main floor)  Home Access: Stairs to enter with rails  Entrance Stairs - Number of Steps: 2-3  Entrance Stairs - Rails: Both  Bathroom Shower/Tub: Walk-in shower  Bathroom 
Physical Therapy Missed Treatment   Facility/Department: [facility] MED SURG [id number]    NAME: Modesto Burgess    : 10/9/1933 (89 y.o.)  MRN: [id number]    Account: [id number]  Gender: male    Chart reviewed, attempted PT at 1341. Patient unavailable 2° to:        [x] Pt.. off floor for test/procedure.           Will attempt PT treatment again at earliest convenience.      Electronically signed by Arabella Meade PTA on 10/31/24 at 1:41 PM EDT    
Physician Progress Note    11/1/2024   3:32 PM    Name:  Modesto Burgess  MRN:    [id number]     IP Day: 2     Admit Date: 10/29/2024  9:28 PM  PCP: Cb Rolon MD    Code Status:  Full Code    Assessment and Plan:        1.  Acute CVA of anterior left thalamus:  -DAPT, high intensity statin statin  -Neurology following  -Optimize modifiable risk factors: Hypertension, diabetes  -Medically stable.  Awaiting placement    2.  Type 2 diabetes with hyperglycemia:  -Continue SSI.  Add Lantus    3.  Encephalopathy related to #1    Hypertension  CKD 4  Hyperlipidemia  CAD    Diet: ADULT DIET; Easy to Chew; Low Potassium (Less than 3000 mg/day)  Ppx: SQH  Full Code    Electronically signed by Saravanan Zapata DO on 11/1/2024 at 3:32 PM    Subjective:     Doing fine.  No complaints    Current Facility-Administered Medications   Medication Dose Route Frequency Provider Last Rate Last Admin    insulin glargine (LANTUS) injection vial 13 Units  13 Units SubCUTAneous Nightly Saravanan Zapata DO        hydrALAZINE (APRESOLINE) tablet 25 mg  25 mg Oral 2 times per day Mateus Lazo MD   25 mg at 11/01/24 1000    aspirin chewable tablet 162 mg  162 mg Oral Daily Ollie Rob MD   162 mg at 11/01/24 1000    Or    aspirin suppository 300 mg  300 mg Rectal Daily Ollie Rob MD        sodium chloride flush 0.9 % injection 5-40 mL  5-40 mL IntraVENous 2 times per day Fanny Arboleda, DO   10 mL at 11/01/24 1001    sodium chloride flush 0.9 % injection 5-40 mL  5-40 mL IntraVENous PRN Fanny Arboleda DO        0.9 % sodium chloride infusion   IntraVENous PRN Fanny Arboleda DO        heparin (porcine) injection 5,000 Units  5,000 Units SubCUTAneous 3 times per day Fanny Arboleda DO   5,000 Units at 11/01/24 1417    ondansetron (ZOFRAN-ODT) disintegrating tablet 4 mg  4 mg Oral Q8H PRN Fanny Arboleda DO        Or    ondansetron (ZOFRAN) injection 4 mg  4 mg IntraVENous Q6H PRN Fanny Arboleda DO        polyethylene glycol 
Physician Progress Note    11/2/2024   6:15 PM    Name:  Modesto Burgess  MRN:    [id number]      Day: 3     Admit Date: 10/29/2024  9:28 PM  PCP: Cb Rolon MD    Code Status:  Full Code    Assessment and Plan:        1.  Acute CVA of anterior left thalamus:  -DAPT, high intensity statin statin  -Neurology following  -Optimize modifiable risk factors: Hypertension, diabetes.  Increase hydralazine  -Medically stable.  Awaiting placement    2.  Type 2 diabetes with hyperglycemia:  -Continue SSI.  Lantus added    3.  Encephalopathy related to #1: Improved    Hypertension  CKD 4  Hyperlipidemia  CAD    Diet: ADULT DIET; Easy to Chew; Low Potassium (Less than 3000 mg/day)  Ppx: SQH  Full Code    Electronically signed by Saravanan Zapata DO on 11/2/2024 at 6:15 PM    Subjective:     Denies complaints    Current Facility-Administered Medications   Medication Dose Route Frequency Provider Last Rate Last Admin    hydrALAZINE (APRESOLINE) tablet 50 mg  50 mg Oral BID Saravanan Zapata DO   50 mg at 11/02/24 0835    insulin glargine (LANTUS) injection vial 13 Units  13 Units SubCUTAneous Nightly Saravanan Zapata DO   13 Units at 11/01/24 2116    atorvastatin (LIPITOR) tablet 40 mg  40 mg Oral Daily Saravanan Zapata DO   40 mg at 11/02/24 0836    aspirin chewable tablet 162 mg  162 mg Oral Daily Ollie Rob MD   162 mg at 11/02/24 0835    Or    aspirin suppository 300 mg  300 mg Rectal Daily Ollie Rob MD        sodium chloride flush 0.9 % injection 5-40 mL  5-40 mL IntraVENous 2 times per day Fanny Arboleda DO   10 mL at 11/02/24 1212    sodium chloride flush 0.9 % injection 5-40 mL  5-40 mL IntraVENous PRN Fanny Arboleda DO        0.9 % sodium chloride infusion   IntraVENous PRN Fanny Arboleda DO        heparin (porcine) injection 5,000 Units  5,000 Units SubCUTAneous 3 times per day Fanny Arboleda DO   5,000 Units at 11/02/24 1614    ondansetron (ZOFRAN-ODT) disintegrating tablet 4 mg  4 mg Oral Q8H PRN 
Physician Progress Note    11/3/2024   2:16 PM    Name:  Modesto Burgess  MRN:    [id number]     IP Day: 4     Admit Date: 10/29/2024  9:28 PM  PCP: Cb Rolon MD    Code Status:  Full Code    Assessment and Plan:        1.  Acute CVA of anterior left thalamus: He had improved but as of 11/3, much less interactive.  Appears to have some aphasia.  New CT scan shows small amount of subarachnoid hemorrhage in the right parietal area and evolving lacunar infarct of the left thalamus  -Hold antiplatelet and subcutaneous heparin due to new bleed.  Neurology notified  -Continue high intensity statin  -Optimize modifiable risk factors: Hypertension, diabetes.  Increase hydralazine  -Palliative care had been following  -Previously was awaiting placement for [facility].  With new bleed, would need neurology clearance prior to transfer  -Pall Care following    2.  Type 2 diabetes with hyperglycemia:  -Continue SSI.  Lantus added    3.  Encephalopathy related to #1    Hypertension  CKD 4  Hyperlipidemia  CAD    Diet: ADULT DIET; Easy to Chew; Low Potassium (Less than 3000 mg/day)  Ppx: SCDs  Full Code    Electronically signed by Saravanan Zapata DO on 11/3/2024 at 2:16 PM    Subjective:     Patient is a lot less interactive today.  He is able to say a few words but family says he is much slower from how he was doing the last 2 days.    Current Facility-Administered Medications   Medication Dose Route Frequency Provider Last Rate Last Admin    hydrALAZINE (APRESOLINE) tablet 50 mg  50 mg Oral BID Saravanan Zapata DO   50 mg at 11/03/24 1049    insulin glargine (LANTUS) injection vial 13 Units  13 Units SubCUTAneous Nightly Saravanan Zapata DO   13 Units at 11/02/24 2200    atorvastatin (LIPITOR) tablet 40 mg  40 mg Oral Daily Saravanan Zapata DO   40 mg at 11/03/24 1049    [Held by provider] aspirin chewable tablet 162 mg  162 mg Oral Daily Ollie Rob MD   162 mg at 11/03/24 1049    Or    [Held by provider] aspirin 
Physician Progress Note    11/4/2024   11:44 AM    Name:  Modesto Burgess  MRN:    [id number]     IP Day: 5     Admit Date: 10/29/2024  9:28 PM  PCP: Cb Rolon MD    Code Status:  Full Code    Assessment and Plan:        1.  Acute CVA of anterior left thalamus: He had improved but as of 11/3, much less interactive.  Appears to have some aphasia.  New CT scan shows small amount of subarachnoid hemorrhage in the right parietal area and evolving lacunar infarct of the left thalamus  -Hold antiplatelet and subcutaneous heparin due to new bleed.  Neurology is following  -Continue high intensity statin  -Optimize modifiable risk factors: Hypertension, diabetes.  Increase hydralazine  -Palliative care had been following  -Previously was awaiting placement for [facility].  With new bleed, would need neurology clearance prior to transfer  -Pall Care following    2.  Type 2 diabetes with hyperglycemia:  -Continue SSI.  Lantus added    3.  Encephalopathy related to #1    Hypertension  CKD 4  Hyperlipidemia  CAD    Diet: ADULT DIET; Easy to Chew; Low Potassium (Less than 3000 mg/day)  Ppx: SCDs  Full Code    Electronically signed by Love Torre DO on 11/4/2024 at 11:44 AM    Subjective:     No new symptoms.  Current Facility-Administered Medications   Medication Dose Route Frequency Provider Last Rate Last Admin    hydrALAZINE (APRESOLINE) tablet 50 mg  50 mg Oral BID Saravanan Zapata DO   50 mg at 11/03/24 2147    insulin glargine (LANTUS) injection vial 13 Units  13 Units SubCUTAneous Nightly Saravanan Zapata DO   13 Units at 11/03/24 2147    atorvastatin (LIPITOR) tablet 40 mg  40 mg Oral Daily Saravanan Zapata DO   40 mg at 11/03/24 1049    [Held by provider] aspirin chewable tablet 162 mg  162 mg Oral Daily Ollie Rob MD   162 mg at 11/03/24 1049    Or    [Held by provider] aspirin suppository 300 mg  300 mg Rectal Daily Ollie Rob MD        sodium chloride flush 0.9 % injection 5-40 mL  5-40 mL 
Physician Progress Note    11/5/2024   9:52 AM    Name:  Modesto Burgess  MRN:    [id number]     IP Day: 6     Admit Date: 10/29/2024  9:28 PM  PCP: Cb Rolon MD    Code Status:  DNR-CCA    Assessment and Plan:        1.  Acute CVA of anterior left thalamus: He had improved but as of 11/3, much less interactive.  Appears to have some aphasia.  New CT scan shows small amount of subarachnoid hemorrhage in the right parietal area and evolving lacunar infarct of the left thalamus  -Hold antiplatelet and subcutaneous heparin due to new bleed.  Neurology is following. Neuro is planning re-CT head today  -Continue high intensity statin  -Optimize modifiable risk factors: Hypertension, diabetes.  Increase hydralazine  -Palliative care had been following  -Previously was awaiting placement for [facility] rehab.     -Pall Care following    2.  Type 2 diabetes with hyperglycemia:  -Continue SSI.  Lantus added    3.  Encephalopathy related to #1    Hypertension  CKD 4  Hyperlipidemia  CAD    Diet: ADULT DIET; Easy to Chew; Low Potassium (Less than 3000 mg/day)  Ppx: SCDs  DNR-CCA    Electronically signed by Love Torre DO on 11/5/2024 at 9:52 AM    Subjective:     No new symptoms.  Current Facility-Administered Medications   Medication Dose Route Frequency Provider Last Rate Last Admin    hydrALAZINE (APRESOLINE) tablet 50 mg  50 mg Oral BID Saravanan Zapata DO   50 mg at 11/05/24 0928    insulin glargine (LANTUS) injection vial 13 Units  13 Units SubCUTAneous Nightly Saravanan Zapata DO   13 Units at 11/04/24 2105    atorvastatin (LIPITOR) tablet 40 mg  40 mg Oral Daily Saravanan Zapata DO   40 mg at 11/05/24 0928    [Held by provider] aspirin chewable tablet 162 mg  162 mg Oral Daily Ollie Rob MD   162 mg at 11/03/24 1049    Or    [Held by provider] aspirin suppository 300 mg  300 mg Rectal Daily Ollie Rob MD        sodium chloride flush 0.9 % injection 5-40 mL  5-40 mL IntraVENous 2 times per day Fanny Arboleda, 
Spiritual Health History and Assessment/Progress Note  [facility]    Interdisciplinary rounds,  ,  ,      Name: Modesto Burgess MRN: [id number]    Age: 89 y.o.     Sex: male   Language: English   Synagogue: [employer]   Fall at home, initial encounter     Date: 11/1/2024            Total Time Calculated: 15 min              Spiritual Assessment began in [facility] 2W ORTHO TELE        Referral/Consult From: Rounding   Encounter Overview/Reason: Interdisciplinary rounds  Service Provided For: Patient    Patient was laying down awake with son and daughter-in law at bedside. Patient expressed concerns about being a burden to family due to stroke and inability to walk. Patient is fearful that he will not regain the ability to walk again.  assured patient that possibility to walk is still there. To not give up hope and stay positive as it will account for healing. Patient affirmed  that he will do this.    Bere, Belief, Meaning:   Patient is connected with a bere tradition or spiritual practice and has beliefs or practices that help with coping during difficult times  Family/Friends are connected with a bere tradition or spiritual practice and have beliefs or practices that help with coping during difficult times      Importance and Influence:  Patient has spiritual/personal beliefs that influence decisions regarding their health  Family/Friends have spiritual/personal beliefs that influence decisions regarding the patient's health    Community:  Patient feels well-supported. Support system includes: Children, Bere Community, and Extended family  Family/Friends feel well-supported. Support system includes: Spouse/Partner, Children, Bere Community, and Extended family    Assessment and Plan of Care:     Patient Interventions include: Facilitated expression of thoughts and feelings, Explored spiritual coping/struggle/distress, Affirmed coping skills/support 
Stroke Education provided to relative(s) and the following topics were discussed    1. Patients personal risk factors for stroke are diabetes mellitus, hyperlipidemia, and hypertension    2. Warning signs of Stroke:        * Sudden numbness or weakness of the face, arm or leg, especially on one side of          The body            * Sudden confusion, trouble speaking or understanding        * Sudden trouble seeing in one or both eyes        * Sudden trouble walking, dizziness, loss of balance or coordination        * Sudden severe headache with no known cause      3. Importance of activation Emergency Medical Services ( 9-1-1 ) immediately if experience any warning signs of stroke.    4. Be sure and schedule a follow-up appointment with your primary care doctor or any specialists as instructed.     5. You must take medicine every day to treat your risk factors for stroke.  Be sure to take your medicines exactly as your doctor tells you: no more, no less.  Know what your medicines are for , what they do.  Anti-thrombotics /anticoagulants can help prevent strokes.  You are taking the following medicine(s)  NONE/ ON HOLD     6.  Smoking and second-hand smoke greatly increase your risk of stroke, cardiovascular disease and death. Smoking history UNKNOWN    7. Information provided was Stroke Handouts    8. Documentation of teaching completed in Patient Education Activity and on Care Plan with teaching response noted?  no   
  confused, only able to answer simple questions, responses are delayed. CT head   negative. 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  \"On examination, patient is found to be   awake but still grossly encephalopathic.  Answers some very simple questions   appears to have very poor recall.  Responses are delayed\".  ED provider \" Patient alert to self and location, unable to answer the month   or year\".  BUN 70, , WBC 12.6. CT head was negative for acute findings.  Treatment:  ml bolus, CT head, monitoring    Thank you,  Katia Cohen   Clinical Documentation Improvement Specialist  W: [phone]  Options provided:  -- Metabolic encephalopathy due to dehydration and rhabdomyolysis  -- Other - I will add my own diagnosis  -- Disagree - Not applicable / Not valid  -- Disagree - Clinically unable to determine / Unknown  -- Refer to Clinical Documentation Reviewer    PROVIDER RESPONSE TEXT:    This patient has metabolic encephalopathy due dehydration and rhabdomyolysis.    Query created by: Marianne Cohen on 10/30/2024 8:26 AM      Electronically signed by:  Brandyn Vasquez MD 10/31/2024 9:47 AM          
(ZOFRAN) injection 4 mg  4 mg IntraVENous Q6H PRN Fanny Arboleda, DO        polyethylene glycol (GLYCOLAX) packet 17 g  17 g Oral Daily PRN Fanny Arboleda, DO        acetaminophen (TYLENOL) tablet 650 mg  650 mg Oral Q6H PRN Fanny Arboleda, DO        Or    acetaminophen (TYLENOL) suppository 650 mg  650 mg Rectal Q6H PRN Fanny Arboleda, DO        glucose chewable tablet 16 g  4 tablet Oral PRN Fanny Arboleda, DO        dextrose bolus 10% 125 mL  125 mL IntraVENous PRN Fanny Arboleda, DO        Or    dextrose bolus 10% 250 mL  250 mL IntraVENous PRN Fanny Arboleda, DO        glucagon injection 1 mg  1 mg SubCUTAneous PRN Fanny Arboleda, DO        dextrose 10 % infusion   IntraVENous Continuous PRN Fanny Arboleda,         hydrALAZINE (APRESOLINE) injection 10 mg  10 mg IntraVENous Q6H PRN Fanny Arboleda, DO   10 mg at 11/02/24 0833    insulin lispro (HUMALOG,ADMELOG) injection vial 0-8 Units  0-8 Units SubCUTAneous 4x Daily AC & HS Fanny Arboleda, DO   4 Units at 11/03/24 2148    carvedilol (COREG) tablet 12.5 mg  12.5 mg Oral BID WC Fanny Arboleda, DO   12.5 mg at 11/03/24 1858    sodium chloride flush 0.9 % injection 5-40 mL  5-40 mL IntraVENous 2 times per day Brandyn Vasquez MD   10 mL at 11/04/24 0832    sodium chloride flush 0.9 % injection 5-40 mL  5-40 mL IntraVENous PRN Brandyn Vasquez MD        0.9 % sodium chloride infusion   IntraVENous PRN Brandyn Vasquez MD        allopurinol (ZYLOPRIM) tablet 100 mg  100 mg Oral Daily Brandyn Vasquez MD   100 mg at 11/03/24 1049    calcitRIOL (ROCALTROL) capsule 0.25 mcg  0.25 mcg Oral Daily Brandyn Vasquez MD   0.25 mcg at 11/03/24 1049    [Held by provider] clopidogrel (PLAVIX) tablet 75 mg  75 mg Oral Daily Brandyn Vasquez MD   75 mg at 11/03/24 1049    isosorbide mononitrate (IMDUR) extended release tablet 30 mg  30 mg Oral Daily Brandyn Vasquez MD   30 mg at 11/04/24 0828    sodium bicarbonate tablet 650 mg  650 mg Oral BID Brandyn Vasquez MD   650 mg at 11/03/24 
Henri     Electronically signed by Brandyn Vasquez MD on 10/30/2024 at 12:32 PM    
Score : 9     Therapy Time   Individual   Time In 1105   Time Out 1119   Minutes 14     Minutes: 14  Transfers: 10  Gait: 2  Neuro re-ed: 2          Arabella Meade PTA, 11/01/24 at 11:52 AM         Definitions for assistance levels  Independent = pt does not require any physical supervision or assistance from another person for activity completion. Device may be needed.  Stand by assistance = pt requires verbal cues or instructions from another person, close to but not touching, to perform the activity  Minimal assistance= pt performs 75% or more of the activity; assistance is required to complete the activity  Moderate assistance= pt performs 50% of the activity; assistance is required to complete the activity  Maximal assistance = pt performs 25% of the activity; assistance is required to complete the activity  Dependent = pt requires total physical assistance to accomplish the task  
joint swelling, myalgias, neck pain and neck stiffness.   Skin:  Negative for color change.   Allergic/Immunologic: Negative for food allergies.   Neurological:  Positive for weakness. Negative for dizziness, tremors, seizures, syncope, facial asymmetry, speech difficulty, light-headedness, numbness and headaches.   Psychiatric/Behavioral:  Negative for behavioral problems, confusion, hallucinations and sleep disturbance.       Mental Status Exam:             Level of Alertness:   awake            Orientation:   person, place            Language:  normal      Funduscopic Exam:     Cranial Nerves        Cr    Cranial nerve III           Pupils:  equal, round, reactive to light      Cranial nerves III, IV, VI           Extraocular Movements: intact             Shoulder shrug:  intact      Cranial nerve XII          Tongue movement:  normal    Motor: Incoordination upon standing but no other focal weakness            Sensory: No definite sensory level        Pinprick             Right Upper Extremity:  normal             Left Upper Extremity:  normal             Right Lower Extremity:  normal             Left Lower Extremity:  normal           Vibration                         Touch            Proprioception                 Coordination: Unable to perform due to understanding                    Gait:                       Casual: Gait deferred          Reflexes:             Deep Tendon Reflexes:             Reflexes are 2 +             Plantar response:                Right:  downgoing               Left:  downgoing    Vascular:  Cardiac Exam:  normal         XR HAND LEFT (2 VIEWS)    Result Date: 10/30/2024  EXAMINATION: TWO XRAY VIEWS OF THE LEFT HAND 10/30/2024 7:22 pm COMPARISON: None. HISTORY: ORDERING SYSTEM PROVIDED HISTORY: checking for metal objects for MRI TECHNOLOGIST PROVIDED HISTORY: Reason for exam:->checking for metal objects for MRI What reading provider will be dictating this exam?->CRC FINDINGS: The 
tablet, Take 2 tablets by mouth daily  aspirin 81 MG EC tablet, Take 1 tablet by mouth daily  Multiple Vitamins-Minerals (THERAPEUTIC MULTIVITAMIN-MINERALS) tablet, Take 1 tablet by mouth daily  ferrous sulfate (IRON 325) 325 (65 Fe) MG tablet, Take 1 tablet by mouth daily (with breakfast)    Current Hospital Medications:   Scheduled Meds:   hydrALAZINE  25 mg Oral 2 times per day    aspirin  162 mg Oral Daily    Or    aspirin  300 mg Rectal Daily    sodium chloride flush  5-40 mL IntraVENous 2 times per day    heparin (porcine)  5,000 Units SubCUTAneous 3 times per day    insulin lispro  0-8 Units SubCUTAneous 4x Daily AC & HS    carvedilol  12.5 mg Oral BID WC    sodium chloride flush  5-40 mL IntraVENous 2 times per day    allopurinol  100 mg Oral Daily    atorvastatin  10 mg Oral Daily    calcitRIOL  0.25 mcg Oral Daily    clopidogrel  75 mg Oral Daily    isosorbide mononitrate  30 mg Oral Daily    sodium bicarbonate  650 mg Oral BID     Continuous Infusions:   sodium chloride      dextrose      sodium chloride       PRN Meds:.sodium chloride flush, sodium chloride, ondansetron **OR** ondansetron, polyethylene glycol, acetaminophen **OR** acetaminophen, glucose, dextrose bolus **OR** dextrose bolus, glucagon (rDNA), dextrose, hydrALAZINE, sodium chloride flush, sodium chloride   sodium chloride      dextrose      sodium chloride        Allergies:   No Known Allergies   Review of Systems:       [x] CV, Resp, Neuro, , and all other systems reviewed and negative other than listed in HPI.       Objective Findings:     Vitals:   Vitals:    10/1935 11/01/24 0259 11/01/24 0300 11/01/24 0758   BP: 136/69 (!) 163/69 (!) 158/67 (!) 175/74   Pulse: 59 62  60   Resp: 16 18  16   Temp: 98 °F (36.7 °C) 98.1 °F (36.7 °C)  97.7 °F (36.5 °C)   TempSrc: Oral Oral  Oral   SpO2: 98% 97%  97%   Weight:       Height:            Physical Examination:  General: Alert oriented x4 no acute distress  HEENT: Normocephalic, No 
Assessment  Patient does not c/o pain.    Pain Re-assessment  Patient does not c/o pain.    Therapy Time  SLP Individual Minutes  Time In: 0905  Time Out: 0916  Minutes: 11             Signature: Electronically signed by STORMY Ruelas on 10/31/2024 at 11:04 AM     
chloride      dextrose      sodium chloride       PRN Meds:.sodium chloride flush, sodium chloride, ondansetron **OR** ondansetron, polyethylene glycol, acetaminophen **OR** acetaminophen, glucose, dextrose bolus **OR** dextrose bolus, glucagon (rDNA), dextrose, hydrALAZINE, sodium chloride flush, sodium chloride   sodium chloride      dextrose      sodium chloride        Allergies:   No Known Allergies   Review of Systems:       [x] CV, Resp, Neuro, , and all other systems reviewed and negative other than listed in HPI.       Objective Findings:     Vitals:   Vitals:    11/02/24 0200 11/02/24 0400 11/02/24 0744 11/02/24 0851   BP: (!) 154/71  (!) 183/86 (!) 155/67   Pulse: 64  66    Resp: 16  18    Temp: 97.5 °F (36.4 °C)  97.5 °F (36.4 °C)    TempSrc: Oral  Oral    SpO2: 98%  98%    Weight:  94 kg (207 lb 3.7 oz)     Height:  1.727 m (5' 7.99\")          Physical Examination:  General: Alert oriented x4 no acute distress  HEENT: Normocephalic, No scleral icterus.  Neck: No JVD.  Heart: Regular, no murmur, no rub/gallop. No RV heave.  Lungs: Clear to ascultation, no rales/wheezing/rhonchi. Good chest wall excursion.  Abdomen: Soft non tender non distended   extremities: No clubbing/cyanosis, no edema.   Skin: Warm, dry, normal turgor, no rash, no bruise, no petichiae.  Neuro: No myoclonus or tremor.   Psych: Normal affect    Results/ Medications reviewed 11/2/2024, 1:45 PM     Laboratory, Microbiology, Pathology, Radiology, Cardiology, Medications and Transcriptions reviewed  Scheduled Meds:   hydrALAZINE  50 mg Oral BID    insulin glargine  13 Units SubCUTAneous Nightly    atorvastatin  40 mg Oral Daily    aspirin  162 mg Oral Daily    Or    aspirin  300 mg Rectal Daily    sodium chloride flush  5-40 mL IntraVENous 2 times per day    heparin (porcine)  5,000 Units SubCUTAneous 3 times per day    insulin lispro  0-8 Units SubCUTAneous 4x Daily AC & HS    carvedilol  12.5 mg Oral BID WC    sodium chloride flush  
--  18* 19*   BUN  --  68* 72*   CREATININE 3.4* 3.60* 3.57*     No results for input(s): \"INR\" in the last 72 hours.    Invalid input(s): \"PROT\"  No results found for this or any previous visit.       Impression:     -RBBB and first-degree AV block  Bifascicular block  Continue Coreg    -Questionable syncope versus encephalopathy versus CVA  As per neurology acute thalamic infarct and questionable small subdural hematoma  Patient was found down at home unknown downtime  So far telemetry negative for arrhythmias  However QTc at admission 507 ms, repeat QTc 10/30/2024 at 458 ms  Diuretic and IV fluid management as per nephrology  Neurology following    -History of CAD with prior PCI's  Continue atorvastatin, okay to stop aspirin in the setting of subdural hematoma  Okay to continue with Coreg    -New?  Cardiomyopathy EF 30 to 35% by TTE 10/30/2024  Patient follows up with [facility].  I do not see prior echocardiograms  Continue Coreg  No Entresto ACE or ARB in the setting of APRYL on CKD  Continue Imdur, and hydralazine  For now family members are not interested in a coronary angiogram to prevent kidney function.  Family members agree on medical therapy only.  Only if patient has obvious signs of a \"heart attack\" then they would consider a coronary angiogram    Other medical problems:  Diabetes  Hypertension  Hyperlipidemia  CKD  Rhabdo  Encephalopathy  CVA basal ganglia infarct on the left  Small subdural hematoma?    JOE Kemp [phone]    From cardiology standpoint patient is stable to be discharged at any time  Follow-up with Dr. Liu after discharge    Comments:     I personally reviewed all external notes from all providers, reviewed the test results and ordered additional testing as needed. I discussed test results and management with other providers.  I reviewed and independently interpreted imaging and test results.      Thank you for allowing us to participate in the care of this patient.     Will continue 
Coreg    -New?  Cardiomyopathy EF 30 to 35% by TTE 10/30/2024  Patient follows up with [facility].  I do not see prior echocardiograms  Continue Coreg  No Entresto ACE or ARB in the setting of APRYL on CKD  Continue Imdur, and hydralazine  For now family members are not interested in a coronary angiogram to prevent kidney function.  Family members agree on medical therapy only.  Only if patient has obvious signs of a \"heart attack\" then they would consider a coronary angiogram    Other medical problems:  Diabetes  Hypertension  Hyperlipidemia  CKD  Rhabdo  Encephalopathy    POA Justo [phone]    From cardiology standpoint patient is stable to be discharged at any time  Follow-up with Dr. Liu after discharge    Comments:     I personally reviewed all external notes from all providers, reviewed the test results and ordered additional testing as needed. I discussed test results and management with other providers.  I reviewed and independently interpreted imaging and test results.      Thank you for allowing us to participate in the care of this patient.     Will continue to follow.    Please call if questions or concerns arise.    Electronically signed by Mateus Lazo MD on 11/3/2024 at 1:05 PM    Please note this report has been partially produced using speech recognition software and may cause contain errors related to that system including grammar, punctuation and spelling as well as words and phrases that may seem inappropriate. If there are questions or concerns please feel free to contact me to clarify.   
Problem:    Fall at home, initial encounter  Active Problems:    Altered mental status    Cardiomyopathy (HCC)    HTN (hypertension)    Hyperparathyroidism due to renal insufficiency (HCC)    Palliative care encounter    Goals of care, counseling/discussion    Advanced care planning/counseling discussion    Full code status    CVA  L Thalamic    Impaired mobility ADLs dt L thal CVA    Late effects of CVA (cerebrovascular accident)    [place] (hard of hearing)    Parkinsonian features  Resolved Problems:    * No resolved hospital problems. *          Events and functional changes in the past 24 hours reviewed improvements in functional status are encouraging       Focus of today's plan-   he is to come to acute rehab today after repeat CT of brain.  He was authorized by his insurance      Christina Robison D.O., FAAPMR  PM&R     Attending    [phone]   [facility]      
on 11/3/2024 showed small amount of subarachnoid hemorrhage in the right parietal area and evolving lacunar infarct of the left thalamus  Carotid duplex negative for significant stenosis  Echocardiogram with ejection fraction of 30%, left ventricular moderate dilation and severe hypokinesis  LDL 70  Hemoglobin A1c 8.4  Aspirin and Plavix currently on hold secondary to finding of possible subarachnoid hemorrhage.  Will repeat CT of the head tomorrow.  Palliative care has been consulted for goals of care.  Elevated blood pressure, improving      I have personally performed a face to face diagnostic evaluation on this patient, reviewed all data and investigations, and am the sole provider of all clinical decisions on the neurological status of this patient.  Further clinical worsening and therefore I was consulted yesterday again.  Stat CT scan of the head was obtained and reviewed which shows a left basal ganglia infarct which is somewhat extended.  Patient also has a very tiny subcoracoid hemorrhage reported which I am not quite sure is true or.  We are holding his aspirin and Plavix and repeat his CT tomorrow.  Patient has become more despondent but not more weakness and therefore continued physical therapy.  This is an extended evaluation and of high complexity due to the evaluation yesterday and reevaluation of the patient and diagnostic difficulties.  60% time spent on evaluating patient    11/5/2024:  Repeat CT of the head has been completed and stable.  Somnolence improved today  Okay to transfer to rehab from neurology standpoint.  Will follow on rehab.    I have personally performed a face to face diagnostic evaluation on this patient, reviewed all data and investigations, and am the sole provider of all clinical decisions on the neurological status of this patient.  Patient seen and examined events noted.  Repeat CT scan does not show any significant.  He may have some extension of the same infarct.  He is 
bifascicular block  Dehydration   Mild hyperglycemia    MDM: Moderate    Electronically signed by SERENA Cervantes CNP on 11/4/2024 at 2:16 PM    Collaborating physician: Dr. Winn     Please note this report is partially produced by using speech recognition hardware.  It may contain errors related to the system, including grammar, punctuation and spelling as well as words and phrases that may seem inaccurate.  For any questions or concerns feel free to contact me for clarification.    Thanks for the opportunity you have allowed us to provide palliative care to Modesto Burgess. We will be in touch as care progresses. Please feel free to reach out to us should you have any questions or requests.

## 2024-11-06 NOTE — CARE COORDINATION
Case Management Initial Assessment        NAME:  Modesto Burgess  ROOM: R246/R246-01  :  10/9/1933  DATE: 2024        Social Functional:  Social/Functional History  Lives With: Son (two sons, dtr in law)  Type of Home: House  Home Layout: Two level;Able to Live on Main level with bedroom/bathroom (pt stays on the first floor only)  Home Access: Stairs to enter with rails  Entrance Stairs - Number of Steps: 2 IZABELA w/ bilateral HR from garage or 2 IZABELA w/ one HR from front, has a portable ramp as needed  Entrance Stairs - Rails: Both  Bathroom Shower/Tub: Walk-in shower;Doors;Shower chair with back  Bathroom Toilet: Handicap height  Bathroom Equipment: Grab bars in shower;Shower chair;Grab bars around toilet;Hand-held shower  Bathroom Accessibility: Walker accessible  Home Equipment: Walker - Rolling;Wheelchair - Manual;Lift chair;Reacher (bed rails on adjustable bed)  ADL Assistance: Independent  Homemaking Responsibilities: Yes  Meal Prep Responsibility: No  Laundry Responsibility: Primary (mostly does himself and is on the first floor)  Cleaning Responsibility: No  Bill Paying/Finance Responsibility: No  Shopping Responsibility: No  Health Care Management: Primary (pill organizer)  Ambulation Assistance: Independent (used ww, would need some assist with stairs to get into the home)  Transfer Assistance: Independent  Active : No  Patient's  Info: children  Mode of Transportation: SUV ([employer] smaller SUV; some difficulty getting legs up)  Education: high school graduate and some college  Occupation: Retired  Type of Occupation: engineering    Spoke with patient and son (Sukhwinder) and explained role in the team. Patient questions answered appropriately. Explained discharge process. Patient stated understanding. Pt completed high school and some college courses, and had retired from [employer]. Pt's son Sukhwinder

## 2024-11-06 NOTE — PROGRESS NOTES
OCCUPATIONAL THERAPY  INPATIENT REHAB TREATMENT NOTE  [facility]      NAME: Modesto Burgess  : 10/9/1933 (89 y.o.)  MRN: [id number]  CODE STATUS: DNR-CCA  [facility]    Date of Service: 2024    Referring Physician: Christina Robison DO  Rehab Diagnosis: Impaired mobility and ADL's d/t left thalamic CVA    Hospital course:   Comments: 89 y.o. male patient who presented to ER 10/29 with altered mental status and was found on floor. Not TNK candidate for CVA due timing timing window. Patient found to have had an acute L thalamus infarct in addition to being hypernatremic and having APRYL on CKD. Patient admitted with neuro and cardiology consulting.      Restrictions  Restrictions/Precautions  Restrictions/Precautions: Fall Risk        Patient's date of birth confirmed: Yes    SAFETY:  Safety Devices  Safety Devices in place: Yes  Type of devices: Left in chair;Call light within reach;Chair alarm in place;Nurse notified    SUBJECTIVE:  Subjective  Subjective: \"something was right in the middle\" referring to feeling like something was poking him from under his mattress    Pain at start of treatment: Yes: Pt. unable to rate pain- discomfort in back from lying in bed    Pain at end of treatment: Yes: Pt. unable to rate pain- continues      COGNITION:  Orientation  Orientation Level: Oriented to person;Disoriented to place;Oriented to time;Oriented to situation  Cognition  Overall Cognitive Status: Exceptions  Arousal/Alertness: Delayed responses to stimuli  Following Commands: Inconsistently follows commands  Attention Span: Difficulty attending to directions  Memory: Decreased short term memory;Decreased recall of recent events  Safety Judgement: Decreased awareness of need for assistance;Decreased awareness of need for safety  Problem Solving: Assistance required to generate solutions;Assistance required to identify errors made;Assistance required to correct errors made  Insights: Decreased awareness of    Time Out 1600         Minutes 30               ADL/IADL training: 10 minutes  Cognitive Retrainin minutes     Electronically signed by:    Susana Goncalves OT,   2024, 4:14 PM

## 2024-11-06 NOTE — PROGRESS NOTES
[facility]   [facility]  Speech Language Pathology  Clinical Bedside Swallow Evaluation    NAME:Modesto Burgess  : 10/9/1933 (89 y.o.)   [x]   confirmed    MRN: [id number]  ROOM: [place] [address]  ADMISSION DATE: 2024  PATIENT DIAGNOSIS(ES): Impaired mobility and ADLs [Z74.09, Z78.9]  No chief complaint on file.    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) 10/30/2024    PVD (posterior vitreous detachment), both eyes 2023    APRYL (acute kidney injury) ([facility]) 09/15/2020    CKD (chronic kidney disease) 09/15/2020    HTN (hypertension) 09/15/2020    CAD (coronary artery disease) 09/15/2020    Type 2 diabetes mellitus, with long-term current use of insulin (HCC) 09/15/2020    HLD (hyperlipidemia) 09/15/2020    Hyperkalemia 09/15/2020    Actinic keratosis 2019     Past Medical History:   Diagnosis Date    CAD (coronary artery disease) 09/15/2020    HLD (hyperlipidemia) 09/15/2020    [place] (hard of hearing) 2024    Hyperparathyroidism due to renal insufficiency (HCC) 10/30/2024    Late effects of CVA (cerebrovascular accident) 2024    Lumbosacral disc disease     Osteoarthritis     Type 2 diabetes mellitus, with long-term current use of insulin (HCC) 09/15/2020     No past surgical history on file.  No Known Allergies    Date of Onset: 10/29/2024  Rehab Diagnosis: Impaired  alarm in place;Call light within reach    Pain Assessment  Patient does not appear in pain.    Pain Re-assessment  Patient does not appear in pain.    Dysphagia Outcomes Severity Scale  Dysphagia Outcome Severity Scale: Level 7: Normal in all situations    Therapy Time  SLP Individual Minutes  Time In: 1510  Time Out: 1525  Minutes: 15              Signature: Electronically signed by STORMY Graham on 11/6/2024 at 6:50 PM

## 2024-11-06 NOTE — PLAN OF CARE
Problem: Chronic Conditions and Co-morbidities  Goal: Patient's chronic conditions and co-morbidity symptoms are monitored and maintained or improved  11/6/2024 1408 by Daiana Hart RN  Outcome: Progressing  11/6/2024 0021 by Christofer Villarreal RN  Outcome: Progressing     Problem: Safety - Adult  Goal: Free from fall injury  11/6/2024 1408 by Daiana Hart RN  Outcome: Progressing  11/6/2024 0021 by Christofer Villarreal RN  Outcome: Progressing     Problem: ABCDS Injury Assessment  Goal: Absence of physical injury  11/6/2024 1408 by Daiana Hart RN  Outcome: Progressing  11/6/2024 0021 by Christofer Villarreal RN  Outcome: Progressing     Problem: Skin/Tissue Integrity  Goal: Absence of new skin breakdown  Description: 1.  Monitor for areas of redness and/or skin breakdown  2.  Assess vascular access sites hourly  3.  Every 4-6 hours minimum:  Change oxygen saturation probe site  4.  Every 4-6 hours:  If on nasal continuous positive airway pressure, respiratory therapy assess nares and determine need for appliance change or resting period.  11/6/2024 1408 by Daiana Hart RN  Outcome: Progressing  11/6/2024 0021 by Christofer Villarreal RN  Outcome: Progressing

## 2024-11-06 NOTE — H&P
HISTORY & PHYSICAL       DATE OF ADMISSION:  11/5/2024    DATE OF SERVICE:  11/6/24    Subjective:    Modesto Burgess, 89 y.o. male presents today with:     CHIEF COMPLAINT:   CVA.  Patient was admitted to [facility] on 10/29/2024 after presenting through the ER after falling at home.  He is 89 years old and presented through the ER on 10/29/2024 with altered mental status after being found on the floor.  He was diagnosed with a left thalamic CVA.  He was not a TNK candidate is that he were not sure as to the timing window.    Post stroke he was also found to have acute on chronic renal failure and diabetes mellitus with hyperglycemia.    He was admitted under the care of the hospitalist with neurology consulting.  He had significant confusion felt to be secondary to his subarachnoid hemorrhage in the right parietal area antiplatelets therapy and subcutaneous heparin were held.     Neurologic Problem  The patient's primary symptoms include an altered mental status, clumsiness, focal sensory loss, focal weakness, a loss of balance, slurred speech and weakness. The patient's pertinent negatives include no syncope. This is a recurrent problem. The current episode started in the past 7 days. The problem has been gradually improving since onset. Associated symptoms include back pain, confusion, dizziness, fatigue, headaches and light-headedness. Pertinent negatives include no chest pain, diaphoresis, fever, nausea, palpitations, shortness of breath or vomiting.   Fatigue  Associated symptoms include fatigue, headaches, numbness and weakness. Pertinent negatives include no chest pain, chills, congestion, diaphoresis, fever, joint swelling, myalgias, nausea or vomiting.   Back Pain  Associated symptoms include headaches, numbness and weakness. Pertinent negatives include no chest pain or fever.       I reviewed recent nursing note, \" admitted to room 246 form 2 [place]. Patient denies any chest pain, sob, or  ordered.    The patient is high risk for orthostasis and a hydration program and orthostatic blood pressure screening have been ordered.    I will attempt to get old records from the patient's previous hospital stay.  Care everywhere on [facility] was utilized.    3.  Current and previous medications were reviewed and summarized and compared to old medication lists from home and from the acute floor.    4.  Complex labs and x-rays were reviewed.  I will review patient's old EKG and labs.    5.  Will provide emotional support for this patient regarding adjustment to their disability.  Cognition and mood will be screened daily and addressed by rehabilitation psychology and/or speech therapy as appropriate.  I have encouraged the patient to attend the Rehab Adjustment to Disability Support Group and recreational therapy.    6.  Estimated length of stay is   2-3 weeks.  Discharge to home with help from family and home health PT, OT, RN, and aide.  Patient should be independent at discharge.     7.  The patient's medical and rehab prognosis are good.      8.  [facility] regarding the patient's back up to general medical needs.    A welcome letter was presented with an explanation of my services, my specialty and what to expect during the rehabilitation process.  As well as introducing myself, I also wrote my name on their bedside marker board with their name as well as the names of the other physicians with an explanation of our individual roles in their care, as well as the rehab process.            Christina Robison D.O., F.A.A.P.MICHAEL.&JOSE

## 2024-11-06 NOTE — PROGRESS NOTES
[facility]  Occupational Therapy      NAME:  Modesto Burgess  ROOM: R246/R246-01  :  10/9/1933  DATE: 2024    Attempted to see Modesto Burgess on this date at 1500 for a 60 minute session for   []  Initial Evaluation   [x]  Scheduled therapy    []  Make-up therapy   []  Group therapy   []  Family training    Patient was unable to be seen due to:   [] Off unit for testing/procedure   [] Patient refused, stating \"    [] Therapy on hold due to     [] Nursing deferred due to    [x] Other:  Speech therapy with pt. Therapist to return at 1530 for remainder of time.      Electronically signed by Susana Goncalves OT on 24 at 3:28 PM EST

## 2024-11-06 NOTE — CARE COORDINATION
[facility]  INPATIENT REHABILITATION  TEAM CONFERENCE NOTE  Room: R246/R246-01  Admit Date: 2024       Date: 2024  Patient Name: Modesto Burgess        MRN: [id number]    : 10/9/1933  (89 y.o.)  Gender: male        REHAB DIAGNOSIS:   Diagnosis: Impaired mobility and ADL's due to left thalamic CVA    CO MORBIDITIES:      Past Medical History:   Diagnosis Date    CAD (coronary artery disease) 09/15/2020    HLD (hyperlipidemia) 09/15/2020    [place] (hard of hearing) 2024    Hyperparathyroidism due to renal insufficiency (HCC) 10/30/2024    Late effects of CVA (cerebrovascular accident) 2024    Lumbosacral disc disease     Osteoarthritis     Type 2 diabetes mellitus, with long-term current use of insulin (HCC) 09/15/2020     No past surgical history on file.     Restrictions  Restrictions/Precautions: Fall Risk  CASE MANAGEMENT    Social/Functional History  Social/Functional History  Lives With: Son (2 sons, DIL)  Type of Home: House  Home Layout: Two level, Able to Live on Main level with bedroom/bathroom (pt stays on the first floor only)  Home Access: Stairs to enter with rails  Entrance Stairs - Number of Steps: 2 IZABELA w/ bilateral HR from garage or 2 IZABELA w/ one HR from front, has a portable ramp as needed  Entrance Stairs - Rails: Both  Bathroom Shower/Tub: Walk-in shower, Doors, Shower chair with back  Bathroom Toilet: Handicap height  Bathroom Equipment: Grab bars in shower, Shower chair, Grab bars around toilet, Hand-held shower  Bathroom Accessibility: Walker accessible  Home Equipment: Walker - Rolling, Wheelchair - Manual, Lift chair, Reacher (bed rails on adjustable bed)  Has the patient had two or more falls in the past year or any fall with injury in the past year?: Yes  Receives Help From: Family (sons and DIL help with ADLs)  ADL Assistance: Independent  Homemaking Assistance: Needs assistance (family completes. Pt reports he does his own laundry)  Homemaking          5.            - Intervention / Plan:          - Results:         6.            - Intervention / Plan:         - Results:         7.            - Intervention / Plan:         - Results:           Discharge Plan   Estimated Length of Stay: TBD    Tentative Discharge date: 11/26/24      Anticipated Discharge Destination:  Home      Team recommendations:    1. Follow up Therapy :    PT  OT  SLP  RN  Social Work  HH Aide    2. Home Health    Other:     Equipment needed at Discharge: Other: TBD      Team Members Present at Conference:    Physician: Dr. Christina Robison  : JONNY Edmond LSW  RN: Radha Rudolph, RN  Physical Therapist: Hyun Delgado, PT  Occupational Therapist: Giacomo Leos OTR  Speech Therapist: Philippe Duff, SLP      Electronically signed by JONNY Edmond LSW on 11/7/2024 at 9:12 AM

## 2024-11-06 NOTE — FLOWSHEET NOTE
Patient admitted to room 246 form 2 [place]. Patient denies any chest pain, sob, or dizziness. Patient assessment completed. A&Ox2-3. Oriented/disoriented at times.  Patient has an IV #22 gauge to right hand. LBM 11/3/2024. Incontinent of bladder and continent of bowel. Patient oriented to room and call light. Patient in bed, call light within reach and bed alarm on.

## 2024-11-06 NOTE — CONSULTS
supportive care  Medically stable for cute admission  at [facility]     Thank you  for allowing me to participate in this patient's care.      Electronically signed by Sukhwinder Decker MD on 11/6/2024 at 2:19 PM    Consulting Hospitalist

## 2024-11-06 NOTE — PROGRESS NOTES
[facility]   Facility/Department: [facility] [place] [facility]  Speech Language Pathology  Initial Speech/Language/Cognitive Assessment    NAME:Modesto Burgess  : 10/9/1933 (89 y.o.)   [x]   confirmed    MRN: [id number]  ROOM: [place] [address]  ADMISSION DATE: 2024  PATIENT DIAGNOSIS(ES): Impaired mobility and ADLs [Z74.09, Z78.9]  No chief complaint on file.    Patient Active Problem List    Diagnosis Date Noted    Cardiomyopathy (HCC) 2024    Altered mental status 10/30/2024    CVA  L Thalamic 2024    Impaired mobility ADLs dt L thal CVA 2024    Late effect of brain injury 2024    Late effects of CVA (cerebrovascular accident) 2024    [place] (hard of hearing) 2024    Parkinsonian features 2024    Palliative care encounter 10/31/2024    Goals of care, counseling/discussion 10/31/2024    Advanced care planning/counseling discussion 10/31/2024    Full code status 10/31/2024    Fall at home, initial encounter 10/30/2024    Hyperparathyroidism due to renal insufficiency (HCC) 10/30/2024    PVD (posterior vitreous detachment), both eyes 2023    APRYL (acute kidney injury) (HCC) 09/15/2020    CKD (chronic kidney disease) 09/15/2020    HTN (hypertension) 09/15/2020    CAD (coronary artery disease) 09/15/2020    Type 2 diabetes mellitus, with long-term current use of insulin (HCC) 09/15/2020    HLD (hyperlipidemia) 09/15/2020    Hyperkalemia 09/15/2020    Actinic keratosis 2019     Past Medical History:   Diagnosis Date    CAD (coronary artery disease) 09/15/2020    HLD (hyperlipidemia) 09/15/2020    [place] (hard of hearing) 2024    Hyperparathyroidism due to renal insufficiency (HCC) 10/30/2024    Late effects of CVA (cerebrovascular accident) 2024    Lumbosacral disc disease     Osteoarthritis     Type 2 diabetes mellitus, with long-term current use of insulin (HCC) 09/15/2020     No past surgical history on file.    Date of Onset: 10/29/2024  Rehab Diagnosis: Impaired mobility

## 2024-11-07 LAB
GLUCOSE BLD-MCNC: 170 MG/DL (ref 70–99)
GLUCOSE BLD-MCNC: 212 MG/DL (ref 70–99)
GLUCOSE BLD-MCNC: 259 MG/DL (ref 70–99)
GLUCOSE BLD-MCNC: 273 MG/DL (ref 70–99)
PERFORMED ON: ABNORMAL

## 2024-11-07 PROCEDURE — 97530 THERAPEUTIC ACTIVITIES: CPT

## 2024-11-07 PROCEDURE — 97129 THER IVNTJ 1ST 15 MIN: CPT

## 2024-11-07 PROCEDURE — 6370000000 HC RX 637 (ALT 250 FOR IP)

## 2024-11-07 PROCEDURE — 97116 GAIT TRAINING THERAPY: CPT

## 2024-11-07 PROCEDURE — 99233 SBSQ HOSP IP/OBS HIGH 50: CPT | Performed by: PHYSICAL MEDICINE & REHABILITATION

## 2024-11-07 PROCEDURE — 97535 SELF CARE MNGMENT TRAINING: CPT

## 2024-11-07 PROCEDURE — 6370000000 HC RX 637 (ALT 250 FOR IP): Performed by: INTERNAL MEDICINE

## 2024-11-07 PROCEDURE — 97130 THER IVNTJ EA ADDL 15 MIN: CPT

## 2024-11-07 PROCEDURE — 2580000003 HC RX 258: Performed by: INTERNAL MEDICINE

## 2024-11-07 PROCEDURE — 6370000000 HC RX 637 (ALT 250 FOR IP): Performed by: PHYSICAL MEDICINE & REHABILITATION

## 2024-11-07 PROCEDURE — 1180000000 HC REHAB R&B

## 2024-11-07 RX ADMIN — CARVEDILOL 12.5 MG: 12.5 TABLET, FILM COATED ORAL at 17:35

## 2024-11-07 RX ADMIN — BACITRACIN ZINC, NEOMYCIN, POLYMYXIN B: 400; 3.5; 5 OINTMENT TOPICAL at 22:50

## 2024-11-07 RX ADMIN — INSULIN LISPRO 4 UNITS: 100 INJECTION, SOLUTION INTRAVENOUS; SUBCUTANEOUS at 22:48

## 2024-11-07 RX ADMIN — Medication 2000 UNITS: at 17:35

## 2024-11-07 RX ADMIN — BACITRACIN ZINC, NEOMYCIN, POLYMYXIN B: 400; 3.5; 5 OINTMENT TOPICAL at 08:22

## 2024-11-07 RX ADMIN — HYDRALAZINE HYDROCHLORIDE 50 MG: 50 TABLET ORAL at 22:47

## 2024-11-07 RX ADMIN — INSULIN LISPRO 2 UNITS: 100 INJECTION, SOLUTION INTRAVENOUS; SUBCUTANEOUS at 08:16

## 2024-11-07 RX ADMIN — INSULIN GLARGINE 20 UNITS: 100 INJECTION, SOLUTION SUBCUTANEOUS at 22:48

## 2024-11-07 RX ADMIN — CALCITRIOL 0.25 MCG: 0.25 CAPSULE ORAL at 08:15

## 2024-11-07 RX ADMIN — ALLOPURINOL 100 MG: 100 TABLET ORAL at 08:15

## 2024-11-07 RX ADMIN — Medication 100 MG: at 08:15

## 2024-11-07 RX ADMIN — INSULIN LISPRO 4 UNITS: 100 INJECTION, SOLUTION INTRAVENOUS; SUBCUTANEOUS at 17:35

## 2024-11-07 RX ADMIN — SODIUM BICARBONATE 650 MG TABLET 650 MG: at 22:50

## 2024-11-07 RX ADMIN — Medication 10 ML: at 08:22

## 2024-11-07 RX ADMIN — Medication 10 ML: at 22:50

## 2024-11-07 RX ADMIN — HYDRALAZINE HYDROCHLORIDE 50 MG: 50 TABLET ORAL at 08:15

## 2024-11-07 RX ADMIN — CARVEDILOL 12.5 MG: 12.5 TABLET, FILM COATED ORAL at 08:15

## 2024-11-07 RX ADMIN — ISOSORBIDE MONONITRATE 30 MG: 30 TABLET, EXTENDED RELEASE ORAL at 08:15

## 2024-11-07 RX ADMIN — SODIUM BICARBONATE 650 MG TABLET 650 MG: at 08:15

## 2024-11-07 RX ADMIN — ATORVASTATIN CALCIUM 40 MG: 40 TABLET, FILM COATED ORAL at 08:15

## 2024-11-07 ASSESSMENT — PAIN - FUNCTIONAL ASSESSMENT: PAIN_FUNCTIONAL_ASSESSMENT: ACTIVITIES ARE NOT PREVENTED

## 2024-11-07 ASSESSMENT — PAIN DESCRIPTION - ORIENTATION: ORIENTATION: LOWER

## 2024-11-07 ASSESSMENT — PAIN DESCRIPTION - LOCATION: LOCATION: BACK

## 2024-11-07 ASSESSMENT — PAIN SCALES - GENERAL
PAINLEVEL_OUTOF10: 2
PAINLEVEL_OUTOF10: 0

## 2024-11-07 ASSESSMENT — PAIN DESCRIPTION - PAIN TYPE: TYPE: ACUTE PAIN

## 2024-11-07 ASSESSMENT — PAIN DESCRIPTION - DESCRIPTORS: DESCRIPTORS: DISCOMFORT

## 2024-11-07 NOTE — PROGRESS NOTES
OCCUPATIONAL THERAPY  INPATIENT REHAB TREATMENT NOTE  [facility]      NAME: Modesto Burgess  : 10/9/1933 (89 y.o.)  MRN: [id number]  CODE STATUS: DNR-CCA  [facility]    Date of Service: 2024    Referring Physician: Christina Robison DO  Rehab Diagnosis: Impaired mobility and ADL's d/t left thalamic CVA    Hospital course:   Comments: 89 y.o. male patient who presented to ER 10/29 with altered mental status and was found on floor. Not TNK candidate for CVA due timing timing window. Patient found to have had an acute L thalamus infarct in addition to being hypernatremic and having APRYL on CKD. Patient admitted with neuro and cardiology consulting.      Restrictions  Restrictions/Precautions  Restrictions/Precautions: Fall Risk           Patient's date of birth confirmed: Yes    SAFETY:  Safety Devices  Safety Devices in place: Yes  Type of devices: Left in chair;Call light within reach;Chair alarm in place;Nurse notified    SUBJECTIVE:  Subjective  Subjective: \"I didn't settle down until about 4am\"    Pain at start of treatment: No    Pain at end of treatment: No    COGNITION:  Orientation  Orientation Level: Oriented to person;Disoriented to place;Oriented to time;Oriented to situation  Cognition  Overall Cognitive Status: Exceptions  Arousal/Alertness: Delayed responses to stimuli  Following Commands: Inconsistently follows commands  Attention Span: Difficulty attending to directions  Memory: Decreased short term memory;Decreased recall of recent events  Safety Judgement: Decreased awareness of need for assistance;Decreased awareness of need for safety  Problem Solving: Assistance required to generate solutions;Assistance required to identify errors made;Assistance required to correct errors made  Insights: Decreased awareness of deficits  Initiation: Requires cues for all  Sequencing: Requires cues for all  Cognition Comment: increased processing time required to answer

## 2024-11-07 NOTE — CARE COORDINATION
LSW met with pt, Hema (son), and dtr-in-law and discussed discharge date and goals. Hema confirmed that he would be able to assist physically upon discharge if needed. No concerns noted at this time. LSW spoke with Sukhwinder (son) and relayed discharge date and goals, Sukhwinder is in agreement. Sukhwinder will be going on vacation starting tomorrow for two weeks and would like Justo (dtr) updated going forward. Electronically signed by JONNY Edmond, MOO on 11/7/2024 at 4:38 PM

## 2024-11-07 NOTE — PLAN OF CARE
Problem: Chronic Conditions and Co-morbidities  Goal: Patient's chronic conditions and co-morbidity symptoms are monitored and maintained or improved  Outcome: Progressing     Problem: Discharge Planning  Goal: Discharge to home or other facility with appropriate resources  Outcome: Progressing Son and daughter in law at bedside, looking forward to patient returning home for thanksgiving. They are going to have a big family celebration.      Problem: Safety - Adult  Goal: Free from fall injury  Outcome: Progressing     Problem: ABCDS Injury Assessment  Goal: Absence of physical injury  Outcome: Progressing     Problem: Skin/Tissue Integrity  Goal: Absence of new skin breakdown  Description: 1.  Monitor for areas of redness and/or skin breakdown  2.  Assess vascular access sites hourly  3.  Every 4-6 hours minimum:  Change oxygen saturation probe site  4.  Every 4-6 hours:  If on nasal continuous positive airway pressure, respiratory therapy assess nares and determine need for appliance change or resting period.  Outcome: Progressing

## 2024-11-07 NOTE — PROGRESS NOTES
training, Equipment evaluation, education, & procurement, Positioning, Self-Care / ADL, Coordination training  Continue per OT POC for planned discharge on 24.    Patient goals : \"Get out of here.\"           Therapy Time:   Individual Group Co-Treat   Time In 1500       Time Out 1600         Minutes 60               ADL/IADL trainin minutes  Therapeutic activities: 40 minutes     Electronically signed by:    Susana Goncalves OT,   2024, 4:14 PM

## 2024-11-07 NOTE — PROGRESS NOTES
[facility]  Facility/Department: [facility]  Speech Language Pathology   Treatment Note          Modesto Burgess  10/9/1933  R246/R246-01  [x]   confirmed    Date: 2024      Restrictions/Precautions: Fall Risk     ADULT DIET; Regular; Low Potassium (Less than 3000 mg/day)     Respiratory Status:   Room air  No active isolations    Rehab Diagnosis: Impaired mobility and ADL's due to left thalamic CVA     Subjective:  Alert and Cooperative        Interventions used this date:  Expressive Language and Cognitive Skill Development    Objective/Assessment:  Patient progressing towards goals:  Goal 1: Patient will state name of facility, time within 1 hour, reason in hospital, current month and year with use of external aid with min cues with 100% accuracy.  Pt oriented to place, time of day, month, and year independently.  Pt disoriented to injury (\"heart attack\" given choices).  Goal 2: Patient will complete structured tasks addressing sustained/alternating attention with min cues for task completion.  Attentive to all tasks.  No perseverations occurred this session.  Goal 3: Patient will complete low level problem solving tasks related to home/health/safety with min cues with 85% accuracy.  Goal 4: Patient will complete convergent and divergent naming tasks for improved thought organization with min cues with 85% accuracy.  Pt named concrete category given 3 words with 83% accuracy with adequate response time.  Occasional cues needed for specific word.  Goal 5: Patient will answer questions addressing remote memory and recent recall with min cues with 90% accuracy.  Pt answered remote memory questions correctly: age, who lives with, birthday month and day, and previous job  Pt incorrectly stated birthday year and address (street number).  Pt had good details to previous events of day with increased time formulating sentences.  Cue needed for name for parallel bars.  Goal 6: Patient will follow 2 step verbal  directions with stand by cues with 85% accuracy.  Pt followed 2 step directions with 80% accuracy.      Treatment/Activity Tolerance:  Patient tolerated treatment well    Plan:  Continue per POC    Pain Assessment:  Patient does not c/o pain.    Pain Re-assessment:  Patient does not c/o pain.    Patient/Caregiver Education:  Patient educated on session and progression towards goals.  Patient stated verbal understanding of directions.    Safety Devices:  Call light within reach and Chair alarm in place      Speech Therapy Level of Assistance Scale    AUDITORY COMPREHENSION  Rating:  Minimal Assistance    VERBAL EXPRESSION  Rating:  Minimal Assistance    MOTOR SPEECH  Rating: Independent    MEMORY  Rating:  Moderate Assistance        Therapy Time  SLP Individual Minutes  Time In: 1100  Time Out: 1130  Minutes: 30            Signature: Electronically signed by STORMY Graham on 11/7/2024 at 12:03 PM

## 2024-11-07 NOTE — PLAN OF CARE
Problem: Pain  Goal: Verbalizes/displays adequate comfort level or baseline comfort level  11/6/2024 2356 by Tara Louis, RN  Outcome: Progressing  Flowsheets (Taken 11/6/2024 2130)  Verbalizes/displays adequate comfort level or baseline comfort level:   Encourage patient to monitor pain and request assistance   Assess pain using appropriate pain scale   Administer analgesics based on type and severity of pain and evaluate response     Problem: Skin/Tissue Integrity  Goal: Absence of new skin breakdown  Description: 1.  Monitor for areas of redness and/or skin breakdown  2.  Assess vascular access sites hourly  3.  Every 4-6 hours minimum:  Change oxygen saturation probe site  4.  Every 4-6 hours:  If on nasal continuous positive airway pressure, respiratory therapy assess nares and determine need for appliance change or resting period.  11/6/2024 2356 by Tara Louis, RN  Outcome: Progressing     Problem: Safety - Adult  Goal: Free from fall injury  11/6/2024 2356 by Tara Louis, RN  Outcome: Progressing

## 2024-11-07 NOTE — CARE COORDINATION
CM updated pt on the team meeting. Pt is aware that the discharge is 11/26/24. CM went over goals and his progress. Electronically signed by Melina Claros RN on 11/7/2024 at 11:45 AM

## 2024-11-07 NOTE — PLAN OF CARE
Problem: Chronic Conditions and Co-morbidities  Goal: Patient's chronic conditions and co-morbidity symptoms are monitored and maintained or improved  11/6/2024 2356 by Tara Louis RN  Outcome: Progressing  Flowsheets (Taken 11/6/2024 2130)  Care Plan - Patient's Chronic Conditions and Co-Morbidity Symptoms are Monitored and Maintained or Improved: Monitor and assess patient's chronic conditions and comorbid symptoms for stability, deterioration, or improvement  11/6/2024 1408 by Daiana Hart RN  Outcome: Progressing     Problem: Discharge Planning  Goal: Discharge to home or other facility with appropriate resources  Outcome: Progressing  Flowsheets (Taken 11/6/2024 2130)  Discharge to home or other facility with appropriate resources: Identify barriers to discharge with patient and caregiver     Problem: Safety - Adult  Goal: Free from fall injury  11/6/2024 2356 by Tara Louis RN  Outcome: Progressing  11/6/2024 1408 by Daiana Hart RN  Outcome: Progressing     Problem: ABCDS Injury Assessment  Goal: Absence of physical injury  11/6/2024 2356 by Tara Louis RN  Outcome: Progressing  11/6/2024 1408 by Daiana Hart RN  Outcome: Progressing     Problem: Skin/Tissue Integrity  Goal: Absence of new skin breakdown  Description: 1.  Monitor for areas of redness and/or skin breakdown  2.  Assess vascular access sites hourly  3.  Every 4-6 hours minimum:  Change oxygen saturation probe site  4.  Every 4-6 hours:  If on nasal continuous positive airway pressure, respiratory therapy assess nares and determine need for appliance change or resting period.  11/6/2024 2356 by Tara Louis RN  Outcome: Progressing  11/6/2024 1408 by Daiana Hart RN  Outcome: Progressing     Problem: SLP Adult - Impaired Swallowing  Goal: By Discharge: Advance to least restrictive diet without signs or symptoms of aspiration for planned discharge setting.  See evaluation for individualized

## 2024-11-07 NOTE — PROGRESS NOTES
INDIVIDUALIZED OVERALL REHAB PLAN OF CARE  ADDENDUM TO REHAB PROGRESS NOTE-for audit purposes must also refer to this day's clinical note and combine the information      Date: 2024  Patient Name: Modesto Burgess   Room: R246/R246-01    MRN: [id number]    : 10/9/1933  (89 y.o.)  Gender: male       Today 2024 during weekly team meeting, I reviewed the patient Modesto Burgess in detail with the therapists and nurses involved in patient's care gathering complex physiatric data regarding current medical issues, progress in therapies, factors limiting progress, social issues, psychological issues, ongoing therapeutic plans and discharge planning.    Legend:  I= independent Im =Modified independent  S=Supervised SB=stand by HAND=set up CG=contact maura Min= minimal Mod=Moderate Max=maximal Max of 2 =maximal assist of 2 people      CURRENT FUNCTIONAL STATUS:    CVA.  Patient was admitted to [facility] on 10/29/2024 after presenting through the ER after falling at home.  He is 89 years old and presented through the ER on 10/29/2024 with altered mental status after being found on the floor.  He was diagnosed with a left thalamic CVA.  He was not a TNK candidate is that he were not sure as to the timing window.     Post stroke he was also found to have acute on chronic renal failure and diabetes mellitus with hyperglycemia.     He was admitted under the care of the hospitalist with neurology consulting.  He had significant confusion felt to be secondary to his subarachnoid hemorrhage in the right parietal area antiplatelets therapy and subcutaneous heparin were held.    NURSING ISSUES:    start of shift, family requested neosporin for abrasions to top of patients feet. Message sent to hospitalist np and received order for neosporin.      Nursing will continue to focus on bowel and bladder continence transitioning toward independence by time of discharge.  Monitoring post void residuals monitoring for severe  heavy lean to the right without ability to correct, poor walker control, flexed trunk (11/06/24 1010)  Gait Deviations: Slow Shivani;Decreased step length;Decreased step height (11/06/24 1010)  Distance: 5 steps forward (11/06/24 1010)  Stairs:  Stairs/Curb  Stairs?: No (11/06/24 1010)  W/C mobility:  Wheelchair Activities  Propulsion: Yes (11/06/24 1010)  Propulsion 1  Method: LUE;RUE (11/06/24 1010)  Level of Assistance: Minimal assistance;Stand by assistance (11/06/24 1010)  Description/ Details: SBA for first 30 feet including one turn, mmin assist required last 30 feet with one additional turn (11/06/24 1010)  Distance: 60 feet (11/06/24 1010)        Pt and Family training goals-  Focus on sequencing and endurance        OCCUPATIONAL THERAPY     ADL  Feeding: Setup (11/06/24 0929)  Feeding Skilled Clinical Factors: Pt swallowed multiple pills and water w/ no difficulty. (11/06/24 0929)  Grooming: Stand by assistance (11/06/24 0929)  Grooming Skilled Clinical Factors: Pt brushed teeth while long-sitting in bed. Instructed pt to wash face, however pt only washed hands w/ wash cloth. (11/06/24 0929)  UE Bathing: Minimal assistance (11/06/24 0929)  UE Bathing Skilled Clinical Factors: Assist to thoroughly bathe all areas while supine in bed. (11/06/24 0929)  LE Bathing: Maximum assistance (11/06/24 0929)  LE Bathing Skilled Clinical Factors: Pt bathed anterior vinicio area IND. Assist to bathe BLEs and posterior vinicio area. (11/06/24 0929)  UE Dressing: Unable to assess (Comment) (11/06/24 0929)  UE Dressing Skilled Clinical Factors: dressing gown only (11/06/24 0929)  LE Dressing: Dependent/Total (11/06/24 0929)  LE Dressing Skilled Clinical Factors: doff old brief and don new brief (11/06/24 0929)  Putting On/Taking Off Footwear: Dependent/Total (11/06/24 0929)  Putting On/Taking Off Footwear Skilled Clinical Factors: hospital socks (11/06/24 0929)  Toileting: Moderate assistance (11/06/24 0929)  Toileting Skilled

## 2024-11-07 NOTE — PROGRESS NOTES
Late entry:  At start of shift, family requested neosporin for abrasions to top of patients feet.  Message sent to hospitalist np and received order for neosporin.  Electronically signed by Tara Louis RN on 11/6/2024 at 11:28 PM

## 2024-11-07 NOTE — PROGRESS NOTES
(11/06/24 1010)  Device: Rolling Walker (11/06/24 1010)  Assistance: Maximum assistance;2 Person assistance (11/06/24 1010)  Quality of Gait: small steps, heavy lean to the right without ability to correct, poor walker control, flexed trunk (11/06/24 1010)  Gait Deviations: Slow Shivani;Decreased step length;Decreased step height (11/06/24 1010)  Distance: 5 steps forward (11/06/24 1010)  Stairs:  Stairs/Curb  Stairs?: No (11/06/24 1010)  W/C mobility:  Wheelchair Activities  Propulsion: Yes (11/06/24 1010)  Propulsion 1  Method: LUE;RUE (11/06/24 1010)  Level of Assistance: Minimal assistance;Stand by assistance (11/06/24 1010)  Description/ Details: SBA for first 30 feet including one turn, mmin assist required last 30 feet with one additional turn (11/06/24 1010)  Distance: 60 feet (11/06/24 1010)    Occupational Therapy:   Hand Dominance: Right  ADL  Feeding: Setup (11/06/24 0929)  Feeding Skilled Clinical Factors: Pt swallowed multiple pills and water w/ no difficulty. (11/06/24 0929)  Grooming: Stand by assistance (11/06/24 0929)  Grooming Skilled Clinical Factors: Pt brushed teeth while long-sitting in bed. Instructed pt to wash face, however pt only washed hands w/ wash cloth. (11/06/24 0929)  UE Bathing: Minimal assistance (11/06/24 0929)  UE Bathing Skilled Clinical Factors: Assist to thoroughly bathe all areas while supine in bed. (11/06/24 0929)  LE Bathing: Maximum assistance (11/06/24 0929)  LE Bathing Skilled Clinical Factors: Pt bathed anterior vinicio area IND. Assist to bathe BLEs and posterior vinicio area. (11/06/24 0929)  UE Dressing: Unable to assess (Comment) (11/06/24 0929)  UE Dressing Skilled Clinical Factors: dressing gown only (11/06/24 0929)  LE Dressing: Dependent/Total (11/06/24 0929)  LE Dressing Skilled Clinical Factors: doff old brief and don new brief (11/06/24 0929)  Putting On/Taking Off Footwear: Dependent/Total (11/06/24 0929)  Putting On/Taking Off Footwear Skilled Clinical Factors:  osseous structures are normal in appearance for the patient's age.     No sign of acute cardiopulmonary disease.        Previous extensive, complex labs, notes and diagnostics reviewed and analyzed.     ALLERGIES:    Allergies as of 11/05/2024    (No Known Allergies)      (please also verify by checking MAR)     Today I evaluated this patient for periodic reassessment of medical and functional status.  The patient was discussed in detail at the treatment team meeting focusing on current medical issues, progress in therapies, social issues, psychological issues, barriers to progress and strategies to address these barriers, and discharge planning.  See the addendum to rehab progress note-as a second progress note in the chart.  The patient continues to be high risk for future disability and their medical and rehabilitation prognosis continue to be good and therefore, we will continue the patient's rehabilitation course as planned.  The patient's tentative discharge date was set. Patient and family education was discussed.  The patient was made aware of the team discussion regarding their progress.  For audit purposes must also refer to this day's team note and combine the information.    Complex Physical Medicine & Rehab Issues Assess & Plan:   Severe abnormality of gait and mobility and impaired self-care and ADL's secondary to Acute infarct in the anterior left thalamus.  Functional and medical status reassessed regarding patient’s ability to participate in therapies and patient found to be able to participate in acute intensive comprehensive inpatient rehabilitation program including PT/OT to improve balance, ambulation, ADL’s, and to improve the P/AROM.  Therapeutic modifications regarding activities in therapies, place, amount of time per day and intensity of therapy made daily.  In bed therapies or bedside therapies prn.   Bowel and Bladder dysfunction  , Neurogenic bowel and bladder:  frequent toileting,

## 2024-11-07 NOTE — PROGRESS NOTES
Physical Therapy Rehab Treatment Note  Facility/Department: [facility]  [id number]       NAME: Modesto Burgess  : 10/9/1933 (89 y.o.)  MRN: [id number]  CODE STATUS: DNR-CCA    Date of Service: 2024       Restrictions:  Restrictions/Precautions: Fall Risk       SUBJECTIVE:   Subjective: \"I didn't sleep too well last nght.\"    Pain   Denies pre and post session pain      OBJECTIVE:             Bed mobility  Rolling to Left: Maximum assistance  Rolling to Right: Maximum assistance  Supine to Sit: Moderate assistance  Sit to Supine: Maximum assistance  Bed Mobility Comments: Pt struggles with spatial organization during transitions sit<>supine. Requires hand over hand and step by step sequencing throughout. Slow to complete.    Transfers  Sit to Stand: Minimal Assistance;Moderate Assistance  Stand to Sit: Minimal Assistance  Bed to Chair: Minimal assistance;Moderate assistance  Comment: Variable performance - pt performs best when he utilizes UEs for power. Struggles to stand from EOB. Noted decreased eccentric control during sit, especially when using only 1 UE for support. Constant cues for correct hand placement however minimal follow through and carry over of technique. Increased facilitation required to correctly approach chair/seated surface on the pt's right d/t spatial awareness deficits. ModA for low pivot transfer mat>WC to pt's L side.    Ambulation  Device: Parallel Bars;Rolling Walker  Other Apparatus: Wheelchair follow  Assistance: Minimal assistance  Quality of Gait: Pt with small L step and R LE knee instability. R knee blocked for safety - no LOB. Heavy bracing with UEs for support. Transfer of technique to WW with excellent follow through. Slow pacing. Struggles with spatial awareness to R side requiring verbal cues for direction.  Distance: 8ft in // bars; 15ft with WW    Stairs/Curb  Stairs?: No         Balance  Comments: Standing static unsupported X 15sec (3 tirlas); Lateral weight shifts

## 2024-11-08 ENCOUNTER — TELEPHONE (OUTPATIENT)
Dept: PRIMARY CARE | Facility: CLINIC | Age: 89
End: 2024-11-08
Payer: MEDICARE

## 2024-11-08 DIAGNOSIS — E11.9 TYPE 2 DIABETES MELLITUS WITHOUT COMPLICATION, WITHOUT LONG-TERM CURRENT USE OF INSULIN (MULTI): ICD-10-CM

## 2024-11-08 PROBLEM — I60.9 SAH (SUBARACHNOID HEMORRHAGE) (HCC): Status: ACTIVE | Noted: 2024-11-08

## 2024-11-08 LAB
ANION GAP SERPL CALCULATED.3IONS-SCNC: 12 MEQ/L (ref 9–15)
BASOPHILS # BLD: 0 K/UL (ref 0–0.2)
BASOPHILS NFR BLD: 0.3 %
BUN SERPL-MCNC: 101 MG/DL (ref 8–23)
CALCIUM SERPL-MCNC: 9.4 MG/DL (ref 8.5–9.9)
CHLORIDE SERPL-SCNC: 110 MEQ/L (ref 95–107)
CO2 SERPL-SCNC: 19 MEQ/L (ref 20–31)
CREAT SERPL-MCNC: 4.36 MG/DL (ref 0.7–1.2)
EOSINOPHIL # BLD: 0 K/UL (ref 0–0.7)
EOSINOPHIL NFR BLD: 0 %
ERYTHROCYTE [DISTWIDTH] IN BLOOD BY AUTOMATED COUNT: 13.7 % (ref 11.5–14.5)
FERRITIN: 187 NG/ML
GLUCOSE BLD-MCNC: 165 MG/DL (ref 70–99)
GLUCOSE BLD-MCNC: 179 MG/DL (ref 70–99)
GLUCOSE BLD-MCNC: 212 MG/DL (ref 70–99)
GLUCOSE BLD-MCNC: 275 MG/DL (ref 70–99)
GLUCOSE SERPL-MCNC: 131 MG/DL (ref 70–99)
HCT VFR BLD AUTO: 27.1 % (ref 42–52)
HGB BLD-MCNC: 9 G/DL (ref 14–18)
IRON % SATURATION: 25 % (ref 20–55)
IRON: 54 UG/DL (ref 61–157)
LYMPHOCYTES # BLD: 1.1 K/UL (ref 1–4.8)
LYMPHOCYTES NFR BLD: 15.8 %
MCH RBC QN AUTO: 32.3 PG (ref 27–31.3)
MCHC RBC AUTO-ENTMCNC: 33.2 % (ref 33–37)
MCV RBC AUTO: 97.1 FL (ref 79–92.2)
MONOCYTES # BLD: 0.6 K/UL (ref 0.2–0.8)
MONOCYTES NFR BLD: 8.2 %
NEUTROPHILS # BLD: 5.2 K/UL (ref 1.4–6.5)
NEUTS SEG NFR BLD: 75.4 %
PERFORMED ON: ABNORMAL
PLATELET # BLD AUTO: 151 K/UL (ref 130–400)
POTASSIUM SERPL-SCNC: 4.7 MEQ/L (ref 3.4–4.9)
RBC # BLD AUTO: 2.79 M/UL (ref 4.7–6.1)
SODIUM SERPL-SCNC: 141 MEQ/L (ref 135–144)
TOTAL IRON BINDING CAPACITY: 213 UG/DL (ref 250–450)
UNSATURATED IRON BINDING CAPACITY: 159 UG/DL (ref 112–347)
WBC # BLD AUTO: 6.9 K/UL (ref 4.8–10.8)

## 2024-11-08 PROCEDURE — 99232 SBSQ HOSP IP/OBS MODERATE 35: CPT | Performed by: PSYCHIATRY & NEUROLOGY

## 2024-11-08 PROCEDURE — 83550 IRON BINDING TEST: CPT

## 2024-11-08 PROCEDURE — 6370000000 HC RX 637 (ALT 250 FOR IP): Performed by: INTERNAL MEDICINE

## 2024-11-08 PROCEDURE — 83540 ASSAY OF IRON: CPT

## 2024-11-08 PROCEDURE — 97129 THER IVNTJ 1ST 15 MIN: CPT

## 2024-11-08 PROCEDURE — 97130 THER IVNTJ EA ADDL 15 MIN: CPT

## 2024-11-08 PROCEDURE — APPSS45 APP SPLIT SHARED TIME 31-45 MINUTES: Performed by: NURSE PRACTITIONER

## 2024-11-08 PROCEDURE — 80048 BASIC METABOLIC PNL TOTAL CA: CPT

## 2024-11-08 PROCEDURE — 99222 1ST HOSP IP/OBS MODERATE 55: CPT

## 2024-11-08 PROCEDURE — 99232 SBSQ HOSP IP/OBS MODERATE 35: CPT | Performed by: PHYSICAL MEDICINE & REHABILITATION

## 2024-11-08 PROCEDURE — 97112 NEUROMUSCULAR REEDUCATION: CPT

## 2024-11-08 PROCEDURE — 82728 ASSAY OF FERRITIN: CPT

## 2024-11-08 PROCEDURE — 6370000000 HC RX 637 (ALT 250 FOR IP)

## 2024-11-08 PROCEDURE — 97535 SELF CARE MNGMENT TRAINING: CPT

## 2024-11-08 PROCEDURE — 85025 COMPLETE CBC W/AUTO DIFF WBC: CPT

## 2024-11-08 PROCEDURE — 2580000003 HC RX 258: Performed by: INTERNAL MEDICINE

## 2024-11-08 PROCEDURE — 97116 GAIT TRAINING THERAPY: CPT

## 2024-11-08 PROCEDURE — 6370000000 HC RX 637 (ALT 250 FOR IP): Performed by: PHYSICAL MEDICINE & REHABILITATION

## 2024-11-08 PROCEDURE — 1180000000 HC REHAB R&B

## 2024-11-08 PROCEDURE — 36415 COLL VENOUS BLD VENIPUNCTURE: CPT

## 2024-11-08 RX ORDER — FLASH GLUCOSE SENSOR
KIT MISCELLANEOUS
Qty: 6 EACH | Refills: 1 | Status: SHIPPED | OUTPATIENT
Start: 2024-11-08

## 2024-11-08 RX ADMIN — Medication 10 ML: at 21:42

## 2024-11-08 RX ADMIN — CARVEDILOL 12.5 MG: 12.5 TABLET, FILM COATED ORAL at 11:16

## 2024-11-08 RX ADMIN — ATORVASTATIN CALCIUM 40 MG: 40 TABLET, FILM COATED ORAL at 11:16

## 2024-11-08 RX ADMIN — HYDRALAZINE HYDROCHLORIDE 50 MG: 50 TABLET ORAL at 11:16

## 2024-11-08 RX ADMIN — BACITRACIN ZINC, NEOMYCIN, POLYMYXIN B 1 G: 400; 3.5; 5 OINTMENT TOPICAL at 21:41

## 2024-11-08 RX ADMIN — SODIUM BICARBONATE 650 MG TABLET 650 MG: at 11:16

## 2024-11-08 RX ADMIN — HYDRALAZINE HYDROCHLORIDE 50 MG: 50 TABLET ORAL at 21:41

## 2024-11-08 RX ADMIN — Medication 100 MG: at 11:16

## 2024-11-08 RX ADMIN — Medication 10 ML: at 11:18

## 2024-11-08 RX ADMIN — Medication 2000 UNITS: at 16:54

## 2024-11-08 RX ADMIN — CARVEDILOL 12.5 MG: 12.5 TABLET, FILM COATED ORAL at 16:54

## 2024-11-08 RX ADMIN — INSULIN LISPRO 4 UNITS: 100 INJECTION, SOLUTION INTRAVENOUS; SUBCUTANEOUS at 21:48

## 2024-11-08 RX ADMIN — ALLOPURINOL 100 MG: 100 TABLET ORAL at 11:16

## 2024-11-08 RX ADMIN — CALCITRIOL 0.25 MCG: 0.25 CAPSULE ORAL at 11:15

## 2024-11-08 RX ADMIN — SODIUM BICARBONATE 650 MG TABLET 650 MG: at 21:41

## 2024-11-08 RX ADMIN — ISOSORBIDE MONONITRATE 30 MG: 30 TABLET, EXTENDED RELEASE ORAL at 11:16

## 2024-11-08 RX ADMIN — INSULIN LISPRO 2 UNITS: 100 INJECTION, SOLUTION INTRAVENOUS; SUBCUTANEOUS at 12:00

## 2024-11-08 RX ADMIN — BACITRACIN ZINC, NEOMYCIN, POLYMYXIN B 1 G: 400; 3.5; 5 OINTMENT TOPICAL at 11:18

## 2024-11-08 RX ADMIN — INSULIN GLARGINE 20 UNITS: 100 INJECTION, SOLUTION SUBCUTANEOUS at 21:40

## 2024-11-08 NOTE — CONSULTS
sequela of acute or chronic hemorrhage.    ORBITS: Lens implants from prior cataract surgery are noted.  The orbits are  otherwise unremarkable.    SINUSES: Thickened proteinaceous secretions are noted in the right sphenoid  sinus that are bright on T1.  This could be related to inspissated secretions  versus fungal disease.  There is scattered mild paranasal sinus disease.  Trace right mastoid effusion.  The left mastoid air cells are clear.    BONES/SOFT TISSUES: The bone marrow signal intensity and craniocervical  junction are unremarkable.  The pituitary gland is unremarkable.  No areas of  abnormal soft tissue swelling.    Impression  1. Acute infarct in the anterior left thalamus.  2. Cerebral parenchymal volume loss with chronic microvascular white matter  ischemic disease.  3. Multifocal chronic infarcts, as above.                            MRA of the Head and Neck: No results found for this or any previous visit.  No results found for this or any previous visit.  No results found for this or any previous visit.                            CT of the Head: Results for orders placed during the hospital encounter of 10/29/24    CT HEAD WO CONTRAST    Narrative  EXAMINATION:  CT OF THE HEAD WITHOUT CONTRAST  11/5/2024 8:54 am    TECHNIQUE:  CT of the head was performed without the administration of intravenous  contrast. Automated exposure control, iterative reconstruction, and/or weight  based adjustment of the mA/kV was utilized to reduce the radiation dose to as  low as reasonably achievable.    COMPARISON:  11/03/2020    HISTORY:  ORDERING SYSTEM PROVIDED HISTORY: SAH  TECHNOLOGIST PROVIDED HISTORY:  Reason for exam:->SAH  Has a \"code stroke\" or \"stroke alert\" been called?->No  What reading provider will be dictating this exam?->CRC    FINDINGS:  BRAIN/VENTRICLES: Minimal residual hemorrhage seen right parietal region.  Findings are stable and unchanged.  There is atrophy identified of the  reviewed all data and investigations, and am the sole provider of all clinical decisions on the neurological status of this patient.  Patient seen and examined weekly rehab rounds.  FIM scores noted.  60% time spent on evaluating pt.      Ollie Rob MD, THUY  Diplomate, American Board of Psychiatry & Neurology  Board Certified in Vascular Neurology  Board Certified in Neuromuscular Medicine  Certified in Neurorehabilitation       Collaborating physicians: Dr Rob    Electronically signed by SERENA Huff CNP on 11/8/2024 at 3:43 PM

## 2024-11-08 NOTE — PROGRESS NOTES
[facility]  Facility[facility] [facility]  Speech Language Pathology   Treatment Note          Modesto Burgess  10/9/1933  R246/R246-01  [x]   confirmed    Date: 2024      Restrictions/Precautions: Fall Risk     ADULT DIET; Regular; Low Potassium (Less than 3000 mg/day)     Respiratory Status:   room air  No active isolations    Rehab Diagnosis: Impaired mobility and ADL's due to left thalamic CVA     Subjective:  Alert, Cooperative, and Pleasant        Interventions used this date:  Cognitive Skill Development    Objective/Assessment:  Patient progressing towards goals:  Short Term Goals  Time Frame for Short Term Goals: 2-3 weeks  Goal 1: Patient will state name of facility, time within 1 hour, reason in hospital, current month and year with use of external aid with min cues with 100% accuracy.  --pt was unable to recall [facility] in which pt referred to as \"[facility]\". After given the new name of the hospital, pt was unable to recall. Pt was able to recall his name and his .  Goal 2: Patient will complete structured tasks addressing sustained/alternating attention with min cues for task completion.  --not targeted.  Goal 3: Patient will complete low level problem solving tasks related to home/health/safety with min cues with 85% accuracy.  --pt was able to answer problem solving questions by looking at picture cards with 3/3 trials given max models and cues.  Goal 4: Patient will complete convergent and divergent naming tasks for improved thought organization with min cues with 85% accuracy.  --pt was able to complete naming tasks with 66% accuracy increasing to 77% accuracy given mod-max cues.  Goal 5: Patient will answer questions addressing remote memory and recent recall with min cues with 90% accuracy.  --not targeted.  Goal 6: Patient will follow 2 step verbal directions with stand by cues with 85% accuracy.  --not targeted.    Treatment/Activity Tolerance:  Patient tolerated treatment  well    Plan:  Continue per POC    Pain Assessment:  Patient does not c/o pain.    Pain Re-assessment:  Patient does not c/o pain.    Patient/Caregiver Education:  Patient educated on session and progression towards goals.    Safety Devices:  All fall risk precautions in place            Speech Therapy Level of Assistance Scale    AUDITORY COMPREHENSION  Rating:  Minimal Assistance    VERBAL EXPRESSION  Rating:  Minimal Assistance    MOTOR SPEECH  Rating: Independent    PROBLEM SOLVING  Rating: Moderate Assistance-Maximal Assistance    MEMORY  Rating:  Moderate Assistance        Therapy Time  SLP Individual Minutes  Time In: 1518  Time Out: 1533  Minutes: 15            Signature: Electronically signed by STORMY Childs on 11/8/2024 at 3:48 PM

## 2024-11-08 NOTE — PROGRESS NOTES
OCCUPATIONAL THERAPY  INPATIENT REHAB TREATMENT NOTE  [facility]      NAME: Modesto Burgess  : 10/9/1933 (89 y.o.)  MRN: [id number]  CODE STATUS: DNR-CCA  [facility]    Date of Service: 2024    Referring Physician: Christina Robison DO  Rehab Diagnosis: Impaired mobility and ADL's d/t left thalamic CVA    Hospital course:   Comments: 89 y.o. male patient who presented to ER 10/29 with altered mental status and was found on floor. Not TNK candidate for CVA due timing timing window. Patient found to have had an acute L thalamus infarct in addition to being hypernatremic and having APRYL on CKD. Patient admitted with neuro and cardiology consulting.      Restrictions  Restrictions/Precautions  Restrictions/Precautions: Fall Risk     Patient's date of birth confirmed: Yes    SAFETY:  Safety Devices  Safety Devices in place: Yes  Type of devices: All fall risk precautions in place    SUBJECTIVE: \"They say I had a small stroke.\"    Pain at start of treatment: No    Pain at end of treatment: No    COGNITION:  Orientation  Orientation Level: Oriented to person;Oriented to situation;Oriented to place;Disoriented to time  Cognition  Overall Cognitive Status: Exceptions  Arousal/Alertness: Delayed responses to stimuli  Following Commands: Follows one step commands with increased time;Follows one step commands with repetition  Attention Span: Appears intact  Memory: Decreased short term memory;Decreased recall of recent events  Safety Judgement: Decreased awareness of need for assistance  Problem Solving: Assistance required to generate solutions;Assistance required to implement solutions  Insights: Decreased awareness of deficits  Initiation: Requires cues for some  Sequencing: Requires cues for some    OBJECTIVE:    Following Instructions Worksheet:  Patient completed simple following directions worksheet for increased safety with ADL's and IADL's.  Patient provided with following instructions worksheet and

## 2024-11-08 NOTE — CONSULTS
Palliative Care Consult Note  Patient: Modesto Burgess  Gender: male  YOB: 1933  Unit/Bed: R246/R246-01  CodeStatus: DNR-CCA  Inpatient Treatment Team: Treatment Team:   Christina Robison DO Patel, Dhruv R, Sukhwinder Noyola MD Thomas, Taryn, ELDA Raya, Diane, ELDA Sorenson, Elyssa ARREOLA, Brooke Serra, MSW, LSW  Gracy Andrade, Elicia Knox, Porter Resendiz DO  Admit Date:  11/5/2024    Chief Complaint: Fall    History of Presenting Illness:      Modesto Burgess is a 89 y.o. male on hospital day 3 with a history of CKD, HTN, CAD, T2DM, HLD.    Patient was brought to the emergency room with fall with altered mental status. Patient was admitted in the setting of fall with mild rhabdo myelosis, CKD stage IIIb-IV, encephalopathy, right leg weakness and mild leukocytosis. Palliative care was consulted by Dr. Robison  for goals of care. Patient was previously living at home with son per EMR documentation. Patient went from 2W to Acute Rehab.     Upon entering the room I find the patient alert and oriented x 1-2, he does not recall meeting myself before. He is able to explain what therapies he had today.. Family are at bedside. Patient showing worsening kidney function.  Plan for nephrology consult.  Patient has known CKD, his baseline GFR was roughly 15-18 during hospitalization. Patient follows with nephrology in outpatient setting. Patient has had poor intake.  Patient encouraged PO fluids during assessment. He was able to drink a 8 oz cup of water without difficulty. Repeat CT scan shows Minimal residual subarachnoid hemorrhage seen right parietal region.Patient reports lower back pain, unknown last bowel movement.     Most recent notable labs:   WBC 6.9 RBC 2.79 hemoglobin 9.0 hematocrit 27.1    Sodium 141 potassium 4.7  creatinine 4.36 GFR 12.1        Review of Systems:       Review of Systems   Unable to perform ROS: Dementia       Physical Examination:      Needs: Patient Unable To Answer (10/30/2024)    PRAPARE - Transportation     Lack of Transportation (Medical): Patient unable to answer     Lack of Transportation (Non-Medical): Patient unable to answer    Social Connections ([facility] HRSN)   Housing Stability: Patient Unable To Answer (10/30/2024)    Housing Stability Vital Sign     Unable to Pay for Housing in the Last Year: Patient unable to answer     Number of Times Moved in the Last Year: 0     Homeless in the Last Year: Patient unable to answer       Family Hx:  No family history on file.    LABS: Reviewed     CBC:  Lab Results   Component Value Date/Time    WBC 6.9 11/08/2024 04:57 AM    RBC 2.79 11/08/2024 04:57 AM    HGB 9.0 11/08/2024 04:57 AM    HCT 27.1 11/08/2024 04:57 AM    MCV 97.1 11/08/2024 04:57 AM    MCH 32.3 11/08/2024 04:57 AM    MCHC 33.2 11/08/2024 04:57 AM    RDW 13.7 11/08/2024 04:57 AM     11/08/2024 04:57 AM    MPV 9.2 01/14/2015 03:07 AM     CBC with Differential:   Lab Results   Component Value Date/Time    WBC 6.9 11/08/2024 04:57 AM    RBC 2.79 11/08/2024 04:57 AM    HGB 9.0 11/08/2024 04:57 AM    HCT 27.1 11/08/2024 04:57 AM     11/08/2024 04:57 AM    MCV 97.1 11/08/2024 04:57 AM    MCH 32.3 11/08/2024 04:57 AM    MCHC 33.2 11/08/2024 04:57 AM    RDW 13.7 11/08/2024 04:57 AM    LYMPHOPCT 15.8 11/08/2024 04:57 AM    MONOPCT 8.2 11/08/2024 04:57 AM    EOSPCT 0.0 11/08/2024 04:57 AM    BASOPCT 0.3 11/08/2024 04:57 AM    MONOSABS 0.6 11/08/2024 04:57 AM    LYMPHSABS 1.1 11/08/2024 04:57 AM    EOSABS 0.0 11/08/2024 04:57 AM    BASOSABS 0.0 11/08/2024 04:57 AM     CMP:    Lab Results   Component Value Date/Time     11/08/2024 04:57 AM    K 4.7 11/08/2024 04:57 AM    K 4.5 11/05/2024 04:39 AM     11/08/2024 04:57 AM    CO2 19 11/08/2024 04:57 AM     11/08/2024 04:57 AM    CREATININE 4.36 11/08/2024 04:57 AM    GFRAA 16.3 09/17/2020 06:08 AM    LABGLOM 12.1 11/08/2024 04:57 AM    GLUCOSE 131 11/08/2024 04:57 AM

## 2024-11-08 NOTE — PROGRESS NOTES
Hospitalist Progress Note      PCP: Cb Rolon MD    Date of Admission: 11/5/2024    Chief Complaint: no active complains    Subjective: patient back from PT, sitting in chair     Medications:  Reviewed    Infusion Medications    sodium chloride      sodium chloride      dextrose       Scheduled Medications    Vitamin D  2,000 Units Oral Dinner    cyanocobalamin  1,000 mcg IntraMUSCular Weekly    coenzyme Q10  100 mg Oral Daily    lidocaine  3 patch TransDERmal Daily    insulin glargine  20 Units SubCUTAneous Nightly    neomycin-bacitracin-polymyxin   Topical BID    allopurinol  100 mg Oral Daily    atorvastatin  40 mg Oral Daily    calcitRIOL  0.25 mcg Oral Daily    carvedilol  12.5 mg Oral BID WC    hydrALAZINE  50 mg Oral BID    insulin lispro  0-8 Units SubCUTAneous 4x Daily AC & HS    isosorbide mononitrate  30 mg Oral Daily    sodium bicarbonate  650 mg Oral BID    sodium chloride flush  5-40 mL IntraVENous 2 times per day     PRN Meds: acetaminophen, bisacodyl, sodium phosphate, sodium chloride, sodium chloride, dextrose, dextrose bolus **OR** dextrose bolus, glucagon (rDNA), glucose, hydrALAZINE, ondansetron **OR** ondansetron, polyethylene glycol      Intake/Output Summary (Last 24 hours) at 11/8/2024 1148  Last data filed at 11/7/2024 2245  Gross per 24 hour   Intake 120 ml   Output --   Net 120 ml       Exam:    BP (!) 150/65   Pulse 74   Temp 98.1 °F (36.7 °C) (Oral)   Resp 18   Ht 1.723 m (5' 7.84\")   Wt 93.4 kg (206 lb)   SpO2 98%   BMI 31.47 kg/m²     General appearance: No apparent distress, appears stated age and cooperative.  HEENT: Pupils equal, round, and reactive to light. Conjunctivae/corneas clear.  Neck: Supple, with full range of motion. No jugular venous distention. Trachea midline.  Respiratory:  Normal respiratory effort. Clear to auscultation, bilaterally without Rales/Wheezes/Rhonchi.  Cardiovascular: Regular rate and rhythm with normal S1/S2 without murmurs, rubs or  gallops.  Abdomen: Soft, non-tender, non-distended with normal bowel sounds.  Musculoskeletal: No clubbing, cyanosis or edema bilaterally.  Full range of motion without deformity.  Skin: Skin color, texture, turgor normal.  No rashes or lesions.  Neurologic:  Neurovascularly intact without any focal sensory/motor deficits. Cranial nerves: II-XII intact, grossly non-focal.  Psychiatric: Alert and oriented, thought content appropriate, normal insight  Capillary Refill: Brisk,< 3 seconds   Peripheral Pulses: +2 palpable, equal bilaterally       Labs:   Recent Labs     11/08/24 0457   WBC 6.9   HGB 9.0*   HCT 27.1*        Recent Labs     11/08/24 0457      K 4.7   *   CO2 19*   *   CREATININE 4.36*   CALCIUM 9.4     No results for input(s): \"AST\", \"ALT\", \"BILIDIR\", \"BILITOT\", \"ALKPHOS\" in the last 72 hours.  No results for input(s): \"INR\" in the last 72 hours.  No results for input(s): \"CKTOTAL\", \"TROPONINI\" in the last 72 hours.    Urinalysis:      Lab Results   Component Value Date/Time    NITRU Negative 10/29/2024 11:22 PM    WBCUA 0-2 10/29/2024 11:22 PM    BACTERIA Negative 10/29/2024 11:22 PM    RBCUA 3-5 10/29/2024 11:22 PM    BLOODU MODERATE 10/29/2024 11:22 PM    GLUCOSEU 500 10/29/2024 11:22 PM       Radiology:  No orders to display           Assessment/Plan:    Active Hospital Problems    Diagnosis Date Noted    Cardiomyopathy (HCC) [I42.9] 11/04/2024     Priority: High    Late effects of CVA (cerebrovascular accident) [I69.90] 11/01/2024    Impaired mobility ADLs dt L thal CVA [Z74.09, Z78.9] 11/01/2024    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

## 2024-11-08 NOTE — PLAN OF CARE
Problem: Chronic Conditions and Co-morbidities  Goal: Patient's chronic conditions and co-morbidity symptoms are monitored and maintained or improved  11/8/2024 0542 by Negra Stuart RN  Outcome: Progressing  11/8/2024 0541 by Negra Stuart RN  Outcome: Progressing  11/8/2024 0540 by Negra Stuart RN  Outcome: Progressing     Problem: Discharge Planning  Goal: Discharge to home or other facility with appropriate resources  11/8/2024 0542 by Negra Stuart RN  Outcome: Progressing  11/8/2024 0541 by Negra Stuart RN  Outcome: Progressing  11/8/2024 0540 by Negra Stuart RN  Outcome: Progressing     Problem: Safety - Adult  Goal: Free from fall injury  11/8/2024 0542 by Negra Stuart RN  Outcome: Progressing  11/8/2024 0541 by Negra Stuart RN  Outcome: Progressing  11/8/2024 0540 by Negra Stuart RN  Outcome: Progressing     Problem: ABCDS Injury Assessment  Goal: Absence of physical injury  11/8/2024 0542 by Negra Stuart RN  Outcome: Progressing  11/8/2024 0541 by Negra Stuart RN  Outcome: Progressing  11/8/2024 0540 by Negra Stuart RN  Outcome: Progressing     Problem: Skin/Tissue Integrity  Goal: Absence of new skin breakdown  Description: 1.  Monitor for areas of redness and/or skin breakdown  2.  Assess vascular access sites hourly  3.  Every 4-6 hours minimum:  Change oxygen saturation probe site  4.  Every 4-6 hours:  If on nasal continuous positive airway pressure, respiratory therapy assess nares and determine need for appliance change or resting period.  11/8/2024 0542 by Negra Stuart RN  Outcome: Progressing  11/8/2024 0541 by Negra Stuart RN  Outcome: Progressing  11/8/2024 0540 by Negra Stuart RN  Outcome: Progressing     Problem: Pain  Goal: Verbalizes/displays adequate comfort level or baseline comfort level  11/8/2024 0542 by Negra Stuart RN  Outcome: Progressing  11/8/2024 0541 by Negra Stuart RN  Outcome: Progressing  11/8/2024 0540 by Negra Stuart, RN  Outcome:

## 2024-11-08 NOTE — PROGRESS NOTES
Comprehensive Nutrition Assessment    Type and Reason for Visit:  Initial, Consult    Nutrition Recommendations/Plan:   Modify Current Diet (To add to Carb Control 4 ANGELA restrictions to diet order)     Malnutrition Assessment:  Malnutrition Status:  No malnutrition (11/08/24 1555)      Nutrition Assessment:    Pt is stable from a nutritional standpoint, with verbalized good appetite, noted good intake at meals, with no nutritional complaints. Pt had requested low K+ restriction, also stated that he follows diabetic guidelines and ANGELA. To add to Carb Control 4 ANGELA restrictions to diet order.    Nutrition Related Findings:    PMH-DM2, CKD, CAD, GERD, hld, OA, [place]; admitted s/p CVA, fall. Labs/meds reviewed. Noted elevated BUN/Cr, K-4.7; Gluc-170-292 x last 48hrs. Pt receiving-insulin, lipitor. +1 BLE edema noted. Wound Type:  (Abrasion L foot)       Current Nutrition Intake & Therapies:    Average Meal Intake: 51-75%, %     ADULT DIET; Regular; Low Potassium (Less than 3000 mg/day)    Anthropometric Measures:  Height: 172.3 cm (5' 7.84\")  Ideal Body Weight (IBW): 153 lbs (70 kg)    Admission Body Weight: 93.4 kg (206 lb) (unknown wt source)  Current BMI (kg/m2):  31.5  Usual Body Weight: 82.1 kg (181 lb) (4/2024 stated)                          BMI Categories: Obese Class 1 (BMI 30.0-34.9)    Estimated Daily Nutrient Needs:  Energy Requirements Based On: Kcal/kg  Weight Used for Energy Requirements: Admission  Energy (kcal/day): 2555-3641 (kg x 17-18)  Weight Used for Protein Requirements: Ideal  Protein (g/day): ~56-70 (kg x .8-1.0)  Method Used for Fluid Requirements: 1 ml/kcal  Fluid (ml/day):  Per MD    Nutrition Diagnosis:   Altered nutrition-related lab values related to endocrine dysfunction, renal dysfunction as evidenced by lab values    Nutrition Interventions:   Food and/or Nutrient Delivery: Modify Current Diet  Nutrition Education/Counseling: No recommendation at this time  Coordination of Nutrition

## 2024-11-08 NOTE — PROGRESS NOTES
OCCUPATIONAL THERAPY  INPATIENT REHAB TREATMENT NOTE  [facility]      NAME: Modesto Burgess  : 10/9/1933 (89 y.o.)  MRN: [id number]  CODE STATUS: DNR-CCA  [facility]    Date of Service: 2024    Referring Physician: Christina Robison DO  Rehab Diagnosis: Impaired mobility and ADL's d/t left thalamic CVA    Hospital course:   Comments: 89 y.o. male patient who presented to ER 10/29 with altered mental status and was found on floor. Not TNK candidate for CVA due timing timing window. Patient found to have had an acute L thalamus infarct in addition to being hypernatremic and having APRYL on CKD. Patient admitted with neuro and cardiology consulting.      Restrictions  Restrictions/Precautions  Restrictions/Precautions: Fall Risk     Patient's date of birth confirmed: Yes      SAFETY:  Safety Devices  Safety Devices in place: Yes  Type of devices: All fall risk precautions in place      SUBJECTIVE: \"I didn't work much with my hands.\"     Pain   Pain at start of treatment: No    Pain at end of treatment: No      COGNITION:  Overall Cognitive Status: Exceptions  Arousal/Alertness: Delayed responses to stimuli (Possibly d/t [place])  Following Commands: Follows one step commands with increased time;Follows one step commands with repetition  Attention Span: Appears intact  Safety Judgement: Decreased awareness of need for assistance  Problem Solving: Assistance required to generate solutions;Assistance required to implement solutions  Insights: Decreased awareness of deficits  Initiation: Requires cues for some  Sequencing: Requires cues for some      Pt's current cognitive status is:  Comprehension: SBA  Expression: SBA  Social Interaction: SBA  Problem Solving: Min A  Memory: SBA      OBJECTIVE: Pt in bed resting upon arrival. Introduced self, explained role of OT, and offered to assist pt with morning ADLs. Pt completed bathing/dressing at bed level for safety then transferred to  to complete grooming while  Co-Treat   Time In 830       Time Out 1000         Minutes 90           ADL/IADL trainin minutes  Neuromuscular reeducation: 20 minutes     Electronically signed by:    DELMAR Moore,   2024, 10:26 AM

## 2024-11-08 NOTE — PROGRESS NOTES
Subjective:  The patient complains of severe acute on chronic progressive aphasia, fatigue and right-sided weakness partially relieved by rest, medications, PT,  OT,   SLP and rest and exacerbated by recent left thalamic CVA.       89 yo male was admitted to [facility] on 10/29/2024 after presenting through the ER after falling at home.  He is 89 years old and presented through the ER on 10/29/2024 with altered mental status after being found on the floor.  He was diagnosed with a left thalamic CVA.  He was not a TNK candidate is that he were not sure as to the timing window.  Heparin is on hold for new pain.     Post stroke he was also found to have acute on chronic renal failure and diabetes mellitus with hyperglycemia.     He was admitted under the care of the hospitalist with neurology consulting.  He had significant confusion felt to be secondary to his subarachnoid hemorrhage in the right parietal area antiplatelets therapy and subcutaneous heparin were held.    I am concerned about patient’s medical complexities and barriers to advancing in rehab goals including decreasing stroke risk and monitoring for UTI and urinary retention.        I reviewed current care and plans for further care with other rehab providers including nursing and case management.  According to recent nursing note, \" critical , was reported to Dr. Carey awaiting a response .    ROS x10:  The patient also complains of severely impaired mobility and activities of daily living.  Otherwise no new problems with vision, hearing, nose, mouth, throat, dermal, cardiovascular, GI, , pulmonary, musculoskeletal, psychiatric or neurological. See also Acute Rehab PM&R H&P.       Vital signs:  BP (!) 150/65   Pulse 74   Temp 98.1 °F (36.7 °C) (Oral)   Resp 18   Ht 1.723 m (5' 7.84\")   Wt 93.4 kg (206 lb)   SpO2 98%   BMI 31.47 kg/m²   I/O:   PO/Intake:  fair PO intake, regular diet thins    Bowel:   continent  suppository prn, enema prn.  Encourage therapy and nursing to co-treat and problem solve re continence.    Severe back pain,  , as well as generalized OA pain: reassess pain every shift and prior to and after each therapy session, give prn Tylenol and consider scheduled Tylenol, modalities prn in therapy, masage, Lidoderm, K-pad prn. Consider scheduled AM pain meds. Controlled Substance Monitoring:  Acute and Chronic Pain Monitoring:   RX Monitoring Acute Pain Prescriptions Periodic Controlled Substance Monitoring   11/7/2024   8:28 AM Prescription exceeds daily limit for a specific reason. See comments or note.;Severe pain not adequately treated with lower dose.;Not required given exclusionary diagnoses... Possible medication side effects, risk of tolerance/dependence & alternative treatments discussed.;No signs of potential drug abuse or diversion identified.;Assessed functional status (ability to engage in work or other purposeful activities, the pain intensity and its interference with activities of daily living, quality of family life and social activities, and the physical activity);Obtaining appropriate analgesic effect of treatment.       Skin healing left foot abrasions and breakdown risk:  continue pressure relief program.  Daily skin exams and reports from nursing.  Bacitracin Left foot.  Fatigue due to nutritional and hydration deficiency: Add and titrate vitamin B12 vitamin D and CoQ10 continue to monitor I&O’s, calorie counts prn, dietary consult prn.  Add healthy snack at night.  Acute episodic insomnia with situational adjustment disorder:  prn Ambien, monitor for day time sedation.  Falls risk elevated:  patient to use call light to get nursing assistance to get up, bed and chair alarm.  Elevated DVT risk: progressive activities in PT, continue prophylaxis JB rodrigueze, elevation and meds-see MAR.   Oral pharyngeal dysphagia-up in a degrees of feeds-modified diet as needed.  Complex discharge planning:

## 2024-11-08 NOTE — PROGRESS NOTES
Physical Therapy Rehab Treatment Note  [facility]  [facility]       NAME: Modesto Burgess  : 10/9/1933 (89 y.o.)  MRN: [id number]  CODE STATUS: DNR-CCA    Date of Service: 2024       Restrictions:  Restrictions/Precautions: Fall Risk       SUBJECTIVE:   Subjective: I always do better for the pretty ladies.    Pain  Pain: 0/10 pre and post session      OBJECTIVE:                  Sit to Stand  Assistance Level: Minimal assistance;Moderate assistance  Skilled Clinical Factors: Steadying, mild lifting.  cues for technique and practiced multiple times.  Stand to Sit  Assistance Level: Minimal assistance;Moderate assistance  Skilled Clinical Factors: Occasional decreased eccentric control.    Ambulation  Surface: Level surface  Device: Rolling walker  Distance: 50' with turns.  Assistance Level: Minimal assistance  Gait Deviations: Slow lakisha;Narrow base of support;Decreased step length bilateral;Decreased trunk rotation  Skilled Clinical Factors: slightly flexed trunk.  no LOB                   PT Exercises  A/AROM Exercises: Seated: LAQ x 10, Marching x 10  Resistive Exercises: Seated MRE's x 20: ABD/ADD                        ASSESSMENT/PROGRESS TOWARDS GOALS:   Body Structures, Functions, Activity Limitations Requiring Skilled Therapeutic Intervention: Decreased functional mobility ;Decreased ROM;Decreased endurance;Decreased safe awareness;Decreased balance;Decreased coordination;Decreased posture;Increased pain;Decreased strength;Decreased fine motor control  Assessment: Treatment continues post eval.  Focused on transfers and gait.  Supervising PT notified pt able to complete 50' of gait.  Pt seems very motivated and is active at home.  Assessment  Discharge Recommendations: Continue to assess pending progress    Goals:  Long Term Goals  Long Term Goal 1: Pt to complete bed mobility with SBA  Long Term Goal 2: Pt to complete transfers with SBA  Long Term Goal 3: Pt to ambulate 50ft  with LRD and SBA  Long Term Goal 4: Pt to manage 2 steps with HR and Can  Long Term Goal 5: Pt to manage and propel WC indep 150ft    PLAN OF CARE/Safety:   Safety Devices  Type of Devices: Left in chair;Chair alarm in place      Therapy Time:   Individual   Time In 1030   Time Out 1100   Minutes 30      Minutes: 30  Transfer/Bed mobility training: 10  Gait training: 10  Neuro re education: 0  Therapeutic ex:  10      Arabella Meade PTA, 11/08/24 at 1:11 PM

## 2024-11-08 NOTE — TELEPHONE ENCOUNTER
Patient's son had called wanting Dr. Sandoval to be aware that his father had a stroke and is currently at [facility] in [place].

## 2024-11-08 NOTE — PROGRESS NOTES
Physical Therapy Rehab Treatment Note  Facility/Department[facility]  [facility]       NAME: Modesto Burgess  : 10/9/1933 (89 y.o.)  MRN: [id number]  CODE STATUS: DNR-CCA    Date of Service: 2024       Restrictions:  Restrictions/Precautions: Fall Risk       SUBJECTIVE:        Pain  Pain: 0/10 pre and post session      OBJECTIVE:             Bed Mobility  Overall Assistance Level: Moderate Assistance;Minimal Assistance (on mat)  Roll Left  Assistance Level: Minimal assistance  Skilled Clinical Factors: multiple trials with hands on facilitation required to complete tasks - no less assistance required following  Roll Right  Assistance Level: Minimal assistance  Skilled Clinical Factors: multiple trials with hands on facilitation required to complete tasks - no less assistance required following  Sit to Supine  Assistance Level: Moderate assistance  Skilled Clinical Factors: multiple trials with hands on facilitation required to complete tasks -  no less assistance required following  Supine to Sit  Assistance Level: Moderate assistance  Skilled Clinical Factors: multiple trials with hands on facilitation required to complete tasks - no less assistance required following  Scooting  Assistance Level: Moderate assistance    Transfers  Surface: To mat;From mat;Wheelchair  Additional Factors: Verbal cues;Increased time to complete  Sit to Stand  Assistance Level: Moderate assistance;Maximum assistance  Skilled Clinical Factors: Pt require lifting assistance with multiple trials in session - heavy lean to right requiring assistance for recovery  Stand to Sit  Assistance Level: Moderate assistance;Maximum assistance  Skilled Clinical Factors: eccentric control required  Bed To/From Chair  Assistance Level: Maximum assistance;Moderate assistance  Skilled Clinical Factors: approach to mat with ww required assist for weight shift, walker management and balance maintenance    Ambulation  Surface: Level

## 2024-11-09 LAB
ANION GAP SERPL CALCULATED.3IONS-SCNC: 10 MEQ/L (ref 9–15)
BUN SERPL-MCNC: 110 MG/DL (ref 8–23)
CALCIUM SERPL-MCNC: 9.3 MG/DL (ref 8.5–9.9)
CHLORIDE SERPL-SCNC: 112 MEQ/L (ref 95–107)
CO2 SERPL-SCNC: 19 MEQ/L (ref 20–31)
CREAT SERPL-MCNC: 4.81 MG/DL (ref 0.7–1.2)
GLUCOSE BLD-MCNC: 126 MG/DL (ref 70–99)
GLUCOSE BLD-MCNC: 154 MG/DL (ref 70–99)
GLUCOSE BLD-MCNC: 185 MG/DL (ref 70–99)
GLUCOSE BLD-MCNC: 194 MG/DL (ref 70–99)
GLUCOSE SERPL-MCNC: 131 MG/DL (ref 70–99)
PERFORMED ON: ABNORMAL
POTASSIUM SERPL-SCNC: 5 MEQ/L (ref 3.4–4.9)
SODIUM SERPL-SCNC: 141 MEQ/L (ref 135–144)

## 2024-11-09 PROCEDURE — 97535 SELF CARE MNGMENT TRAINING: CPT

## 2024-11-09 PROCEDURE — 84155 ASSAY OF PROTEIN SERUM: CPT

## 2024-11-09 PROCEDURE — 97116 GAIT TRAINING THERAPY: CPT

## 2024-11-09 PROCEDURE — 1180000000 HC REHAB R&B

## 2024-11-09 PROCEDURE — 97130 THER IVNTJ EA ADDL 15 MIN: CPT

## 2024-11-09 PROCEDURE — 2580000003 HC RX 258: Performed by: INTERNAL MEDICINE

## 2024-11-09 PROCEDURE — 6370000000 HC RX 637 (ALT 250 FOR IP): Performed by: INTERNAL MEDICINE

## 2024-11-09 PROCEDURE — 97530 THERAPEUTIC ACTIVITIES: CPT

## 2024-11-09 PROCEDURE — 36415 COLL VENOUS BLD VENIPUNCTURE: CPT

## 2024-11-09 PROCEDURE — 6370000000 HC RX 637 (ALT 250 FOR IP)

## 2024-11-09 PROCEDURE — 97129 THER IVNTJ 1ST 15 MIN: CPT

## 2024-11-09 PROCEDURE — 97110 THERAPEUTIC EXERCISES: CPT

## 2024-11-09 PROCEDURE — 80048 BASIC METABOLIC PNL TOTAL CA: CPT

## 2024-11-09 PROCEDURE — 6370000000 HC RX 637 (ALT 250 FOR IP): Performed by: PHYSICAL MEDICINE & REHABILITATION

## 2024-11-09 PROCEDURE — 84165 PROTEIN E-PHORESIS SERUM: CPT

## 2024-11-09 RX ADMIN — CARVEDILOL 12.5 MG: 12.5 TABLET, FILM COATED ORAL at 09:24

## 2024-11-09 RX ADMIN — CALCITRIOL 0.25 MCG: 0.25 CAPSULE ORAL at 09:24

## 2024-11-09 RX ADMIN — SODIUM BICARBONATE 650 MG TABLET 650 MG: at 20:33

## 2024-11-09 RX ADMIN — ATORVASTATIN CALCIUM 40 MG: 40 TABLET, FILM COATED ORAL at 09:24

## 2024-11-09 RX ADMIN — INSULIN LISPRO 2 UNITS: 100 INJECTION, SOLUTION INTRAVENOUS; SUBCUTANEOUS at 18:26

## 2024-11-09 RX ADMIN — INSULIN GLARGINE 20 UNITS: 100 INJECTION, SOLUTION SUBCUTANEOUS at 20:54

## 2024-11-09 RX ADMIN — HYDRALAZINE HYDROCHLORIDE 50 MG: 50 TABLET ORAL at 09:25

## 2024-11-09 RX ADMIN — BACITRACIN ZINC, NEOMYCIN, POLYMYXIN B 1 G: 400; 3.5; 5 OINTMENT TOPICAL at 09:34

## 2024-11-09 RX ADMIN — SODIUM BICARBONATE 650 MG TABLET 650 MG: at 09:25

## 2024-11-09 RX ADMIN — ALLOPURINOL 100 MG: 100 TABLET ORAL at 09:24

## 2024-11-09 RX ADMIN — Medication 100 MG: at 09:24

## 2024-11-09 RX ADMIN — ISOSORBIDE MONONITRATE 30 MG: 30 TABLET, EXTENDED RELEASE ORAL at 09:24

## 2024-11-09 RX ADMIN — HYDRALAZINE HYDROCHLORIDE 50 MG: 50 TABLET ORAL at 20:34

## 2024-11-09 RX ADMIN — BACITRACIN ZINC, NEOMYCIN, POLYMYXIN B 1 G: 400; 3.5; 5 OINTMENT TOPICAL at 20:34

## 2024-11-09 RX ADMIN — INSULIN LISPRO 2 UNITS: 100 INJECTION, SOLUTION INTRAVENOUS; SUBCUTANEOUS at 20:54

## 2024-11-09 RX ADMIN — Medication 10 ML: at 09:24

## 2024-11-09 NOTE — PROGRESS NOTES
151     CMP:    Recent Labs     11/08/24  0457 11/09/24  0515    141   K 4.7 5.0*   * 112*   CO2 19* 19*   * 110*   CREATININE 4.36* 4.81*   GLUCOSE 131* 131*   CALCIUM 9.4 9.3   LABGLOM 12.1* 10.8*     Troponin: No results for input(s): \"TROPONINI\" in the last 72 hours.  BNP: No results for input(s): \"BNP\" in the last 72 hours.  INR: No results for input(s): \"INR\" in the last 72 hours.  Lipids: No results for input(s): \"CHOL\", \"LDLDIRECT\", \"TRIG\", \"HDL\", \"AMYLASE\", \"LIPASE\" in the last 72 hours.  Liver: No results for input(s): \"AST\", \"ALT\", \"ALKPHOS\", \"LABALBU\", \"BILITOT\" in the last 72 hours.    Invalid input(s): \"PROT\", \"BILDIR\"  Iron:    Recent Labs     11/08/24  0457   FERRITIN 187     Urinalysis: No results for input(s): \"UA\" in the last 72 hours.    Objective:  Vitals: BP (!) 110/43   Pulse 66   Temp 98.9 °F (37.2 °C) (Oral)   Resp 17   Ht 1.723 m (5' 7.84\")   Wt 93.4 kg (206 lb)   SpO2 96%   BMI 31.47 kg/m²    Wt Readings from Last 3 Encounters:   11/05/24 93.4 kg (206 lb)   11/05/24 93.6 kg (206 lb 5.6 oz)   04/03/24 82.1 kg (181 lb)      24HR INTAKE/OUTPUT:    Intake/Output Summary (Last 24 hours) at 11/9/2024 0908  Last data filed at 11/8/2024 2000  Gross per 24 hour   Intake --   Output 700 ml   Net -700 ml       General: alert, in no apparent distress  HEENT: normocephalic, atraumatic, anicteric  Neck: supple, no mass  Lungs: non-labored respirations, clear to auscultation bilaterally  Heart: regular rate and rhythm, no murmurs or rubs  Abdomen: soft, non-tender, non-distended  Ext: no cyanosis, no peripheral edema  Neuro: alert and oriented, no gross abnormalities  Psych: normal mood and affect  Skin: no rash      Electronically signed by Porter Eaton DO

## 2024-11-09 NOTE — PROGRESS NOTES
Physical Therapy Rehab Treatment Note  Facility/Department[facility]  [facility]       NAME: Modesto Burgess  : 10/9/1933 (89 y.o.)  MRN: [id number]  CODE STATUS: DNR-CCA    Date of Service: 2024       Restrictions:  Restrictions/Precautions: Fall Risk       SUBJECTIVE:   Subjective: \"Ok I'll go\"    Pain  Pain: 0/10 pre and post session      OBJECTIVE:     Bed Mobility  Overall Assistance Level: Minimal Assistance  Additional Factors: Increased time to complete;Head of bed raised;Verbal cues;With handrails  Roll Left  Assistance Level: Minimal assistance  Skilled Clinical Factors: min A & vc's for sequencing and hand placement.  Sit to Supine  Assistance Level: Minimal assistance  Skilled Clinical Factors: min A at trunk and for LE advancement & vc's for sequencing and hand placement.    Transfers  Surface: From bed;Wheelchair  Additional Factors: Verbal cues;Increased time to complete;Hand placement cues  Device:  (no AD)  Sit to Stand  Assistance Level: Minimal assistance;Moderate assistance  Skilled Clinical Factors: Steadying, mild lifting.  cues for technique  Stand to Sit  Assistance Level: Minimal assistance;Moderate assistance  Skilled Clinical Factors: improved eccentric control.    Ambulation  Surface: Level surface  Device: Rolling walker  Distance: 30' with turns  Assistance Level: Minimal assistance  Gait Deviations: Slow lakisha;Narrow base of support;Decreased step length bilateral;Decreased trunk rotation  Skilled Clinical Factors: slightly flexed trunk.  no LOB.    PT Exercises  A/AROM Exercises: Seated: AP, LAQ, Marching, Hip abd, Hip add- x10 BLE       Activity Tolerance  Activity Tolerance: Patient tolerated treatment well    ASSESSMENT/PROGRESS TOWARDS GOALS:   Assessment  Assessment: Pt. was unable to progress in gait this date due to \"sore legs\" from ther ex. Pt. tolerated treatment well. Pt. requires repetitive cueing throughout and demonstrates poor follow through. Varying levels

## 2024-11-09 NOTE — PROGRESS NOTES
Home medications reviewed and updated with family per Dr. Eaton request. , sliding scale insulin given see MAR. Pt resting in bed, denies needs. Electronically signed by Catrachita Pedraza RN on 11/9/2024 at 5:52 PM

## 2024-11-09 NOTE — PLAN OF CARE
Problem: Chronic Conditions and Co-morbidities  Goal: Patient's chronic conditions and co-morbidity symptoms are monitored and maintained or improved  11/9/2024 1034 by Catrachita Pedraza RN  Outcome: Progressing  11/9/2024 0002 by Ena Hassan RN  Outcome: Progressing     Problem: Discharge Planning  Goal: Discharge to home or other facility with appropriate resources  11/9/2024 1034 by Catrachita Pedraza RN  Outcome: Progressing  11/9/2024 0002 by Ena Hassan RN  Outcome: Progressing     Problem: Safety - Adult  Goal: Free from fall injury  11/9/2024 1034 by Catrachita Pedraza RN  Outcome: Progressing  11/9/2024 0002 by Ena Hassan RN  Outcome: Progressing     Problem: ABCDS Injury Assessment  Goal: Absence of physical injury  11/9/2024 1034 by Catrachita Pedraza RN  Outcome: Progressing  11/9/2024 0002 by Ena Hassan RN  Outcome: Progressing     Problem: Skin/Tissue Integrity  Goal: Absence of new skin breakdown  Description: 1.  Monitor for areas of redness and/or skin breakdown  2.  Assess vascular access sites hourly  3.  Every 4-6 hours minimum:  Change oxygen saturation probe site  4.  Every 4-6 hours:  If on nasal continuous positive airway pressure, respiratory therapy assess nares and determine need for appliance change or resting period.  11/9/2024 1034 by Catrachita Pedraza RN  Outcome: Progressing  11/9/2024 0002 by Ena Hassan RN  Outcome: Progressing     Problem: Pain  Goal: Verbalizes/displays adequate comfort level or baseline comfort level  11/9/2024 1034 by Catrachita Pedraza RN  Outcome: Progressing  11/9/2024 0002 by Ena Hassan RN  Outcome: Progressing     Problem: Neurosensory - Adult  Goal: Bowel movement at least every other day  11/9/2024 1034 by Catrachita Pedraza RN  Outcome: Progressing  11/9/2024 0002 by Ena Hassan RN  Outcome: Progressing

## 2024-11-09 NOTE — PROGRESS NOTES
OCCUPATIONAL THERAPY  INPATIENT REHAB TREATMENT NOTE  [facility]      NAME: Modesto Burgess  : 10/9/1933 (89 y.o.)  MRN: [id number]  CODE STATUS: DNR-CCA  [facility]    Date of Service: 2024    Referring Physician: Christina Robison DO  Rehab Diagnosis: Impaired mobility and ADL's d/t left thalamic CVA    Hospital course:   Comments: 89 y.o. male patient who presented to ER 10/29 with altered mental status and was found on floor. Not TNK candidate for CVA due timing timing window. Patient found to have had an acute L thalamus infarct in addition to being hypernatremic and having APRYL on CKD. Patient admitted with neuro and cardiology consulting.      Restrictions  Restrictions/Precautions  Restrictions/Precautions: Fall Risk     Patient's date of birth confirmed: Yes    SAFETY:  Safety Devices  Safety Devices in place: Yes  Type of devices: All fall risk precautions in place    SUBJECTIVE: \"I'm cold.\"     Pain   Pain at start of treatment: No    Pain at end of treatment: No        COGNITION:  Overall Cognitive Status: Exceptions  Following Commands: Follows one step commands with increased time;Follows one step commands with repetition  Attention Span: Appears intact  Problem Solving: Assistance required to implement solutions;Assistance required to generate solutions  Sequencing: Requires cues for some      OBJECTIVE:    Bed Mobility  Supine to Sit  Assistance Level: Dependent;Requires x 2 assistance  Skilled Clinical Factors: HOB flat, Heavy use of bed rails, Increased time and effort  Scooting  Assistance Level: Maximum assistance    Bed To/From Chair  Technique: Stand pivot  Assistance Level: Dependent;Requires x 2 assistance  Skilled Clinical Factors: EOB > WC, Education and cues for sequencing and safe transfer technique      Blocks and Bolts: Pt used bilateral hands to assemble a pattern using colored blocks, threaded rods, and wing nuts. Pt was able to partially replicate a medium complexity

## 2024-11-09 NOTE — PROGRESS NOTES
OCCUPATIONAL THERAPY  INPATIENT REHAB TREATMENT NOTE  [facility]      NAME: Modesto Burgess  : 10/9/1933 (89 y.o.)  MRN: [id number]  CODE STATUS: DNR-CCA  Room: R246/R246-01    Date of Service: 2024    Referring Physician: Christina Robison DO  Rehab Diagnosis: Impaired mobility and ADL's d/t left thalamic CVA    Hospital course:   Comments: 89 y.o. male patient who presented to ER 10/29 with altered mental status and was found on floor. Not TNK candidate for CVA due timing timing window. Patient found to have had an acute L thalamus infarct in addition to being hypernatremic and having APRYL on CKD. Patient admitted with neuro and cardiology consulting.      Restrictions  Restrictions/Precautions  Restrictions/Precautions: Fall Risk     Patient's date of birth confirmed: Yes  Pt able to correctly stated  with increased time.     SAFETY:  Safety Devices  Safety Devices in place: Yes  Type of devices: All fall risk precautions in place      SUBJECTIVE: \"I don't care for it\" pt stated about Theraputty activity.     Pain   Pain at start of treatment: No    Pain at end of treatment: No        OBJECTIVE:    UE Strength  Socks: Pt wore 1# wrist weights  used bilateral hands to retrieve socks that were spread out across the tabletop. Pt matched/folded socks and placed them in a basket placed on the floor to the right of him. Pt had Min difficulty with activity and worked at a steady pace with rest breaks prn. Pt had No difficulty with cognitive challenges of activity. Pt completed activity to improve functional endurance for ADL/IADL completion.        Hand Strength/Fine Motor Coordination   Theraputty: Blue putty (Hard resistance), squeezing, rolling, pinching, folding, inserting and removing beads. Pt successfully inserted/removed 15 of 15 beads with Min difficulty and increased time. Pt completed activity to improve hand strength/fine motor coordination for mgmt of clothing fasteners/ADL containers in a

## 2024-11-09 NOTE — PLAN OF CARE
Problem: Chronic Conditions and Co-morbidities  Goal: Patient's chronic conditions and co-morbidity symptoms are monitored and maintained or improved  11/9/2024 0002 by Ena Hassan RN  Outcome: Progressing  11/8/2024 1018 by Diane Raya RN  Outcome: Progressing     Problem: Discharge Planning  Goal: Discharge to home or other facility with appropriate resources  11/9/2024 0002 by Ena Hassan RN  Outcome: Progressing  11/8/2024 1018 by Diane Raya RN  Outcome: Progressing     Problem: Safety - Adult  Goal: Free from fall injury  11/9/2024 0002 by Ena Hassan RN  Outcome: Progressing  11/8/2024 1018 by Diane Raya RN  Outcome: Progressing     Problem: ABCDS Injury Assessment  Goal: Absence of physical injury  11/9/2024 0002 by Ena Hassan RN  Outcome: Progressing  11/8/2024 1018 by Diane Raya RN  Outcome: Progressing     Problem: Skin/Tissue Integrity  Goal: Absence of new skin breakdown  Description: 1.  Monitor for areas of redness and/or skin breakdown  2.  Assess vascular access sites hourly  3.  Every 4-6 hours minimum:  Change oxygen saturation probe site  4.  Every 4-6 hours:  If on nasal continuous positive airway pressure, respiratory therapy assess nares and determine need for appliance change or resting period.  11/9/2024 0002 by Ena Hassan RN  Outcome: Progressing  11/8/2024 1018 by Diane Raya RN  Outcome: Progressing     Problem: Pain  Goal: Verbalizes/displays adequate comfort level or baseline comfort level  11/9/2024 0002 by Ena Hassan RN  Outcome: Progressing  11/8/2024 1018 by Diane Raya RN  Outcome: Progressing     Problem: Neurosensory - Adult  Goal: Bowel movement at least every other day  11/9/2024 0002 by Ena Hassan RN  Outcome: Progressing  11/8/2024 1018 by Diane Raya RN  Outcome: Progressing

## 2024-11-09 NOTE — CONSULTS
Renal consult dictated  poor historian history per chart   CKD 4/5 per chart GFR 12 cc/min  Hgb 9.0 Gm vitals stable  labs in morning

## 2024-11-09 NOTE — PROGRESS NOTES
Subjective:  The patient complains of severe acute on chronic progressive aphasia, fatigue and right-sided weakness partially relieved by rest, medications, PT,  OT,   SLP and rest and exacerbated by recent left thalamic CVA.       89 yo male was admitted to [facility] on 10/29/2024 after presenting through the ER after falling at home.  He is 89 years old and presented through the ER on 10/29/2024 with altered mental status after being found on the floor.  He was diagnosed with a left thalamic CVA.  He was not a TNK candidate is that he were not sure as to the timing window.  Heparin is on hold for new pain.  Ena Hassan RN  Registered Nurse     Plan of Care      Signed     Date of Service: 11/9/2024 12:02 AM     Signed            Problem: Chronic Conditions and Co-morbidities  Goal: Patient's chronic conditions and co-morbidity symptoms are monitored and maintained or improved  11/9/2024 0002 by Ena Hassan RN  Outcome: Progressing  11/8/2024 1018 by Diane Raya RN  Outcome: Progressing     Problem: Discharge Planning  Goal: Discharge to home or other facility with appropriate resources  11/9/2024 0002 by Ena Hassan RN  Outcome: Progressing  11/8/2024 1018 by Diane Raya RN  Outcome: Progressing     Problem: Safety - Adult  Goal: Free from fall injury  11/9/2024 0002 by Ena Hassan RN  Outcome: Progressing  11/8/2024 1018 by Diane Raya RN  Outcome: Progressing     Problem: ABCDS Injury Assessment  Goal: Absence of physical injury  11/9/2024 0002 by Ena Hassan RN  Outcome: Progressing  11/8/2024 1018 by Diane Raya RN  Outcome: Progressing     Problem: Skin/Tissue Integrity  Goal: Absence of new skin breakdown  Description: 1.  Monitor for areas of redness and/or skin breakdown  2.  Assess vascular access sites hourly  3.  Every 4-6 hours minimum:  Change oxygen saturation probe site  4.  Every 4-6 hours:  If on nasal continuous positive

## 2024-11-10 LAB
ANION GAP SERPL CALCULATED.3IONS-SCNC: 11 MEQ/L (ref 9–15)
BUN SERPL-MCNC: 106 MG/DL (ref 8–23)
CALCIUM SERPL-MCNC: 9.5 MG/DL (ref 8.5–9.9)
CHLORIDE SERPL-SCNC: 113 MEQ/L (ref 95–107)
CO2 SERPL-SCNC: 18 MEQ/L (ref 20–31)
CREAT SERPL-MCNC: 4.39 MG/DL (ref 0.7–1.2)
GLUCOSE BLD-MCNC: 115 MG/DL (ref 70–99)
GLUCOSE BLD-MCNC: 147 MG/DL (ref 70–99)
GLUCOSE BLD-MCNC: 168 MG/DL (ref 70–99)
GLUCOSE BLD-MCNC: 228 MG/DL (ref 70–99)
GLUCOSE SERPL-MCNC: 114 MG/DL (ref 70–99)
PERFORMED ON: ABNORMAL
POTASSIUM SERPL-SCNC: 5 MEQ/L (ref 3.4–4.9)
SODIUM SERPL-SCNC: 142 MEQ/L (ref 135–144)

## 2024-11-10 PROCEDURE — 6370000000 HC RX 637 (ALT 250 FOR IP): Performed by: INTERNAL MEDICINE

## 2024-11-10 PROCEDURE — 36415 COLL VENOUS BLD VENIPUNCTURE: CPT

## 2024-11-10 PROCEDURE — 97116 GAIT TRAINING THERAPY: CPT

## 2024-11-10 PROCEDURE — 6370000000 HC RX 637 (ALT 250 FOR IP): Performed by: PHYSICAL MEDICINE & REHABILITATION

## 2024-11-10 PROCEDURE — 80048 BASIC METABOLIC PNL TOTAL CA: CPT

## 2024-11-10 PROCEDURE — 97535 SELF CARE MNGMENT TRAINING: CPT

## 2024-11-10 PROCEDURE — 1180000000 HC REHAB R&B

## 2024-11-10 PROCEDURE — 6370000000 HC RX 637 (ALT 250 FOR IP)

## 2024-11-10 RX ORDER — POLYETHYLENE GLYCOL 3350 17 G/17G
17 POWDER, FOR SOLUTION ORAL DAILY
Status: DISCONTINUED | OUTPATIENT
Start: 2024-11-10 | End: 2024-11-23 | Stop reason: HOSPADM

## 2024-11-10 RX ORDER — SENNOSIDES A AND B 8.6 MG/1
1 TABLET, FILM COATED ORAL NIGHTLY
Status: DISCONTINUED | OUTPATIENT
Start: 2024-11-10 | End: 2024-11-23 | Stop reason: HOSPADM

## 2024-11-10 RX ADMIN — SODIUM BICARBONATE 650 MG TABLET 650 MG: at 21:22

## 2024-11-10 RX ADMIN — INSULIN GLARGINE 20 UNITS: 100 INJECTION, SOLUTION SUBCUTANEOUS at 21:22

## 2024-11-10 RX ADMIN — CARVEDILOL 12.5 MG: 12.5 TABLET, FILM COATED ORAL at 08:44

## 2024-11-10 RX ADMIN — SODIUM ZIRCONIUM CYCLOSILICATE 5 G: 5 POWDER, FOR SUSPENSION ORAL at 14:18

## 2024-11-10 RX ADMIN — ATORVASTATIN CALCIUM 40 MG: 40 TABLET, FILM COATED ORAL at 08:44

## 2024-11-10 RX ADMIN — CARVEDILOL 12.5 MG: 12.5 TABLET, FILM COATED ORAL at 17:28

## 2024-11-10 RX ADMIN — HYDRALAZINE HYDROCHLORIDE 50 MG: 50 TABLET ORAL at 21:22

## 2024-11-10 RX ADMIN — Medication 100 MG: at 08:44

## 2024-11-10 RX ADMIN — CALCITRIOL 0.25 MCG: 0.25 CAPSULE ORAL at 08:44

## 2024-11-10 RX ADMIN — ISOSORBIDE MONONITRATE 30 MG: 30 TABLET, EXTENDED RELEASE ORAL at 08:44

## 2024-11-10 RX ADMIN — BACITRACIN ZINC, NEOMYCIN, POLYMYXIN B 1 G: 400; 3.5; 5 OINTMENT TOPICAL at 21:24

## 2024-11-10 RX ADMIN — HYDRALAZINE HYDROCHLORIDE 50 MG: 50 TABLET ORAL at 08:43

## 2024-11-10 RX ADMIN — SODIUM BICARBONATE 650 MG TABLET 650 MG: at 08:43

## 2024-11-10 RX ADMIN — ALLOPURINOL 100 MG: 100 TABLET ORAL at 08:44

## 2024-11-10 RX ADMIN — Medication 2000 UNITS: at 17:28

## 2024-11-10 RX ADMIN — INSULIN LISPRO 2 UNITS: 100 INJECTION, SOLUTION INTRAVENOUS; SUBCUTANEOUS at 21:21

## 2024-11-10 NOTE — PROGRESS NOTES
Hospitalist Progress Note      PCP: Cb Rolon MD    Date of Admission: 11/5/2024    Chief Complaint: no active complains     Subjective: patient looks sleepy today AM     Medications:  Reviewed    Infusion Medications    sodium chloride      sodium chloride      dextrose       Scheduled Medications    polyethylene glycol  17 g Oral Daily    senna  1 tablet Oral Nightly    sodium zirconium cyclosilicate  5 g Oral Daily    Vitamin D  2,000 Units Oral Dinner    cyanocobalamin  1,000 mcg IntraMUSCular Weekly    coenzyme Q10  100 mg Oral Daily    lidocaine  3 patch TransDERmal Daily    insulin glargine  20 Units SubCUTAneous Nightly    neomycin-bacitracin-polymyxin   Topical BID    allopurinol  100 mg Oral Daily    atorvastatin  40 mg Oral Daily    calcitRIOL  0.25 mcg Oral Daily    carvedilol  12.5 mg Oral BID WC    hydrALAZINE  50 mg Oral BID    insulin lispro  0-8 Units SubCUTAneous 4x Daily AC & HS    isosorbide mononitrate  30 mg Oral Daily    sodium bicarbonate  650 mg Oral BID     PRN Meds: acetaminophen, bisacodyl, sodium phosphate, sodium chloride, sodium chloride, dextrose, dextrose bolus **OR** dextrose bolus, glucagon (rDNA), glucose, hydrALAZINE, ondansetron **OR** ondansetron    No intake or output data in the 24 hours ending 11/10/24 1113    Exam:    BP (!) 134/57   Pulse 73   Temp 98.6 °F (37 °C) (Oral)   Resp 16   Ht 1.723 m (5' 7.84\")   Wt 93.4 kg (206 lb)   SpO2 97%   BMI 31.47 kg/m²     General appearance: No apparent distress, appears stated age and cooperative.  HEENT: Pupils equal, round, and reactive to light. Conjunctivae/corneas clear.  Neck: Supple, with full range of motion. No jugular venous distention. Trachea midline.  Respiratory:  Normal respiratory effort. Clear to auscultation, bilaterally without Rales/Wheezes/Rhonchi.  Cardiovascular: Regular rate and rhythm with normal S1/S2 without murmurs, rubs or gallops.  Abdomen: Soft, non-tender, non-distended with normal bowel  sounds.  Musculoskeletal: No clubbing, cyanosis or edema bilaterally.  Full range of motion without deformity.  Skin: Skin color, texture, turgor normal.  No rashes or lesions.  Neurologic:  Neurovascularly intact without any focal sensory/motor deficits. Cranial nerves: II-XII intact, grossly non-focal.  Psychiatric:partially  Alert and oriented, thought content appropriate, normal insight  Capillary Refill: Brisk,< 3 seconds   Peripheral Pulses: +2 palpable, equal bilaterally       Labs:   Recent Labs     11/08/24 0457   WBC 6.9   HGB 9.0*   HCT 27.1*        Recent Labs     11/08/24 0457 11/09/24  0515 11/10/24  0547    141 142   K 4.7 5.0* 5.0*   * 112* 113*   CO2 19* 19* 18*   * 110* 106*   CREATININE 4.36* 4.81* 4.39*   CALCIUM 9.4 9.3 9.5     No results for input(s): \"AST\", \"ALT\", \"BILIDIR\", \"BILITOT\", \"ALKPHOS\" in the last 72 hours.  No results for input(s): \"INR\" in the last 72 hours.  No results for input(s): \"CKTOTAL\", \"TROPONINI\" in the last 72 hours.    Urinalysis:      Lab Results   Component Value Date/Time    NITRU Negative 10/29/2024 11:22 PM    WBCUA 0-2 10/29/2024 11:22 PM    BACTERIA Negative 10/29/2024 11:22 PM    RBCUA 3-5 10/29/2024 11:22 PM    BLOODU MODERATE 10/29/2024 11:22 PM    GLUCOSEU 500 10/29/2024 11:22 PM       Radiology:  No orders to display           Assessment/Plan:    Active Hospital Problems    Diagnosis Date Noted    Cardiomyopathy (HCC) [I42.9] 11/04/2024     Priority: High    SAH (subarachnoid hemorrhage) (HCC) [I60.9] 11/08/2024    Late effects of CVA (cerebrovascular accident) [I69.90] 11/01/2024    Impaired mobility ADLs dt L thal CVA [Z74.09, Z78.9] 11/01/2024    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

## 2024-11-10 NOTE — PROGRESS NOTES
Physical Therapy Rehab Treatment Note  Facility/Department[facility]  [facility]       NAME: Modesto Burgess  : 10/9/1933 (89 y.o.)  MRN: [id number]  CODE STATUS: DNR-CCA    Date of Service: 11/10/2024       Restrictions:  Restrictions/Precautions: Fall Risk       SUBJECTIVE: Patient agreeable to session, states he needs to use the bathroom, bm. Patient's son present for tx.           Pain   Denies pain pre treatment, during tx patient c/o pain with bending knees to sit on toilet. Raised toilet seat given for future use. Patient states a lot of pain in kness post treatment however no number given and declines RN or other intervention      OBJECTIVE:           Bed Mobility  Overall Assistance Level: Minimal Assistance;Moderate Assistance  Additional Factors: Head of bed flat;Verbal cues;With handrails  Roll Left  Assistance Level: Minimal assistance  Skilled Clinical Factors: nt  Supine to Sit  Assistance Level: Moderate assistance  Skilled Clinical Factors: with hob flat, assist with trunk lifting    Transfers  Surface: From bed  Additional Factors: Verbal cues;Increased time to complete;Hand placement cues  Sit to Stand  Assistance Level: Minimal assistance;Moderate assistance  Skilled Clinical Factors: Steadying, mild lifting, cues for technique  Stand to Sit  Assistance Level: Minimal assistance;Moderate assistance  Bed To/From Chair  Assistance Level: Moderate assistance  Skilled Clinical Factors: cues for hand placement and safe approach    Stood x5 min bue's / cga/sba for functional movement tolerance during patient clean up    Ambulation  Surface: Level surface  Distance: 15 feet x2  Assistance Level: Minimal assistance  Gait Deviations: Decreased step length bilateral  Skilled Clinical Factors: ff posture, short step length and height assist for weight shift       ASSESSMENT/PROGRESS TOWARDS GOALS:   Assessment  Assessment: Patient with poor tolerance to greater gait distance this date due to greater

## 2024-11-10 NOTE — PLAN OF CARE
Problem: Chronic Conditions and Co-morbidities  Goal: Patient's chronic conditions and co-morbidity symptoms are monitored and maintained or improved  11/10/2024 1035 by Catrachita Pedraza RN  Outcome: Progressing  11/10/2024 0055 by Ena Hassan RN  Outcome: Progressing     Problem: Discharge Planning  Goal: Discharge to home or other facility with appropriate resources  11/10/2024 1035 by Catrachita Pedraza RN  Outcome: Progressing  11/10/2024 0055 by Ena Hassan RN  Outcome: Progressing     Problem: Safety - Adult  Goal: Free from fall injury  11/10/2024 1035 by Catrachita Pedraza RN  Outcome: Progressing  11/10/2024 0055 by Ena Hassan RN  Outcome: Progressing     Problem: ABCDS Injury Assessment  Goal: Absence of physical injury  11/10/2024 1035 by Catrachita Pedraza RN  Outcome: Progressing  11/10/2024 0055 by Ena Hassan RN  Outcome: Progressing     Problem: Skin/Tissue Integrity  Goal: Absence of new skin breakdown  Description: 1.  Monitor for areas of redness and/or skin breakdown  2.  Assess vascular access sites hourly  3.  Every 4-6 hours minimum:  Change oxygen saturation probe site  4.  Every 4-6 hours:  If on nasal continuous positive airway pressure, respiratory therapy assess nares and determine need for appliance change or resting period.  11/10/2024 1035 by Catrachita Pedraza RN  Outcome: Progressing  11/10/2024 0055 by Ena Hassan RN  Outcome: Progressing     Problem: Pain  Goal: Verbalizes/displays adequate comfort level or baseline comfort level  11/10/2024 1035 by Catrachita Pedraza RN  Outcome: Progressing  11/10/2024 0055 by Ena Hassan RN  Outcome: Progressing     Problem: Neurosensory - Adult  Goal: Bowel movement at least every other day  11/10/2024 1035 by Catrachita Pedraza RN  Outcome: Progressing  11/10/2024 0055 by Ena Hassan RN  Outcome: Progressing

## 2024-11-10 NOTE — PROGRESS NOTES
Nephrology Progress Note  Re-dictated consult 3rd time  Assessment:  CKD 4-5  L Thalamic CVA  Hyperkalemia  DM type-2  CAD  Hypertension        Plan:control K+ with lokelma and low diet K+  follow labs daily  add po NaHCO3    Patient Active Problem List:     APRYL (acute kidney injury) ([facility])     CKD (chronic kidney disease)     HTN (hypertension)     CAD (coronary artery disease)     Type 2 diabetes mellitus, with long-term current use of insulin ([facility])     HLD (hyperlipidemia)     Hyperkalemia     Fall at home, initial encounter     Altered mental status     Hyperparathyroidism due to renal insufficiency ([facility])     Palliative care encounter     Goals of care, counseling/discussion     Advanced care planning/counseling discussion     Full code status     Actinic keratosis     PVD (posterior vitreous detachment), both eyes     CVA  L Thalamic     Impaired mobility ADLs dt L thal CVA     Late effect of brain injury     Late effects of CVA (cerebrovascular accident)     [place] (hard of hearing)     Parkinsonian features     Cardiomyopathy ([facility])     SAH (subarachnoid hemorrhage) ([facility])      Subjective:  Admit Date: 11/5/2024    Interval History: stable    Medications:  Scheduled Meds:   sodium zirconium cyclosilicate  5 g Oral Daily    Vitamin D  2,000 Units Oral Dinner    cyanocobalamin  1,000 mcg IntraMUSCular Weekly    coenzyme Q10  100 mg Oral Daily    lidocaine  3 patch TransDERmal Daily    insulin glargine  20 Units SubCUTAneous Nightly    neomycin-bacitracin-polymyxin   Topical BID    allopurinol  100 mg Oral Daily    atorvastatin  40 mg Oral Daily    calcitRIOL  0.25 mcg Oral Daily    carvedilol  12.5 mg Oral BID WC    hydrALAZINE  50 mg Oral BID    insulin lispro  0-8 Units SubCUTAneous 4x Daily AC & HS    isosorbide mononitrate  30 mg Oral Daily    sodium bicarbonate  650 mg Oral BID    sodium chloride flush  5-40 mL IntraVENous 2 times per day     Continuous Infusions:   sodium chloride      sodium chloride       dextrose         CBC:   Recent Labs     11/08/24 0457   WBC 6.9   HGB 9.0*        CMP:    Recent Labs     11/08/24 0457 11/09/24  0515 11/10/24  0547    141 142   K 4.7 5.0* 5.0*   * 112* 113*   CO2 19* 19* 18*   * 110* 106*   CREATININE 4.36* 4.81* 4.39*   GLUCOSE 131* 131* 114*   CALCIUM 9.4 9.3 9.5   LABGLOM 12.1* 10.8* 12.0*     Troponin: No results for input(s): \"TROPONINI\" in the last 72 hours.  BNP: No results for input(s): \"BNP\" in the last 72 hours.  INR: No results for input(s): \"INR\" in the last 72 hours.  Lipids: No results for input(s): \"CHOL\", \"LDLDIRECT\", \"TRIG\", \"HDL\", \"AMYLASE\", \"LIPASE\" in the last 72 hours.  Liver: No results for input(s): \"AST\", \"ALT\", \"ALKPHOS\", \"LABALBU\", \"BILITOT\" in the last 72 hours.    Invalid input(s): \"PROT\", \"BILDIR\"  Iron:    Recent Labs     11/08/24 0457   FERRITIN 187     Urinalysis: No results for input(s): \"UA\" in the last 72 hours.    Objective:  Vitals: BP (!) 125/51   Pulse 72   Temp 98.4 °F (36.9 °C) (Oral)   Resp 17   Ht 1.723 m (5' 7.84\")   Wt 93.4 kg (206 lb)   SpO2 97%   BMI 31.47 kg/m²    Wt Readings from Last 3 Encounters:   11/05/24 93.4 kg (206 lb)   11/05/24 93.6 kg (206 lb 5.6 oz)   04/03/24 82.1 kg (181 lb)      24HR INTAKE/OUTPUT:  No intake or output data in the 24 hours ending 11/10/24 0815    General: alert, in no apparent distress  HEENT: normocephalic, atraumatic, anicteric  Neck: supple, no mass  Lungs: non-labored respirations, clear to auscultation bilaterally  Heart: regular rate and rhythm, no murmurs or rubs  Abdomen: soft, non-tender, non-distended  Ext: no cyanosis, no peripheral edema  Neuro: alert and oriented, no gross abnormalities  Psych: normal mood and affect  Skin: no rash      Electronically signed by Porter Eaton DO

## 2024-11-10 NOTE — PLAN OF CARE

## 2024-11-10 NOTE — PROGRESS NOTES
Attempted feeding patient breakfast but he spit out each bite. Will try again later. Patient went back to sleep.  Electronically signed by Rosario Limon LPN on 11/10/2024 at 8:39 AM

## 2024-11-11 LAB
ANION GAP SERPL CALCULATED.3IONS-SCNC: 11 MEQ/L (ref 9–15)
BASOPHILS # BLD: 0.1 K/UL (ref 0–0.2)
BASOPHILS NFR BLD: 0.6 %
BUN SERPL-MCNC: 101 MG/DL (ref 8–23)
CALCIUM SERPL-MCNC: 9.4 MG/DL (ref 8.5–9.9)
CHLORIDE SERPL-SCNC: 114 MEQ/L (ref 95–107)
CO2 SERPL-SCNC: 19 MEQ/L (ref 20–31)
CREAT SERPL-MCNC: 4.34 MG/DL (ref 0.7–1.2)
EOSINOPHIL # BLD: 0.4 K/UL (ref 0–0.7)
EOSINOPHIL NFR BLD: 5 %
ERYTHROCYTE [DISTWIDTH] IN BLOOD BY AUTOMATED COUNT: 14.2 % (ref 11.5–14.5)
GLUCOSE BLD-MCNC: 141 MG/DL (ref 70–99)
GLUCOSE BLD-MCNC: 153 MG/DL (ref 70–99)
GLUCOSE BLD-MCNC: 193 MG/DL (ref 70–99)
GLUCOSE BLD-MCNC: 94 MG/DL (ref 70–99)
GLUCOSE SERPL-MCNC: 96 MG/DL (ref 70–99)
HCT VFR BLD AUTO: 25.4 % (ref 42–52)
HGB BLD-MCNC: 8.3 G/DL (ref 14–18)
LYMPHOCYTES # BLD: 1.3 K/UL (ref 1–4.8)
LYMPHOCYTES NFR BLD: 14.8 %
MCH RBC QN AUTO: 31.8 PG (ref 27–31.3)
MCHC RBC AUTO-ENTMCNC: 32.7 % (ref 33–37)
MCV RBC AUTO: 97.3 FL (ref 79–92.2)
MONOCYTES # BLD: 0.5 K/UL (ref 0.2–0.8)
MONOCYTES NFR BLD: 5.6 %
NEUTROPHILS # BLD: 6.4 K/UL (ref 1.4–6.5)
NEUTS SEG NFR BLD: 73.5 %
PERFORMED ON: ABNORMAL
PERFORMED ON: NORMAL
PLATELET # BLD AUTO: 188 K/UL (ref 130–400)
POTASSIUM SERPL-SCNC: 4.8 MEQ/L (ref 3.4–4.9)
RBC # BLD AUTO: 2.61 M/UL (ref 4.7–6.1)
SODIUM SERPL-SCNC: 144 MEQ/L (ref 135–144)
WBC # BLD AUTO: 8.6 K/UL (ref 4.8–10.8)

## 2024-11-11 PROCEDURE — 97530 THERAPEUTIC ACTIVITIES: CPT

## 2024-11-11 PROCEDURE — 97535 SELF CARE MNGMENT TRAINING: CPT

## 2024-11-11 PROCEDURE — 85025 COMPLETE CBC W/AUTO DIFF WBC: CPT

## 2024-11-11 PROCEDURE — 6370000000 HC RX 637 (ALT 250 FOR IP): Performed by: PHYSICAL MEDICINE & REHABILITATION

## 2024-11-11 PROCEDURE — 99232 SBSQ HOSP IP/OBS MODERATE 35: CPT | Performed by: PHYSICAL MEDICINE & REHABILITATION

## 2024-11-11 PROCEDURE — 97110 THERAPEUTIC EXERCISES: CPT

## 2024-11-11 PROCEDURE — 97116 GAIT TRAINING THERAPY: CPT

## 2024-11-11 PROCEDURE — 99232 SBSQ HOSP IP/OBS MODERATE 35: CPT | Performed by: NURSE PRACTITIONER

## 2024-11-11 PROCEDURE — 6370000000 HC RX 637 (ALT 250 FOR IP)

## 2024-11-11 PROCEDURE — 6370000000 HC RX 637 (ALT 250 FOR IP): Performed by: INTERNAL MEDICINE

## 2024-11-11 PROCEDURE — 1180000000 HC REHAB R&B

## 2024-11-11 PROCEDURE — 36415 COLL VENOUS BLD VENIPUNCTURE: CPT

## 2024-11-11 PROCEDURE — 80048 BASIC METABOLIC PNL TOTAL CA: CPT

## 2024-11-11 PROCEDURE — 97129 THER IVNTJ 1ST 15 MIN: CPT

## 2024-11-11 PROCEDURE — 97130 THER IVNTJ EA ADDL 15 MIN: CPT

## 2024-11-11 RX ORDER — SODIUM BICARBONATE 650 MG/1
650 TABLET ORAL 2 TIMES DAILY
Status: DISCONTINUED | OUTPATIENT
Start: 2024-11-11 | End: 2024-11-23 | Stop reason: HOSPADM

## 2024-11-11 RX ADMIN — Medication 100 MG: at 08:40

## 2024-11-11 RX ADMIN — ATORVASTATIN CALCIUM 40 MG: 40 TABLET, FILM COATED ORAL at 08:40

## 2024-11-11 RX ADMIN — HYDRALAZINE HYDROCHLORIDE 50 MG: 50 TABLET ORAL at 21:16

## 2024-11-11 RX ADMIN — ALLOPURINOL 100 MG: 100 TABLET ORAL at 08:40

## 2024-11-11 RX ADMIN — CARVEDILOL 12.5 MG: 12.5 TABLET, FILM COATED ORAL at 18:19

## 2024-11-11 RX ADMIN — SODIUM BICARBONATE 650 MG TABLET 650 MG: at 21:15

## 2024-11-11 RX ADMIN — CARVEDILOL 12.5 MG: 12.5 TABLET, FILM COATED ORAL at 08:40

## 2024-11-11 RX ADMIN — INSULIN LISPRO 2 UNITS: 100 INJECTION, SOLUTION INTRAVENOUS; SUBCUTANEOUS at 22:29

## 2024-11-11 RX ADMIN — SODIUM BICARBONATE 650 MG TABLET 650 MG: at 08:40

## 2024-11-11 RX ADMIN — CALCITRIOL 0.25 MCG: 0.25 CAPSULE ORAL at 08:40

## 2024-11-11 RX ADMIN — INSULIN GLARGINE 20 UNITS: 100 INJECTION, SOLUTION SUBCUTANEOUS at 22:29

## 2024-11-11 RX ADMIN — BACITRACIN ZINC, NEOMYCIN, POLYMYXIN B 1 G: 400; 3.5; 5 OINTMENT TOPICAL at 08:41

## 2024-11-11 RX ADMIN — ISOSORBIDE MONONITRATE 30 MG: 30 TABLET, EXTENDED RELEASE ORAL at 08:40

## 2024-11-11 RX ADMIN — HYDRALAZINE HYDROCHLORIDE 50 MG: 50 TABLET ORAL at 08:41

## 2024-11-11 RX ADMIN — SENNOSIDES 8.6 MG: 8.6 TABLET, FILM COATED ORAL at 21:15

## 2024-11-11 RX ADMIN — Medication 2000 UNITS: at 18:19

## 2024-11-11 RX ADMIN — POLYETHYLENE GLYCOL 3350 17 G: 17 POWDER, FOR SOLUTION ORAL at 08:40

## 2024-11-11 RX ADMIN — BACITRACIN ZINC, NEOMYCIN, POLYMYXIN B 1 G: 400; 3.5; 5 OINTMENT TOPICAL at 21:15

## 2024-11-11 NOTE — PROGRESS NOTES
Subjective:  The patient complains of severe acute on chronic progressive aphasia, fatigue and right-sided weakness partially relieved by rest, medications, PT,  OT,   SLP and rest and exacerbated by recent left thalamic CVA.       89 yo male was admitted to [facility] on 10/29/2024 after presenting through the ER after falling at home.  He is 89 years old and presented through the ER on 10/29/2024 with altered mental status after being found on the floor.  He was diagnosed with a left thalamic CVA.  He was not a TNK candidate is that he were not sure as to the timing window.  Heparin is on hold for new pain.     Post stroke he was also found to have acute on chronic renal failure and diabetes mellitus with hyperglycemia.     He was admitted under the care of the hospitalist with neurology consulting.  He had significant confusion felt to be secondary to his subarachnoid hemorrhage in the right parietal area antiplatelets therapy and subcutaneous heparin were held.    I am concerned about patient’s medical complexities and barriers to advancing in rehab goals including decreasing stroke risk and monitoring for UTI and urinary retention.        I reviewed current care and plans for further care with other rehab providers including nursing and case management.  According to recent nursing note, \"VSS. Pt denied having any pain at this time. Medications given per MAR. Pt denies any needs for assistance at this time. Call light within reach, bed locked, bed in lowest position, and bed alarm active. \"    ROS x10:  The patient also complains of severely impaired mobility and activities of daily living.  Otherwise no new problems with vision, hearing, nose, mouth, throat, dermal, cardiovascular, GI, , pulmonary, musculoskeletal, psychiatric or neurological. See also Acute Rehab PM&R H&P.       Vital signs:  BP (!) 143/60   Pulse 64   Temp (!) 0.4 °F (-17.6 °C) (Oral)   Resp 16   Ht 1.723 m (5' 7.84\")

## 2024-11-11 NOTE — PROGRESS NOTES
[facility] Neurology Daily Progress Note  Name: Modesto Burgess  Age: 89 y.o.  Gender: male  CodeStatus: DNR-CCA  Allergies: No Known Allergies    Chief Complaint:No chief complaint on file.    Primary Care Provider: Cb Rolon MD  InpatientTreatment Team: Treatment Team:   Christina Robison DO Patel, Dhruv R, MD Kaplan, Mark, MD Bescak, DO Keila Aguilar Kathryn, ELDA Lehman, Beth DEE, ELDA Estrada, Chuy Rodriguez, Brooke Devlin, MSW, LSW  Admission Date: 11/5/2024      HPI   Pt seen and examined on rehab unit for neurology follow-up.  Alert and oriented x 4.  Patient with some cognitive impairment and short-term memory loss.  Generalized weakness noted.  Denies headache.  Afebrile.  Blood pressure stable.    Vitals:    11/11/24 1815   BP: 132/62   Pulse: 70   Resp:    Temp:    SpO2:         Review of Systems   Constitutional:  Negative for appetite change, chills, fatigue and fever.   HENT:  Negative for hearing loss and trouble swallowing.    Eyes:  Negative for visual disturbance.   Respiratory:  Negative for cough, chest tightness, shortness of breath and wheezing.    Cardiovascular:  Negative for chest pain, palpitations and leg swelling.   Gastrointestinal:  Negative for nausea and vomiting.   Genitourinary:  Negative for difficulty urinating.   Musculoskeletal:  Positive for gait problem.   Skin:  Negative for color change and rash.   Neurological:  Positive for weakness. Negative for dizziness, tremors, seizures, syncope, facial asymmetry, speech difficulty, light-headedness, numbness and headaches.   Psychiatric/Behavioral:  Positive for confusion. Negative for agitation and hallucinations. The patient is not nervous/anxious.          Physical Exam  Vitals and nursing note reviewed.   Constitutional:       General: He is not in acute distress.     Appearance: He is not diaphoretic.   HENT:      Head: Normocephalic and atraumatic.   Eyes:      Pupils: Pupils are equal, round, and reactive to

## 2024-11-11 NOTE — PROGRESS NOTES
Physical Therapy Rehab Treatment Note  [place] [facility]  [facility]       NAME: Modesto Burgess  : 10/9/1933 (89 y.o.)  MRN: [id number]  CODE STATUS: DNR-CCA    Date of Service: 2024     Restrictions:  Restrictions/Precautions: Fall Risk       SUBJECTIVE:        Pain  Pain: No pain reported at start of session, 5-6/10 abisai knee pain with WBing. Pt denied need for intervention.      OBJECTIVE:            Transfers  Surface: Wheelchair  Additional Factors: Verbal cues;Increased time to complete;Hand placement cues  Device: Walker  Sit to Stand  Assistance Level: Minimal assistance  Skilled Clinical Factors: Steadying, mild lifting, cues for technique  Stand to Sit  Assistance Level: Stand by assist;Minimal assistance  Skilled Clinical Factors: improved eccentric control, occasional incidental touching    Ambulation  Surface: Level surface  Device: Rolling walker  Distance: 40', 90'  Activity: Within Unit  Additional Factors: Verbal cues;Increased time to complete  Assistance Level: Stand by assist;Minimal assistance (touching assist)  Gait Deviations: Decreased step length bilateral  Skilled Clinical Factors: ff posture, short step length and height. SBA-touching assist for safety           PT Exercises  A/AROM Exercises: Seated: AP, LAQ, Marching x15ea abisai  Functional Mobility Circuit Training: STS from w/c x2 focusing on technique - good carry over with second attempt  Static Standing Balance Exercises: stand at ww with emphasis on upright posture        Activity Tolerance  Activity Tolerance: Patient tolerated treatment well           ASSESSMENT/PROGRESS TOWARDS GOALS:   Assessment  Assessment: Pt with improved tolerance to increased gait distance this session as compared to previous dates, minimally fatigued following each trial. Demonstrating improved technique with STS from w/c following vc's from therapist and good stability throughout gait, only requiring Amandeep to stand and SBA during

## 2024-11-11 NOTE — FLOWSHEET NOTE
Assessment completed, denies pain at this time. Slept well. Confused to , slow to answer. Dr Eaton in to see patient, asking for name of last nephrology doctor, patient unsure. Message left for Sukhwinder Burgess to call the hospital.

## 2024-11-11 NOTE — PROGRESS NOTES
OCCUPATIONAL THERAPY  INPATIENT REHAB TREATMENT NOTE  [facility]      NAME: Modesto Burgess  : 10/9/1933 (89 y.o.)  MRN: [id number]  CODE STATUS: DNR-CCA  Room: R246/R246-01    Date of Service: 2024    Referring Physician: Christina Robison DO  Rehab Diagnosis: Impaired mobility and ADL's d/t left thalamic CVA    Hospital course:   Comments: 89 y.o. male patient who presented to ER 10/29 with altered mental status and was found on floor. Not TNK candidate for CVA due timing timing window. Patient found to have had an acute L thalamus infarct in addition to being hypernatremic and having APRYL on CKD. Patient admitted with neuro and cardiology consulting.      Restrictions  Restrictions/Precautions  Restrictions/Precautions: Fall Risk                 Patient's date of birth confirmed: Pt verbally unable, verified via wrist band    SAFETY:  Safety Devices  Safety Devices in place: Yes  Type of devices: All fall risk precautions in place    SUBJECTIVE:  Subjective  Subjective: \"Well, I had my birthday all planned out in my head but I can't think of it now.\"    Pain at start of treatment: No    Pain at end of treatment: No    Pt reported knee pain w/ R knee flexion which subsided w/ rest.    COGNITION:  Orientation  Orientation Level: Oriented X4;Oriented to time;Oriented to situation (unable to state birthday; stated year only for time)  Cognition  Overall Cognitive Status: Exceptions  Arousal/Alertness: Appears intact  Following Commands: Follows one step commands with increased time;Follows one step commands with repetition  Attention Span: Appears intact  Memory: Decreased recall of biographical Information;Decreased short term memory;Decreased recall of recent events  Safety Judgement: Decreased awareness of need for assistance  Problem Solving: Assistance required to identify errors made;Assistance required to generate solutions  Insights: Decreased awareness of deficits  Initiation: Requires cues for  mid-transfer.  Education Method: Verbal  Barriers to Learning: Cognition;Hearing  Education Outcome: Continued education needed    Equipment recommendations:  OT Equipment Recommendations  Other: continue to assess      ASSESSMENT:  Assessment: Pt deomnstrated improved functional mobility this date, however [id number] his BLEs buckled requiring Dep x2 for safe, controlled descent to w/c. Pt remains pleasantly confused and requires verbal cues for sequencing during transfers and ADLs.  Activity Tolerance: Patient tolerated treatment well      PLAN OF CARE:  Strengthening, Balance training, Functional mobility training, Endurance training, Safety education & training, Cognitive reorientation, Patient/Caregiver education & training, Equipment evaluation, education, & procurement, Positioning, Self-Care / ADL, Coordination training  Continue per OT POC for planned discharge on 24.    Patient goals : \"Get out of here.\"           Therapy Time:   Individual Group Co-Treat   Time In 0830       Time Out 1000         Minutes 90                   ADL/IADL trainin minutes  Therapeutic activities: 25 minutes     Electronically signed by:    Yennifer Yu OT,   2024, 11:18 AM

## 2024-11-11 NOTE — PROGRESS NOTES
OCCUPATIONAL THERAPY  INPATIENT REHAB TREATMENT NOTE  [facility]      NAME: Modesto Burgess  : 10/9/1933 (89 y.o.)  MRN: [id number]  CODE STATUS: DNR-CCA  Room: [facility]/R246-01    Date of Service: 2024    Referring Physician: Christina Robison DO  Rehab Diagnosis: Impaired mobility and ADL's d/t left thalamic CVA    Hospital course:   Comments: 89 y.o. male patient who presented to ER 10/29 with altered mental status and was found on floor. Not TNK candidate for CVA due timing timing window. Patient found to have had an acute L thalamus infarct in addition to being hypernatremic and having APRYL on CKD. Patient admitted with neuro and cardiology consulting.      Restrictions  Restrictions/Precautions  Restrictions/Precautions: Fall Risk     Patient's date of birth confirmed: Yes    SAFETY:  Safety Devices  Safety Devices in place: Yes  Type of devices: All fall risk precautions in place    SUBJECTIVE: \"I was in [employer].\"    Pain at start of treatment: No    Pain at end of treatment: No    COGNITION:  Orientation  Overall Orientation Status: Within Functional Limits  Cognition  Overall Cognitive Status: Exceptions  Arousal/Alertness: Appears intact  Following Commands: Follows one step commands with increased time;Follows one step commands with repetition  Attention Span: Appears intact  Memory: Decreased recall of biographical Information;Decreased short term memory;Decreased recall of recent events  Safety Judgement: Decreased awareness of need for assistance  Problem Solving: Decreased awareness of errors  Insights: Decreased awareness of deficits  Initiation: Requires cues for some  Sequencing: Requires cues for some    OBJECTIVE:    FM coordination and Strengthening:  Patient engaged in B FM coordination/strengthening and cognition to increase I with fasteners and small objects for ADL's and IADL's.   Patient able to connect MAX resistive pop beads with MIN difficulty.   Patient able to disconnect

## 2024-11-11 NOTE — PROGRESS NOTES
[facility]  Fac[place]/Department[place] [facility]  Speech Language Pathology   Treatment Note          Modesto Burgess  10/9/1933  R246/R246-01  [x]   confirmed    Date: 2024      Restrictions/Precautions: Fall Risk     ADULT DIET; Regular; 4 carb choices (60 gm/meal); Low Potassium (Less than 3000 mg/day)     Respiratory Status:   Room air  No active isolations    Rehab Diagnosis: Impaired mobility and ADL's due to left thalamic CVA     Subjective:  Alert and Cooperative        Interventions used this date:  Cognitive Skill Development    Objective/Assessment:  Patient progressing towards goals:  Goal 1: Patient will state name of facility, time within 1 hour, reason in hospital, current month and year with use of external aid with min cues with 100% accuracy.  Pt oriented to place but needed first letter cue to recall '[facility]'.  Pt oriented to time of day and year.  Pt disoriented to injury (\"I have no idea.  No one seems to know.\").  Pt disoriented to month (no response, given 2 choices pt correctly recalled).   Goal 2: Patient will complete structured tasks addressing sustained/alternating attention with min cues for task completion.  Improved sustained attention with less tangential speech since initial eval.  Pt noted to have decreased alternating attention when phone would sound or when staff member came to room.  Pt asked for repetition when needed.  Goal 3: Patient will complete low level problem solving tasks related to home/health/safety with min cues with 85% accuracy.  Goal 4: Patient will complete convergent and divergent naming tasks for improved thought organization with min cues with 85% accuracy.  Pt named 3 items in abstract category in 3/5 trials, increasing to 5/5 trials with min cues/repetition.  Goal 5: Patient will answer questions addressing remote memory and recent recall with min cues with 90% accuracy.  Pt required increased time and min-mod cues to recall previous events of day.   Given 4

## 2024-11-11 NOTE — CONSULTS
[facility]                   [address] [place] [address]                              CONSULTATION      PATIENT NAME: PAUL CLAYTON                   : 10/09/1933  MED REC NO: [id number]                        ROOM: R246  ACCOUNT NO: [id number]                       ADMIT DATE: 2024  PROVIDER: Porter Eaton DO    RENAL CONSULT    CONSULT DATE: 2024      HISTORY OF PRESENT ILLNESS:  89-year-old  male, being seen for CKD 5.  The patient has known history of chronic kidney disease.dating back 3 years with GFR 24 cc/min.  He is a fair historian, but cannot give details.  He was recently diagnosed with a left thalamic CVA.  The patient does have a history of diabetes mellitus.  He has known coronary artery disease, hyperlipidemia, hyperparathyroidism, diabetes mellitus type 2.trying to find nephrologist  name son no answer    PAST SURGICAL HISTORY:  None reported on the chart.    REVIEW OF SYSTEMS:  Unable to be obtained from the patient.    PAST MEDICAL HISTORY:  Hypertension, hyperlipidemia, diabetes type 2, CKD 4.    PAST SURGICAL HISTORY:  Would be obtained from the chart.    HABITS:  Nothing on the chart to suggest smoking or alcohol use.    REVIEW OF SYSTEMS:  Unable to be performed.    PHYSICAL EXAMINATION:  GENERAL:  The patient is 5 feet 8 inches, 206 pounds.    VITAL SIGNS:  Blood pressure is 114/50, heart rate 60, respirations 20, afebrile.  HEENT:  Normocephalic.  Pupils reactive to light.  Extraocular muscles difficult to evaluate.  NECK:  Supple.  No JVD or adenopathy.    HEART:  Regular rate, 1/6 systolic murmur, S3 gallop.  (Ejection fraction recently diagnosed at 30% through echo).  ABDOMEN:  Soft.  No guarding or rigidity.    EXTREMITIES:  Lower limbs show no edema.    SKIN:  Warm and dry.    IMPRESSIONS:  Chronic kidney disease 4/5, acute cerebrovascular accident, hypertension, hyperlipidemia, coronary artery disease.    PLAN:  We will

## 2024-11-11 NOTE — PROGRESS NOTES
Pt assessment completed. VSS. Pt denied having any pain at this time. Medications given per MAR. Pt denies any needs for assistance at this time. Call light within reach, bed locked, bed in lowest position, and bed alarm active.

## 2024-11-11 NOTE — PROGRESS NOTES
Physical Therapy Rehab Treatment Note  Facility/Department: [facility] [place] [facility]  [facility]       NAME: Modesto Burgess  : 10/9/1933 (89 y.o.)  MRN: [id number]  CODE STATUS: DNR-CCA    Date of Service: 2024     Restrictions:  Restrictions/Precautions: Fall Risk       SUBJECTIVE:   Subjective: \"I'm always cold\"    Pain  Pain: No pain reported at start of session.      OBJECTIVE:            Transfers  Surface: Wheelchair  Additional Factors: Verbal cues;Increased time to complete;Hand placement cues  Device: Walker  Sit to Stand  Assistance Level: Minimal assistance  Skilled Clinical Factors: Steadying, mild lifting, cues for technique  Stand to Sit  Assistance Level: Stand by assist  Skilled Clinical Factors: improved eccentric control    Ambulation  Surface: Level surface  Device: Rolling walker  Distance: 50'  Activity: Within Unit  Additional Factors: Verbal cues;Increased time to complete  Assistance Level: Stand by assist;Minimal assistance (touching assist)  Gait Deviations: Decreased step length bilateral  Skilled Clinical Factors: ff posture, short step length and height. SBA-touching assist for safety           PT Exercises  A/AROM Exercises: Seated: AP, LAQ, Marching, hip add with ball, hip abd side kicks x15ea abisai  Functional Mobility Circuit Training: STS from w/c focusing on technique with improving carry over  Static Standing Balance Exercises: stand at ww with emphasis on upright posture        Activity Tolerance  Activity Tolerance: Patient tolerated treatment well;Patient limited by fatigue           ASSESSMENT/PROGRESS TOWARDS GOALS:   Assessment  Assessment: Pt limited by fatigue this afternoon, requiring increased time to complete tasks as well as occasional vc's to stay awake and participate in seated therex. Plan to assess bed mobility next session, unable to test this afternoon due to pt's bed not made and time limitations at end of tx.  Activity Tolerance: Patient tolerated treatment  well;Patient limited by fatigue    Goals:  Long Term Goals  Long Term Goal 1: Pt to complete bed mobility with SBA  Long Term Goal 2: Pt to complete transfers with SBA  Long Term Goal 3: Pt to ambulate 50ft with LRD and SBA  Long Term Goal 4: Pt to manage 2 steps with HR and Can  Long Term Goal 5: Pt to manage and propel WC indep 150ft    PLAN OF CARE/Safety:   Safety Devices  Type of Devices: All fall risk precautions in place;Chair alarm in place;Left in chair;Call light within reach      Therapy Time:   Individual   Time In 1430   Time Out 1500   Minutes 30      Minutes: 30   Transfer/Bed mobility trainin  Gait training: 15  Neuro re education: 0  Therapeutic ex: Cooper GRAHAM PTA, 24 at 4:20 PM

## 2024-11-11 NOTE — PROGRESS NOTES
Subjective:  The patient complains of severe acute on chronic progressive aphasia, fatigue and right-sided weakness partially relieved by rest, medications, PT,  OT,   SLP and rest and exacerbated by recent left thalamic CVA.       89 yo male was admitted to [facility] on 10/29/2024 after presenting through the ER after falling at home.  He is 89 years old and presented through the ER on 10/29/2024 with altered mental status after being found on the floor.  He was diagnosed with a left thalamic CVA.  He was not a TNK candidate is that he were not sure as to the timing window.  Heparin is on hold for new pain.   Catrachita Pedraza RN  Registered Nurse     Plan of Care      Signed     Date of Service: 11/10/2024 10:35 AM     Signed            Problem: Chronic Conditions and Co-morbidities  Goal: Patient's chronic conditions and co-morbidity symptoms are monitored and maintained or improved  11/10/2024 1035 by Catrachita Pedraza RN  Outcome: Progressing  11/10/2024 0055 by Ena Hassan RN  Outcome: Progressing     Problem: Discharge Planning  Goal: Discharge to home or other facility with appropriate resources  11/10/2024 1035 by Catrachita Pedraza RN  Outcome: Progressing  11/10/2024 0055 by Ena Hassan RN  Outcome: Progressing     Problem: Safety - Adult  Goal: Free from fall injury  11/10/2024 1035 by Catrachita Pedraza RN  Outcome: Progressing  11/10/2024 0055 by Ena Hassan RN  Outcome: Progressing     Problem: ABCDS Injury Assessment  Goal: Absence of physical injury  11/10/2024 1035 by Catrachita Pedraza RN  Outcome: Progressing  11/10/2024 0055 by Ena Hassan RN  Outcome: Progressing     Problem: Skin/Tissue Integrity  Goal: Absence of new skin breakdown  Description: 1.  Monitor for areas of redness and/or skin breakdown  2.  Assess vascular access sites hourly  3.  Every 4-6 hours minimum:  Change oxygen saturation probe site  4.  Every 4-6 hours:  If on nasal continuous  intake, regular diet thins    Bowel:   continent   Bladder: incontinent  no negrete  General:  Patient is well developed,   adequately nourished, and    well kempt.     HEENT:    Pupils equal, HOH_hearing intact to loud voice, external inspection of ear and nose benign.  Inspection of lips, tongue and gums benign  -flat affect  Musculoskeletal: No significant change in strength or tone.  All joints stable.      Inspection and palpation of digits and nails show no clubbing, cyanosis or inflammatory conditions.   Neuro/Psychiatric: Affect: flat but pleasant.  Alert and oriented to person, place and situation with  mod cues.  No significant change in deep tendon reflexes or sensation-right sided weakness--delayed processing  Lungs:  Diminished, CTA-B. Respiration effort is   normal at rest.     Heart:   S1 = S2,   RRR.       Abdomen:  Soft, non-tender, no enlargement of liver or spleen.  Extremities:    lower extremity edema and tenderness   Skin:   Intact to general survey, abrasions on  left foot.    Rehabilitation:  Physical Therapy:   Bed mobility:  Bed mobility  Rolling to Left: Maximum assistance (11/07/24 1048)  Rolling to Right: Maximum assistance (11/07/24 1048)  Supine to Sit: Moderate assistance (11/07/24 1048)  Sit to Supine: Maximum assistance (11/07/24 1048)  Scooting: Maximal assistance (11/06/24 0942)  Bed Mobility Comments: Pt struggles with spatial organization during transitions sit<>supine. Requires hand over hand and step by step sequencing throughout. Slow to complete. (11/07/24 1048)  Bed Mobility  Overall Assistance Level: Minimal Assistance;Moderate Assistance (11/10/24 1204)  Additional Factors: Head of bed flat;Verbal cues;With handrails (11/10/24 1204)  Overall Assistance Level: Minimal Assistance;Moderate Assistance (11/10/24 1204)  Additional Factors: Head of bed flat;Verbal cues;With handrails (11/10/24 1204)  Roll Left  Assistance Level: Minimal assistance (11/10/24 1204)  Skilled Clinical

## 2024-11-12 ENCOUNTER — APPOINTMENT (OUTPATIENT)
Dept: CT IMAGING | Age: 89
End: 2024-11-12
Attending: PHYSICAL MEDICINE & REHABILITATION
Payer: MEDICARE

## 2024-11-12 PROBLEM — Z66 DNR (DO NOT RESUSCITATE): Status: ACTIVE | Noted: 2024-11-12

## 2024-11-12 LAB
GLUCOSE BLD-MCNC: 102 MG/DL (ref 70–99)
GLUCOSE BLD-MCNC: 141 MG/DL (ref 70–99)
GLUCOSE BLD-MCNC: 146 MG/DL (ref 70–99)
GLUCOSE BLD-MCNC: 173 MG/DL (ref 70–99)
PERFORMED ON: ABNORMAL

## 2024-11-12 PROCEDURE — 97130 THER IVNTJ EA ADDL 15 MIN: CPT

## 2024-11-12 PROCEDURE — 6370000000 HC RX 637 (ALT 250 FOR IP): Performed by: PHYSICAL MEDICINE & REHABILITATION

## 2024-11-12 PROCEDURE — 97535 SELF CARE MNGMENT TRAINING: CPT

## 2024-11-12 PROCEDURE — 6370000000 HC RX 637 (ALT 250 FOR IP)

## 2024-11-12 PROCEDURE — 6370000000 HC RX 637 (ALT 250 FOR IP): Performed by: NURSE PRACTITIONER

## 2024-11-12 PROCEDURE — 99232 SBSQ HOSP IP/OBS MODERATE 35: CPT | Performed by: PHYSICAL MEDICINE & REHABILITATION

## 2024-11-12 PROCEDURE — 99232 SBSQ HOSP IP/OBS MODERATE 35: CPT

## 2024-11-12 PROCEDURE — 6370000000 HC RX 637 (ALT 250 FOR IP): Performed by: INTERNAL MEDICINE

## 2024-11-12 PROCEDURE — 97116 GAIT TRAINING THERAPY: CPT

## 2024-11-12 PROCEDURE — 70450 CT HEAD/BRAIN W/O DYE: CPT

## 2024-11-12 PROCEDURE — 97110 THERAPEUTIC EXERCISES: CPT

## 2024-11-12 PROCEDURE — 97530 THERAPEUTIC ACTIVITIES: CPT

## 2024-11-12 PROCEDURE — 1180000000 HC REHAB R&B

## 2024-11-12 PROCEDURE — 97129 THER IVNTJ 1ST 15 MIN: CPT

## 2024-11-12 RX ORDER — ASPIRIN 81 MG/1
162 TABLET, CHEWABLE ORAL DAILY
Status: DISCONTINUED | OUTPATIENT
Start: 2024-11-12 | End: 2024-11-23 | Stop reason: HOSPADM

## 2024-11-12 RX ADMIN — HYDRALAZINE HYDROCHLORIDE 50 MG: 50 TABLET ORAL at 10:25

## 2024-11-12 RX ADMIN — ALLOPURINOL 100 MG: 100 TABLET ORAL at 10:25

## 2024-11-12 RX ADMIN — Medication 100 MG: at 10:24

## 2024-11-12 RX ADMIN — CALCITRIOL 0.25 MCG: 0.25 CAPSULE ORAL at 10:25

## 2024-11-12 RX ADMIN — CARVEDILOL 12.5 MG: 12.5 TABLET, FILM COATED ORAL at 17:07

## 2024-11-12 RX ADMIN — BACITRACIN ZINC, NEOMYCIN, POLYMYXIN B 1 G: 400; 3.5; 5 OINTMENT TOPICAL at 10:27

## 2024-11-12 RX ADMIN — CARVEDILOL 12.5 MG: 12.5 TABLET, FILM COATED ORAL at 10:24

## 2024-11-12 RX ADMIN — ISOSORBIDE MONONITRATE 30 MG: 30 TABLET, EXTENDED RELEASE ORAL at 10:25

## 2024-11-12 RX ADMIN — SODIUM BICARBONATE 650 MG TABLET 650 MG: at 21:45

## 2024-11-12 RX ADMIN — Medication 2000 UNITS: at 17:07

## 2024-11-12 RX ADMIN — ATORVASTATIN CALCIUM 40 MG: 40 TABLET, FILM COATED ORAL at 10:25

## 2024-11-12 RX ADMIN — BACITRACIN ZINC, NEOMYCIN, POLYMYXIN B 1 G: 400; 3.5; 5 OINTMENT TOPICAL at 21:45

## 2024-11-12 RX ADMIN — SODIUM BICARBONATE 650 MG TABLET 650 MG: at 10:25

## 2024-11-12 RX ADMIN — SENNOSIDES 8.6 MG: 8.6 TABLET, FILM COATED ORAL at 21:45

## 2024-11-12 RX ADMIN — ASPIRIN 81 MG CHEWABLE TABLET 162 MG: 81 TABLET CHEWABLE at 14:31

## 2024-11-12 ASSESSMENT — PAIN SCALES - GENERAL: PAINLEVEL_OUTOF10: 3

## 2024-11-12 NOTE — PROGRESS NOTES
OCCUPATIONAL THERAPY  INPATIENT REHAB TREATMENT NOTE  [facility]      NAME: Modesto Burgess  : 10/9/1933 (89 y.o.)  MRN: [id number]  CODE STATUS: DNR-CCA  Room: [facility]/R246-01    Date of Service: 2024    Referring Physician: Christina Robison DO  Rehab Diagnosis: Impaired mobility and ADL's d/t left thalamic CVA    Hospital course:   Comments: 89 y.o. male patient who presented to ER 10/29 with altered mental status and was found on floor. Not TNK candidate for CVA due timing timing window. Patient found to have had an acute L thalamus infarct in addition to being hypernatremic and having APRYL on CKD. Patient admitted with neuro and cardiology consulting.      Restrictions  Restrictions/Precautions  Restrictions/Precautions: Fall Risk     Patient's date of birth confirmed: Yes    SAFETY:  Safety Devices  Safety Devices in place: Yes  Type of devices: All fall risk precautions in place    SUBJECTIVE: \"A minute to you guys is an eternity for me.\"     Pain   Pain at start of treatment: No    Pain at end of treatment: No    OBJECTIVE:    Grooming/Oral Hygiene  Assistance Level: Contact guard assist (Touching Assistance)  Skilled Clinical Factors: Pt stood at sink to complete hand hygiene  Toileting  Assistance Level: Maximum assistance  Skilled Clinical Factors: Pt sat to have BM. Pt able to partially complete hygiene while seated. Assistance for hygiene and pants mgmt    Toilet Transfers  Technique: Stand step  Equipment: Grab bars;Beside commode (commode placed over toilet)  Additional Factors: With handrails;Increased time to complete  Assistance Level: Minimal assistance    Functional Mobility:  Device: FWW  Activity: To/From Bathroom  Assistance Level: Contact Guard Assistance (Touching Assistance)        Upper Extremity Function  UE Strengthing:  BUE HEP (can exercises): Pt used a 1# dumbbell to engage in BUE exercises as follows:   Elbow Flexion/Extension: 1 set x 10 reps   Chest Press: 1 set x 10 reps

## 2024-11-12 NOTE — PROGRESS NOTES
[facility]  [place]Department[place] [facility]  Speech Language Pathology   Treatment Note          Modesto Burgess  10/9/1933  R246/R246-01  [x]   confirmed    Date: 2024      Restrictions/Precautions: Fall Risk     ADULT DIET; Regular; 4 carb choices (60 gm/meal); Low Potassium (Less than 3000 mg/day)     Respiratory Status:     No active isolations    Rehab Diagnosis: Impaired mobility and ADL's due to left thalamic CVA     Subjective:  Alert, Cooperative, and Pleasant        Interventions used this date:  Cognitive Skill Development    Objective/Assessment:  Patient progressing towards goals:  Short Term Goals  Time Frame for Short Term Goals: 2-3 weeks  Goal 1: Patient will state name of facility, time within 1 hour, reason in hospital, current month and year with use of external aid with min cues with 100% accuracy.  Pt was able to recall name of facility, time within 1 hour, and current year. He had difficulties recalling month and benefited from 2 verbal options. Pt had difficulties recalling reason in hospital. He required max verbal cues.   Goal 2: Patient will complete structured tasks addressing sustained/alternating attention with min cues for task completion.  Goal 3: Patient will complete low level problem solving tasks related to home/health/safety with min cues with 85% accuracy.  72% given min-mod verbal cues. Required often redirection to task when alternating his attention to preferred conversation about past memories.  Goal 4: Patient will complete convergent and divergent naming tasks for improved thought organization with min cues with 85% accuracy.  Goal 5: Patient will answer questions addressing remote memory and recent recall with min cues with 90% accuracy.  Goal 6: Patient will follow 2 step verba directions with stand by cues with 85% accuracy.  Simple 2-step directions (e.g., point up and then tap your shoulder) 80% indp    Treatment/Activity Tolerance:  Patient tolerated treatment  well    Plan:  Continue per POC    Pain Assessment:  Patient does not c/o pain.    Pain Re-assessment:  Patient does not c/o pain.    Patient/Caregiver Education:  Patient educated on session and progression towards goals.    Safety Devices:  Chair alarm in place          Speech Therapy Level of Assistance Scale    AUDITORY COMPREHENSION  Rating:  Supervised Assistance-Minimal Assistance    VERBAL EXPRESSION  Rating:  Supervised Assistance-Minimal Assistance    MOTOR SPEECH  Rating: Independent    PROBLEM SOLVING  Rating: Moderate Assistance    MEMORY  Rating:  Moderate Assistance-Maximal Assistance        Therapy Time  SLP Individual Minutes  Time In: 1330  Time Out: 1359  Minutes: 29            Signature: Electronically signed by STORMY Sanabria on 11/12/2024 at 3:48 PM

## 2024-11-12 NOTE — PROGRESS NOTES
patient. Explained in extensive detail nuances between full code, DNR CCA and DNR CC. Patient has made the decision to be DNRCCA.  -Patient has previously elected son, Little as HCPOA.  -Disease process and goals of treatment were discussed in basic terms. Modesto's goal is to optimize available comfort care measures. We discussed the palliative care philosophy in light of those goals. We discussed all care options contingent on treatment response and QOL. Much active listening, presence, and emotional support were given.    I have discussed/reviewed the patient's case with nursing.    11/12/2024:  -Goals of care discussion:  -Advance care planning discussion: Advance Care Planning      Palliative Medicine Provider (MD/NP)  Advance Care Planning (ACP) Conversation      Date of Conversation: 11/12/24  The patient and/or authorized decision maker consented to a voluntary Advance Care Planning conversation.   Individuals present for the conversation:   Patient with decision making capacity and Daughter Justo    Legal Healthcare Agent(s):    Primary Decision Maker (Active): little ware - Child - [phone]    Primary Decision Maker: Aditya BlanchardJusto - Child - [phone]    ACP documents available in EMR prior to discussion:  -Power of  for Healthcare  -Living Will    Primary Palliative Diagnosis(es):  Acute left CVA  Advance care planning discussion  Goals of care discussion  Palliative care encounter    Conversation Summary:  -The patient and patient's daughter admit that  his son/primary HCPOA Little is out of the country in [place] at the moment.  The patient and patient's daughter state that Justo is currently  primary HCPOA given that her brother is not currently available.  -Patient has previously elected son Little as primary and daughter Justo as secondary with physical copy in chart to be scanned in after discharge.  -Reviewed patient's current CODE STATUS and he wishes to remain a DNR-CCA at this time

## 2024-11-12 NOTE — PROGRESS NOTES
Physical Therapy Rehab Treatment Note  [facility]  [facility]       NAME: Modesto Burgess  : 10/9/1933 (89 y.o.)  MRN: [id number]  CODE STATUS: DNR-CCA    Date of Service: 2024     Restrictions:  Restrictions/Precautions: Fall Risk       SUBJECTIVE:   Subjective: Pt agreeable to tx, no new reports.    Pain  Pain: No pain reported at start of session.      OBJECTIVE:         Bed Mobility  Additional Factors: Verbal cues;Increased time to complete;Head of bed flat;With handrails  Roll Left  Assistance Level: Stand by assist  Skilled Clinical Factors: increased time and effort to complete, vc's to improve technique  Roll Right  Assistance Level: Stand by assist;Minimal assistance  Skilled Clinical Factors: assist to complete full roll, vc's for improved technique with good carry over  Sit to Supine  Assistance Level: Minimal assistance  Skilled Clinical Factors: assist at LEs, increased time and effort  Supine to Sit  Assistance Level: Minimal assistance  Skilled Clinical Factors: assist at trunk, vc's for improved technique with good carry over  Scooting  Assistance Level: Stand by assist  Skilled Clinical Factors: to/along EOB    Transfers  Surface: Wheelchair;To bed;From bed  Additional Factors: Verbal cues;Increased time to complete;Hand placement cues  Device: Walker  Sit to Stand  Assistance Level: Stand by assist  Skilled Clinical Factors: increased time needed - improving efficiency  Stand to Sit  Assistance Level: Stand by assist  Skilled Clinical Factors: vc's to reach back with good carry over, good eccentric control noted  Bed To/From Chair  Technique: Stand pivot  Assistance Level: Stand by assist;Minimal assistance  Skilled Clinical Factors: w/c <-> bed with UE support on armrest and bedrail, occasional touching assist for safety. vc's throughout for technique    Ambulation  Surface: Level surface  Device: Rolling walker  Distance: 120'  Activity: Within Unit  Activity

## 2024-11-12 NOTE — PROGRESS NOTES
OCCUPATIONAL THERAPY  INPATIENT REHAB TREATMENT NOTE  [facility]      NAME: Modesto Burgess  : 10/9/1933 (89 y.o.)  MRN: [id number]  CODE STATUS: DNR-CCA  Room: R246/R246-01    Date of Service: 2024    Referring Physician: Christina Robison DO  Rehab Diagnosis: Impaired mobility and ADL's d/t left thalamic CVA    Hospital course:   Comments: 89 y.o. male patient who presented to ER 10/29 with altered mental status and was found on floor. Not TNK candidate for CVA due timing timing window. Patient found to have had an acute L thalamus infarct in addition to being hypernatremic and having APRYL on CKD. Patient admitted with neuro and cardiology consulting.      Restrictions  Restrictions/Precautions  Restrictions/Precautions: Fall Risk                 Patient's date of birth confirmed: Yes    SAFETY:  Safety Devices  Safety Devices in place: Yes  Type of devices: All fall risk precautions in place    SUBJECTIVE:  Subjective  Subjective: \"I didn't know I had more therapy today\"    Pain at start of treatment: No    Pain at end of treatment: No      COGNITION:  Orientation  Overall Orientation Status: Within Functional Limits  Orientation Level: Oriented X4      Pt's current cognitive status is:WFL    OBJECTIVE:       Pt engaged in pushing and removing pennies from pink therapy putty to target FM coordination and hand strengthening. Pt tolerated activity well and able to manage pennies with no difficulties. Pt able to flatten therapy putty to prep activity.          Education:  Education  Education Given To: Patient  Education Provided: Role of Therapy  Education Method: Verbal  Barriers to Learning: None  Education Outcome: Verbalized understanding    Equipment recommendations:  OT Equipment Recommendations  Other: continue to assess      ASSESSMENT:  Assessment: patient willing to participate in therapy for makeup minutes this date.  Activity Tolerance: Patient tolerated treatment well      PLAN OF

## 2024-11-12 NOTE — PROGRESS NOTES
tactile stimulation. Pt requests to eat breakfast before beginning ADL.     Attempted again at 0847 and pt still eating.   Returned at 0906 and pt was ready.     Pt transferred from EOB to . Pt sat at sink in  to complete ADLs as follows.       Bed Mobility   Supine to Sit  Assistance Level: Dependent;Requires x 2 assistance  Skilled Clinical Factors: HOB elevated, Heavy use of bed rails and therapist's hand to pull self up, Increased time and effort. Pt with stiff, flexed posture. Egg crate mattress pad limiting ease of mobility  Scooting  Assistance Level: Stand by assist      Grooming/Oral Hygiene  Assistance Level: Independent  Skilled Clinical Factors: Pt completed oral care and washed face while seated in  at sink. Pt self initiates  Lower Extremity Bathing  Assistance Level: Dependent  Skilled Clinical Factors: Pt agreeable to washing vinicio areas with encouragement. Pt stands using countertop for balance while therapist assists with vinicio area bathing  Upper Extremity Dressing  Assistance Level: Set-up  Skilled Clinical Factors: Pt donned long sleeved sweater  Lower Extremity Dressing  Assistance Level: Minimal assistance  Skilled Clinical Factors: Pt donned pants with increased time and effort. Pt noted to use hands to lift legs and the knees in order to place feet into pants. Pt refused to use reacher despite education, demonstration, and encouragement. Min A to don pants past hips in standing.  Putting On/Taking Off Footwear  Equipment Provided: Long-handle shoe horn  Assistance Level: Moderate assistance  Skilled Clinical Factors: Pt doffed/donned bilateral socks and donned shoes. Pt able to doff socks by using figure four. Pt uses pants to lift legs into figure four. Pt able to don right sock with significantly increased time. Pt able to don right shoe using long handled shoe horn with increased effort. Pt took significant time attempting to manage LLE footwear, however, was unable to. Provided  : \"Get out of here.\"    Therapy Time:   Individual Group Co-Treat   Time In 906       Time Out 1000         Minutes 54       Session started late secondary to pt eating breakfast (-36 minutes)   Will attempt to make up minutes as able.       ADL/IADL trainin minutes     Electronically signed by:    DELMAR Moore,   2024, 10:30 AM

## 2024-11-12 NOTE — PROGRESS NOTES
Subjective:  The patient complains of severe acute on chronic progressive aphasia, fatigue and right-sided weakness partially relieved by rest, medications, PT,  OT,   SLP and rest and exacerbated by recent left thalamic CVA.       89 yo male was admitted to [facility] on 10/29/2024 after presenting through the ER after falling at home.  He is 89 years old and presented through the ER on 10/29/2024 with altered mental status after being found on the floor.  He was diagnosed with a left thalamic CVA.  He was not a TNK candidate is that he were not sure as to the timing window.  Heparin is on hold for new pain.     Post stroke he was also found to have acute on chronic renal failure and diabetes mellitus with hyperglycemia.     He was admitted under the care of the hospitalist with neurology consulting.  He had significant confusion felt to be secondary to his subarachnoid hemorrhage in the right parietal area antiplatelets therapy and subcutaneous heparin were held.    I am concerned about patient’s medical complexities and barriers to advancing in rehab goals including decreasing stroke risk and monitoring for UTI and urinary retention.        I reviewed current care and plans for further care with other rehab providers including nursing and case management.  According to recent nursing note, \"refused CT head for midnight. Pt states he will go in the morning. This RN spoke with CT technician, will coordinate to send pt at 0600 \".  Ordered to recheck last CT-\"Follow-up CT of the head done on 11/3/2024 showed small amount of subarachnoid hemorrhage in the right parietal area and evolving lacunar infarct of the left thalamus \".    ROS x10:  The patient also complains of severely impaired mobility and activities of daily living.  Otherwise no new problems with vision, hearing, nose, mouth, throat, dermal, cardiovascular, GI, , pulmonary, musculoskeletal, psychiatric or neurological. See also Acute  Stand by assist (11/11/24 1441)  Skilled Clinical Factors: improved eccentric control (11/11/24 1441)  Bed To/From Chair  Assistance Level: Moderate assistance (11/10/24 1204)  Skilled Clinical Factors: cues for hand placement and safe approach (11/10/24 1204)  Gait:   Ambulation  Surface: Level tile (11/06/24 1010)  Device: Parallel Bars;Rolling Walker (11/07/24 1011)  Other Apparatus: Wheelchair follow (11/07/24 1011)  Assistance: Minimal assistance (11/07/24 1011)  Quality of Gait: Pt with small L step and R LE knee instability. R knee blocked for safety - no LOB. Heavy bracing with UEs for support. Transfer of technique to WW with excellent follow through. Slow pacing. Struggles with spatial awareness to R side requiring verbal cues for direction. (11/07/24 1011)  Gait Deviations: Slow Shivani;Decreased step length;Decreased step height (11/06/24 1010)  Distance: 8ft in // bars; 15ft with WW (11/07/24 1011)  Ambulation  Surface: Level surface (11/11/24 1441)  Device: Rolling walker (11/11/24 1441)  Distance: 50' (11/11/24 1441)  Activity: Within Unit (11/11/24 1441)  Additional Factors: Verbal cues;Increased time to complete (11/11/24 1441)  Assistance Level: Stand by assist;Minimal assistance (touching assist) (11/11/24 1441)  Gait Deviations: Decreased step length bilateral (11/11/24 1441)  Skilled Clinical Factors: ff posture, short step length and height. SBA-touching assist for safety (11/11/24 1441)  Stairs:  Stairs/Curb  Stairs?: No (11/07/24 1011)  W/C mobility:  Wheelchair Activities  Propulsion: Yes (11/06/24 1010)  Propulsion 1  Method: LUE;RUE (11/06/24 1010)  Level of Assistance: Minimal assistance;Stand by assistance (11/06/24 1010)  Description/ Details: SBA for first 30 feet including one turn, mmin assist required last 30 feet with one additional turn (11/06/24 1010)  Distance: 60 feet (11/06/24 1010)    Occupational Therapy:   Hand Dominance: Right  ADL  Feeding: Setup (11/06/24 1874)  Feeding

## 2024-11-12 NOTE — FLOWSHEET NOTE
Pt refused CT head for midnight. Pt states he will go in the morning. This RN spoke with CT technician, will coordinate to send pt at 0600.

## 2024-11-12 NOTE — PROGRESS NOTES
Physical Therapy Rehab Treatment Note  Facility/Department: [facility]  [facility]       NAME: Modesto Burgess  : 10/9/1933 (89 y.o.)  MRN: [id number]  CODE STATUS: DNR-CCA    Date of Service: 2024     Restrictions:  Restrictions/Precautions: Fall Risk       SUBJECTIVE:   Subjective: \"My daughter gave me a shave\"    Pain  Pain: No pain reported at start of session.      OBJECTIVE:            Transfers  Surface: Wheelchair  Additional Factors: Verbal cues;Increased time to complete;Hand placement cues  Device: Walker  Sit to Stand  Assistance Level: Stand by assist  Skilled Clinical Factors: increased time needed - multiple attempts needed to stand without assist from therapist  Stand to Sit  Assistance Level: Stand by assist  Skilled Clinical Factors: vc's to reach back with good carry over, good eccentric control noted    Ambulation  Surface: Level surface  Device: Rolling walker  Distance: 100'  Activity: Within Unit  Activity Comments: fwd flexed posture, shuffling steps with approach to chairs  Additional Factors: Verbal cues;Increased time to complete  Assistance Level: Stand by assist;Minimal assistance (touching assist)  Gait Deviations: Decreased step length bilateral;Path deviations;Narrow base of support  Skilled Clinical Factors: SBA-touching assist for safety as well as directional cues and cues for fwd gaze           PT Exercises  A/AROM Exercises: Seated: AP, LAQ, Marching, hip add with ball, hip abd side kicks x20ea abisai        Activity Tolerance  Activity Tolerance: Patient tolerated treatment well       ASSESSMENT/PROGRESS TOWARDS GOALS:   Assessment  Assessment: Treatment focused on LE strengthening, gait with ww, and improving technique during transfers. Pt demonstrating improved ability to stand from w/c without assistance this session when allowed increased time and multiple attempts. Vc's provided to improved hand placement. Increased gait distance with good tolerance as well, pt  required cues to improve safety awareness and changes of direction. Continue to progress as able.  Activity Tolerance: Patient tolerated treatment well    Goals:  Long Term Goals  Long Term Goal 1: Pt to complete bed mobility with SBA  Long Term Goal 2: Pt to complete transfers with SBA  Long Term Goal 3: Pt to ambulate 50ft with LRD and SBA  Long Term Goal 4: Pt to manage 2 steps with HR and Can  Long Term Goal 5: Pt to manage and propel WC indep 150ft    PLAN OF CARE/Safety:   Safety Devices  Type of Devices: All fall risk precautions in place;Chair alarm in place;Left in chair;Call light within reach      Therapy Time:   Individual   Time In 1100   Time Out 1130   Minutes 30      Minutes: 30  Transfer/Bed mobility trainin  Gait training: 10  Neuro re education: 0  Therapeutic ex: Roberta GRAHAM PTA, 24 at 12:04 PM

## 2024-11-12 NOTE — PROGRESS NOTES
Nephrology Progress Note    Assessment:  [id number] chronic  DM type-2  Hypertension stable  Recent CVA no changes  Chronic anemia    Plan: alert no distress   discussed with family stability of GFR See Dr Gilmore nephrologist     Patient Active Problem List:     APRYL (acute kidney injury) ([facility])     CKD (chronic kidney disease)     HTN (hypertension)     CAD (coronary artery disease)     Type 2 diabetes mellitus, with long-term current use of insulin ([facility])     HLD (hyperlipidemia)     Hyperkalemia     Fall at home, initial encounter     Altered mental status     Hyperparathyroidism due to renal insufficiency ([facility])     Palliative care encounter     Goals of care, counseling/discussion     Advanced care planning/counseling discussion     Full code status     Actinic keratosis     PVD (posterior vitreous detachment), both eyes     CVA  L Thalamic     Impaired mobility ADLs dt L thal CVA     Late effect of brain injury     Late effects of CVA (cerebrovascular accident)     [place] (hard of hearing)     Parkinsonian features     Cardiomyopathy ([facility])     SAH (subarachnoid hemorrhage) ([facility])      Subjective:  Admit Date: 11/5/2024    Interval History: stable    Medications:  Scheduled Meds:   sodium bicarbonate  650 mg Oral BID    polyethylene glycol  17 g Oral Daily    senna  1 tablet Oral Nightly    Vitamin D  2,000 Units Oral Dinner    cyanocobalamin  1,000 mcg IntraMUSCular Weekly    coenzyme Q10  100 mg Oral Daily    lidocaine  3 patch TransDERmal Daily    insulin glargine  20 Units SubCUTAneous Nightly    neomycin-bacitracin-polymyxin   Topical BID    allopurinol  100 mg Oral Daily    atorvastatin  40 mg Oral Daily    calcitRIOL  0.25 mcg Oral Daily    carvedilol  12.5 mg Oral BID WC    hydrALAZINE  50 mg Oral BID    insulin lispro  0-8 Units SubCUTAneous 4x Daily AC & HS    isosorbide mononitrate  30 mg Oral Daily     Continuous Infusions:   sodium chloride      sodium chloride      dextrose         CBC:   Recent Labs

## 2024-11-13 LAB
ALBUMIN SERPL-MCNC: 2.93 G/DL (ref 3.75–5.01)
ALBUMIN SERPL-MCNC: 3.04 G/DL (ref 3.75–5.01)
ALPHA1 GLOB SERPL ELPH-MCNC: 0.34 G/DL (ref 0.19–0.46)
ALPHA1 GLOB SERPL ELPH-MCNC: 0.35 G/DL (ref 0.19–0.46)
ALPHA2 GLOB SERPL ELPH-MCNC: 0.66 G/DL (ref 0.48–1.05)
ALPHA2 GLOB SERPL ELPH-MCNC: 0.68 G/DL (ref 0.48–1.05)
ANION GAP SERPL CALCULATED.3IONS-SCNC: 11 MEQ/L (ref 9–15)
B-GLOBULIN SERPL ELPH-MCNC: 0.68 G/DL (ref 0.48–1.1)
B-GLOBULIN SERPL ELPH-MCNC: 0.69 G/DL (ref 0.48–1.1)
BUN SERPL-MCNC: 97 MG/DL (ref 8–23)
CALCIUM SERPL-MCNC: 9.3 MG/DL (ref 8.5–9.9)
CHLORIDE SERPL-SCNC: 113 MEQ/L (ref 95–107)
CO2 SERPL-SCNC: 19 MEQ/L (ref 20–31)
CREAT SERPL-MCNC: 4.39 MG/DL (ref 0.7–1.2)
GAMMA GLOB SERPL ELPH-MCNC: 0.96 G/DL (ref 0.62–1.51)
GAMMA GLOB SERPL ELPH-MCNC: 0.97 G/DL (ref 0.62–1.51)
GLUCOSE BLD-MCNC: 140 MG/DL (ref 70–99)
GLUCOSE BLD-MCNC: 167 MG/DL (ref 70–99)
GLUCOSE BLD-MCNC: 184 MG/DL (ref 70–99)
GLUCOSE SERPL-MCNC: 173 MG/DL (ref 70–99)
HCT VFR BLD AUTO: 24.5 % (ref 42–52)
HGB BLD-MCNC: 7.9 G/DL (ref 14–18)
INTERPRETATION SERPL IFE-IMP: ABNORMAL
INTERPRETATION SERPL IFE-IMP: ABNORMAL
PERFORMED ON: ABNORMAL
PHOSPHATE SERPL-MCNC: 4.9 MG/DL (ref 2.3–4.8)
POTASSIUM SERPL-SCNC: 5 MEQ/L (ref 3.4–4.9)
PROT SERPL-MCNC: 5.6 G/DL (ref 6.3–8.2)
PROT SERPL-MCNC: 5.7 G/DL (ref 6.3–8.2)
PROTEIN ELECTROPHORESIS, SERUM: ABNORMAL
PROTEIN ELECTROPHORESIS, SERUM: ABNORMAL
SODIUM SERPL-SCNC: 143 MEQ/L (ref 135–144)

## 2024-11-13 PROCEDURE — 97129 THER IVNTJ 1ST 15 MIN: CPT

## 2024-11-13 PROCEDURE — 97530 THERAPEUTIC ACTIVITIES: CPT

## 2024-11-13 PROCEDURE — 6370000000 HC RX 637 (ALT 250 FOR IP): Performed by: PHYSICAL MEDICINE & REHABILITATION

## 2024-11-13 PROCEDURE — 99232 SBSQ HOSP IP/OBS MODERATE 35: CPT | Performed by: PHYSICAL MEDICINE & REHABILITATION

## 2024-11-13 PROCEDURE — 36415 COLL VENOUS BLD VENIPUNCTURE: CPT

## 2024-11-13 PROCEDURE — 6360000002 HC RX W HCPCS: Performed by: INTERNAL MEDICINE

## 2024-11-13 PROCEDURE — 99232 SBSQ HOSP IP/OBS MODERATE 35: CPT | Performed by: NURSE PRACTITIONER

## 2024-11-13 PROCEDURE — 97535 SELF CARE MNGMENT TRAINING: CPT

## 2024-11-13 PROCEDURE — 6370000000 HC RX 637 (ALT 250 FOR IP): Performed by: INTERNAL MEDICINE

## 2024-11-13 PROCEDURE — 85018 HEMOGLOBIN: CPT

## 2024-11-13 PROCEDURE — 80048 BASIC METABOLIC PNL TOTAL CA: CPT

## 2024-11-13 PROCEDURE — 1180000000 HC REHAB R&B

## 2024-11-13 PROCEDURE — 97130 THER IVNTJ EA ADDL 15 MIN: CPT

## 2024-11-13 PROCEDURE — 6370000000 HC RX 637 (ALT 250 FOR IP): Performed by: NURSE PRACTITIONER

## 2024-11-13 PROCEDURE — 84100 ASSAY OF PHOSPHORUS: CPT

## 2024-11-13 PROCEDURE — 85014 HEMATOCRIT: CPT

## 2024-11-13 PROCEDURE — 97110 THERAPEUTIC EXERCISES: CPT

## 2024-11-13 PROCEDURE — 97116 GAIT TRAINING THERAPY: CPT

## 2024-11-13 PROCEDURE — 6370000000 HC RX 637 (ALT 250 FOR IP)

## 2024-11-13 PROCEDURE — 6360000002 HC RX W HCPCS: Performed by: PHYSICAL MEDICINE & REHABILITATION

## 2024-11-13 RX ORDER — MENTHOL 0 G/G
POWDER TOPICAL 2 TIMES DAILY
Status: DISCONTINUED | OUTPATIENT
Start: 2024-11-13 | End: 2024-11-13 | Stop reason: CLARIF

## 2024-11-13 RX ADMIN — CYANOCOBALAMIN 1000 MCG: 1000 INJECTION, SOLUTION INTRAMUSCULAR; SUBCUTANEOUS at 09:16

## 2024-11-13 RX ADMIN — BACITRACIN ZINC, NEOMYCIN, POLYMYXIN B: 400; 3.5; 5 OINTMENT TOPICAL at 21:13

## 2024-11-13 RX ADMIN — Medication 2000 UNITS: at 17:25

## 2024-11-13 RX ADMIN — SODIUM BICARBONATE 650 MG TABLET 650 MG: at 09:17

## 2024-11-13 RX ADMIN — ALLOPURINOL 100 MG: 100 TABLET ORAL at 09:17

## 2024-11-13 RX ADMIN — CARVEDILOL 12.5 MG: 12.5 TABLET, FILM COATED ORAL at 09:17

## 2024-11-13 RX ADMIN — HYDRALAZINE HYDROCHLORIDE 50 MG: 50 TABLET ORAL at 21:09

## 2024-11-13 RX ADMIN — SODIUM BICARBONATE 650 MG TABLET 650 MG: at 21:10

## 2024-11-13 RX ADMIN — HYDRALAZINE HYDROCHLORIDE 50 MG: 50 TABLET ORAL at 09:17

## 2024-11-13 RX ADMIN — ATORVASTATIN CALCIUM 40 MG: 40 TABLET, FILM COATED ORAL at 09:17

## 2024-11-13 RX ADMIN — Medication 100 MG: at 09:17

## 2024-11-13 RX ADMIN — CALCITRIOL 0.25 MCG: 0.25 CAPSULE ORAL at 09:17

## 2024-11-13 RX ADMIN — ISOSORBIDE MONONITRATE 30 MG: 30 TABLET, EXTENDED RELEASE ORAL at 09:17

## 2024-11-13 RX ADMIN — CARVEDILOL 12.5 MG: 12.5 TABLET, FILM COATED ORAL at 17:25

## 2024-11-13 RX ADMIN — EPOETIN ALFA-EPBX 10000 UNITS: 10000 INJECTION, SOLUTION INTRAVENOUS; SUBCUTANEOUS at 17:25

## 2024-11-13 RX ADMIN — ASPIRIN 81 MG CHEWABLE TABLET 162 MG: 81 TABLET CHEWABLE at 09:17

## 2024-11-13 NOTE — PROGRESS NOTES
Physical Therapy Rehab Treatment Note  [facility]  [facility]       NAME: Modesto Burgess  : 10/9/1933 (89 y.o.)  MRN: [id number]  CODE STATUS: DNR-CCA    Date of Service: 2024       Restrictions:  Restrictions/Precautions: Fall Risk       SUBJECTIVE:        Pain   No pain reported at beginning or end of session      OBJECTIVE:                  Transfers  Sit to Stand: Minimal Assistance;Stand by assistance (multiple trials with varying assistance required)  Stand to Sit: Minimal Assistance  Transfers  Surface: Wheelchair;From chair with arms;To chair with arms  Additional Factors: Verbal cues;Increased time to complete;Hand placement cues  Device: Walker  Sit to Stand  Assistance Level: Stand by assist;Minimal assistance  Skilled Clinical Factors: variable time required - variable assistance with greater assistance from lower surface required  Stand to Sit  Assistance Level: Stand by assist  Skilled Clinical Factors: variable cues required for safe approach to chair and alignment with surface    Ambulation  Device: Rolling walker  Distance: 50 ft; 150 feet  Activity Comments: fwd flexed posture, shuffling steps with approach to chairs  Assistance Level: Stand by assist;Minimal assistance  Gait Deviations: Decreased step length bilateral;Path deviations;Narrow base of support  Skilled Clinical Factors: additional gait with rollator 30 feet incorporated into session - improved quality of turns however decreased quality with straight path gait control    Stairs  Stair Height: 6''  Device: Bilateral handrails  Number of Stairs: 4  Additional Factors: Non-reciprocal going up;Non-reciprocal going down  Assistance Level: Moderate assistance  Skilled Clinical Factors: LOB post with descent requiring assist at trunk for balance recovery  Skilled Clinical Factors - Comments: Pt requires rest between each step and cues to assist with balance correction              PT Exercises  Functional

## 2024-11-13 NOTE — PROGRESS NOTES
Subjective:  The patient complains of severe acute on chronic progressive aphasia, fatigue and right-sided weakness partially relieved by rest, medications, PT,  OT,   SLP and rest and exacerbated by recent left thalamic CVA.       89 yo male was admitted to [facility] on 10/29/2024 after presenting through the ER after falling at home.  He is 89 years old and presented through the ER on 10/29/2024 with altered mental status after being found on the floor.  He was diagnosed with a left thalamic CVA.  He was not a TNK candidate is that he were not sure as to the timing window.  Heparin is on hold for new pain.     Post stroke he was also found to have acute on chronic renal failure and diabetes mellitus with hyperglycemia.     He was admitted under the care of the hospitalist with neurology consulting.  He had significant confusion felt to be secondary to his subarachnoid hemorrhage in the right parietal area antiplatelets therapy and subcutaneous heparin were held.    I am concerned about patient’s medical complexities and barriers to advancing in rehab goals including decreasing stroke risk and monitoring for UTI and urinary retention.        I reviewed current care and plans for further care with other rehab providers including nursing and case management.  According to recent nursing note, \"The patient and patient's daughter admit that  his son/primary HCPOA Sukhwinder is out of the country in [place] at the moment.  The patient and patient's daughter state that Justo is currently  primary HCPOA given that her brother is not currently available.  -Patient has previously elected son Sukhwinder as primary and daughter Justo as secondary with physical copy in chart to be scanned in after discharge.  -Reviewed patient's current CODE STATUS and he wishes to remain a DNR-CCA at this time per conversation.   Resuscitation Status:  DNR-CCA\".    ROS x10:  The patient also complains of severely impaired mobility  12.0 (L) >60    Calcium 9.3 8.5 - 9.9 mg/dL   Phosphorus    Collection Time: 11/13/24  5:20 AM   Result Value Ref Range    Phosphorus 4.9 (H) 2.3 - 4.8 mg/dL   Hemoglobin and Hematocrit    Collection Time: 11/13/24  5:20 AM   Result Value Ref Range    Hemoglobin 7.9 (L) 14.0 - 18.0 g/dL    Hematocrit 24.5 (L) 42.0 - 52.0 %   POCT Glucose    Collection Time: 11/13/24  6:31 AM   Result Value Ref Range    POC Glucose 167 (H) 70 - 99 mg/dl    Performed on ACCU-CHEK        CT HEAD 11/5/2024    1. Stable exam with no new findings in the interval.   2. Atrophy and chronic small vessel ischemic change.   3. Minimal residual subarachnoid hemorrhage seen right parietal region.     XR CHEST  11/3/2024 Tortuous atherosclerotic aorta.  Cardiomegaly.  Old granulomatous disease. Mild central bronchial wall thickening and chronic interstitial coarsening. Bibasilar subsegmental atelectasis or scarring. Trace bilateral pleural effusions/thickening.  Osteopenia.     No new findings or significant changes, as described.     CT HEAD  11/3/2024     1. Small amount of subarachnoid hemorrhage in the right parietal area. No significant mass effect or midline shift.   2. Evolving lacunar infarct of the left thalamus.   3. Stable ventriculomegaly.   4. Stable complete opacification of the right sphenoid sinus with some chronic wall thickening.     Vascular duplex carotid bilateral 11/1/2024    The right internal carotid artery demonstrates 0-50% stenosis. The left internal carotid artery demonstrates 0-50% stenosis. Bilateral vertebral arteries are patent with flow in the normal direction.     MRI BRAIN  10/31/2024   1. Acute infarct in the anterior left thalamus.   2. Cerebral parenchymal volume loss with chronic microvascular white matter ischemic disease.   3. Multifocal chronic infarcts, as above.     MRI LUMBAR SPINE  10/31/2024   1. Subacute small Schmorl's node is seen involving inferior endplate of L2.   2. No acute compression

## 2024-11-13 NOTE — PROGRESS NOTES
Pt requesting that fingersticks for blood glucose be discontinued, A1C is elevated at 8.4, education provided regarding importance of BG monitoring. Abrasion to dorsal aspect of pt's L foot is scabbed over and DELMAR, healing well. Electronically signed by Catrachita Pedraza RN on 11/13/2024 at 3:12 PM

## 2024-11-13 NOTE — PROGRESS NOTES
Nephrology Progress Note    Assessment:  CKD-5  Recent CVA  DM type-2  Hypertension  Mild hyperalemia  Anemia        Plan:maintaining Rehab well  conversation with daughter about stable GFR    Patient Active Problem List:     APRYL (acute kidney injury) ([facility])     CKD (chronic kidney disease)     HTN (hypertension)     CAD (coronary artery disease)     Type 2 diabetes mellitus, with long-term current use of insulin ([facility])     HLD (hyperlipidemia)     Hyperkalemia     Fall at home, initial encounter     Altered mental status     Hyperparathyroidism due to renal insufficiency ([facility])     Palliative care encounter     Goals of care, counseling/discussion     Advanced care planning/counseling discussion     Full code status     Actinic keratosis     PVD (posterior vitreous detachment), both eyes     CVA  L Thalamic     Impaired mobility ADLs dt L thal CVA     Late effect of brain injury     Late effects of CVA (cerebrovascular accident)     [place] (hard of hearing)     Parkinsonian features     Cardiomyopathy ([facility])     SAH (subarachnoid hemorrhage) ([facility])     DNR (do not resuscitate)      Subjective:  Admit Date: 11/5/2024    Interval History: stable    Medications:  Scheduled Meds:   aspirin  162 mg Oral Daily    sodium bicarbonate  650 mg Oral BID    polyethylene glycol  17 g Oral Daily    senna  1 tablet Oral Nightly    Vitamin D  2,000 Units Oral Dinner    cyanocobalamin  1,000 mcg IntraMUSCular Weekly    coenzyme Q10  100 mg Oral Daily    lidocaine  3 patch TransDERmal Daily    insulin glargine  20 Units SubCUTAneous Nightly    neomycin-bacitracin-polymyxin   Topical BID    allopurinol  100 mg Oral Daily    atorvastatin  40 mg Oral Daily    calcitRIOL  0.25 mcg Oral Daily    carvedilol  12.5 mg Oral BID WC    hydrALAZINE  50 mg Oral BID    insulin lispro  0-8 Units SubCUTAneous 4x Daily AC & HS    isosorbide mononitrate  30 mg Oral Daily     Continuous Infusions:   sodium chloride      sodium chloride      dextrose          CBC:   Recent Labs     11/11/24 0437 11/13/24  0520   WBC 8.6  --    HGB 8.3* 7.9*     --      CMP:    Recent Labs     11/11/24 0437 11/13/24  0520    143   K 4.8 5.0*   * 113*   CO2 19* 19*   * 97*   CREATININE 4.34* 4.39*   GLUCOSE 96 173*   CALCIUM 9.4 9.3   LABGLOM 12.2* 12.0*     Troponin: No results for input(s): \"TROPONINI\" in the last 72 hours.  BNP: No results for input(s): \"BNP\" in the last 72 hours.  INR: No results for input(s): \"INR\" in the last 72 hours.  Lipids: No results for input(s): \"CHOL\", \"LDLDIRECT\", \"TRIG\", \"HDL\", \"AMYLASE\", \"LIPASE\" in the last 72 hours.  Liver: No results for input(s): \"AST\", \"ALT\", \"ALKPHOS\", \"LABALBU\", \"BILITOT\" in the last 72 hours.    Invalid input(s): \"PROT\", \"BILDIR\"  Iron:  No results for input(s): \"FERRITIN\" in the last 72 hours.    Invalid input(s): \"IRONS\", \"LABIRONS\"  Urinalysis: No results for input(s): \"UA\" in the last 72 hours.    Objective:  Vitals: BP (!) 108/48   Pulse 60   Temp 98 °F (36.7 °C) (Oral)   Resp 16   Ht 1.723 m (5' 7.84\")   Wt 96.7 kg (213 lb 3 oz)   SpO2 97%   BMI 32.57 kg/m²    Wt Readings from Last 3 Encounters:   11/12/24 96.7 kg (213 lb 3 oz)   11/05/24 93.6 kg (206 lb 5.6 oz)   04/03/24 82.1 kg (181 lb)      24HR INTAKE/OUTPUT:    Intake/Output Summary (Last 24 hours) at 11/13/2024 0822  Last data filed at 11/12/2024 1210  Gross per 24 hour   Intake 360 ml   Output --   Net 360 ml       General: alert, in no apparent distress  HEENT: normocephalic, atraumatic, anicteric  Neck: supple, no mass  Lungs: non-labored respirations, clear to auscultation bilaterally  Heart: regular rate and rhythm, no murmurs or rubs  Abdomen: soft, non-tender, non-distended  Ext: no cyanosis, no peripheral edema  Neuro: alert and oriented, no gross abnormalities  Psych: normal mood and affect  Skin: no rash      Electronically signed by Porter Eaton DO

## 2024-11-13 NOTE — PROGRESS NOTES
[facility]  Facility/Department: [facility]  Speech Language Pathology   Treatment Note          Modesto Burgess  10/9/1933  [id number]/R246-01  [x]   confirmed    Date: 2024      Restrictions/Precautions: Fall Risk     ADULT DIET; Regular; 4 carb choices (60 gm/meal); Low Potassium (Less than 3000 mg/day)     Respiratory Status:   Room air  No active isolations    Rehab Diagnosis: Impaired mobility and ADL's due to left thalamic CVA     Subjective:  Alert and Cooperative        Interventions used this date:  Cognitive Skill Development    Objective/Assessment:  Patient progressing towards goals:  Goal 1: Patient will state name of facility, time within 1 hour, reason in hospital, current month and year with use of external aid with min cues with 100% accuracy.  Pt independently oriented to name of facility, injury, time, month, and year, given additional time and stand by cues for more specific answers.    Goal 2: Patient will complete structured tasks addressing sustained/alternating attention with min cues for task completion.  Pt completed alternating attention task of adding or subtracting 2 numbers shown with 90% accuracy.  Pt sequenced playing cards 2-10 in correct order, alternating suits, with max cues fading to mod cues throughout task.  Goal 3: Patient will complete low level problem solving tasks related to home/health/safety with min cues with 85% accuracy.  Goal 4: Patient will complete convergent and divergent naming tasks for improved thought organization with min cues with 85% accuracy.  Pt named 3 items in abstract category in 2/5 trials, increasing to 5/5 trials with min cues.  Pt named concrete category given 3 words in 4/5 trials, increasing to 5/5 trials with stand by cues.  Goal 5: Patient will answer questions addressing remote memory and recent recall with min cues with 90% accuracy.  Remote memory: age, address, birthday- all independently recalled  Recent memory: difficulty recalling

## 2024-11-13 NOTE — PROGRESS NOTES
Physical Therapy Rehab Treatment Note  Facility/Department: [facility] REHAB  Room: R246/R246-01       NAME: Modesto Burgess  : 10/9/1933 (89 y.o.)  MRN: [id number]  CODE STATUS: DNR-CCA    Date of Service: 2024     Restrictions:  Restrictions/Precautions: Fall Risk       SUBJECTIVE:   Subjective: \"I'm just too comfortable\"    Pain  Pain: No pain reported at start of session.      OBJECTIVE:         Bed Mobility  Additional Factors: Verbal cues;Increased time to complete;Head of bed flat;With handrails  Roll Left  Assistance Level: Stand by assist  Skilled Clinical Factors: completed x4. increased time and effort to complete, vc's to improve technique  Roll Right  Assistance Level: Stand by assist  Skilled Clinical Factors: completed x4. increased time and effort to complete, vc's to improve technique  Supine to Sit  Skilled Clinical Factors: NT due to pt declining OOB activity at this time  Scooting  Assistance Level: Stand by assist;Minimal assistance  Skilled Clinical Factors: intermittent assist at trunk                 PT Exercises  A/AROM Exercises: supine: AP, QS, GS, heel slides, hip abd x20ea abisai; bridges x8 - vc's to improve technique with fair carry over        Activity Tolerance  Activity Tolerance: Patient limited by fatigue;Patient tolerated treatment well           ASSESSMENT/PROGRESS TOWARDS GOALS:   Assessment  Assessment: Treatment completed at bed level this session secondary to pt declining OOB activity at this time due to fatigue. Focused on technique with rolling and supine strengthening activities. Pt demonstrated improved carry over with technique and sequencing during rolling following blocked practice. Multiple verbal and tactile cues provided during supine therex for improved technique and to keep aroused and participate.  Activity Tolerance: Patient limited by fatigue;Patient tolerated treatment well    Goals:  Long Term Goals  Long Term Goal 1: Pt to complete bed mobility with SBA  Long Term

## 2024-11-13 NOTE — PROGRESS NOTES
OCCUPATIONAL THERAPY  INPATIENT REHAB TREATMENT NOTE  [facility]      NAME: Modesto Burgess  : 10/9/1933 (89 y.o.)  MRN: [id number]  CODE STATUS: DNR-CCA  Room: R246R246-01    Date of Service: 2024    Referring Physician: Christina Robison DO  Rehab Diagnosis: Impaired mobility and ADL's d/t left thalamic CVA    Hospital course:   Comments: 89 y.o. male patient who presented to ER 10/29 with altered mental status and was found on floor. Not TNK candidate for CVA due timing timing window. Patient found to have had an acute L thalamus infarct in addition to being hypernatremic and having APRYL on CKD. Patient admitted with neuro and cardiology consulting.      Restrictions  Restrictions/Precautions  Restrictions/Precautions: Fall Risk                 Patient's date of birth confirmed: Yes    SAFETY:  Safety Devices  Safety Devices in place: Yes  Type of devices: All fall risk precautions in place    SUBJECTIVE:  Subjective  Subjective: \"No, I don't remember you.\"    Pain at start of treatment: No    Pain at end of treatment: No    COGNITION:  Orientation  Overall Orientation Status: Within Functional Limits  Orientation Level: Disoriented to situation;Oriented to place;Oriented to time;Oriented to person (unable to state month and year)  Cognition  Overall Cognitive Status: Exceptions  Arousal/Alertness: Appears intact  Following Commands: Follows one step commands with increased time;Follows one step commands with repetition  Attention Span: Appears intact  Memory: Decreased recall of biographical Information;Decreased short term memory;Decreased recall of recent events  Safety Judgement: Decreased awareness of need for assistance  Problem Solving: Decreased awareness of errors  Insights: Decreased awareness of deficits  Initiation: Requires cues for some  Sequencing: Requires cues for some      Pt's current cognitive status is:  Comprehension: Min A  Expression: Independent  Social Interaction:  Given To: Patient  Education Provided: Role of Therapy;Plan of Care;Transfer Training  Education Method: Verbal  Barriers to Learning: Cognition  Education Outcome: Continued education needed;Verbalized understanding    Equipment recommendations:  OT Equipment Recommendations  Other: continue to assess      ASSESSMENT:  Assessment: Patient demonstrated good standing tolerance, however LOB noted w/ minimal reach outside of [place]. Pt participated well in cognitive activites.  Activity Tolerance: Patient tolerated treatment well      PLAN OF CARE:  Strengthening, Balance training, Functional mobility training, Endurance training, Safety education & training, Cognitive reorientation, Patient/Caregiver education & training, Equipment evaluation, education, & procurement, Positioning, Self-Care / ADL, Coordination training  Continue per OT POC for planned discharge on 24.    Patient goals : \"Get out of here.\"           Therapy Time:   Individual Group Co-Treat   Time In 1400       Time Out 1500         Minutes 60                   Therapeutic activities: 35 minutes  Cognitive Retrainin minutes     Electronically signed by:    Yennifer Yu OT,   2024, 4:14 PM

## 2024-11-13 NOTE — PROGRESS NOTES
OCCUPATIONAL THERAPY  INPATIENT REHAB TREATMENT NOTE  [facility]      NAME: Modesto Burgess  : 10/9/1933 (89 y.o.)  MRN: [id number]  CODE STATUS: DNR-CCA  Room: [facility]/R246-01    Date of Service: 2024    Referring Physician: Christina Robison DO  Rehab Diagnosis: Impaired mobility and ADL's d/t left thalamic CVA    Hospital course:   Comments: 89 y.o. male patient who presented to ER 10/29 with altered mental status and was found on floor. Not TNK candidate for CVA due timing timing window. Patient found to have had an acute L thalamus infarct in addition to being hypernatremic and having APRYL on CKD. Patient admitted with neuro and cardiology consulting.      Restrictions  Restrictions/Precautions  Restrictions/Precautions: Fall Risk     Patient's date of birth confirmed: Yes    SAFETY:  Safety Devices  Safety Devices in place: Yes  Type of devices: All fall risk precautions in place    SUBJECTIVE: \"I don't know who washes my feet but I don't.\"    Pain at start of treatment: No    Pain at end of treatment: No    COGNITION:  Orientation  Overall Orientation Status: Within Functional Limits  Cognition  Overall Cognitive Status: Exceptions  Arousal/Alertness: Appears intact  Following Commands: Follows one step commands with increased time;Follows one step commands with repetition  Attention Span: Appears intact  Memory: Decreased recall of biographical Information;Decreased short term memory  Safety Judgement: Decreased awareness of need for assistance  Problem Solving: Good awareness of errors made  Insights: Decreased awareness of deficits  Initiation: Requires cues for some  Sequencing: Requires cues for some    OBJECTIVE:    Patient noted to have a rash on back, 2 sores on L foot and 1 sore on R foot, ELDA Fernandez, notified and assessed.    Grooming/Oral Hygiene  Assistance Level: Independent  Skilled Clinical Factors: seated in armchair at bathroom sink  Upper Extremity Bathing  Assistance Level:  in armchair at bathroom sink  Stand to Sit  Assistance Level: Minimal assistance;Contact guard assist  Skilled Clinical Factors: occasional MIN A 2° 'plopping'.    ASSESSMENT: Patient cooperative while quietly completing ADL tasks. Patient demonstrated decreased recall of events throughout treatment session.    Activity Tolerance: Patient tolerated treatment well      PLAN OF CARE:  Strengthening, Balance training, Functional mobility training, Endurance training, Safety education & training, Cognitive reorientation, Patient/Caregiver education & training, Equipment evaluation, education, & procurement, Positioning, Self-Care / ADL, Coordination training    Continue per OT POC for planned discharge on 24.    Patient goals : \"Get out of here.\"           Therapy Time:   Individual Group Co-Treat   Time In 1100       Time Out 1210         Minutes 70                   ADL/IADL trainin minutes     Electronically signed by:    DELMAR Matthews,   2024, 2:58 PM

## 2024-11-13 NOTE — PROGRESS NOTES
seen right parietal region.  Findings are stable and unchanged.  There is atrophy identified of the brain.  Low-attenuation areas seen in the periventricular and subcortical white  matter to suggest chronic small vessel ischemic change.  There is no acute  intracranial hemorrhage, mass effect or midline shift.  No abnormal  extra-axial fluid collection.  The gray-white differentiation is maintained  without evidence of an acute infarct.  There is no evidence of hydrocephalus.    ORBITS: The visualized portion of the orbits demonstrate no acute abnormality.    SINUSES: The visualized paranasal sinuses and mastoid air cells demonstrate  no acute abnormality.  Chronic sinusitis involving the sphenoid sinuses.  Findings are stable.    SOFT TISSUES/SKULL:  No acute abnormality of the visualized skull or soft  tissues.    Impression  1. Stable exam with no new findings in the interval.  2. Atrophy and chronic small vessel ischemic change.  3. Minimal residual subarachnoid hemorrhage seen right parietal region.  No results found for this or any previous visit.  No results found for this or any previous visit.      Carotid duplex: No results found for this or any previous visit.  No results found for this or any previous visit.  No results found for this or any previous visit.      Echo No results found for this or any previous visit.            Assessment/Plan:  Acute anterior left thalamic ischemic infarct  Follow-up CT of the head done on 11/3/2024 showed small amount of subarachnoid hemorrhage in the right parietal area and evolving lacunar infarct of the left thalamus  Carotid duplex negative for significant stenosis  Echocardiogram with ejection fraction of 30%, left ventricular moderate dilation and severe hypokinesis  LDL 70  Hemoglobin A1c 8.4  Repeat CT of the head 11/11/2024 with resolving hemorrhage.  Aspirin resumed.  Elevated blood pressure, improving  Continue PT/OT/ST           Collaborating physicians:   Darron    Electronically signed by SERENA Huff - CNP on 11/13/2024 at 2:31 PM'

## 2024-11-13 NOTE — CARE COORDINATION
[facility]  INPATIENT REHABILITATION  TEAM CONFERENCE NOTE  Room: R246/R246-01  Admit Date: 2024       Date: 2024  Patient Name: Modesto Burgess        MRN: [id number]    : 10/9/1933  (89 y.o.)  Gender: male        REHAB DIAGNOSIS:   Diagnosis: Impaired mobility and ADL's due to left thalamic CVA    CO MORBIDITIES:      Past Medical History:   Diagnosis Date    CAD (coronary artery disease) 09/15/2020    HLD (hyperlipidemia) 09/15/2020    [facility] (hard of hearing) 2024    Hyperparathyroidism due to renal insufficiency (HCC) 10/30/2024    Late effects of CVA (cerebrovascular accident) 2024    Lumbosacral disc disease     Osteoarthritis     Type 2 diabetes mellitus, with long-term current use of insulin (HCC) 09/15/2020     History reviewed. No pertinent surgical history.     Restrictions  Restrictions/Precautions: Fall Risk  CASE MANAGEMENT    Social/Functional History  Social/Functional History  Lives With: Son (2 sons, DIL)  Type of Home: House  Home Layout: Two level, Able to Live on Main level with bedroom/bathroom (pt stays on the first floor only)  Home Access: Stairs to enter with rails  Entrance Stairs - Number of Steps: 2 IZABELA w/ bilateral HR from garage or 2 IZABELA w/ one HR from front, has a portable ramp as needed  Entrance Stairs - Rails: Both  Bathroom Shower/Tub: Walk-in shower, Doors, Shower chair with back  Bathroom Toilet: Handicap height  Bathroom Equipment: Grab bars in shower, Shower chair, Grab bars around toilet, Hand-held shower  Bathroom Accessibility: Walker accessible  Home Equipment: Walker - Rolling, Wheelchair - Manual, Lift chair, Reacher (bed rails on adjustable bed)  Has the patient had two or more falls in the past year or any fall with injury in the past year?: Yes  Receives Help From: Family (sons and DIL help with ADLs)  ADL Assistance: Independent  Homemaking Assistance: Needs assistance (family completes. Pt reports he does his own  1606)  Attention Span: Appears intact (11/13/24 1606)  Memory: Decreased recall of biographical Information;Decreased short term memory;Decreased recall of recent events (11/13/24 1606)  Safety Judgement: Decreased awareness of need for assistance (11/13/24 1606)  Problem Solving: Decreased awareness of errors (11/13/24 1606)  Insights: Decreased awareness of deficits (11/13/24 1606)  Initiation: Requires cues for some (11/13/24 1606)  Sequencing: Requires cues for some (11/13/24 1606)  Cognition Comment: increased processing time required to answer questions (11/07/24 1242)          Orientation  Overall Orientation Status: Within Functional Limits (11/13/24 1606)  Orientation Level: Disoriented to situation;Oriented to place;Oriented to time;Oriented to person (unable to state month and year) (11/13/24 1606)  SP:Memory: Exceptions to WFL (averaging mod-max cues for recent recall of day and short term memory; improved with remote memory and orientation however can fluctuate)        Problem Solving: Exceptions to WFL (averaging mod cues for simple problem solving)    RECREATIONAL THERAPY  Attendance to recreational therapy programs:    []  Pet Therapy  [] Music Therapy  [] Art Therapy    [] Recreation Therapy Group [] Support Group           Patient social interaction (mood, participation): inconsistent with participation at times, can get agitated easily    Patient strengths: good family support    Patients goal: return home w/ family    Problems/Barriers: not goaled for independence but has son and dtr-in-law home all the time to assist as needed, fluctuates with participation, cog deficits, will need 24/7 supervision        1. Safety:          - Intervention / Plan:    [x]  falls protocol     [x]  PT/OT    [x]  SP        - Results:         2. Potential DME needs:         - Intervention / Plan:  [x]  PT/OT     [x]  Assess equipment needs/access       - Results:         3.  Weakness:          - Intervention / Plan:

## 2024-11-13 NOTE — PLAN OF CARE
Problem: Chronic Conditions and Co-morbidities  Goal: Patient's chronic conditions and co-morbidity symptoms are monitored and maintained or improved  Outcome: Progressing  Flowsheets (Taken 11/13/2024 0030 by Larissa Ferrara, RN)  Care Plan - Patient's Chronic Conditions and Co-Morbidity Symptoms are Monitored and Maintained or Improved: Monitor and assess patient's chronic conditions and comorbid symptoms for stability, deterioration, or improvement     Problem: Discharge Planning  Goal: Discharge to home or other facility with appropriate resources  Outcome: Progressing  Flowsheets (Taken 11/13/2024 0030 by Larissa Ferrara, RN)  Discharge to home or other facility with appropriate resources: Identify barriers to discharge with patient and caregiver     Problem: Safety - Adult  Goal: Free from fall injury  Outcome: Progressing     Problem: ABCDS Injury Assessment  Goal: Absence of physical injury  Outcome: Progressing     Problem: Skin/Tissue Integrity  Goal: Absence of new skin breakdown  Description: 1.  Monitor for areas of redness and/or skin breakdown  2.  Assess vascular access sites hourly  3.  Every 4-6 hours minimum:  Change oxygen saturation probe site  4.  Every 4-6 hours:  If on nasal continuous positive airway pressure, respiratory therapy assess nares and determine need for appliance change or resting period.  Outcome: Progressing     Problem: Pain  Goal: Verbalizes/displays adequate comfort level or baseline comfort level  Outcome: Progressing     Problem: Neurosensory - Adult  Goal: Bowel movement at least every other day  Outcome: Progressing

## 2024-11-14 DIAGNOSIS — I10 ESSENTIAL HYPERTENSION: ICD-10-CM

## 2024-11-14 PROBLEM — E11.65 POORLY CONTROLLED DIABETES MELLITUS (HCC): Status: ACTIVE | Noted: 2024-11-14

## 2024-11-14 LAB
ANION GAP SERPL CALCULATED.3IONS-SCNC: 12 MEQ/L (ref 9–15)
BASOPHILS # BLD: 0 K/UL (ref 0–0.2)
BASOPHILS NFR BLD: 0.5 %
BUN SERPL-MCNC: 94 MG/DL (ref 8–23)
CALCIUM SERPL-MCNC: 9.5 MG/DL (ref 8.5–9.9)
CHLORIDE SERPL-SCNC: 112 MEQ/L (ref 95–107)
CO2 SERPL-SCNC: 19 MEQ/L (ref 20–31)
CREAT SERPL-MCNC: 4.31 MG/DL (ref 0.7–1.2)
CREAT UR-MCNC: 93.3 MG/DL
EOSINOPHIL # BLD: 0 K/UL (ref 0–0.7)
EOSINOPHIL NFR BLD: 0 %
ERYTHROCYTE [DISTWIDTH] IN BLOOD BY AUTOMATED COUNT: 14 % (ref 11.5–14.5)
GLUCOSE BLD-MCNC: 133 MG/DL (ref 70–99)
GLUCOSE BLD-MCNC: 142 MG/DL (ref 70–99)
GLUCOSE BLD-MCNC: 148 MG/DL (ref 70–99)
GLUCOSE BLD-MCNC: 148 MG/DL (ref 70–99)
GLUCOSE SERPL-MCNC: 161 MG/DL (ref 70–99)
HCT VFR BLD AUTO: 25.4 % (ref 42–52)
HGB BLD-MCNC: 8.4 G/DL (ref 14–18)
LYMPHOCYTES # BLD: 1.1 K/UL (ref 1–4.8)
LYMPHOCYTES NFR BLD: 15.5 %
MCH RBC QN AUTO: 32.4 PG (ref 27–31.3)
MCHC RBC AUTO-ENTMCNC: 33.1 % (ref 33–37)
MCV RBC AUTO: 98.1 FL (ref 79–92.2)
MICROALBUMIN UR-MCNC: 6.8 MG/DL
MICROALBUMIN/CREAT UR-RTO: 72.9 MG/G (ref 0–30)
MONOCYTES # BLD: 0.5 K/UL (ref 0.2–0.8)
MONOCYTES NFR BLD: 6.7 %
NEUTROPHILS # BLD: 5.6 K/UL (ref 1.4–6.5)
NEUTS SEG NFR BLD: 77 %
PERFORMED ON: ABNORMAL
PLATELET # BLD AUTO: 218 K/UL (ref 130–400)
POTASSIUM SERPL-SCNC: 5.2 MEQ/L (ref 3.4–4.9)
RBC # BLD AUTO: 2.59 M/UL (ref 4.7–6.1)
SODIUM SERPL-SCNC: 143 MEQ/L (ref 135–144)
WBC # BLD AUTO: 7.3 K/UL (ref 4.8–10.8)

## 2024-11-14 PROCEDURE — 2500000003 HC RX 250 WO HCPCS: Performed by: PHYSICAL MEDICINE & REHABILITATION

## 2024-11-14 PROCEDURE — 85025 COMPLETE CBC W/AUTO DIFF WBC: CPT

## 2024-11-14 PROCEDURE — 82570 ASSAY OF URINE CREATININE: CPT

## 2024-11-14 PROCEDURE — 6370000000 HC RX 637 (ALT 250 FOR IP): Performed by: INTERNAL MEDICINE

## 2024-11-14 PROCEDURE — 6370000000 HC RX 637 (ALT 250 FOR IP): Performed by: PHYSICAL MEDICINE & REHABILITATION

## 2024-11-14 PROCEDURE — 6370000000 HC RX 637 (ALT 250 FOR IP): Performed by: NURSE PRACTITIONER

## 2024-11-14 PROCEDURE — 36415 COLL VENOUS BLD VENIPUNCTURE: CPT

## 2024-11-14 PROCEDURE — 97535 SELF CARE MNGMENT TRAINING: CPT

## 2024-11-14 PROCEDURE — 97530 THERAPEUTIC ACTIVITIES: CPT

## 2024-11-14 PROCEDURE — 80048 BASIC METABOLIC PNL TOTAL CA: CPT

## 2024-11-14 PROCEDURE — 99222 1ST HOSP IP/OBS MODERATE 55: CPT | Performed by: INTERNAL MEDICINE

## 2024-11-14 PROCEDURE — 1180000000 HC REHAB R&B

## 2024-11-14 PROCEDURE — 97116 GAIT TRAINING THERAPY: CPT

## 2024-11-14 PROCEDURE — 6370000000 HC RX 637 (ALT 250 FOR IP)

## 2024-11-14 PROCEDURE — 99233 SBSQ HOSP IP/OBS HIGH 50: CPT | Performed by: PHYSICAL MEDICINE & REHABILITATION

## 2024-11-14 PROCEDURE — 82043 UR ALBUMIN QUANTITATIVE: CPT

## 2024-11-14 RX ORDER — INSULIN GLARGINE 100 [IU]/ML
14 INJECTION, SOLUTION SUBCUTANEOUS NIGHTLY
Status: DISCONTINUED | OUTPATIENT
Start: 2024-11-14 | End: 2024-11-20

## 2024-11-14 RX ADMIN — CALCITRIOL 0.25 MCG: 0.25 CAPSULE ORAL at 09:38

## 2024-11-14 RX ADMIN — ALLOPURINOL 100 MG: 100 TABLET ORAL at 09:38

## 2024-11-14 RX ADMIN — BACITRACIN ZINC, NEOMYCIN, POLYMYXIN B 1 G: 400; 3.5; 5 OINTMENT TOPICAL at 21:14

## 2024-11-14 RX ADMIN — BACITRACIN ZINC, NEOMYCIN, POLYMYXIN B: 400; 3.5; 5 OINTMENT TOPICAL at 12:44

## 2024-11-14 RX ADMIN — INSULIN GLARGINE 14 UNITS: 100 INJECTION, SOLUTION SUBCUTANEOUS at 21:13

## 2024-11-14 RX ADMIN — POLYETHYLENE GLYCOL 3350 17 G: 17 POWDER, FOR SOLUTION ORAL at 09:37

## 2024-11-14 RX ADMIN — CARVEDILOL 12.5 MG: 12.5 TABLET, FILM COATED ORAL at 17:20

## 2024-11-14 RX ADMIN — MICONAZOLE NITRATE: 2 POWDER TOPICAL at 12:45

## 2024-11-14 RX ADMIN — CARVEDILOL 12.5 MG: 12.5 TABLET, FILM COATED ORAL at 09:37

## 2024-11-14 RX ADMIN — SODIUM BICARBONATE 650 MG TABLET 650 MG: at 09:38

## 2024-11-14 RX ADMIN — HYDRALAZINE HYDROCHLORIDE 50 MG: 50 TABLET ORAL at 09:38

## 2024-11-14 RX ADMIN — ISOSORBIDE MONONITRATE 30 MG: 30 TABLET, EXTENDED RELEASE ORAL at 09:38

## 2024-11-14 RX ADMIN — ATORVASTATIN CALCIUM 40 MG: 40 TABLET, FILM COATED ORAL at 09:38

## 2024-11-14 RX ADMIN — ASPIRIN 81 MG CHEWABLE TABLET 162 MG: 81 TABLET CHEWABLE at 09:38

## 2024-11-14 RX ADMIN — Medication 2000 UNITS: at 17:20

## 2024-11-14 RX ADMIN — Medication 100 MG: at 09:37

## 2024-11-14 RX ADMIN — MICONAZOLE NITRATE: 2 POWDER TOPICAL at 22:26

## 2024-11-14 NOTE — PROGRESS NOTES
OCCUPATIONAL THERAPY  INPATIENT REHAB TREATMENT NOTE  [facility]      NAME: Modesto Burgess  : 10/9/1933 (89 y.o.)  MRN: [id number]  CODE STATUS: DNR-CCA  [facility]    Date of Service: 2024    Referring Physician: Christina Robison DO  Rehab Diagnosis: Impaired mobility and ADL's d/t left thalamic CVA    Hospital course:   Comments: 89 y.o. male patient who presented to ER 10/29 with altered mental status and was found on floor. Not TNK candidate for CVA due timing timing window. Patient found to have had an acute L thalamus infarct in addition to being hypernatremic and having APRYL on CKD. Patient admitted with neuro and cardiology consulting.      Restrictions:FAll                    Patient's date of birth confirmed: Yes    SAFETY:  Safety Devices  Safety Devices in place: Yes  Type of devices: All fall risk precautions in place    SUBJECTIVE:Pt pleasant and cooperative.  Pt's friend present for part of session.  Pt with memory deficits and minimal problem solving difficulty noted.       Pain at start of treatment: No    Pain at end of treatment: No    Location: N/A  Description: N/A  Nursing notified: No  Nurse: N/A  Intervention: Repositioned    COGNITION:         Problem Solving: Min A  Memory: Min A    OBJECTIVE:     Pt completed acts at table top to increase stand tolerance, UE function and cognition for acts completion.  Pt completed card sorting of one large deck of playing cards to suit and order.  Pt completed with increased time and one seated rest break.  Pt completed bolts and blocks design x 2 with minimal verbal cues required for correct completion of first design.  Pt completed second design in seated with increased time without mistakes noted.               Education:       Equipment recommendations:         ASSESSMENT:Pt increasing standing tolerance for acts completion.           PLAN OF CARE:  Strengthening, Balance training, Functional mobility training, Endurance training,  Safety education & training, Cognitive reorientation, Patient/Caregiver education & training, Equipment evaluation, education, & procurement, Positioning, Self-Care / ADL, Coordination training  Continue per OT POC for planned discharge on 11-26-24.    Patient goals : \"Get out of here.\"           Therapy Time:   Individual Group Co-Treat   Time In 1400       Time Out 1500         Minutes 60                   Therapeutic activities: 60 minutes     Electronically signed by:    JOON Bingham/L,   11/14/2024, 3:16 PM Electronically signed by JOON Bingham/L on 11/14/24 at 3:19 PM EST

## 2024-11-14 NOTE — CARE COORDINATION
MOO met with pt, Hema (son), and JUICE, while RN was present. Hema is requesting to discharge pt home on 11/17. MOO discussed therapy schedule for tomorrow and he is going to follow up with his siblings to see which times they can attend. Electronically signed by JONNY Edmond, MOO on 11/14/2024 at 4:28 PM

## 2024-11-14 NOTE — PROGRESS NOTES
OCCUPATIONAL THERAPY  INPATIENT REHAB TREATMENT NOTE  [facility]      NAME: Modesto Burgess  : 10/9/1933 (89 y.o.)  MRN: [id number]  CODE STATUS: DNR-CCA  Room: [facility]/R246-01    Date of Service: 2024    Referring Physician: Christina Robison DO  Rehab Diagnosis: Impaired mobility and ADL's d/t left thalamic CVA    Hospital course:   Comments: 89 y.o. male patient who presented to ER 10/29 with altered mental status and was found on floor. Not TNK candidate for CVA due timing timing window. Patient found to have had an acute L thalamus infarct in addition to being hypernatremic and having APRYL on CKD. Patient admitted with neuro and cardiology consulting.      Restrictions  Restrictions/Precautions  Restrictions/Precautions: Fall Risk     Patient's date of birth confirmed: Yes    SAFETY:  Safety Devices  Safety Devices in place: Yes  Type of devices: All fall risk precautions in place    SUBJECTIVE: \"I have 6 boys and 1 girl. Two girls.\"    Pain at start of treatment: No    Pain at end of treatment: No    COGNITION:  Orientation  Overall Orientation Status: Within Functional Limits  Cognition  Overall Cognitive Status: Exceptions  Arousal/Alertness: Appears intact  Following Commands: Follows one step commands with increased time;Follows one step commands with repetition  Attention Span: Appears intact  Memory: Decreased recall of biographical Information;Decreased short term memory;Decreased recall of recent events  Safety Judgement: Decreased awareness of need for assistance  Problem Solving: Decreased awareness of errors  Insights: Decreased awareness of deficits  Initiation: Requires cues for some  Sequencing: Requires cues for some    OBJECTIVE:    Grooming/Oral Hygiene  Assistance Level: Independent  Skilled Clinical Factors: seated in armchair and in standing at bathroom sink  Upper Extremity Bathing  Assistance Level: Set-up  Skilled Clinical Factors: seated in armchair at bathroom sink  Lower  Extremity Bathing  Assistance Level: Stand by assist;Increased time to complete  Skilled Clinical Factors: seated in armchair and in standing at bathroom sink  Upper Extremity Dressing  Assistance Level: Stand by assist;Increased time to complete  Skilled Clinical Factors: seated in armchair and in standing at bathroom sink  Lower Extremity Dressing  Assistance Level: Stand by assist;Increased time to complete  Skilled Clinical Factors: seated in armchair and in standing at bathroom sink  Putting On/Taking Off Footwear  Assistance Level: Supervision;Increased time to complete  Skilled Clinical Factors: seated in armchair at bathroom sink  Toileting  Skilled Clinical Factors: denied need  Tub/Shower Transfers  Skilled Clinical Factors: Patient completed sponge bath seated in armchair and in standing at bathroom sink.    Functional Mobility  Device: Rolling walker  Activity: To/From bathroom  Assistance Level: Stand by assist  Skilled Clinical Factors: slow pace  Sit to Stand  Assistance Level: Stand by assist  Stand to Sit  Assistance Level: Stand by assist    ASSESSMENT: Patient pleasant and cooperative while completing ADL tasks at a significantly slow pace.    Activity Tolerance: Patient tolerated treatment well      PLAN OF CARE:  Strengthening, Balance training, Functional mobility training, Endurance training, Safety education & training, Cognitive reorientation, Patient/Caregiver education & training, Equipment evaluation, education, & procurement, Positioning, Self-Care / ADL, Coordination training    Continue per OT POC for planned discharge on 24.    Patient goals : \"Get out of here.\"           Therapy Time:   Individual Group Co-Treat   Time In 1100       Time Out 1230         Minutes 90                   ADL/IADL trainin minutes     Electronically signed by:    DELMAR Matthews,   2024, 12:49 PM

## 2024-11-14 NOTE — PLAN OF CARE
Problem: Chronic Conditions and Co-morbidities  Goal: Patient's chronic conditions and co-morbidity symptoms are monitored and maintained or improved  11/13/2024 2230 by Marbella Carrington RN  Outcome: Progressing  11/13/2024 1231 by Catrachita Pedraza RN  Outcome: Progressing  Flowsheets (Taken 11/13/2024 0030 by Larissa Ferrara RN)  Care Plan - Patient's Chronic Conditions and Co-Morbidity Symptoms are Monitored and Maintained or Improved: Monitor and assess patient's chronic conditions and comorbid symptoms for stability, deterioration, or improvement     Problem: Discharge Planning  Goal: Discharge to home or other facility with appropriate resources  11/13/2024 2230 by Marbella Carrington RN  Outcome: Progressing  11/13/2024 1231 by Catrachita Pedraza RN  Outcome: Progressing  Flowsheets (Taken 11/13/2024 0030 by Larissa Ferrara RN)  Discharge to home or other facility with appropriate resources: Identify barriers to discharge with patient and caregiver     Problem: Safety - Adult  Goal: Free from fall injury  11/13/2024 2230 by Marbella Carrington RN  Outcome: Progressing  11/13/2024 1231 by Catrachita Pedraza RN  Outcome: Progressing     Problem: ABCDS Injury Assessment  Goal: Absence of physical injury  11/13/2024 2230 by Marbella Carrington RN  Outcome: Progressing  11/13/2024 1231 by Catrachita Pedraza RN  Outcome: Progressing     Problem: Skin/Tissue Integrity  Goal: Absence of new skin breakdown  Description: 1.  Monitor for areas of redness and/or skin breakdown  2.  Assess vascular access sites hourly  3.  Every 4-6 hours minimum:  Change oxygen saturation probe site  4.  Every 4-6 hours:  If on nasal continuous positive airway pressure, respiratory therapy assess nares and determine need for appliance change or resting period.  11/13/2024 2230 by Marbella Carrington RN  Outcome: Progressing  11/13/2024 1231 by Catrachita Pedraza RN  Outcome: Progressing     Problem: Pain  Goal:  Verbalizes/displays adequate comfort level or baseline comfort level  11/13/2024 2230 by Marbella Carrington, RN  Outcome: Progressing  11/13/2024 1231 by Catrachita Pedraza, RN  Outcome: Progressing     Problem: Neurosensory - Adult  Goal: Bowel movement at least every other day  11/13/2024 2230 by Marbella Carrington, RN  Outcome: Progressing  11/13/2024 1231 by Catrachita Pedraza, RN  Outcome: Progressing

## 2024-11-14 NOTE — PROGRESS NOTES
comprehensive inpatient rehabilitation program including PT/OT to improve balance, ambulation, ADL’s, and to improve the P/AROM.  Therapeutic modifications regarding activities in therapies, place, amount of time per day and intensity of therapy made daily.  In bed therapies or bedside therapies prn.   Bowel and Bladder dysfunction, Neurogenic bowel and bladder:  frequent toileting, ambulate to bathroom with assistance, check post void residuals.  Check for C.difficile x1 if >2 loose stools in 24 hours, continue bowel & bladder program.  Monitor bowel and bladder function.  Lactinex 2 PO every AC.  MOM prn, Brown Bomb prn, Glycerin suppository prn, enema prn.  Encourage therapy and nursing to co-treat and problem solve re continence.    Severe back pain,  , as well as generalized OA pain: reassess pain every shift and prior to and after each therapy session, give prn Tylenol and consider scheduled Tylenol, modalities prn in therapy, masage, Lidoderm, K-pad prn. Consider scheduled AM pain meds. Controlled Substance Monitoring:  Acute and Chronic Pain Monitoring:   RX Monitoring Acute Pain Prescriptions Periodic Controlled Substance Monitoring   11/7/2024   8:28 AM Prescription exceeds daily limit for a specific reason. See comments or note.;Severe pain not adequately treated with lower dose.;Not required given exclusionary diagnoses... Possible medication side effects, risk of tolerance/dependence & alternative treatments discussed.;No signs of potential drug abuse or diversion identified.;Assessed functional status (ability to engage in work or other purposeful activities, the pain intensity and its interference with activities of daily living, quality of family life and social activities, and the physical activity);Obtaining appropriate analgesic effect of treatment.       Skin healing left foot abrasions, yeast through and breakdown risk:  continue pressure relief program.  Daily skin exams and reports from  nursing.  Bacitracin Left foot.  Goldbond powder and Micatin powder  Fatigue due to nutritional and hydration deficiency: Add and titrate vitamin B12 vitamin D and CoQ10 continue to monitor I&O’s, calorie counts prn, dietary consult prn.  Add healthy snack at night.  Acute episodic insomnia with situational adjustment disorder:  prn Ambien, monitor for day time sedation.  Falls risk elevated:  patient to use call light to get nursing assistance to get up, bed and chair alarm.  Elevated DVT risk: progressive activities in PT, continue prophylaxis JB hose, elevation and meds-see MAR.   Oral pharyngeal dysphagia-up in a degrees of feeds-modified diet as needed.  Complex discharge planning:  DC-11/26/24--home with sons and HHC.-Until then continue weekly team meeting every Thursday to re-assess progress towards goals, discuss and address social, psychological and medical comorbidities and to address difficulties they may be having progressing in therapy.  Patient and family education is in progress.  The patient is to follow-up with their family physician after discharge.        Complex Active General Medical Issues that complicate care Assess & Plan:    HLD (hyperlipidemia),  HTN (hypertension), CAD (coronary artery disease),  Cardiomyopathy -Acute rehab to monitor heart rate and rhythm with the option of telemetry and the effects of chronotropic medication with respect to increasing physical activity and exercise in PT, OT, ADLs with medication titration to lowest effective dosing.  Continue blood signs every shift focusing on heart rate, rhythm and blood pressure checks with orthostatic checks-monitoring the effect of exercise, therapy and posture.  Consult hospitalist for backup medical and adjust/add medications.  Monitor heart rate and blood pressure as well as medications effects on vital signs before during and after therapy with especial focus on preventing orthostasis and falls risk.    APRYL (acute kidney

## 2024-11-14 NOTE — PROGRESS NOTES
Physical Therapy Rehab Treatment Note  [facility]  [id number]       NAME: Modesto Burgess  : 10/9/1933 (89 y.o.)  MRN: [id number]  CODE STATUS: DNR-CCA    Date of Service: 2024     Restrictions:  Restrictions/Precautions: Fall Risk       SUBJECTIVE:   Subjective: \"What are we going to do?\"    Pain  Pain: Denies pain pre/post session.      OBJECTIVE:         Bed Mobility  Additional Factors: Verbal cues;Increased time to complete;Head of bed flat;With handrails  Roll Left  Assistance Level: Stand by assist  Skilled Clinical Factors: decreased effort to complete, good carry over with technique  Roll Right  Assistance Level: Stand by assist  Skilled Clinical Factors: decreased effort to complete, good carry over with technique  Sit to Supine  Skilled Clinical Factors: NT - pt up in chair at end of session  Supine to Sit  Assistance Level: Moderate assistance  Skilled Clinical Factors: assist at trunk to complete, increased time and effort required  Scooting  Assistance Level: Stand by assist  Skilled Clinical Factors: increased time and effort without assistance    Transfers  Surface: From bed;Wheelchair  Additional Factors: Verbal cues;Increased time to complete;Hand placement cues  Device:  (rollator)  Sit to Stand  Assistance Level: Stand by assist  Skilled Clinical Factors: no assistance needed to stand from EOB this session, good carry over with technique  Stand to Sit  Assistance Level: Stand by assist  Skilled Clinical Factors: vc's to reach back prior to sitting, good carry over  Bed To/From Chair  Technique: Stand step  Assistance Level: Stand by assist  Skilled Clinical Factors: bed -> w/c with rollator, close SBA for safety though pt demonstrated good stability throughout    Ambulation  Surface: Level surface  Device: Rollator  Distance: 5'  Activity: Within Room  Activity Comments: fwd flexed posture, shuffling steps with approach to chairs  Additional Factors: Verbal

## 2024-11-14 NOTE — PROGRESS NOTES
Family conversation with regards to diabetes and the concern with kidney function and taking insulin. They desire a more stringent diet to control his diabetes rather than medication.   Pt. Refused labs this morning. Pt. Was educated the need to monitor the potassium as well as kidney function. He is agreeable.   Call to primary to ask for  consults.   Pt. Up with a standby assist to use the urinal. Pt. Steady but weak while standing.   Return to bed alarm activated. Call light within reach.   Labs received elevated potassium 5.2  BUN critical 94 creatinine 4.31. GFR 12.3 perfect served Dr. Eaton   Pt. Is refusing the [place]. He wants his valtesa call to Dr Eaton.  Dr. Eaton is ok with pt. Taking his home medication.   Updated the hospitalist .   Dietitian into see pt. Will return later to speak with family.   Pt. Daughter whom is POA secondary called to speak with SW. She is not sure that her father is well enough to go home. Her request is for the physicians including consults make the recommendation to return home before she is agreeable to discharge her father early. Message left for SW.   Pt. Medication Valtesa is in the med room refrigerator. Must be taken 2 hours apart from other medications.   Dietary in to educate pt. And family however family was not in. The family will be in for family training 11/15/2024.   Pt. Was noted walking around his room without assistance. Virtual  ordered. Pt. And family educated to the purpose and function.   Pt. And family updated regarding Dr. Jacome new orders for insulin.

## 2024-11-14 NOTE — PROGRESS NOTES
activities such as bathing, preparing meals, shopping, managing finances, etc., do you get the help you need?: Not on file   Intimate Partner Violence: Not on file   Housing Stability: Patient Unable To Answer (10/30/2024)    Housing Stability Vital Sign     Unable to Pay for Housing in the Last Year: Patient unable to answer     Number of Times Moved in the Last Year: 0     Homeless in the Last Year: Patient unable to answer           THERAPY, MEDICAL AND NURSING COORDINATION:    [x]  Pain medication before therapies     [x]  Check orthostatic BP and monitor heart rate and medications effects with therapy      [x]  Ambulate to the bathroom in room    [x]  Add scheduled rest beaks     [x]  In room therapies as needed      Discharge date set for:              11/26/24      Home with:   dash and UJICE with help from   Vicenta            And:     Home Health Care:     [x]  PT    [x]  OT    []  ST   [x]  Aide       [x]  RN                    Equipment:  WW,        At D/C their function is goaled at:   PT:Long Term Goal 1: Pt to complete bed mobility with SBA  Long Term Goal 2: Pt to complete transfers with SBA  Long Term Goal 3: Pt to ambulate 50ft with LRD and SBA  Long Term Goal 4: Pt to manage 2 steps with HR and Can  Long Term Goal 5: Pt to manage and propel WC indep 150ft  OT:Eating  Assistance Needed: Setup or clean-up assistance  CARE Score: 5  Discharge Goal: Independent, Oral Hygiene  Assistance Needed: Supervision or touching assistance  CARE Score: 4  Discharge Goal: Independent, Toileting Hygiene  Assistance needed: Partial/moderate assistance  CARE Score: 3  Discharge Goal: Supervision or touching assistance, Shower/Bathe Self  Assistance Needed: Substantial/maximal assistance  CARE Score: 2  Discharge Goal: Supervision or touching assistance  Upper Body Dressing  Reason if not Attempted: Not attempted due to environmental limitations  CARE Score: 10  Discharge Goal: Independent, Lower Body  Dressing  Assistance Needed: Dependent  CARE Score: 1  Discharge Goal: Supervision or touching assistance, Putting On/Taking Off Footwear  Assistance Needed: Dependent  CARE Score: 1  Discharge Goal: Supervision or touching assistance, Toilet Transfer  Reason if not Attempted: Not attempted due to medical condition or safety concerns  CARE Score: 88  Discharge Goal: Supervision or touching assistance  SP:Long Term Goals  Time Frame for Long Term Goals: 2-3 weeks  Goal 1: Patient will demonstrate functional cognitive-linguistic abilities in all opportunities at a min assist level in order to safely complete ADLs.  Goal 2: -              From a cognitive standpoint they will need:        24 hr vs daily transitioning to supervision  -->progress to occasional             Significant problems/ barriers to functional progress include: Pt is at a high risk for functional loss,      []  Acute infection/UTI    []  Low BP's     []  COPD flare-up   []  Uncontrolled blood sugar     []  Progressive anemia     [x]  poor endurance    []  Severe pain     []  Impaired mental status    []  Urinary incontinence    []  Bowel incontinence           Plan to correct barriers to functional progress:   Add scheduled rest breaks, CoQ 10 and Vit B 12, control pain by using ice Lidoderm rest and massage as well as pain medications prior to therapy.  Spread therapy of 15 hours out over a 7 day window to accommodate rest breaks and medical interventions.  Patient seems to be making fair to good response to these interventions.         Based on a comprehensive evaluation of the above, the individualized therapy and Discharge plan will be:    -Times stated are an average that will be varied based on the patient's daily need.        PT   1 1/2  hrs/day 5-7 days per wk      OT   1 1/2 hrs per day 5-7 days per wk     ST  1/2    hrs /day 3-5 days per week       Estimated LOS   3 week(s)    -Overall functional prognosis:     [x]  Good    []  Fair    []

## 2024-11-14 NOTE — PLAN OF CARE
Problem: Chronic Conditions and Co-morbidities  Goal: Patient's chronic conditions and co-morbidity symptoms are monitored and maintained or improved  Outcome: Progressing  Flowsheets (Taken 11/14/2024 1324)  Care Plan - Patient's Chronic Conditions and Co-Morbidity Symptoms are Monitored and Maintained or Improved:   Monitor and assess patient's chronic conditions and comorbid symptoms for stability, deterioration, or improvement   Collaborate with multidisciplinary team to address chronic and comorbid conditions and prevent exacerbation or deterioration     Problem: Discharge Planning  Goal: Discharge to home or other facility with appropriate resources  Outcome: Progressing     Problem: Safety - Adult  Goal: Free from fall injury  Outcome: Progressing     Problem: ABCDS Injury Assessment  Goal: Absence of physical injury  Outcome: Progressing

## 2024-11-14 NOTE — PROGRESS NOTES
Comprehensive Nutrition Assessment    Type and Reason for Visit:  Reassess    Nutrition Recommendations/Plan:   Continue Current Diet     Malnutrition Assessment:  Malnutrition Status:  No malnutrition (11/08/24 1555)      Nutrition Assessment:    Pt is stable from a nutritional standpoint, with verbalized continued good appetite, noted good intake at meals, with no nutritional complaints. To continue Carb Control 4 ANGELA low K+ diet and provide diet education with pt and family prior to discharge.    Nutrition Related Findings:    PMH-DM2, CKD, CAD, GERD, hld, OA, [place]; admitted s/p CVA, fall. Labs/meds reviewed. Noted elevated BUN/Cr, K-5.2 (pt refused lokemia); Gluc-140-184 x last 48hrs. Pt receiving-insulin, lipitor. +1 BLE edema noted. Wound Type:  (Abrasion L foot)       Current Nutrition Intake & Therapies:    Average Meal Intake: 51-75%, %     ADULT DIET; Regular; 4 carb choices (60 gm/meal); Low Potassium (Less than 3000 mg/day)  ADULT ORAL NUTRITION SUPPLEMENT; HS Snack; Snack; low sweet snack    Anthropometric Measures:  Height: 172.3 cm (5' 7.84\")  Ideal Body Weight (IBW): 153 lbs (70 kg)    Admission Body Weight: 93.4 kg (206 lb) (unknown wt source)  Current BMI (kg/m2):  29  Usual Body Weight: 82.1 kg (181 lb) (4/2024 stated)                          BMI Categories: Obese Class 1 (BMI 30.0-34.9)    Estimated Daily Nutrient Needs:  Energy Requirements Based On: Kcal/kg  Weight Used for Energy Requirements: Admission  Energy (kcal/day): 1287-2602 (kg x 17-18)  Weight Used for Protein Requirements: Ideal  Protein (g/day): ~56-70 (kg x .8-1.0)  Method Used for Fluid Requirements: 1 ml/kcal  Fluid (ml/day):  ~1700    Nutrition Diagnosis:   Altered nutrition-related lab values related to endocrine dysfunction, renal dysfunction as evidenced by lab values    Nutrition Interventions:   Food and/or Nutrient Delivery: Continue Current Diet  Nutrition Education/Counseling: No recommendation at this  time  Coordination of Nutrition Care: Continue to monitor while inpatient       Goals:  Goals: PO intake 75% or greater (improved edema, K+-wnl)          Nutrition Monitoring and Evaluation:      Food/Nutrient Intake Outcomes: Food and Nutrient Intake  Physical Signs/Symptoms Outcomes: Weight, Biochemical Data, Fluid Status or Edema    Discharge Planning:    No discharge needs at this time     Tawanna Lugo, DEBBIE, LD

## 2024-11-14 NOTE — PROGRESS NOTES
Nephrology Progress Note    Assessment:  CKD-5  Hypertension  Recent CVA      Plan: labs today if stable will sign off case    Patient Active Problem List:     APRYL (acute kidney injury) ([facility])     CKD (chronic kidney disease)     HTN (hypertension)     CAD (coronary artery disease)     Type 2 diabetes mellitus, with long-term current use of insulin ([facility])     HLD (hyperlipidemia)     Hyperkalemia     Fall at home, initial encounter     Altered mental status     Hyperparathyroidism due to renal insufficiency ([facility])     Palliative care encounter     Goals of care, counseling/discussion     Advanced care planning/counseling discussion     Full code status     Actinic keratosis     PVD (posterior vitreous detachment), both eyes     CVA  L Thalamic     Impaired mobility ADLs dt L thal CVA     Late effect of brain injury     Late effects of CVA (cerebrovascular accident)     [place] (hard of hearing)     Parkinsonian features     Cardiomyopathy ([facility])     SAH (subarachnoid hemorrhage) ([facility])     DNR (do not resuscitate)      Subjective:  Admit Date: 11/5/2024    Interval History: same no complaints    Medications:  Scheduled Meds:   epoetin emerson-epbx  10,000 Units SubCUTAneous Weekly    miconazole   Topical BID    menthol   Topical BID    aspirin  162 mg Oral Daily    sodium bicarbonate  650 mg Oral BID    polyethylene glycol  17 g Oral Daily    senna  1 tablet Oral Nightly    Vitamin D  2,000 Units Oral Dinner    cyanocobalamin  1,000 mcg IntraMUSCular Weekly    coenzyme Q10  100 mg Oral Daily    lidocaine  3 patch TransDERmal Daily    insulin glargine  20 Units SubCUTAneous Nightly    neomycin-bacitracin-polymyxin   Topical BID    allopurinol  100 mg Oral Daily    atorvastatin  40 mg Oral Daily    calcitRIOL  0.25 mcg Oral Daily    carvedilol  12.5 mg Oral BID WC    hydrALAZINE  50 mg Oral BID    insulin lispro  0-8 Units SubCUTAneous 4x Daily AC & HS    isosorbide mononitrate  30 mg Oral Daily     Continuous Infusions:   sodium

## 2024-11-14 NOTE — PROGRESS NOTES
Pt assessment completed. VSS. Pt denied having any pain at this time. Medications given per MAR except for their insulin. Pt refuses to have insulin due to past history of poor kidney health per pt and pt family. Pt is also refusing care and is argumentative with staff when attempting to assist with care. Pt is having a difficulty being reoriented. Pt denies any needs for assistance at this time. Call light within reach, bed locked, bed in lowest position, and bed alarm active

## 2024-11-14 NOTE — PROGRESS NOTES
Physical Therapy Rehab Treatment Note  [facility]  [facility]       NAME: Modesto Burgess  : 10/9/1933 (89 y.o.)  MRN: [id number]  CODE STATUS: DNR-CCA    Date of Service: 2024     Restrictions:  Restrictions/Precautions: Fall Risk       SUBJECTIVE:   Subjective: \"How're you this afternoon?\"    Pain  Pain: Denies pain pre/post session.      OBJECTIVE:         Transfers  Surface: Wheelchair  Additional Factors: Verbal cues;Increased time to complete;Hand placement cues  Device: Walker  Sit to Stand  Assistance Level: Stand by assist  Skilled Clinical Factors: good carry over with technique, able to complete with SBA consistently throughout session  Stand to Sit  Assistance Level: Stand by assist  Skilled Clinical Factors: good carry over with reaching back, eccentric control varies    Ambulation  Surface: Level surface  Device: Rolling walker;Rollator  Distance: ww: 160', 2 x25'; rollator: 80'  Activity: Within Unit  Activity Comments: fwd flexed posture, shuffling steps with approach to chairs  Additional Factors: Verbal cues;Increased time to complete  Assistance Level: Stand by assist  Gait Deviations: Decreased step length bilateral;Path deviations;Narrow base of support  Skilled Clinical Factors: vc's for widening MIROSLAVA and improving approach to chairs with good carry over    Stairs  Stair Height: 6''  Device: Bilateral handrails  Number of Stairs: 4  Additional Factors: Non-reciprocal going up;Non-reciprocal going down;Increased time to complete  Assistance Level: Minimal assistance (touching assist)  Skilled Clinical Factors: no LOB this session, demonstrates good safety and stability throughout. short rest break at top of stairs before descending          PT Exercises  A/AROM Exercises: seated: AP, LAQ, march x10ea abisai        Activity Tolerance  Activity Tolerance: Patient tolerated treatment well           ASSESSMENT/PROGRESS TOWARDS GOALS:   Assessment  Assessment: Treatment

## 2024-11-14 NOTE — PLAN OF CARE
Nutrition Problem #1: Altered nutrition-related lab values  Intervention: Food and/or Nutrient Delivery: Continue Current Diet

## 2024-11-15 LAB
ANION GAP SERPL CALCULATED.3IONS-SCNC: 14 MEQ/L (ref 9–15)
BUN SERPL-MCNC: 95 MG/DL (ref 8–23)
CALCIUM SERPL-MCNC: 10 MG/DL (ref 8.5–9.9)
CHLORIDE SERPL-SCNC: 113 MEQ/L (ref 95–107)
CO2 SERPL-SCNC: 17 MEQ/L (ref 20–31)
CREAT SERPL-MCNC: 4.17 MG/DL (ref 0.7–1.2)
GLUCOSE BLD-MCNC: 117 MG/DL (ref 70–99)
GLUCOSE BLD-MCNC: 140 MG/DL (ref 70–99)
GLUCOSE BLD-MCNC: 145 MG/DL (ref 70–99)
GLUCOSE BLD-MCNC: 201 MG/DL (ref 70–99)
GLUCOSE SERPL-MCNC: 124 MG/DL (ref 70–99)
PERFORMED ON: ABNORMAL
POTASSIUM SERPL-SCNC: 5.4 MEQ/L (ref 3.4–4.9)
SODIUM SERPL-SCNC: 144 MEQ/L (ref 135–144)

## 2024-11-15 PROCEDURE — 6370000000 HC RX 637 (ALT 250 FOR IP): Performed by: INTERNAL MEDICINE

## 2024-11-15 PROCEDURE — 36415 COLL VENOUS BLD VENIPUNCTURE: CPT

## 2024-11-15 PROCEDURE — 97112 NEUROMUSCULAR REEDUCATION: CPT

## 2024-11-15 PROCEDURE — 99233 SBSQ HOSP IP/OBS HIGH 50: CPT | Performed by: PHYSICAL MEDICINE & REHABILITATION

## 2024-11-15 PROCEDURE — 80048 BASIC METABOLIC PNL TOTAL CA: CPT

## 2024-11-15 PROCEDURE — 97535 SELF CARE MNGMENT TRAINING: CPT

## 2024-11-15 PROCEDURE — 99232 SBSQ HOSP IP/OBS MODERATE 35: CPT | Performed by: PSYCHIATRY & NEUROLOGY

## 2024-11-15 PROCEDURE — 6370000000 HC RX 637 (ALT 250 FOR IP): Performed by: NURSE PRACTITIONER

## 2024-11-15 PROCEDURE — 6370000000 HC RX 637 (ALT 250 FOR IP): Performed by: PHYSICAL MEDICINE & REHABILITATION

## 2024-11-15 PROCEDURE — 99232 SBSQ HOSP IP/OBS MODERATE 35: CPT | Performed by: INTERNAL MEDICINE

## 2024-11-15 PROCEDURE — 97116 GAIT TRAINING THERAPY: CPT

## 2024-11-15 PROCEDURE — 1180000000 HC REHAB R&B

## 2024-11-15 PROCEDURE — 97130 THER IVNTJ EA ADDL 15 MIN: CPT

## 2024-11-15 PROCEDURE — APPSS15 APP SPLIT SHARED TIME 0-15 MINUTES: Performed by: NURSE PRACTITIONER

## 2024-11-15 PROCEDURE — 97129 THER IVNTJ 1ST 15 MIN: CPT

## 2024-11-15 PROCEDURE — 6370000000 HC RX 637 (ALT 250 FOR IP)

## 2024-11-15 RX ADMIN — MENTHOL: 0 POWDER TOPICAL at 08:53

## 2024-11-15 RX ADMIN — ATORVASTATIN CALCIUM 40 MG: 40 TABLET, FILM COATED ORAL at 08:47

## 2024-11-15 RX ADMIN — CALCITRIOL 0.25 MCG: 0.25 CAPSULE ORAL at 08:47

## 2024-11-15 RX ADMIN — ISOSORBIDE MONONITRATE 30 MG: 30 TABLET, EXTENDED RELEASE ORAL at 08:47

## 2024-11-15 RX ADMIN — SENNOSIDES 8.6 MG: 8.6 TABLET, FILM COATED ORAL at 00:03

## 2024-11-15 RX ADMIN — Medication 100 MG: at 08:47

## 2024-11-15 RX ADMIN — HYDRALAZINE HYDROCHLORIDE 50 MG: 50 TABLET ORAL at 20:33

## 2024-11-15 RX ADMIN — MICONAZOLE NITRATE: 2 POWDER TOPICAL at 08:52

## 2024-11-15 RX ADMIN — SODIUM BICARBONATE 650 MG TABLET 650 MG: at 08:46

## 2024-11-15 RX ADMIN — Medication 2000 UNITS: at 17:35

## 2024-11-15 RX ADMIN — ASPIRIN 81 MG CHEWABLE TABLET 162 MG: 81 TABLET CHEWABLE at 08:47

## 2024-11-15 RX ADMIN — SODIUM BICARBONATE 650 MG TABLET 650 MG: at 20:33

## 2024-11-15 RX ADMIN — SODIUM BICARBONATE 650 MG TABLET 650 MG: at 00:03

## 2024-11-15 RX ADMIN — ALLOPURINOL 100 MG: 100 TABLET ORAL at 08:47

## 2024-11-15 RX ADMIN — HYDRALAZINE HYDROCHLORIDE 50 MG: 50 TABLET ORAL at 08:47

## 2024-11-15 RX ADMIN — HYDRALAZINE HYDROCHLORIDE 50 MG: 50 TABLET ORAL at 00:03

## 2024-11-15 RX ADMIN — CARVEDILOL 12.5 MG: 12.5 TABLET, FILM COATED ORAL at 08:47

## 2024-11-15 RX ADMIN — BACITRACIN ZINC, NEOMYCIN, POLYMYXIN B: 400; 3.5; 5 OINTMENT TOPICAL at 09:02

## 2024-11-15 RX ADMIN — CARVEDILOL 12.5 MG: 12.5 TABLET, FILM COATED ORAL at 17:36

## 2024-11-15 NOTE — PROGRESS NOTES
Notified by NOBLE that patient may be discharging Sunday per family request. Neurology and nephrology OK with this. Repeat BMP ordered to check potassium level, noted 5.2 yesterday.     Update* Family has decided NOT to take patient home this weekend. Electronically signed by Mercy Baez RN on 11/15/24 at 12:13 PM EST     MD aware of potassium level 5.4. No new orders at this time, had dose of Veltassa at lunch. Electronically signed by Mercy Baez RN on 11/15/24 at 1:23 PM EST

## 2024-11-15 NOTE — PROGRESS NOTES
OCCUPATIONAL THERAPY  INPATIENT REHAB TREATMENT NOTE  [facility]      NAME: Modesto Burgess  : 10/9/1933 (89 y.o.)  MRN: [id number]  CODE STATUS: DNR-CCA  Room: R246/R246-01    Date of Service: 11/15/2024    Referring Physician: Christina Robison DO  Rehab Diagnosis: Impaired mobility and ADL's d/t left thalamic CVA    Hospital course:   Comments: 89 y.o. male patient who presented to ER 10/29 with altered mental status and was found on floor. Not TNK candidate for CVA due timing timing window. Patient found to have had an acute L thalamus infarct in addition to being hypernatremic and having APRYL on CKD. Patient admitted with neuro and cardiology consulting.      Restrictions  Restrictions/Precautions  Restrictions/Precautions: Fall Risk     Position Activity Restriction  Other position/activity restrictions: [employer]as[employer]    Patient's date of birth confirmed: Yes    SAFETY:  Safety Devices  Safety Devices in place: Yes  Type of devices: All fall risk precautions in place    SUBJECTIVE: \"I agree.\" - to the discussed plans    Pain at start of treatment: No    Pain at end of treatment: No    COGNITION:  Orientation  Overall Orientation Status: Within Functional Limits  Cognition  Overall Cognitive Status: Exceptions  Arousal/Alertness: Appears intact  Following Commands: Follows one step commands with increased time;Follows one step commands with repetition  Attention Span: Appears intact  Memory: Decreased recall of biographical Information;Decreased short term memory;Decreased recall of recent events  Safety Judgement: Decreased awareness of need for assistance  Problem Solving: Decreased awareness of errors  Insights: Decreased awareness of deficits  Initiation: Requires cues for some  Sequencing: Requires cues for some    OBJECTIVE:    Family Training:  Charlene, daughter, present for family training.  Daughter educated on patients current functional ADL status.  Daughter verbalized understanding.  Discussed at

## 2024-11-15 NOTE — PROGRESS NOTES
He is not diaphoretic.   HENT:      Head: Normocephalic and atraumatic.   Eyes:      Pupils: Pupils are equal, round, and reactive to light.   Cardiovascular:      Rate and Rhythm: Normal rate and regular rhythm.   Pulmonary:      Effort: Pulmonary effort is normal. No respiratory distress.      Breath sounds: Normal breath sounds.   Abdominal:      General: There is no distension.   Skin:     General: Skin is warm and dry.   Neurological:      Mental Status: He is alert and oriented to person, place, and time.      Cranial Nerves: No cranial nerve deficit.      Sensory: No sensory deficit.      Motor: Weakness (Generalized) present.      Coordination: Coordination normal.      Gait: Gait abnormal.     Generalized weakness with gait ataxia  Rehabilitation:  Physical therapy: FIMS:  Bed Mobility: Scooting: Maximal assistance    Transfers: Sit to Stand: Minimal Assistance, Stand by assistance (multiple trials with varying assistance required)  Stand to Sit: Minimal Assistance  Bed to Chair: Minimal assistance, Moderate assistance, Ambulation  Surface: Level tile  Device: Parallel Bars, Rolling Walker  Other Apparatus: Wheelchair follow  Assistance: Minimal assistance  Quality of Gait: Pt with small L step and R LE knee instability. R knee blocked for safety - no LOB. Heavy bracing with UEs for support. Transfer of technique to WW with excellent follow through. Slow pacing. Struggles with spatial awareness to R side requiring verbal cues for direction.  Gait Deviations: Slow Shivani, Decreased step length, Decreased step height  Distance: 8ft in // bars; 15ft with WW,      FIMS:  ,  , Assessment: Treatment continues post eval.  Focused on transfers and gait.  Supervising PT notified pt able to complete 50' of gait.  Pt seems very motivated and is active at home.    Occupational therapy: FIMS:   ,  , Assessment: Patient is a 89-year-old male presenting to [facility] from home following a fall secondary to a CVA. Pt presents  patient      Ollie Rob MD, THUY  Diplomate, American Board of Psychiatry & Neurology  Board Certified in Vascular Neurology  Board Certified in Neuromuscular Medicine  Certified in Neurorehabilitation              Collaborating physicians: Dr Rob    Electronically signed by SERENA Huff CNP on 11/15/2024 at 4:17 PM'

## 2024-11-15 NOTE — PROGRESS NOTES
Nutrition Note    Pt's daughter expressed concerns regarding carb options at meal times. RD spoke with daughter via phone and added 'no concentrated sweet' restriction to further help with glycemic control and discussed the benefits of following a consistent carb diet. RD to follow up prior to discharge regarding diabetes education. Noted pt's glucose <200 and continues with hyperkalemia. Advised for any insulin related questions to address endocrinology.     Electronically signed by Doretha Wilcox MS, RD, LD on 11/15/24 at 12:11 PM EST

## 2024-11-15 NOTE — CARE COORDINATION
[facility]  INPATIENT REHABILITATION  UPDATE NOTE  Room: R246/R246-01  Admit Date: 2024       Date: 11/15/2024  Patient Name: Modesto Burgess        MRN: [id number]    : 10/9/1933  (89 y.o.)  Gender: male        Anticipated Discharge Plan: Pt to d/c  home w/ family. Pt is being followed by PMR, PT, OT, SLP, Neurology, Endocrinology, Internal Med, LSW, and Dietician. We believe that pt would benefit from continued rehab to maximize overall function and to ensure a safe discharge. Family training to be scheduled closer to discharge.    REHAB DIAGNOSIS:   Diagnosis: Impaired mobility and ADL's due to left thalamic CVA    CO MORBIDITIES:    Past Medical History:   Diagnosis Date    CAD (coronary artery disease) 09/15/2020    HLD (hyperlipidemia) 09/15/2020    [place] (hard of hearing) 2024    Hyperparathyroidism due to renal insufficiency (HCC) 10/30/2024    Late effects of CVA (cerebrovascular accident) 2024    Lumbosacral disc disease     Osteoarthritis     Type 2 diabetes mellitus, with long-term current use of insulin (HCC) 09/15/2020     History reviewed. No pertinent surgical history.     Last set of vitals:  Vital Signs  Temp: 98.4 °F (36.9 °C)  Temp Source: Oral  Pulse: 68  Heart Rate Source: Monitor  Respirations: 18  BP: 134/67  MAP (Calculated): 89  MAP (mmHg): 86  BP Location: Left upper arm  BP Method: Automatic  Patient Position: Supine    Results/Findings:   Labs:   Recent Labs     24  0524  0756   WBC  --  7.3   HGB 7.9* 8.4*   HCT 24.5* 25.4*   PLT  --  218     Recent Labs     24  0520 24  0756 11/15/24  1206    143 144   K 5.0* 5.2* 5.4*   * 112* 113*   CO2 19* 19* 17*   BUN 97* 94* 95*   CREATININE 4.39* 4.31* 4.17*   CALCIUM 9.3 9.5 10.0*   PHOS 4.9*  --   --      No results for input(s): \"AST\", \"ALT\", \"BILIDIR\", \"BILITOT\", \"ALKPHOS\" in the last 72 hours.  No results for input(s): \"INR\" in the last 72 hours.  No results for  session with variable assistance required  Gait:   Ambulation  Surface: Level tile  Device: Rolling Walker  Other Apparatus: Wheelchair follow  Assistance: Maximum assistance;2 Person assistance  Quality of Gait: small steps, heavy lean to the right without ability to correct, poor walker control, flexed trunk  Gait Deviations: Slow Shivani;Decreased step length;Decreased step height  Distance: 5 steps forward  Stairs:     W/C mobility:  Wheelchair Activities  Propulsion: Yes          Current status:   Bed mobility:  Bed Mobility  Overall Assistance Level: Stand By Assist (improved ability this session)  Additional Factors: Without handrails;Head of bed flat  Overall Assistance Level: Stand By Assist (improved ability this session)  Additional Factors: Without handrails;Head of bed flat  Roll Left  Assistance Level: Stand by assist  Roll Right  Assistance Level: Stand by assist  Sit to Supine  Assistance Level: Stand by assist  Supine to Sit  Assistance Level: Stand by assist  Transfers:  Transfers  Surface: To chair without arms;From bed;To bed;From chair with arms;Wheelchair  Additional Factors: Verbal cues;Increased time to complete;Hand placement cues  Sit to Stand  Assistance Level: Minimal assistance  Skilled Clinical Factors: steadying assistance from w/c and arm chair required - no cues for technique  Stand to Sit  Assistance Level: Minimal assistance  Skilled Clinical Factors: cues intermittently for alignment to surface  Bed To/From Chair  Assistance Level: Stand by assist  Skilled Clinical Factors: pivot and with rollator  Car Transfer  Assistance Level: Minimal assistance  Skilled Clinical Factors: increased time and effort as well as steadying assistance throughout transition  Gait:   Ambulation  Surface: Level surface;Uneven surface;Carpet  Device: Rollator  Distance: 150 feet; 20 feet(limited by destination)  Activity Comments: fwd flexed posture, shuffling steps, improved rollator control, mild lean

## 2024-11-15 NOTE — CARE COORDINATION
LSW spoke with dtr and discussed discharge. Dtr who is reporting she is second POA would like for pt to remain on rehab until he has met goals and has gotten stronger. She is requesting to discuss [facility] services and LSW provided contact info for [facility] Mancera ([facility] SW). Medication concern was discussed and dtr explained that there has to be a 3 hour window before and after Valtesa is taken. RN was made aware. Dtr also voiced concerns with diet and plans to speak to Dietician when she is in today visiting pt. Electronically signed by JONNY Edmond, LSW on 11/15/2024 at 5:41 PM

## 2024-11-15 NOTE — PROGRESS NOTES
Physical Therapy Rehab Treatment Note  [facility]ility[facility]  [facility]       NAME: Modesto Burgess  : 10/9/1933 (89 y.o.)  MRN: [id number]  CODE STATUS: DNR-CCA    Date of Service: 11/15/2024       Restrictions:  Restrictions/Precautions: Fall Risk       SUBJECTIVE:        Pain  Pain: Denies pain pre/post session.      OBJECTIVE:                 Transfers  Surface: Wheelchair  Additional Factors: Verbal cues;Increased time to complete;Hand placement cues (less verbal cues for approach to chair and techniques required)  Sit to Stand  Assistance Level: Stand by assist  Skilled Clinical Factors: no assistance physically required this sessoin  Stand to Sit  Assistance Level: Stand by assist;Minimal assistance  Skilled Clinical Factors: cues intermittently for alignment to surface  Bed To/From Chair  Assistance Level: Stand by assist  Skilled Clinical Factors: pivot and with rollator    Ambulation  Surface: Level surface;Uneven surface;Carpet  Device: Rollator  Distance: 150 feet 75 feet  Activity Comments: fwd flexed posture, shuffling steps, improved rollator control, mild lean to the left  Assistance Level: Stand by assist  Gait Deviations: Decreased step length bilateral;Path deviations;Narrow base of support  Skilled Clinical Factors: improved balance reactions and safety in all aspects of environmental maneuvering and approach to chair    Stairs  Stair Height: 6''  Device: Bilateral handrails  Additional Factors: Non-reciprocal going up;Non-reciprocal going down;Increased time to complete  Assistance Level: Minimal assistance;Stand by assist  Skilled Clinical Factors: no lean this session. Improved balance with LE's forward on rails for descent             PT Exercises  Functional Mobility Circuit Training: challenge included gait with rollator with targeted LE movement tasks and object retrieval at varying heights - pt demonstrated good judgement with retrieving objects and iutilized one UE on

## 2024-11-15 NOTE — PROGRESS NOTES
INDIVIDUALIZED OVERALL REHAB PLAN OF CARE  ADDENDUM TO REHAB PROGRESS NOTE-for audit purposes must also refer to this day's clinical note and combine the information      Date: 11/15/2024  Patient Name: Modesto Burgess   Room: R246/R246-01    MRN: [id number]    : 10/9/1933  (89 y.o.)  Gender: male       Today 11/15/2024 during weekly team meeting, I reviewed the patient Modesto Burgess in detail with the therapists and nurses involved in patient's care gathering complex physiatric data regarding current medical issues, progress in therapies, factors limiting progress, social issues, psychological issues, ongoing therapeutic plans and discharge planning.    Legend:  I= independent Im =Modified independent  S=Supervised SB=stand by AHND=set up CG=contact maura Min= minimal Mod=Moderate Max=maximal Max of 2 =maximal assist of 2 people      CURRENT FUNCTIONAL STATUS:    CVA.  Patient was admitted to [facility] on 10/29/2024 after presenting through the ER after falling at home.  He is 89 years old and presented through the ER on 10/29/2024 with altered mental status after being found on the floor.  He was diagnosed with a left thalamic CVA.  He was not a TNK candidate is that he were not sure as to the timing window.     Post stroke he was also found to have acute on chronic renal failure and diabetes mellitus with hyperglycemia.     He was admitted under the care of the hospitalist with neurology consulting.  He had significant confusion felt to be secondary to his subarachnoid hemorrhage in the right parietal area antiplatelets therapy and subcutaneous heparin were held.    NURSING ISSUES:  conversation with regards to diabetes and the concern with kidney function and taking insulin. They desire a more stringent diet to control his diabetes rather than medication.   Pt. Refused labs this morning. Pt. Was educated the need to monitor the potassium as well as kidney function. He is agreeable.   Call to primary to  bathe all areas while supine in bed. (11/06/24 0929)  LE Bathing: Maximum assistance (11/06/24 0929)  LE Bathing Skilled Clinical Factors: Pt bathed anterior vinicio area IND. Assist to bathe BLEs and posterior vinicio area. (11/06/24 0929)  UE Dressing: Unable to assess (Comment) (11/06/24 0929)  UE Dressing Skilled Clinical Factors: dressing gown only (11/06/24 0929)  LE Dressing: Dependent/Total (11/06/24 0929)  LE Dressing Skilled Clinical Factors: doff old brief and don new brief (11/06/24 0929)  Putting On/Taking Off Footwear: Dependent/Total (11/06/24 0929)  Putting On/Taking Off Footwear Skilled Clinical Factors: hospital socks (11/06/24 0929)  Toileting: Moderate assistance (11/06/24 0929)  Toileting Skilled Clinical Factors: assist to hold urinal while pt urinated into it while supine. (11/06/24 0929)  Functional Mobility Skilled Clinical Factors: not assessed d/t pt max A to roll left and right in bed. (11/06/24 0929)  Additional Comments: Pt limited in ADL completion by overall weakness, decreased processing speed, and decreased cognition. (11/06/24 0929)  Skin Care: Bath wipes (11/06/24 0884)  Toilet Transfers  Toilet Transfers Comments: unable to safely assess on eval (11/06/24 1051)    Plans for addressing ADL deficits affecting: Focus on sequencing and energy conservation.                     SPEECH THERAPY/SLP     Comprehension: Exceptions (averaging min-stand by cues for auditory comprehension, improved ability to follow 2 step directions with improved attention to tasks)  Verbal Expression: Exceptions to functional limits (averaging min-stand by cues for verbal expression; slow rate of speech due to decreased thought organization)    Plans for cognitive and communication issues affecting addressing:   Pt and Family training goals-  teach home tecnology options like Joy use and home assistive devices       Diet/Swallow:        Dysphagia Outcome Severity Scale: Level 7: Normal in all

## 2024-11-15 NOTE — PROGRESS NOTES
(11/06/24 0929)  UE Dressing: Unable to assess (Comment) (11/06/24 0929)  UE Dressing Skilled Clinical Factors: dressing gown only (11/06/24 0929)  LE Dressing: Dependent/Total (11/06/24 0929)  LE Dressing Skilled Clinical Factors: doff old brief and don new brief (11/06/24 0929)  Putting On/Taking Off Footwear: Dependent/Total (11/06/24 0929)  Putting On/Taking Off Footwear Skilled Clinical Factors: hospital socks (11/06/24 0929)  Toileting: Moderate assistance (11/06/24 0929)  Toileting Skilled Clinical Factors: assist to hold urinal while pt urinated into it while supine. (11/06/24 0929)  Functional Mobility Skilled Clinical Factors: not assessed d/t pt max A to roll left and right in bed. (11/06/24 0929)  Additional Comments: Pt limited in ADL completion by overall weakness, decreased processing speed, and decreased cognition. (11/06/24 0929)  Toilet Transfers  Toilet Transfers Comments: unable to safely assess on eval (11/06/24 1051)     Shower Transfers  Shower Transfers Comments: not assesed- sponge bath completed at bed level (11/06/24 1051)    Speech Therapy:      Comprehension: Exceptions (averaging min-stand by cues for auditory comprehension, improved ability to follow 2 step directions with improved attention to tasks)  Verbal Expression: Exceptions to functional limits (averaging min-stand by cues for verbal expression; slow rate of speech due to decreased thought organization)  Diet/Swallow:        Dysphagia Outcome Severity Scale: Level 7: Normal in all situations  Compensatory Swallowing Strategies : Upright as possible for all oral intake, Small bites/sips          Lab/X-ray studies reviewed, analyzed and discussed with patient and staff:   Recent Results (from the past 24 hour(s))   POCT Glucose    Collection Time: 11/14/24 11:54 AM   Result Value Ref Range    POC Glucose 148 (H) 70 - 99 mg/dl    Performed on ACCU-CHEK    POCT Glucose    Collection Time: 11/14/24  4:59 PM   Result Value Ref Range  consult prn.  Add healthy snack at night.  Acute episodic insomnia with situational adjustment disorder:  prn Ambien, monitor for day time sedation.  Falls risk elevated:  patient to use call light to get nursing assistance to get up, bed and chair alarm.  Elevated DVT risk: progressive activities in PT, continue prophylaxis JB hose, elevation and meds-see MAR.   Oral pharyngeal dysphagia-up in a degrees of feeds-modified diet as needed.  Complex discharge planning:  DC-11/26/24--home with sons and HHC.-Until then continue weekly team meeting every Thursday to re-assess progress towards goals, discuss and address social, psychological and medical comorbidities and to address difficulties they may be having progressing in therapy.  Patient and family education is in progress.  The patient is to follow-up with their family physician after discharge.        Complex Active General Medical Issues that complicate care Assess & Plan:    HLD (hyperlipidemia),  HTN (hypertension), CAD (coronary artery disease),  Cardiomyopathy -Acute rehab to monitor heart rate and rhythm with the option of telemetry and the effects of chronotropic medication with respect to increasing physical activity and exercise in PT, OT, ADLs with medication titration to lowest effective dosing.  Continue blood signs every shift focusing on heart rate, rhythm and blood pressure checks with orthostatic checks-monitoring the effect of exercise, therapy and posture.  Consult hospitalist for backup medical and adjust/add medications.  Monitor heart rate and blood pressure as well as medications effects on vital signs before during and after therapy with especial focus on preventing orthostasis and falls risk.    APRYL (acute kidney injury) -Eliminate toxic medications, monitor I's and O's focusing on urine output, recheck BMP.    Type 2 diabetes mellitus, with long-term current use of insulin-Continue blood sugar checks every shift, diet, add diabetic add

## 2024-11-15 NOTE — CONSULTS
[facility]                   [address] [place] [address]                              CONSULTATION      PATIENT NAME: PAUL CLAYTON                   : 10/09/1933  MED REC NO: [id number]                        ROOM: R246  ACCOUNT NO: [id number]                       ADMIT DATE: 2024  PROVIDER: Sal Jacome MD    ENDOCRINE CONSULT    CONSULT DATE: 2024    REFERRING PHYSICIAN:  Christina Robison DO      REASON FOR CONSULT:  Management of uncontrolled diabetes, need for insulin.    HISTORY OF PRESENT ILLNESS:  The patient is a 89-year-old male with history of diabetes, chronic kidney disease, coronary artery disease, admitted initially with fall, was found lying down for a couple of days.  The patient was transferred to rehab after possible CVA in the anterior left thalamus area, multifocal chronic infarcts.  The patient is currently on Lantus 20 units at bedtime.  Blood sugars have been fairly well controlled between 100-200 range.  Most current chemistries were reviewed.  Sodium was 143, potassium 5.2, chloride 112, CO2 was 19, BUN 94, creatinine 4.31.  The patient also has anemia, moderate, hemoglobin of 8.4.  Hemoglobin A1c was 8.4.  Prior A1c 4 years ago was 6.7.  The patient is scheduled to be discharged in 2 weeks.  The patient also seen here by Nephrology and Neurology.  Reviewed Nephrology consult.  Impression was chronic kidney disease stage 4/5, CVA, hypertension, hyperlipidemia.  Current medications for diabetes include Lantus 20 units at bedtime, Humalog coverage.    PAST MEDICAL HISTORY:  Significant for type 2 diabetes, chronic kidney disease, hearing impairment, CVA, lumbosacral disk disease, osteoarthritis.    SURGICAL HISTORY:  Reviewed, noncontributory.    FAMILY HISTORY:  Reviewed, noncontributory.    SOCIAL HISTORY:  Denies any smoking, substance abuse.    ALLERGIES:  NONE.      MEDICATIONS:  Here include Lantus 20 units at bedtime, allopurinol,

## 2024-11-15 NOTE — PROGRESS NOTES
Physical Therapy Rehab Treatment Note  Facility/Department: [facility]  [facility]       NAME: Modesto Burgess  : 10/9/1933 (89 y.o.)  MRN: [id number]  CODE STATUS: DNR-CCA    Date of Service: 11/15/2024       Restrictions:  Restrictions/Precautions: Fall Risk       SUBJECTIVE:        Pain  Pain: Denies pain pre/post session.      OBJECTIVE:             Bed Mobility  Overall Assistance Level: Stand By Assist (improved ability this session)  Additional Factors: Without handrails;Head of bed flat  Roll Left  Assistance Level: Stand by assist  Roll Right  Assistance Level: Stand by assist  Sit to Supine  Assistance Level: Stand by assist  Supine to Sit  Assistance Level: Stand by assist    Transfers  Surface: To chair without arms;From bed;To bed;From chair with arms;Wheelchair  Additional Factors: Verbal cues;Increased time to complete;Hand placement cues  Sit to Stand  Assistance Level: Minimal assistance  Skilled Clinical Factors: steadying assistance from w/c and arm chair required - no cues for technique  Stand to Sit  Assistance Level: Minimal assistance  Skilled Clinical Factors: cues intermittently for alignment to surface  Bed To/From Chair  Assistance Level: Stand by assist  Skilled Clinical Factors: pivot and with rollator  Car Transfer  Assistance Level: Minimal assistance  Skilled Clinical Factors: increased time and effort as well as steadying assistance throughout transition    Ambulation  Surface: Level surface;Uneven surface;Carpet  Device: Rollator  Distance: 150 feet; 20 feet(limited by destination)  Activity Comments: fwd flexed posture, shuffling steps, improved rollator control, mild lean to the left  Assistance Level: Stand by assist;Minimal assistance  Gait Deviations: Decreased step length bilateral;Path deviations;Narrow base of support  Skilled Clinical Factors: hands on facilitation required for balance maintenance, improved posture and gait quality    Stairs  Stair Height: 6''  Device:  Bilateral handrails  Additional Factors: Non-reciprocal going up;Non-reciprocal going down;Increased time to complete  Assistance Level: Minimal assistance  Skilled Clinical Factors: mild lean to left and post in descent, improved foot placement with cues and facilitation of technique    Wheelchair  Surface: Level surface  Device: Standard wheelchair  Assistance Required to Manage Parts: Left leg rest;Right leg rest  Assistance Level for Propulsion: Independent  Propulsion Method: Bilateral upper extremities  Propulsion Quality: Veers right (butt corrects without assistance)  Propulsion Distance: 50 feet                    Activity Tolerance  Activity Tolerance: Patient tolerated treatment well             ASSESSMENT/PROGRESS TOWARDS GOALS:   Assessment  Assessment: Pt has progressed towards goals. He is requiring steadying assistance with gait and transfers however has met bed mob goals.  Activity Tolerance: Patient tolerated treatment well    Goals:  Long Term Goals  Long Term Goal 1: Pt to complete bed mobility with SBA  Long Term Goal 2: Pt to complete transfers with SBA  Long Term Goal 3: Pt to ambulate 50ft with LRD and SBA  Long Term Goal 4: Pt to manage 2 steps with HR and Can  Long Term Goal 5: Pt to manage and propel WC indep 150ft    PLAN OF CARE/Safety:   Safety Devices  Type of Devices: Chair alarm in place;Left in chair;Call light within reach;Nurse notified      Therapy Time:   Individual   Time In 0900   Time Out 0930   Minutes 30      Minutes:  Transfer/Bed mobility training:15  Gait training:15    Elizabeth Hollis PT, 11/15/24 at 9:48 AM

## 2024-11-15 NOTE — PROGRESS NOTES
[facility]  [place]/Department[place] [facility]  Speech Language Pathology   Treatment Note          Modesto Burgess  10/9/1933  R246/R246-01  [x]   confirmed    Date: 11/15/2024      Restrictions/Precautions: Fall Risk     ADULT DIET; Regular; 4 carb choices (60 gm/meal); Low Potassium (Less than 3000 mg/day)  ADULT ORAL NUTRITION SUPPLEMENT; HS Snack; Snack; low sweet snack     Respiratory Status:   Room air  No active isolations    Rehab Diagnosis: Impaired mobility and ADL's due to left thalamic CVA     Subjective:  Alert and Cooperative        Interventions used this date:  Cognitive Skill Development    Objective/Assessment:  Patient progressing towards goals:  Goal 1: Patient will state name of facility, time within 1 hour, reason in hospital, current month and year with use of external aid with min cues with 100% accuracy.  Pt had difficulty recalling name of facility despite first letter cue.  Pt oriented to injury (\"brain disorder\") and time of day.  Oriented to month and year.    Goal 2: Patient will complete structured tasks addressing sustained/alternating attention with min cues for task completion.  Improved attention during story telling.  Increased time for thought organization and word retrieval however no cues needed.  Goal 3: Patient will complete low level problem solving tasks related to home/health/safety with min cues with 85% accuracy.  Pt identified problems in pictures shown in 3/5 trials, increasing to 5/5 trials given mod cues.   Goal 4: Patient will complete convergent and divergent naming tasks for improved thought organization with min cues with 85% accuracy.  Goal 5: Patient will answer questions addressing remote memory and recent recall with min cues with 90% accuracy.  Remote memory: pt needed increased time to recall children's names however accurate with recall.  Pt recalled previous events of day with stand by cues.  Goal 6: Patient will follow 2 step verbal directions with stand

## 2024-11-15 NOTE — PLAN OF CARE
Problem: Chronic Conditions and Co-morbidities  Goal: Patient's chronic conditions and co-morbidity symptoms are monitored and maintained or improved  Outcome: Progressing  Flowsheets (Taken 11/14/2024 2150 by Lilly Gutiérrez, RN)  Care Plan - Patient's Chronic Conditions and Co-Morbidity Symptoms are Monitored and Maintained or Improved: Monitor and assess patient's chronic conditions and comorbid symptoms for stability, deterioration, or improvement     Problem: Discharge Planning  Goal: Discharge to home or other facility with appropriate resources  Outcome: Progressing  Flowsheets (Taken 11/14/2024 2150 by Lilly Gutiérrez RN)  Discharge to home or other facility with appropriate resources: Identify barriers to discharge with patient and caregiver     Problem: Safety - Adult  Goal: Free from fall injury  Outcome: Progressing     Problem: ABCDS Injury Assessment  Goal: Absence of physical injury  Outcome: Progressing     Problem: Skin/Tissue Integrity  Goal: Absence of new skin breakdown  Description: 1.  Monitor for areas of redness and/or skin breakdown  2.  Assess vascular access sites hourly  3.  Every 4-6 hours minimum:  Change oxygen saturation probe site  4.  Every 4-6 hours:  If on nasal continuous positive airway pressure, respiratory therapy assess nares and determine need for appliance change or resting period.  Outcome: Progressing     Problem: Pain  Goal: Verbalizes/displays adequate comfort level or baseline comfort level  Outcome: Progressing     Problem: Neurosensory - Adult  Goal: Bowel movement at least every other day  Outcome: Progressing     Problem: Nutrition Deficit:  Goal: Optimize nutritional status  Outcome: Progressing

## 2024-11-15 NOTE — PROGRESS NOTES
OCCUPATIONAL THERAPY  INPATIENT REHAB TREATMENT NOTE  [facility]      NAME: Modesto Burgses  : 10/9/1933 (89 y.o.)  MRN: [id number]  CODE STATUS: DNR-CCA  Room: R246R246-01    Date of Service: 11/15/2024    Referring Physician: Christina Robison DO  Rehab Diagnosis: Impaired mobility and ADL's d/t left thalamic CVA    Hospital course:   Comments: 89 y.o. male patient who presented to ER 10/29 with altered mental status and was found on floor. Not TNK candidate for CVA due timing timing window. Patient found to have had an acute L thalamus infarct in addition to being hypernatremic and having APRYL on CKD. Patient admitted with neuro and cardiology consulting.      Restrictions  Restrictions/Precautions  Restrictions/Precautions: Fall Risk     Patient's date of birth confirmed: Yes    SAFETY:  Safety Devices  Safety Devices in place: Yes  Type of devices: All fall risk precautions in place    SUBJECTIVE: \"I don't know why we're doing this.\"    Pain at start of treatment: No    Pain at end of treatment: No    COGNITION:  Orientation  Overall Orientation Status: Within Functional Limits  Cognition  Overall Cognitive Status: Exceptions  Arousal/Alertness: Appears intact  Following Commands: Follows one step commands with increased time;Follows one step commands with repetition  Attention Span: Appears intact  Memory: Decreased recall of biographical Information;Decreased short term memory;Decreased recall of recent events  Safety Judgement: Decreased awareness of need for assistance  Problem Solving: Decreased awareness of errors  Insights: Decreased awareness of deficits  Initiation: Requires cues for some  Sequencing: Requires cues for some      Pt's current cognitive status is:  Comprehension: Supervision  Expression: Supervision  Social Interaction: Supervision  Problem Solving: Min A  Memory: Min A    OBJECTIVE:    Grooming/Oral Hygiene  Assistance Level: Independent  Skilled Clinical Factors: seated in

## 2024-11-16 LAB
GLUCOSE BLD-MCNC: 141 MG/DL (ref 70–99)
GLUCOSE BLD-MCNC: 142 MG/DL (ref 70–99)
GLUCOSE BLD-MCNC: 146 MG/DL (ref 70–99)
GLUCOSE BLD-MCNC: 253 MG/DL (ref 70–99)
PERFORMED ON: ABNORMAL

## 2024-11-16 PROCEDURE — 97110 THERAPEUTIC EXERCISES: CPT

## 2024-11-16 PROCEDURE — 6370000000 HC RX 637 (ALT 250 FOR IP): Performed by: INTERNAL MEDICINE

## 2024-11-16 PROCEDURE — 6370000000 HC RX 637 (ALT 250 FOR IP)

## 2024-11-16 PROCEDURE — 97116 GAIT TRAINING THERAPY: CPT

## 2024-11-16 PROCEDURE — 6370000000 HC RX 637 (ALT 250 FOR IP): Performed by: PHYSICAL MEDICINE & REHABILITATION

## 2024-11-16 PROCEDURE — 1180000000 HC REHAB R&B

## 2024-11-16 PROCEDURE — 99232 SBSQ HOSP IP/OBS MODERATE 35: CPT | Performed by: PHYSICAL MEDICINE & REHABILITATION

## 2024-11-16 PROCEDURE — 6370000000 HC RX 637 (ALT 250 FOR IP): Performed by: NURSE PRACTITIONER

## 2024-11-16 PROCEDURE — 97530 THERAPEUTIC ACTIVITIES: CPT

## 2024-11-16 RX ORDER — INSULIN GLARGINE 100 [IU]/ML
INJECTION, SOLUTION SUBCUTANEOUS
Qty: 5 ADJUSTABLE DOSE PRE-FILLED PEN SYRINGE | Refills: 3 | Status: SHIPPED | OUTPATIENT
Start: 2024-11-16 | End: 2024-11-22

## 2024-11-16 RX ORDER — HYDROCHLOROTHIAZIDE 12.5 MG/1
CAPSULE ORAL
Qty: 2 EACH | Refills: 4 | Status: SHIPPED | OUTPATIENT
Start: 2024-11-16

## 2024-11-16 RX ADMIN — SENNOSIDES 8.6 MG: 8.6 TABLET, FILM COATED ORAL at 22:27

## 2024-11-16 RX ADMIN — MENTHOL: 0 POWDER TOPICAL at 22:36

## 2024-11-16 RX ADMIN — HYDRALAZINE HYDROCHLORIDE 50 MG: 50 TABLET ORAL at 08:16

## 2024-11-16 RX ADMIN — ASPIRIN 81 MG CHEWABLE TABLET 162 MG: 81 TABLET CHEWABLE at 08:15

## 2024-11-16 RX ADMIN — ATORVASTATIN CALCIUM 40 MG: 40 TABLET, FILM COATED ORAL at 08:15

## 2024-11-16 RX ADMIN — BACITRACIN ZINC, NEOMYCIN, POLYMYXIN B: 400; 3.5; 5 OINTMENT TOPICAL at 14:59

## 2024-11-16 RX ADMIN — CALCITRIOL 0.25 MCG: 0.25 CAPSULE ORAL at 08:15

## 2024-11-16 RX ADMIN — HYDRALAZINE HYDROCHLORIDE 50 MG: 50 TABLET ORAL at 22:27

## 2024-11-16 RX ADMIN — Medication 2000 UNITS: at 16:23

## 2024-11-16 RX ADMIN — MICONAZOLE NITRATE: 2 POWDER TOPICAL at 22:39

## 2024-11-16 RX ADMIN — MICONAZOLE NITRATE: 2 POWDER TOPICAL at 15:00

## 2024-11-16 RX ADMIN — CARVEDILOL 12.5 MG: 12.5 TABLET, FILM COATED ORAL at 16:23

## 2024-11-16 RX ADMIN — BACITRACIN ZINC, NEOMYCIN, POLYMYXIN B 1 G: 400; 3.5; 5 OINTMENT TOPICAL at 22:40

## 2024-11-16 RX ADMIN — ISOSORBIDE MONONITRATE 30 MG: 30 TABLET, EXTENDED RELEASE ORAL at 08:16

## 2024-11-16 RX ADMIN — SODIUM BICARBONATE 650 MG TABLET 650 MG: at 22:26

## 2024-11-16 RX ADMIN — MENTHOL: 0 POWDER TOPICAL at 15:00

## 2024-11-16 RX ADMIN — ALLOPURINOL 100 MG: 100 TABLET ORAL at 08:15

## 2024-11-16 RX ADMIN — SODIUM BICARBONATE 650 MG TABLET 650 MG: at 08:16

## 2024-11-16 RX ADMIN — CARVEDILOL 12.5 MG: 12.5 TABLET, FILM COATED ORAL at 08:15

## 2024-11-16 RX ADMIN — Medication 100 MG: at 08:16

## 2024-11-16 ASSESSMENT — PAIN SCALES - GENERAL
PAINLEVEL_OUTOF10: 0

## 2024-11-16 NOTE — PLAN OF CARE
Problem: Chronic Conditions and Co-morbidities  Goal: Patient's chronic conditions and co-morbidity symptoms are monitored and maintained or improved  11/15/2024 2234 by Marbella Carrington RN  Outcome: Progressing  11/15/2024 1114 by Mercy Baez RN  Outcome: Progressing  Flowsheets (Taken 11/14/2024 2150 by Lilly Gutiérrez RN)  Care Plan - Patient's Chronic Conditions and Co-Morbidity Symptoms are Monitored and Maintained or Improved: Monitor and assess patient's chronic conditions and comorbid symptoms for stability, deterioration, or improvement     Problem: Discharge Planning  Goal: Discharge to home or other facility with appropriate resources  11/15/2024 2234 by Marbella Carrington RN  Outcome: Progressing  11/15/2024 1114 by Mercy Baez RN  Outcome: Progressing  Flowsheets (Taken 11/14/2024 2150 by Lilly Gutiérrez RN)  Discharge to home or other facility with appropriate resources: Identify barriers to discharge with patient and caregiver     Problem: Safety - Adult  Goal: Free from fall injury  11/15/2024 2234 by Marbella Carrington RN  Outcome: Progressing  11/15/2024 1114 by Mercy Baez RN  Outcome: Progressing     Problem: ABCDS Injury Assessment  Goal: Absence of physical injury  11/15/2024 2234 by Marbella Carrington RN  Outcome: Progressing  11/15/2024 1114 by Mercy Baez RN  Outcome: Progressing     Problem: Skin/Tissue Integrity  Goal: Absence of new skin breakdown  Description: 1.  Monitor for areas of redness and/or skin breakdown  2.  Assess vascular access sites hourly  3.  Every 4-6 hours minimum:  Change oxygen saturation probe site  4.  Every 4-6 hours:  If on nasal continuous positive airway pressure, respiratory therapy assess nares and determine need for appliance change or resting period.  11/15/2024 2234 by Marbella Carrington RN  Outcome: Progressing  11/15/2024 1114 by Mercy Baez RN  Outcome: Progressing     Problem: Pain  Goal:  Verbalizes/displays adequate comfort level or baseline comfort level  11/15/2024 2234 by Marbella Carrington RN  Outcome: Progressing  11/15/2024 1114 by Mercy Baez RN  Outcome: Progressing     Problem: Neurosensory - Adult  Goal: Bowel movement at least every other day  11/15/2024 2234 by Marbella Carrington RN  Outcome: Progressing  11/15/2024 1114 by Mercy Baez, RN  Outcome: Progressing     Problem: Nutrition Deficit:  Goal: Optimize nutritional status  11/15/2024 2234 by Marbella Carrington, RN  Outcome: Progressing  11/15/2024 1114 by Mercy Baez, RN  Outcome: Progressing

## 2024-11-16 NOTE — PROGRESS NOTES
Subjective:  The patient complains of severe acute on chronic progressive aphasia, fatigue and right-sided weakness partially relieved by rest, medications, PT,  OT,   SLP and rest and exacerbated by recent left thalamic CVA.       89 yo male was admitted to [facility] on 10/29/2024 after presenting through the ER after falling at home.  He is 89 years old and presented through the ER on 10/29/2024 with altered mental status after being found on the floor.  He was diagnosed with a left thalamic CVA.  He was not a TNK candidate is that he were not sure as to the timing window.  Heparin is on hold for new pain.     Post stroke he was also found to have acute on chronic renal failure and diabetes mellitus with hyperglycemia.     He was admitted under the care of the hospitalist with neurology consulting.  He had significant confusion felt to be secondary to his subarachnoid hemorrhage in the right parietal area antiplatelets therapy and subcutaneous heparin were held.    I am concerned about patient’s medical complexities and barriers to advancing in rehab goals including decreasing stroke risk and monitoring for UTI and urinary retention.    I am also concerned about his diabetes and have endocrinology on the case-he and family will get diabetic Ed and dietary add as well.    I reviewed current care and plans for further care with other rehab providers including nursing and case management.  According to recent nursing note, \"BS is 201. Pt is scheduled 14 Lantus and 2 of Humalog. Pt is refusing any coverage at this time. Will continue to monitor. \".    Patient was eager for discharge however his daughter who will be helping at discharge would like him to stay to his full stay and keep the discharge date as is.  They are now in agreement with him staying.  He had family training yesterday.    ROS x10:  The patient also complains of severely impaired mobility and activities of daily living.  Otherwise

## 2024-11-16 NOTE — PROGRESS NOTES
Physical Therapy Rehab Treatment Note  Facility/Department: [facility]  [facility]       NAME: Modesto Burgess  : 10/9/1933 (89 y.o.)  MRN: [id number]  CODE STATUS: DNR-CCA    Date of Service: 2024       Restrictions:  Restrictions/Precautions: Fall Risk  Position Activity Restriction  Other position/activity restrictions: Avasys       SUBJECTIVE:   Subjective: \"Nice to meet you\"    Pain  Pain: Denies pain pre/post session.      OBJECTIVE:     Transfers  Surface: Wheelchair;To chair with arms;From chair with arms  Additional Factors: Verbal cues;Increased time to complete;Hand placement cues  Device: Walker  Sit to Stand  Assistance Level: Stand by assist  Skilled Clinical Factors: no assistance physically required this sessoin  Stand to Sit  Assistance Level: Stand by assist;Minimal assistance  Skilled Clinical Factors: cues intermittently for alignment to surface    Ambulation  Surface: Level surface;Uneven surface  Device: Rollator  Distance: 200', 100'  Activity: Within Unit  Activity Comments: fwd flexed posture, shuffling steps, improved rollator control, mild lean to the left  Additional Factors: Verbal cues;Increased time to complete  Assistance Level: Stand by assist  Gait Deviations: Decreased step length bilateral;Path deviations;Narrow base of support  Skilled Clinical Factors: improved balance reactions and safety in all aspects of environmental maneuvering and approach to chair    PT Exercises  A/AROM Exercises: seated: AP, LAQ, march x10ea abisai  Resistive Exercises: seated GTB HS curls/Hip Abd x10 BLE     Activity Tolerance  Activity Tolerance: Patient tolerated treatment well    ASSESSMENT/PROGRESS TOWARDS GOALS:   Assessment  Assessment: Pt pleasant and motivated this session, able to tolerate increased gait distance this session with use of rollator and no LOB noted. Pt reports eagerness to discharge home to be with family for the holidays.  Activity Tolerance: Patient tolerated treatment

## 2024-11-16 NOTE — PROGRESS NOTES
OCCUPATIONAL THERAPY  INPATIENT REHAB TREATMENT NOTE  [facility]      NAME: Modesto Burgess  : 10/9/1933 (89 y.o.)  MRN: [id number]  CODE STATUS: DNR-CCA  [facility]    Date of Service: 2024    Referring Physician: Christina Robison DO  Rehab Diagnosis: Impaired mobility and ADL's d/t left thalamic CVA    Hospital course:   Comments: 89 y.o. male patient who presented to ER 10/29 with altered mental status and was found on floor. Not TNK candidate for CVA due timing timing window. Patient found to have had an acute L thalamus infarct in addition to being hypernatremic and having APRYL on CKD. Patient admitted with neuro and cardiology consulting.      Restrictions  Restrictions/Precautions  Restrictions/Precautions: Fall Risk            Position Activity Restriction  Other position/activity restrictions: Avasys    Patient's date of birth confirmed: Yes    SAFETY:  Safety Devices  Safety Devices in place: Yes  Type of devices: All fall risk precautions in place    SUBJECTIVE: Pt agreeable to OT treatment.       Pain at start of treatment: No    Pain at end of treatment: No    Location: N/A  Description: N/A    COGNITION:  Orientation  Overall Orientation Status: Within Functional Limits  Orientation Level: Oriented to time;Oriented to place;Oriented to situation;Oriented to person  Cognition  Overall Cognitive Status: Exceptions  Arousal/Alertness: Appears intact  Following Commands: Follows one step commands with increased time;Follows one step commands with repetition  Attention Span: Appears intact  Safety Judgement: Decreased awareness of need for assistance  Problem Solving: Assistance required to correct errors made;Assistance required to identify errors made  Insights: Decreased awareness of deficits  Initiation: Requires cues for some  Sequencing: Requires cues for some    OBJECTIVE:  UB STRENGTH/FUNCTION  Pt participated in the following seated table top exercises to focus on UB ENDURANCE  here.\"     Therapy Time:   Individual Group Co-Treat   Time In 1130       Time Out 1200         Minutes 30             Therapeutic activities: 30 minutes     Electronically signed by:    DELMAR Duff,   11/16/2024, 12:03 PM

## 2024-11-16 NOTE — PROGRESS NOTES
Pts BS is 201. Pt is scheduled 14 Lantus and 2 of Humalog. Pt is refusing any coverage at thi time. Will continue to monitor. Electronically signed by Marbella Carrington RN on 11/15/2024 at 8:41 PM

## 2024-11-16 NOTE — PROGRESS NOTES
Nephrology Progress Note    Assessment:  CKD-5  Hyperkalemia   Recent CVA  CAD  DM type-2    Plan:check labs in morning for K+ on veltessa    Patient Active Problem List:     APRYL (acute kidney injury) ([facility])     CKD (chronic kidney disease)     HTN (hypertension)     CAD (coronary artery disease)     Type 2 diabetes mellitus, with long-term current use of insulin ([facility])     HLD (hyperlipidemia)     Hyperkalemia     Fall at home, initial encounter     Altered mental status     Hyperparathyroidism due to renal insufficiency ([facility])     Palliative care encounter     Goals of care, counseling/discussion     Advanced care planning/counseling discussion     Full code status     Actinic keratosis     PVD (posterior vitreous detachment), both eyes     CVA  L Thalamic     Impaired mobility ADLs dt L thal CVA     Late effect of brain injury     Late effects of CVA (cerebrovascular accident)     [place] (hard of hearing)     Parkinsonian features     Cardiomyopathy ([facility])     SAH (subarachnoid hemorrhage) ([facility])     DNR (do not resuscitate)     Poorly controlled diabetes mellitus ([facility])      Subjective:  Admit Date: 11/5/2024    Interval History: stable son in room    Medications:  Scheduled Meds:   insulin glargine  14 Units SubCUTAneous Nightly    patiromer sorbitex calcium  8.4 g Oral Lunch    epoetin emerson-epbx  10,000 Units SubCUTAneous Weekly    miconazole   Topical BID    menthol   Topical BID    aspirin  162 mg Oral Daily    sodium bicarbonate  650 mg Oral BID    polyethylene glycol  17 g Oral Daily    senna  1 tablet Oral Nightly    Vitamin D  2,000 Units Oral Dinner    cyanocobalamin  1,000 mcg IntraMUSCular Weekly    coenzyme Q10  100 mg Oral Daily    lidocaine  3 patch TransDERmal Daily    neomycin-bacitracin-polymyxin   Topical BID    allopurinol  100 mg Oral Daily    atorvastatin  40 mg Oral Daily    calcitRIOL  0.25 mcg Oral Daily    carvedilol  12.5 mg Oral BID WC    hydrALAZINE  50 mg Oral BID    insulin lispro  0-8 Units

## 2024-11-16 NOTE — PROGRESS NOTES
Progress Note  Date:2024       [facility]  Patient Name:Modesto Burgess     YOB: 1933     Age:89 y.o.    Chief complaint uncontrolled type 2 diabetes    Subjective    Subjective:  Symptoms:  Stable.    Diet:  Adequate intake.    Activity level: Returning to normal.    Pain:  He reports no pain.       Review of Systems  Objective         Vitals Last 24 Hours:  TEMPERATURE:  Temp  Av.8 °F (36.6 °C)  Min: 97.8 °F (36.6 °C)  Max: 97.8 °F (36.6 °C)  RESPIRATIONS RANGE: No data recorded  PULSE OXIMETRY RANGE: SpO2  Av %  Min: 95 %  Max: 95 %  PULSE RANGE: Pulse  Av.5  Min: 63  Max: 66  BLOOD PRESSURE RANGE: Systolic (24hrs), Av , Min:119 , Max:140   ; Diastolic (24hrs), Av, Min:49, Max:69    I/O (24Hr):    Intake/Output Summary (Last 24 hours) at 2024 1020  Last data filed at 2024 0349  Gross per 24 hour   Intake --   Output 650 ml   Net -650 ml     Objective:  General Appearance:  Comfortable.    Vital signs: (most recent): Blood pressure (!) 119/49, pulse 63, temperature 97.8 °F (36.6 °C), temperature source Oral, resp. rate 18, height 1.723 m (5' 7.84\"), weight 86 kg (189 lb 9.5 oz), SpO2 95%.  Vital signs are normal.    HEENT: Normal HEENT exam.    Lungs:  Normal effort.    Heart: Normal rate.    Abdomen: Abdomen is soft.    Extremities: Normal range of motion.    Neurological: Patient is alert.    Skin:  No rash.     Labs/Imaging/Diagnostics    Labs:  CBC:  Recent Labs     24  0756   WBC 7.3   RBC 2.59*   HGB 8.4*   HCT 25.4*   MCV 98.1*   RDW 14.0        CHEMISTRIES:  Recent Labs     24  0756 11/15/24  1206    144   K 5.2* 5.4*   * 113*   CO2 19* 17*   BUN 94* 95*   CREATININE 4.31* 4.17*   GLUCOSE 161* 124*     PT/INR:No results for input(s): \"PROTIME\", \"INR\" in the last 72 hours.  APTT:No results for input(s): \"APTT\" in the last 72 hours.  LIVER PROFILE:No results for input(s): \"AST\", \"ALT\", \"BILIDIR\", \"BILITOT\", \"ALKPHOS\" in

## 2024-11-17 LAB
ANION GAP SERPL CALCULATED.3IONS-SCNC: 12 MEQ/L (ref 9–15)
BUN SERPL-MCNC: 88 MG/DL (ref 8–23)
CALCIUM SERPL-MCNC: 9.6 MG/DL (ref 8.5–9.9)
CHLORIDE SERPL-SCNC: 115 MEQ/L (ref 95–107)
CO2 SERPL-SCNC: 17 MEQ/L (ref 20–31)
CREAT SERPL-MCNC: 4.17 MG/DL (ref 0.7–1.2)
GLUCOSE BLD-MCNC: 134 MG/DL (ref 70–99)
GLUCOSE BLD-MCNC: 146 MG/DL (ref 70–99)
GLUCOSE BLD-MCNC: 175 MG/DL (ref 70–99)
GLUCOSE BLD-MCNC: 203 MG/DL (ref 70–99)
GLUCOSE SERPL-MCNC: 125 MG/DL (ref 70–99)
PERFORMED ON: ABNORMAL
POTASSIUM SERPL-SCNC: 4.6 MEQ/L (ref 3.4–4.9)
SODIUM SERPL-SCNC: 144 MEQ/L (ref 135–144)

## 2024-11-17 PROCEDURE — 6370000000 HC RX 637 (ALT 250 FOR IP): Performed by: PHYSICAL MEDICINE & REHABILITATION

## 2024-11-17 PROCEDURE — 1180000000 HC REHAB R&B

## 2024-11-17 PROCEDURE — 6370000000 HC RX 637 (ALT 250 FOR IP)

## 2024-11-17 PROCEDURE — 99232 SBSQ HOSP IP/OBS MODERATE 35: CPT | Performed by: PHYSICAL MEDICINE & REHABILITATION

## 2024-11-17 PROCEDURE — 97535 SELF CARE MNGMENT TRAINING: CPT

## 2024-11-17 PROCEDURE — 6370000000 HC RX 637 (ALT 250 FOR IP): Performed by: INTERNAL MEDICINE

## 2024-11-17 PROCEDURE — 36415 COLL VENOUS BLD VENIPUNCTURE: CPT

## 2024-11-17 PROCEDURE — 80048 BASIC METABOLIC PNL TOTAL CA: CPT

## 2024-11-17 PROCEDURE — 97116 GAIT TRAINING THERAPY: CPT

## 2024-11-17 PROCEDURE — 6370000000 HC RX 637 (ALT 250 FOR IP): Performed by: NURSE PRACTITIONER

## 2024-11-17 RX ADMIN — HYDRALAZINE HYDROCHLORIDE 50 MG: 50 TABLET ORAL at 09:22

## 2024-11-17 RX ADMIN — CALCITRIOL 0.25 MCG: 0.25 CAPSULE ORAL at 09:23

## 2024-11-17 RX ADMIN — BACITRACIN ZINC, NEOMYCIN, POLYMYXIN B 1 G: 400; 3.5; 5 OINTMENT TOPICAL at 09:23

## 2024-11-17 RX ADMIN — ASPIRIN 81 MG CHEWABLE TABLET 162 MG: 81 TABLET CHEWABLE at 09:22

## 2024-11-17 RX ADMIN — ISOSORBIDE MONONITRATE 30 MG: 30 TABLET, EXTENDED RELEASE ORAL at 09:22

## 2024-11-17 RX ADMIN — CARVEDILOL 12.5 MG: 12.5 TABLET, FILM COATED ORAL at 17:56

## 2024-11-17 RX ADMIN — SODIUM BICARBONATE 650 MG TABLET 650 MG: at 21:44

## 2024-11-17 RX ADMIN — CARVEDILOL 12.5 MG: 12.5 TABLET, FILM COATED ORAL at 09:22

## 2024-11-17 RX ADMIN — Medication 100 MG: at 09:23

## 2024-11-17 RX ADMIN — Medication 2000 UNITS: at 17:56

## 2024-11-17 RX ADMIN — ALLOPURINOL 100 MG: 100 TABLET ORAL at 09:22

## 2024-11-17 RX ADMIN — ATORVASTATIN CALCIUM 40 MG: 40 TABLET, FILM COATED ORAL at 09:22

## 2024-11-17 RX ADMIN — HYDRALAZINE HYDROCHLORIDE 50 MG: 50 TABLET ORAL at 21:44

## 2024-11-17 RX ADMIN — BACITRACIN ZINC, NEOMYCIN, POLYMYXIN B 1 G: 400; 3.5; 5 OINTMENT TOPICAL at 21:45

## 2024-11-17 RX ADMIN — SODIUM BICARBONATE 650 MG TABLET 650 MG: at 09:22

## 2024-11-17 NOTE — PROGRESS NOTES
Physical Therapy Rehab Treatment Note  [place] [facility]       NAME: Modesto Burgess  : 10/9/1933 (89 y.o.)  MRN: [id number]  CODE STATUS: DNR-CCA    Date of Service: 2024       Restrictions:  Restrictions/Precautions: Fall Risk     SUBJECTIVE:   Subjective: Patient supine in bed, agreeable to treatment, states he is cold.    Pain  Pain: Denies pain pre/post session.    OBJECTIVE:         Bed mobility  Supine to Sit: Stand by assistance;Minimal assistance  Sit to Supine: Stand by assistance  Bed Mobility Comments: cues for hand placement with supine to sit, cueing patient to use bed rail vs pull on therapist hand    Transfers  Sit to Stand: Stand by assistance  Stand to Sit: Stand by assistance  Bed to Chair: Stand by assistance  Comment: from bed, and rollator seat, increased time and effort, vc for eccentric control  Sit to stand x5 throughout session for quality and strength, as well as hand placment without cues    Ambulation  Surface: Level tile  Device: Rollator  Assistance: Stand by assistance  Quality of Gait: decreased step height and length, ff posture and downward gaze, slow lakisha  Gait Deviations: Slow Lakisha;Decreased step length;Decreased step height  Distance: 150 feet    Stairs/Curb  Stairs?: No        PT Exercises  Exercise Treatment: standing squat, hr; with bue support, x15, supine hooklying march x30  Static Standing Balance Exercises: without ue ft/fa/modified tandem x15-30 seconds assist varied from sba- mod assist        ASSESSMENT/PROGRESS TOWARDS GOALS:   Assessment: Pattient worked toward gait with rollator to improve step length and height, vc with fading carryover. Patient completed therex for ble strengthening and balance activities to improve stability and reaction times. Vareid assist from sba- mod assist with ft no ue support.    Goals:  Long Term Goals  Long Term Goal 1: Pt to complete bed mobility with SBA  Long Term Goal 2: Pt to complete  transfers with SBA  Long Term Goal 3: Pt to ambulate 50ft with LRD and SBA  Long Term Goal 4: Pt to manage 2 steps with HR and Can  Long Term Goal 5: Pt to manage and propel WC indep 150ft    PLAN OF CARE/Safety:   Safety Devices  Type of Devices: All fall risk precautions in place;Bed alarm in place;Call light within reach      Therapy Time:   Individual   Time In 1245   Time Out 1315   Minutes 30      Minutes:30  Transfer/Bed mobility training:10  Gait training:10  Neuro re education:2  Therapeutic ex:8      Jolanta Rider PTA, 11/17/24 at 1:18 PM

## 2024-11-17 NOTE — PLAN OF CARE
Problem: Chronic Conditions and Co-morbidities  Goal: Patient's chronic conditions and co-morbidity symptoms are monitored and maintained or improved  Outcome: Progressing     Problem: Discharge Planning  Goal: Discharge to home or other facility with appropriate resources  Outcome: Progressing     Problem: Safety - Adult  Goal: Free from fall injury  Outcome: Progressing     Problem: ABCDS Injury Assessment  Goal: Absence of physical injury  Outcome: Progressing     Problem: Skin/Tissue Integrity  Goal: Absence of new skin breakdown  Description: 1.  Monitor for areas of redness and/or skin breakdown  2.  Assess vascular access sites hourly  3.  Every 4-6 hours minimum:  Change oxygen saturation probe site  4.  Every 4-6 hours:  If on nasal continuous positive airway pressure, respiratory therapy assess nares and determine need for appliance change or resting period.  Outcome: Progressing     Problem: Pain  Goal: Verbalizes/displays adequate comfort level or baseline comfort level  Outcome: Progressing     Problem: Neurosensory - Adult  Goal: Bowel movement at least every other day  Outcome: Progressing     Problem: Nutrition Deficit:  Goal: Optimize nutritional status  Outcome: Progressing

## 2024-11-17 NOTE — FLOWSHEET NOTE
This aid attempted to provide care to patient. Visitors were in the room visiting. The patient was in his bed. Patient and family requested this aid come back later after her visitors leave.

## 2024-11-17 NOTE — PROGRESS NOTES
Subjective:  The patient complains of severe acute on chronic progressive aphasia, fatigue and right-sided weakness partially relieved by rest, medications, PT,  OT,   SLP and rest and exacerbated by recent left thalamic CVA.       89 yo male was admitted to [facility] on 10/29/2024 after presenting through the ER after falling at home.  He is 89 years old and presented through the ER on 10/29/2024 with altered mental status after being found on the floor.  He was diagnosed with a left thalamic CVA.  He was not a TNK candidate is that he were not sure as to the timing window.  Heparin is on hold for new pain.     Post stroke he was also found to have acute on chronic renal failure and diabetes mellitus with hyperglycemia.     He was admitted under the care of the hospitalist with neurology consulting.  He had significant confusion felt to be secondary to his subarachnoid hemorrhage in the right parietal area antiplatelets therapy and subcutaneous heparin were held.    I am concerned about patient’s medical complexities and barriers to advancing in rehab goals including decreasing stroke risk and monitoring for UTI and urinary retention.    I am also concerned about his diabetes and have endocrinology on the case-he and family will get diabetic Ed and dietary add as well.    I reviewed current care and plans for further care with other rehab providers including nursing and case management.  According to recent nursing note,  no substantive notes-- only form notes from the nursing over the past 24 hours any and all other notes reviewed as well.       ROS x10:  The patient also complains of severely impaired mobility and activities of daily living.  Otherwise no new problems with vision, hearing, nose, mouth, throat, dermal, cardiovascular, GI, , pulmonary, musculoskeletal, psychiatric or neurological. See also Acute Rehab PM&R H&P.       Vital signs:  /62   Pulse 71   Temp 97.9 °F (36.6 °C)  in 24 hours, continue bowel & bladder program.  Monitor bowel and bladder function.  Lactinex 2 PO every AC.  MOM prn, Brown Bomb prn, Glycerin suppository prn, enema prn.  Encourage therapy and nursing to co-treat and problem solve re continence.    Severe back pain,  , as well as generalized OA pain: reassess pain every shift and prior to and after each therapy session, give prn Tylenol and consider scheduled Tylenol, modalities prn in therapy, masage, Lidoderm, K-pad prn. Consider scheduled AM pain meds. Controlled Substance Monitoring:  Acute and Chronic Pain Monitoring:   RX Monitoring Acute Pain Prescriptions Periodic Controlled Substance Monitoring   11/7/2024   8:28 AM Prescription exceeds daily limit for a specific reason. See comments or note.;Severe pain not adequately treated with lower dose.;Not required given exclusionary diagnoses... Possible medication side effects, risk of tolerance/dependence & alternative treatments discussed.;No signs of potential drug abuse or diversion identified.;Assessed functional status (ability to engage in work or other purposeful activities, the pain intensity and its interference with activities of daily living, quality of family life and social activities, and the physical activity);Obtaining appropriate analgesic effect of treatment.       Skin healing left foot abrasions, yeast through and breakdown risk:  continue pressure relief program.  Daily skin exams and reports from nursing.  Bacitracin Left foot.  Goldbond powder and Micatin powder  Fatigue due to nutritional and hydration deficiency: Add and titrate vitamin B12 vitamin D and CoQ10 continue to monitor I&O’s, calorie counts prn, dietary consult prn.  Add healthy snack at night.  Acute episodic insomnia with situational adjustment disorder:  prn Ambien, monitor for day time sedation.  Falls risk elevated:  patient to use call light to get nursing assistance to get up, bed and chair alarm.  Elevated DVT risk:

## 2024-11-17 NOTE — PROGRESS NOTES
Nephrology Progress Note    Assessment:  CKD 5stable  Hyperkalemia correctedHx CVA  DM type-2  CAD      Plan: same therapy   encourage oral intake  family in room  GFR13 cc/min    Patient Active Problem List:     APRYL (acute kidney injury) ([facility])     CKD (chronic kidney disease)     HTN (hypertension)     CAD (coronary artery disease)     Type 2 diabetes mellitus, with long-term current use of insulin ([facility])     HLD (hyperlipidemia)     Hyperkalemia     Fall at home, initial encounter     Altered mental status     Hyperparathyroidism due to renal insufficiency ([facility])     Palliative care encounter     Goals of care, counseling/discussion     Advanced care planning/counseling discussion     Full code status     Actinic keratosis     PVD (posterior vitreous detachment), both eyes     CVA  L Thalamic     Impaired mobility ADLs dt L thal CVA     Late effect of brain injury     Late effects of CVA (cerebrovascular accident)     [place] (hard of hearing)     Parkinsonian features     Cardiomyopathy ([facility])     SAH (subarachnoid hemorrhage) ([facility])     DNR (do not resuscitate)     Poorly controlled diabetes mellitus ([facility])      Subjective:  Admit Date: 11/5/2024    Interval History: stable    Medications:  Scheduled Meds:   insulin glargine  14 Units SubCUTAneous Nightly    patiromer sorbitex calcium  8.4 g Oral Lunch    epoetin emerson-epbx  10,000 Units SubCUTAneous Weekly    miconazole   Topical BID    menthol   Topical BID    aspirin  162 mg Oral Daily    sodium bicarbonate  650 mg Oral BID    polyethylene glycol  17 g Oral Daily    senna  1 tablet Oral Nightly    Vitamin D  2,000 Units Oral Dinner    cyanocobalamin  1,000 mcg IntraMUSCular Weekly    coenzyme Q10  100 mg Oral Daily    lidocaine  3 patch TransDERmal Daily    neomycin-bacitracin-polymyxin   Topical BID    allopurinol  100 mg Oral Daily    atorvastatin  40 mg Oral Daily    calcitRIOL  0.25 mcg Oral Daily    carvedilol  12.5 mg Oral BID WC    hydrALAZINE  50 mg Oral BID

## 2024-11-18 LAB
ANION GAP SERPL CALCULATED.3IONS-SCNC: 12 MEQ/L (ref 9–15)
BASOPHILS # BLD: 0.1 K/UL (ref 0–0.2)
BASOPHILS NFR BLD: 0.7 %
BUN SERPL-MCNC: 83 MG/DL (ref 8–23)
CALCIUM SERPL-MCNC: 9.6 MG/DL (ref 8.5–9.9)
CHLORIDE SERPL-SCNC: 113 MEQ/L (ref 95–107)
CO2 SERPL-SCNC: 17 MEQ/L (ref 20–31)
CREAT SERPL-MCNC: 4.4 MG/DL (ref 0.7–1.2)
EOSINOPHIL # BLD: 0 K/UL (ref 0–0.7)
EOSINOPHIL NFR BLD: 0 %
ERYTHROCYTE [DISTWIDTH] IN BLOOD BY AUTOMATED COUNT: 14.3 % (ref 11.5–14.5)
GLUCOSE BLD-MCNC: 139 MG/DL (ref 70–99)
GLUCOSE BLD-MCNC: 183 MG/DL (ref 70–99)
GLUCOSE SERPL-MCNC: 148 MG/DL (ref 70–99)
HCT VFR BLD AUTO: 25.7 % (ref 42–52)
HGB BLD-MCNC: 8 G/DL (ref 14–18)
LYMPHOCYTES # BLD: 1 K/UL (ref 1–4.8)
LYMPHOCYTES NFR BLD: 11.6 %
MCH RBC QN AUTO: 30.8 PG (ref 27–31.3)
MCHC RBC AUTO-ENTMCNC: 31.1 % (ref 33–37)
MCV RBC AUTO: 98.8 FL (ref 79–92.2)
MONOCYTES # BLD: 0.5 K/UL (ref 0.2–0.8)
MONOCYTES NFR BLD: 5.3 %
NEUTROPHILS # BLD: 7 K/UL (ref 1.4–6.5)
NEUTS SEG NFR BLD: 82 %
PERFORMED ON: ABNORMAL
PERFORMED ON: ABNORMAL
PLATELET # BLD AUTO: 203 K/UL (ref 130–400)
POTASSIUM SERPL-SCNC: 4.7 MEQ/L (ref 3.4–4.9)
RBC # BLD AUTO: 2.6 M/UL (ref 4.7–6.1)
SODIUM SERPL-SCNC: 142 MEQ/L (ref 135–144)
WBC # BLD AUTO: 8.5 K/UL (ref 4.8–10.8)

## 2024-11-18 PROCEDURE — 99232 SBSQ HOSP IP/OBS MODERATE 35: CPT | Performed by: PHYSICAL MEDICINE & REHABILITATION

## 2024-11-18 PROCEDURE — G0108 DIAB MANAGE TRN  PER INDIV: HCPCS

## 2024-11-18 PROCEDURE — 80048 BASIC METABOLIC PNL TOTAL CA: CPT

## 2024-11-18 PROCEDURE — 85025 COMPLETE CBC W/AUTO DIFF WBC: CPT

## 2024-11-18 PROCEDURE — 6370000000 HC RX 637 (ALT 250 FOR IP)

## 2024-11-18 PROCEDURE — 97130 THER IVNTJ EA ADDL 15 MIN: CPT

## 2024-11-18 PROCEDURE — 6370000000 HC RX 637 (ALT 250 FOR IP): Performed by: NURSE PRACTITIONER

## 2024-11-18 PROCEDURE — 6370000000 HC RX 637 (ALT 250 FOR IP): Performed by: INTERNAL MEDICINE

## 2024-11-18 PROCEDURE — 1180000000 HC REHAB R&B

## 2024-11-18 PROCEDURE — 97535 SELF CARE MNGMENT TRAINING: CPT

## 2024-11-18 PROCEDURE — 97530 THERAPEUTIC ACTIVITIES: CPT

## 2024-11-18 PROCEDURE — 36415 COLL VENOUS BLD VENIPUNCTURE: CPT

## 2024-11-18 PROCEDURE — 99231 SBSQ HOSP IP/OBS SF/LOW 25: CPT | Performed by: NURSE PRACTITIONER

## 2024-11-18 PROCEDURE — 97112 NEUROMUSCULAR REEDUCATION: CPT

## 2024-11-18 PROCEDURE — 97129 THER IVNTJ 1ST 15 MIN: CPT

## 2024-11-18 PROCEDURE — 6370000000 HC RX 637 (ALT 250 FOR IP): Performed by: PHYSICAL MEDICINE & REHABILITATION

## 2024-11-18 PROCEDURE — 97116 GAIT TRAINING THERAPY: CPT

## 2024-11-18 RX ORDER — ALLOPURINOL 100 MG/1
100 TABLET ORAL DAILY
Qty: 90 TABLET | Refills: 3 | Status: SHIPPED | OUTPATIENT
Start: 2024-11-18

## 2024-11-18 RX ADMIN — SODIUM BICARBONATE 650 MG TABLET 650 MG: at 20:02

## 2024-11-18 RX ADMIN — HYDRALAZINE HYDROCHLORIDE 50 MG: 50 TABLET ORAL at 20:02

## 2024-11-18 RX ADMIN — ATORVASTATIN CALCIUM 40 MG: 40 TABLET, FILM COATED ORAL at 10:39

## 2024-11-18 RX ADMIN — CARVEDILOL 12.5 MG: 12.5 TABLET, FILM COATED ORAL at 20:02

## 2024-11-18 RX ADMIN — MICONAZOLE NITRATE: 2 POWDER TOPICAL at 20:06

## 2024-11-18 RX ADMIN — BACITRACIN ZINC, NEOMYCIN, POLYMYXIN B 1 G: 400; 3.5; 5 OINTMENT TOPICAL at 20:02

## 2024-11-18 RX ADMIN — CALCITRIOL 0.25 MCG: 0.25 CAPSULE ORAL at 10:39

## 2024-11-18 RX ADMIN — SENNOSIDES 8.6 MG: 8.6 TABLET, FILM COATED ORAL at 20:02

## 2024-11-18 RX ADMIN — ASPIRIN 81 MG CHEWABLE TABLET 162 MG: 81 TABLET CHEWABLE at 10:39

## 2024-11-18 RX ADMIN — ALLOPURINOL 100 MG: 100 TABLET ORAL at 10:39

## 2024-11-18 RX ADMIN — CARVEDILOL 12.5 MG: 12.5 TABLET, FILM COATED ORAL at 10:39

## 2024-11-18 RX ADMIN — Medication 100 MG: at 10:39

## 2024-11-18 RX ADMIN — HYDRALAZINE HYDROCHLORIDE 50 MG: 50 TABLET ORAL at 10:38

## 2024-11-18 RX ADMIN — SODIUM BICARBONATE 650 MG TABLET 650 MG: at 10:38

## 2024-11-18 RX ADMIN — ISOSORBIDE MONONITRATE 30 MG: 30 TABLET, EXTENDED RELEASE ORAL at 10:39

## 2024-11-18 ASSESSMENT — ENCOUNTER SYMPTOMS
WHEEZING: 0
NAUSEA: 0
CHEST TIGHTNESS: 0
SHORTNESS OF BREATH: 0
TROUBLE SWALLOWING: 0
COUGH: 0
COLOR CHANGE: 0
VOMITING: 0

## 2024-11-18 ASSESSMENT — PAIN SCALES - GENERAL: PAINLEVEL_OUTOF10: 0

## 2024-11-18 NOTE — PROGRESS NOTES
OCCUPATIONAL THERAPY  INPATIENT REHAB TREATMENT NOTE  [facility]      NAME: Modesto Burgess  : 10/9/1933 (89 y.o.)  MRN: [id number]  CODE STATUS: DNR-CCA  Room: [facility]/R246-01    Date of Service: 2024    Referring Physician: Christina Robison DO  Rehab Diagnosis: Impaired mobility and ADL's d/t left thalamic CVA    Hospital course:   Comments: 89 y.o. male patient who presented to ER 10/29 with altered mental status and was found on floor. Not TNK candidate for CVA due timing timing window. Patient found to have had an acute L thalamus infarct in addition to being hypernatremic and having APRYL on CKD. Patient admitted with neuro and cardiology consulting.      Restrictions  Restrictions/Precautions  Restrictions/Precautions: Fall Risk     Patient's date of birth confirmed: Yes    SAFETY:  Safety Devices  Safety Devices in place: Yes  Type of devices: All fall risk precautions in place    SUBJECTIVE: \"I already washed up today.\"    Pain at start of treatment: No    Pain at end of treatment: No    COGNITION:  Orientation  Overall Orientation Status: Within Functional Limits  Cognition  Overall Cognitive Status: Exceptions  Arousal/Alertness: Appears intact  Following Commands: Follows one step commands with increased time;Follows one step commands with repetition  Attention Span: Appears intact  Memory: Decreased recall of biographical Information;Decreased short term memory;Decreased recall of recent events  Safety Judgement: Decreased awareness of need for assistance  Problem Solving: Assistance required to correct errors made;Assistance required to identify errors made  Insights: Decreased awareness of deficits  Initiation: Requires cues for some  Sequencing: Requires cues for some    OBJECTIVE:    Patient seated in w/c upon therapist arrival.  Treatment session interrupted with diabetes educator and again with family friend.  Patient missed 15 minutes treatment session 2°

## 2024-11-18 NOTE — PROGRESS NOTES
INDIVIDUALIZED OVERALL REHAB PLAN OF CARE  ADDENDUM TO REHAB PROGRESS NOTE-for audit purposes must also refer to this day's clinical note and combine the information      Date: 2024  Patient Name: Modesto Burgess   Room: R246/R246-01    MRN: [id number]    : 10/9/1933  (89 y.o.)  Gender: male       Today 2024 during weekly team meeting, I reviewed the patient Modesto Burgess in detail with the therapists and nurses involved in patient's care gathering complex physiatric data regarding current medical issues, progress in therapies, factors limiting progress, social issues, psychological issues, ongoing therapeutic plans and discharge planning.    Legend:  I= independent Im =Modified independent  S=Supervised SB=stand by HAND=set up CG=contact maura Min= minimal Mod=Moderate Max=maximal Max of 2 =maximal assist of 2 people      CURRENT FUNCTIONAL STATUS:    CVA.  Patient was admitted to [facility] on 10/29/2024 after presenting through the ER after falling at home.  He is 89 years old and presented through the ER on 10/29/2024 with altered mental status after being found on the floor.  He was diagnosed with a left thalamic CVA.  He was not a TNK candidate is that he were not sure as to the timing window.     Post stroke he was also found to have acute on chronic renal failure and diabetes mellitus with hyperglycemia.     He was admitted under the care of the hospitalist with neurology consulting.  He had significant confusion felt to be secondary to his subarachnoid hemorrhage in the right parietal area antiplatelets therapy and subcutaneous heparin were held.    NURSING ISSUES:  refused HS insulins and AM POC Glucose. No s/s of hyper/hypo glycemia  Nursing will continue to focus on bowel and bladder continence transitioning toward independence by time of discharge.  Monitoring post void residuals monitoring for severe constipation and bowel obstruction.      Barriers to progress and discharge complex  150ft  OT:Eating  Assistance Needed: Setup or clean-up assistance  CARE Score: 5  Discharge Goal: Independent, Oral Hygiene  Assistance Needed: Supervision or touching assistance  CARE Score: 4  Discharge Goal: Independent, Toileting Hygiene  Assistance needed: Partial/moderate assistance  CARE Score: 3  Discharge Goal: Supervision or touching assistance, Shower/Bathe Self  Assistance Needed: Substantial/maximal assistance  CARE Score: 2  Discharge Goal: Supervision or touching assistance  Upper Body Dressing  Reason if not Attempted: Not attempted due to environmental limitations  CARE Score: 10  Discharge Goal: Independent, Lower Body Dressing  Assistance Needed: Dependent  CARE Score: 1  Discharge Goal: Supervision or touching assistance, Putting On/Taking Off Footwear  Assistance Needed: Dependent  CARE Score: 1  Discharge Goal: Supervision or touching assistance, Toilet Transfer  Reason if not Attempted: Not attempted due to medical condition or safety concerns  CARE Score: 88  Discharge Goal: Supervision or touching assistance  SP:Long Term Goals  Time Frame for Long Term Goals: 2-3 weeks  Goal 1: Patient will demonstrate functional cognitive-linguistic abilities in all opportunities at a min assist level in order to safely complete ADLs.  Goal 2: -              From a cognitive standpoint they will need:        24 hr vs daily transitioning to supervision  -->progress to occasional             Significant problems/ barriers to functional progress include: Pt is at a high risk for functional loss,      []  Acute infection/UTI    []  Low BP's     []  COPD flare-up   []  Uncontrolled blood sugar     []  Progressive anemia     [x]  poor endurance    []  Severe pain     []  Impaired mental status    []  Urinary incontinence    []  Bowel incontinence           Plan to correct barriers to functional progress:   Add scheduled rest breaks, CoQ 10 and Vit B 12, control pain by using ice Lidoderm rest and massage as well

## 2024-11-18 NOTE — PLAN OF CARE
Problem: Chronic Conditions and Co-morbidities  Goal: Patient's chronic conditions and co-morbidity symptoms are monitored and maintained or improved  11/18/2024 1129 by Catrachita Pedraza RN  Outcome: Progressing  11/18/2024 0655 by Magdalena Hanson RN  Outcome: Progressing  11/18/2024 0653 by Magdalena Hanson RN  Outcome: Progressing  Flowsheets (Taken 11/17/2024 2045)  Care Plan - Patient's Chronic Conditions and Co-Morbidity Symptoms are Monitored and Maintained or Improved: Monitor and assess patient's chronic conditions and comorbid symptoms for stability, deterioration, or improvement     Problem: Discharge Planning  Goal: Discharge to home or other facility with appropriate resources  11/18/2024 1129 by Catrachita Pedraza RN  Outcome: Progressing  11/18/2024 0655 by Magdalena Hanson RN  Outcome: Progressing  11/18/2024 0653 by Magdalena Hanson RN  Outcome: Progressing  Flowsheets (Taken 11/17/2024 2045)  Discharge to home or other facility with appropriate resources: Identify barriers to discharge with patient and caregiver     Problem: Safety - Adult  Goal: Free from fall injury  11/18/2024 1129 by Catrachita Pedraza RN  Outcome: Progressing  11/18/2024 0655 by Magdalena Hanson RN  Outcome: Progressing  11/18/2024 0653 by Magdalena Hanson RN  Outcome: Progressing     Problem: ABCDS Injury Assessment  Goal: Absence of physical injury  11/18/2024 1129 by Catrachita Pedraza RN  Outcome: Progressing  11/18/2024 0655 by Magdalena Hanson RN  Outcome: Progressing  11/18/2024 0653 by Magdalena Hanson RN  Outcome: Progressing     Problem: Skin/Tissue Integrity  Goal: Absence of new skin breakdown  Description: 1.  Monitor for areas of redness and/or skin breakdown  2.  Assess vascular access sites hourly  3.  Every 4-6 hours minimum:  Change oxygen saturation probe site  4.  Every 4-6 hours:  If on nasal continuous positive airway pressure, respiratory therapy assess nares and determine need for appliance change or

## 2024-11-18 NOTE — PROGRESS NOTES
Subjective:  The patient complains of severe acute on chronic progressive aphasia, fatigue and right-sided weakness partially relieved by rest, medications, PT,  OT,   SLP and rest and exacerbated by recent left thalamic CVA.       89 yo male was admitted to [facility] on 10/29/2024 after presenting through the ER after falling at home.  He is 89 years old and presented through the ER on 10/29/2024 with altered mental status after being found on the floor.  He was diagnosed with a left thalamic CVA.  He was not a TNK candidate is that he were not sure as to the timing window.  Heparin is on hold for new pain.     Post stroke he was also found to have acute on chronic renal failure and diabetes mellitus with hyperglycemia.     He was admitted under the care of the hospitalist with neurology consulting.  He had significant confusion felt to be secondary to his subarachnoid hemorrhage in the right parietal area antiplatelets therapy and subcutaneous heparin were held.    I am concerned about patient’s medical complexities and barriers to advancing in rehab goals including decreasing stroke risk and monitoring for UTI and urinary retention.    I am also concerned about his diabetes and have endocrinology on the case-he and family will get diabetic Ed and dietary add as well.    I reviewed current care and plans for further care with other rehab providers including nursing and case management.  According to recent nursing note,   \"refused HS insulins and AM POC Glucose. No s/s of hyper/hypo glycemia \".       ROS x10:  The patient also complains of severely impaired mobility and activities of daily living.  Otherwise no new problems with vision, hearing, nose, mouth, throat, dermal, cardiovascular, GI, , pulmonary, musculoskeletal, psychiatric or neurological. See also Acute Rehab PM&R H&P.       Vital signs:  BP (!) 146/66   Pulse 65   Temp 97.9 °F (36.6 °C)   Resp 18   Ht 1.723 m (5' 7.84\")   Wt

## 2024-11-18 NOTE — PROGRESS NOTES
Modesto Burgess, seen for evaluation and education on Type 2 uncontrolled.     Lab Results   Component Value Date    LABA1C 8.4 (H) 11/01/2024     Lab Results   Component Value Date     11/01/2024       Modesto Burgess has had diabetes for 20 years.    Discussed with Modesto Aditya, their current diabetes self-care routines prior to this admission. medication compliance: compliant most of the time, diabetic diet compliance: compliant most of the time.  I also spoke with his daughter over the phone who states he has a Dexcom and knows how to manage his diabetes well.     Survival Skill Topics discussed:  [x] Type 2 Diabetes diagnosis as chronic and progressive  [] Type 1 Diabetes diagnosis    [x] Eating healthy - Currently follows: [] plate method [] portion control [] label reading [] carb counting  [] sources of carbohydrates [x] No meal plan   Assessment of current status: [] Needs instruction [x] Needs review   [] Comprehends key points  [] Demonstrates understanding/competence  [x] Not covered                   Discussed the following:  [] plate method [] portion control [] label reading [] carb counting  [] sources of carbohydrates    [x] Being active - Currently: [x]  is not active  []  is active -- Type of activity:  Assessment of current status: [] Needs instruction [x] Needs review   [] Comprehends key points  [] Demonstrates understanding/competence  [] Not covered    [x] Discussed current recommendations and safety     [x] Medications - current medications: action, side effects, precautions   Patient's medications: see mar/ if discharged on insulin Lantus__  Assessment of current status: [] Needs instruction [] Needs review   [] Comprehends key points  [] Demonstrates understanding/competence  [] Not covered   [x] Discussed each medication patient is taking, action, side effects, precautions, proper administration  Plan to review Lantus teaching if discharged on the insulin.  States no need for insulin teaching

## 2024-11-18 NOTE — PLAN OF CARE
Problem: Chronic Conditions and Co-morbidities  Goal: Patient's chronic conditions and co-morbidity symptoms are monitored and maintained or improved  11/18/2024 0655 by Magdalena Hanson RN  Outcome: Progressing  11/18/2024 0653 by Magdalena Hanson RN  Outcome: Progressing  Flowsheets (Taken 11/17/2024 2045)  Care Plan - Patient's Chronic Conditions and Co-Morbidity Symptoms are Monitored and Maintained or Improved: Monitor and assess patient's chronic conditions and comorbid symptoms for stability, deterioration, or improvement     Problem: Discharge Planning  Goal: Discharge to home or other facility with appropriate resources  11/18/2024 0655 by Magdalena Hanson RN  Outcome: Progressing  11/18/2024 0653 by Magdalena Hanson RN  Outcome: Progressing  Flowsheets (Taken 11/17/2024 2045)  Discharge to home or other facility with appropriate resources: Identify barriers to discharge with patient and caregiver     Problem: Safety - Adult  Goal: Free from fall injury  11/18/2024 0655 by Magdalena Hanson RN  Outcome: Progressing  11/18/2024 0653 by Magdalena Hanson RN  Outcome: Progressing     Problem: ABCDS Injury Assessment  Goal: Absence of physical injury  11/18/2024 0655 by Magdalena Hanson RN  Outcome: Progressing  11/18/2024 0653 by Magdalena Hanson RN  Outcome: Progressing     Problem: Skin/Tissue Integrity  Goal: Absence of new skin breakdown  Description: 1.  Monitor for areas of redness and/or skin breakdown  2.  Assess vascular access sites hourly  3.  Every 4-6 hours minimum:  Change oxygen saturation probe site  4.  Every 4-6 hours:  If on nasal continuous positive airway pressure, respiratory therapy assess nares and determine need for appliance change or resting period.  11/18/2024 0655 by Magdalena Hanson RN  Outcome: Progressing  11/18/2024 0653 by Magdalena Hanson RN  Outcome: Progressing     Problem: Pain  Goal: Verbalizes/displays adequate comfort level or baseline comfort level  11/18/2024 0655 by  Valentin, Magdalena A, RN  Outcome: Progressing  11/18/2024 0653 by Magdalena Hanson RN  Outcome: Progressing     Problem: Neurosensory - Adult  Goal: Bowel movement at least every other day  11/18/2024 0655 by Magdalena Hanson RN  Outcome: Progressing  11/18/2024 0653 by Magdalena Hanson RN  Outcome: Progressing     Problem: Nutrition Deficit:  Goal: Optimize nutritional status  11/18/2024 0655 by Magdalena Hanson RN  Outcome: Progressing  11/18/2024 0653 by Magdalena Hanson RN  Outcome: Progressing

## 2024-11-18 NOTE — PROGRESS NOTES
Nephrology Progress Note    Assessment:  CKD-5  Hypertension  DM type-2  CAD  Anemia      Plan: no new changes  reviewed labs with him  maintain same protocol    Patient Active Problem List:     APRYL (acute kidney injury) ([facility])     CKD (chronic kidney disease)     HTN (hypertension)     CAD (coronary artery disease)     Type 2 diabetes mellitus, with long-term current use of insulin ([facility])     HLD (hyperlipidemia)     Hyperkalemia     Fall at home, initial encounter     Altered mental status     Hyperparathyroidism due to renal insufficiency ([facility])     Palliative care encounter     Goals of care, counseling/discussion     Advanced care planning/counseling discussion     Full code status     Actinic keratosis     PVD (posterior vitreous detachment), both eyes     CVA  L Thalamic     Impaired mobility ADLs dt L thal CVA     Late effect of brain injury     Late effects of CVA (cerebrovascular accident)     [place] (hard of hearing)     Parkinsonian features     Cardiomyopathy ([facility])     SAH (subarachnoid hemorrhage) ([facility])     DNR (do not resuscitate)     Poorly controlled diabetes mellitus ([facility])      Subjective:  Admit Date: 11/5/2024    Interval History: stable    Medications:  Scheduled Meds:   insulin glargine  14 Units SubCUTAneous Nightly    patiromer sorbitex calcium  8.4 g Oral Lunch    epoetin emerson-epbx  10,000 Units SubCUTAneous Weekly    miconazole   Topical BID    menthol   Topical BID    aspirin  162 mg Oral Daily    sodium bicarbonate  650 mg Oral BID    polyethylene glycol  17 g Oral Daily    senna  1 tablet Oral Nightly    Vitamin D  2,000 Units Oral Dinner    cyanocobalamin  1,000 mcg IntraMUSCular Weekly    coenzyme Q10  100 mg Oral Daily    lidocaine  3 patch TransDERmal Daily    neomycin-bacitracin-polymyxin   Topical BID    allopurinol  100 mg Oral Daily    atorvastatin  40 mg Oral Daily    calcitRIOL  0.25 mcg Oral Daily    carvedilol  12.5 mg Oral BID WC    hydrALAZINE  50 mg Oral BID    insulin lispro   0-8 Units SubCUTAneous 4x Daily AC & HS    isosorbide mononitrate  30 mg Oral Daily     Continuous Infusions:   sodium chloride      sodium chloride      dextrose         CBC:   Recent Labs     11/18/24 0427   WBC 8.5   HGB 8.0*        CMP:    Recent Labs     11/15/24  1206 11/17/24  0432 11/18/24  0427    144 142   K 5.4* 4.6 4.7   * 115* 113*   CO2 17* 17* 17*   BUN 95* 88* 83*   CREATININE 4.17* 4.17* 4.40*   GLUCOSE 124* 125* 148*   CALCIUM 10.0* 9.6 9.6   LABGLOM 12.8* 12.8* 12.0*     Troponin: No results for input(s): \"TROPONINI\" in the last 72 hours.  BNP: No results for input(s): \"BNP\" in the last 72 hours.  INR: No results for input(s): \"INR\" in the last 72 hours.  Lipids: No results for input(s): \"CHOL\", \"LDLDIRECT\", \"TRIG\", \"HDL\", \"AMYLASE\", \"LIPASE\" in the last 72 hours.  Liver: No results for input(s): \"AST\", \"ALT\", \"ALKPHOS\", \"LABALBU\", \"BILITOT\" in the last 72 hours.    Invalid input(s): \"PROT\", \"BILDIR\"  Iron:  No results for input(s): \"FERRITIN\" in the last 72 hours.    Invalid input(s): \"IRONS\", \"LABIRONS\"  Urinalysis: No results for input(s): \"UA\" in the last 72 hours.    Objective:  Vitals: BP (!) 146/66   Pulse 65   Temp 97.9 °F (36.6 °C)   Resp 18   Ht 1.723 m (5' 7.84\")   Wt 86 kg (189 lb 9.5 oz)   SpO2 96%   BMI 28.97 kg/m²    Wt Readings from Last 3 Encounters:   11/14/24 86 kg (189 lb 9.5 oz)   11/05/24 93.6 kg (206 lb 5.6 oz)   04/03/24 82.1 kg (181 lb)      24HR INTAKE/OUTPUT:    Intake/Output Summary (Last 24 hours) at 11/18/2024 0814  Last data filed at 11/18/2024 0710  Gross per 24 hour   Intake --   Output 1500 ml   Net -1500 ml       General: alert, in no apparent distress  HEENT: normocephalic, atraumatic, anicteric  Neck: supple, no mass  Lungs: non-labored respirations, clear to auscultation bilaterally  Heart: regular rate and rhythm, no murmurs or rubs  Abdomen: soft, non-tender, non-distended  Ext: no cyanosis, no peripheral edema  Neuro: alert and

## 2024-11-18 NOTE — PROGRESS NOTES
[facility]  Facility/Department: [facility]  Speech Language Pathology   Treatment Note          Modesto Burgess  10/9/1933  R246/R246-01  [x]   confirmed    Date: 2024      Restrictions/Precautions: Fall Risk  Position Activity Restriction  Other position/activity restrictions: Avasys     ADULT ORAL NUTRITION SUPPLEMENT; HS Snack; Snack; low sweet snack  ADULT DIET; Regular; 4 carb choices (60 gm/meal); Low Potassium (Less than 3000 mg/day); No Concentrated sweets  ADULT ORAL NUTRITION SUPPLEMENT; HS Snack; Snack; LOW CARB     Respiratory Status:   Room air  No active isolations    Rehab Diagnosis: Impaired mobility and ADL's due to left thalamic CVA     Subjective:  Alert and Cooperative        Interventions used this date:  Cognitive Skill Development    Objective/Assessment:  Patient progressing towards goals:  Goal 1: Patient will state name of facility, time within 1 hour, reason in hospital, current month and year with use of external aid with min cues with 100% accuracy.  Pt independently oriented to place (\"[facility]\"), time, injury, month, and year.  D/C, goal met  Goal 2: Patient will complete structured tasks addressing sustained/alternating attention with min cues for task completion.  Great sustained and alternating attention this date.  D/C, goal met  Goal 3: Patient will complete low level problem solving tasks related to home/health/safety with min cues with 85% accuracy.  Mid level problem solving: sequenced 4 written items in correct progression in 4/6 trials, increasing to 6/6 trials with min cues.  D/C, advance goal  Goal 4: Patient will complete convergent and divergent naming tasks for improved thought organization with min cues with 85% accuracy.  Goal 5: Patient will answer questions addressing remote memory and recent recall with min cues with 90% accuracy.  Remote recall:  age, birthday, address 100%  Recent recall: therapy, breakfast 100%  Delayed recall of 4 items: after  review 1/4x; after second review 3/4x, after 3 minute delay 3/4x, after 3 minute delay 3/4x, after 3 min delay 3/4  D/C, advance goal  Goal 6: Patient will follow 2 step verbal directions with stand by cues with 85% accuracy.  Pt followed 2 step directions in 5/5 trials.    D/C, goal met    Updated goals:  Goal 1: Patient will complete mid level problem solving tasks related to home/health/safety with min cues with 90%.  Goal 2: Patient will carry out mid level verbal/written directives in everyday activities with stand by cues with 90% accuracy.    Goal 3: Patient will sequence 4-6 words/pictures/written steps with stand by cues with 90% accuracy.  Goal 4: Patient will utilize a memory strategy to recall 4-6 words/pictures with stand by cues with 90% accuracy.        Treatment/Activity Tolerance:  Patient tolerated treatment well    Plan:  Continue per POC    Pain Assessment:  Patient does not c/o pain.    Pain Re-assessment:  Patient does not c/o pain.    Patient/Caregiver Education:  Patient educated on session and progression towards goals.  Education needs reinforcement.    Safety Devices:  Call light within reach, Chair alarm in place, and Telesitter in use        Speech Therapy Level of Assistance Scale    AUDITORY COMPREHENSION  Rating:  Supervised Assistance    VERBAL EXPRESSION  Rating:  Supervised Assistance-Minimal Assistance    MOTOR SPEECH  Rating: Independent    PROBLEM SOLVING  Rating: Minimal Assistance    MEMORY  Rating:  Minimal Assistance-Moderate Assistance        Therapy Time   Time in: 1000  Time out: 1030  Minutes: 30            Signature: Electronically signed by STORMY Graham on 11/18/2024 at 10:38 AM

## 2024-11-18 NOTE — PROGRESS NOTES
stable and unchanged.  There is atrophy identified of the brain.  Low-attenuation areas seen in the periventricular and subcortical white  matter to suggest chronic small vessel ischemic change.  There is no acute  intracranial hemorrhage, mass effect or midline shift.  No abnormal  extra-axial fluid collection.  The gray-white differentiation is maintained  without evidence of an acute infarct.  There is no evidence of hydrocephalus.    ORBITS: The visualized portion of the orbits demonstrate no acute abnormality.    SINUSES: The visualized paranasal sinuses and mastoid air cells demonstrate  no acute abnormality.  Chronic sinusitis involving the sphenoid sinuses.  Findings are stable.    SOFT TISSUES/SKULL:  No acute abnormality of the visualized skull or soft  tissues.    Impression  1. Stable exam with no new findings in the interval.  2. Atrophy and chronic small vessel ischemic change.  3. Minimal residual subarachnoid hemorrhage seen right parietal region.  No results found for this or any previous visit.  No results found for this or any previous visit.      Carotid duplex: No results found for this or any previous visit.  No results found for this or any previous visit.  No results found for this or any previous visit.      Echo No results found for this or any previous visit.            Assessment/Plan:  Acute anterior left thalamic ischemic infarct  Follow-up CT of the head done on 11/3/2024 showed small amount of subarachnoid hemorrhage in the right parietal area and evolving lacunar infarct of the left thalamus  Carotid duplex negative for significant stenosis  Echocardiogram with ejection fraction of 30%, left ventricular moderate dilation and severe hypokinesis  LDL 70  Hemoglobin A1c 8.4  Repeat CT of the head 11/11/2024 with resolving hemorrhage.  Aspirin resumed at 162mg.  Elevated blood pressure, improving  Will need ongoing attention to risk factor modification.  Continue  PT/OT/ST            Collaborating physicians: Dr Rob    Electronically signed by Tawanna De Luna, SERENA - CNP on 11/18/2024 at 3:23 PM'

## 2024-11-18 NOTE — PLAN OF CARE
Problem: Chronic Conditions and Co-morbidities  Goal: Patient's chronic conditions and co-morbidity symptoms are monitored and maintained or improved  Outcome: Progressing  Flowsheets (Taken 11/17/2024 2045)  Care Plan - Patient's Chronic Conditions and Co-Morbidity Symptoms are Monitored and Maintained or Improved: Monitor and assess patient's chronic conditions and comorbid symptoms for stability, deterioration, or improvement     Problem: Discharge Planning  Goal: Discharge to home or other facility with appropriate resources  Outcome: Progressing  Flowsheets (Taken 11/17/2024 2045)  Discharge to home or other facility with appropriate resources: Identify barriers to discharge with patient and caregiver     Problem: Safety - Adult  Goal: Free from fall injury  Outcome: Progressing     Problem: ABCDS Injury Assessment  Goal: Absence of physical injury  Outcome: Progressing     Problem: Skin/Tissue Integrity  Goal: Absence of new skin breakdown  Description: 1.  Monitor for areas of redness and/or skin breakdown  2.  Assess vascular access sites hourly  3.  Every 4-6 hours minimum:  Change oxygen saturation probe site  4.  Every 4-6 hours:  If on nasal continuous positive airway pressure, respiratory therapy assess nares and determine need for appliance change or resting period.  Outcome: Progressing     Problem: Pain  Goal: Verbalizes/displays adequate comfort level or baseline comfort level  Outcome: Progressing     Problem: Neurosensory - Adult  Goal: Bowel movement at least every other day  Outcome: Progressing     Problem: Nutrition Deficit:  Goal: Optimize nutritional status  Outcome: Progressing

## 2024-11-18 NOTE — PROGRESS NOTES
Physical Therapy Rehab Treatment Note  Facility/Department: [facility]  [facility]       NAME: Modesto Burgess  : 10/9/1933 (89 y.o.)  MRN: [id number]  CODE STATUS: DNR-CCA    Date of Service: 2024       Restrictions:  Restrictions/Precautions: Fall Risk       SUBJECTIVE:        Pain  Pain: Denies pain pre/post session.      OBJECTIVE:             Bed Mobility  Additional Factors: Without handrails;Head of bed flat;Verbal cues;Increased time to complete (decreased quality overall - on mat this session at pt request)  Roll Left  Assistance Level: Stand by assist  Roll Right  Assistance Level: Stand by assist  Sit to Supine  Assistance Level: Stand by assist  Supine to Sit  Assistance Level: Stand by assist  Scooting  Assistance Level: Stand by assist    Transfers  Surface: Wheelchair;To chair with arms;From chair with arms;From bed  Additional Factors: Verbal cues;Increased time to complete;Hand placement cues  Device: Walker  Sit to Stand  Assistance Level: Stand by assist  Skilled Clinical Factors: cues for technique required  Stand to Sit  Assistance Level: Stand by assist  Skilled Clinical Factors: cues intermittently for alignment to surface  Bed To/From Chair  Assistance Level: Stand by assist  Car Transfer  Skilled Clinical Factors: NT - pt reports his car is higher for return to home    Ambulation  Surface: Level surface;Uneven surface;Carpet  Device: Rollator  Distance: 300 feet; plus multiple short distance gait between surfaces  Activity Comments: occasional decrease in LLE stance time, flexed trunk with heavy UE support required, rollator control appropriate  Additional Factors: Verbal cues;Increased time to complete  Assistance Level: Stand by assist;Minimal assistance (intermittent hands on cues for posture utilized)    Stairs  Stair Height: 6''  Device: Bilateral handrails  Additional Factors: Non-reciprocal going up;Non-reciprocal going down;Increased time to complete  Assistance Level:

## 2024-11-18 NOTE — PROGRESS NOTES
Physical Therapy Rehab Treatment Note  Facility/Department: [facility]  [facility]       NAME: Modesto Burgses  : 10/9/1933 (89 y.o.)  MRN: [id number]  CODE STATUS: DNR-CCA    Date of Service: 2024       Restrictions:  Restrictions/Precautions: Fall Risk  Position Activity Restriction  Other position/activity restrictions: Avasys       SUBJECTIVE: Patient without new reports        Pain   Patient denies pain pre/post session     OBJECTIVE:   Bed mobility  Rolling to Left: Stand by assistance  Rolling to Right: Stand by assistance  Supine to Sit: Stand by assistance;Minimal assistance  Scooting: Stand by assistance  Bed Mobility Comments: HOB slightly elevated; patient requires increased time and effort    Transfers  Sit to Stand: Stand by assistance  Stand to Sit: Stand by assistance  Bed to Chair: Stand by assistance  Comment: requires increased time and effort    Ambulation  Surface: Level tile  Device: Rollator  Assistance: Stand by assistance  Quality of Gait: flexed forward posture with downward gaze, reciprocal gait, inconsistent heel strike, decreased gait speed  Gait Deviations: Slow Shivani;Decreased step length;Decreased step height  Distance: 300 feet    Exercises:  Static standing balance without UE support: WBOS- horizontal/vertical head turns (1 LOB posterior)  Ball lifts, push pull and trunk rotations without UE support.    ASSESSMENT/PROGRESS TOWARDS GOALS:   Body Structures, Functions, Activity Limitations Requiring Skilled Therapeutic Intervention: Decreased functional mobility ;Decreased ROM;Decreased endurance;Decreased safe awareness;Decreased balance;Decreased coordination;Decreased posture;Increased pain;Decreased strength;Decreased fine motor control  Assessment: Patient continues to require frequent seated RB due to fatigue. Challenged patient's balance without UE support. He demonstrates 1 LOB during vertical head turns. Required min A to correct.   Discharge Recommendations:

## 2024-11-18 NOTE — PROGRESS NOTES
OCCUPATIONAL THERAPY  INPATIENT REHAB TREATMENT NOTE  [facility]      NAME: Modesto Burgess  : 10/9/1933 (89 y.o.)  MRN: [id number]  CODE STATUS: DNR-CCA  Room: [facility]/R246-01    Date of Service: 2024    Referring Physician: Christina Robison DO  Rehab Diagnosis: Impaired mobility and ADL's d/t left thalamic CVA    Hospital course:   Comments: 89 y.o. male patient who presented to ER 10/29 with altered mental status and was found on floor. Not TNK candidate for CVA due timing timing window. Patient found to have had an acute L thalamus infarct in addition to being hypernatremic and having APRYL on CKD. Patient admitted with neuro and cardiology consulting.      Restrictions  Restrictions/Precautions  Restrictions/Precautions: Fall Risk     Patient's date of birth confirmed: Yes    SAFETY:  Safety Devices  Safety Devices in place: Yes  Type of devices: All fall risk precautions in place    SUBJECTIVE: \"I don't know why they put that (raised toilet seat) in here. It looks like it's for babies.\"    Pain at start of treatment: No    Pain at end of treatment: No    COGNITION:  Orientation  Overall Orientation Status: Within Functional Limits  Cognition  Overall Cognitive Status: Exceptions  Arousal/Alertness: Appears intact  Following Commands: Follows one step commands with increased time;Follows one step commands with repetition  Attention Span: Appears intact  Memory: Decreased recall of biographical Information;Decreased short term memory;Decreased recall of recent events  Safety Judgement: Decreased awareness of need for assistance  Problem Solving: Assistance required to correct errors made;Assistance required to identify errors made  Insights: Decreased awareness of deficits  Initiation: Requires cues for some  Sequencing: Requires cues for some    OBJECTIVE:    FM Coordination and Strengthening:  Patient engaged in B FM coordination and strengthening to increase I with fasteners and small objects for ADL's  education & training, Equipment evaluation, education, & procurement, Positioning, Self-Care / ADL, Coordination training    Continue per OT POC for planned discharge on 24.    Patient goals : \"Get out of here.\"           Therapy Time:   Individual Group Co-Treat   Time In 1530       Time Out 1615         Minutes 45               ADL/IADL trainin minutes  Therapeutic activities: 25 minutes     Electronically signed by:    DELMAR Matthews,   2024, 4:30 PM

## 2024-11-19 LAB
GLUCOSE BLD-MCNC: 140 MG/DL (ref 70–99)
GLUCOSE BLD-MCNC: 146 MG/DL (ref 70–99)
GLUCOSE BLD-MCNC: 200 MG/DL (ref 70–99)
PERFORMED ON: ABNORMAL

## 2024-11-19 PROCEDURE — 97116 GAIT TRAINING THERAPY: CPT

## 2024-11-19 PROCEDURE — 99231 SBSQ HOSP IP/OBS SF/LOW 25: CPT | Performed by: PHYSICAL MEDICINE & REHABILITATION

## 2024-11-19 PROCEDURE — 6370000000 HC RX 637 (ALT 250 FOR IP): Performed by: PHYSICAL MEDICINE & REHABILITATION

## 2024-11-19 PROCEDURE — 6370000000 HC RX 637 (ALT 250 FOR IP)

## 2024-11-19 PROCEDURE — 6370000000 HC RX 637 (ALT 250 FOR IP): Performed by: INTERNAL MEDICINE

## 2024-11-19 PROCEDURE — 97129 THER IVNTJ 1ST 15 MIN: CPT

## 2024-11-19 PROCEDURE — 97535 SELF CARE MNGMENT TRAINING: CPT

## 2024-11-19 PROCEDURE — 1180000000 HC REHAB R&B

## 2024-11-19 PROCEDURE — 97112 NEUROMUSCULAR REEDUCATION: CPT

## 2024-11-19 PROCEDURE — 97130 THER IVNTJ EA ADDL 15 MIN: CPT

## 2024-11-19 PROCEDURE — 6370000000 HC RX 637 (ALT 250 FOR IP): Performed by: NURSE PRACTITIONER

## 2024-11-19 RX ADMIN — HYDRALAZINE HYDROCHLORIDE 50 MG: 50 TABLET ORAL at 20:39

## 2024-11-19 RX ADMIN — BACITRACIN ZINC, NEOMYCIN, POLYMYXIN B 1 G: 400; 3.5; 5 OINTMENT TOPICAL at 20:39

## 2024-11-19 RX ADMIN — ALLOPURINOL 100 MG: 100 TABLET ORAL at 08:57

## 2024-11-19 RX ADMIN — SENNOSIDES 8.6 MG: 8.6 TABLET, FILM COATED ORAL at 20:38

## 2024-11-19 RX ADMIN — ISOSORBIDE MONONITRATE 30 MG: 30 TABLET, EXTENDED RELEASE ORAL at 08:58

## 2024-11-19 RX ADMIN — MICONAZOLE NITRATE: 2 POWDER TOPICAL at 20:40

## 2024-11-19 RX ADMIN — CALCITRIOL 0.25 MCG: 0.25 CAPSULE ORAL at 08:58

## 2024-11-19 RX ADMIN — HYDRALAZINE HYDROCHLORIDE 50 MG: 50 TABLET ORAL at 08:57

## 2024-11-19 RX ADMIN — INSULIN LISPRO 2 UNITS: 100 INJECTION, SOLUTION INTRAVENOUS; SUBCUTANEOUS at 20:45

## 2024-11-19 RX ADMIN — SODIUM BICARBONATE 650 MG TABLET 650 MG: at 08:57

## 2024-11-19 RX ADMIN — Medication 100 MG: at 08:58

## 2024-11-19 RX ADMIN — CARVEDILOL 12.5 MG: 12.5 TABLET, FILM COATED ORAL at 08:57

## 2024-11-19 RX ADMIN — SODIUM BICARBONATE 650 MG TABLET 650 MG: at 20:39

## 2024-11-19 RX ADMIN — ASPIRIN 81 MG CHEWABLE TABLET 162 MG: 81 TABLET CHEWABLE at 08:57

## 2024-11-19 RX ADMIN — CARVEDILOL 12.5 MG: 12.5 TABLET, FILM COATED ORAL at 17:49

## 2024-11-19 RX ADMIN — Medication 2000 UNITS: at 17:49

## 2024-11-19 RX ADMIN — ATORVASTATIN CALCIUM 40 MG: 40 TABLET, FILM COATED ORAL at 08:57

## 2024-11-19 NOTE — PROGRESS NOTES
via fingerstick, no sliding scale insulin needed. Pt denies pain, call light in reach, video monitoring continues for pt safety. Electronically signed by Catrachita Pedraza RN on 11/19/2024 at 4:50 PM

## 2024-11-19 NOTE — PROGRESS NOTES
[facility]  [place] [facility]  Speech Language Pathology   Treatment Note          Modesto Burgess  10/9/1933  R246/R246-01  [x]   confirmed    Date: 2024      Restrictions/Precautions: Fall Risk  Position Activity Restriction  Other position/activity restrictions: Avasys     ADULT ORAL NUTRITION SUPPLEMENT; HS Snack; Snack; low sweet snack  ADULT DIET; Regular; 4 carb choices (60 gm/meal); Low Potassium (Less than 3000 mg/day); No Concentrated sweets  ADULT ORAL NUTRITION SUPPLEMENT; HS Snack; Snack; LOW CARB     Respiratory Status:   Room air  No active isolations    Rehab Diagnosis: Impaired mobility and ADL's due to left thalamic CVA     Subjective:  Alert, Cooperative, and Pleasant        Interventions used this date:  Cognitive Skill Development    Objective/Assessment:  Patient progressing towards goals:  Short Term Goals  Time Frame for Short Term Goals: 2-3 weeks  Goal 1: Patient will complete mid level problem solving tasks related to home/health/safety with min cues with 90%.  Pt was able to explain \"why\" something the picture scene is not safe and provide a solution with 60% indp increased to 100% given mod verbal cues   Goal 2: Patient will carry out mid level verbal/written directives in everyday activities with stand by cues with 90% accuracy.  Goal 3: Patient will sequence 4-6 words/pictures/written steps with stand by cues with 90% accuracy.  Pt was able to sequence 4 pictures with 33% indp increased to 70% given min verbal cues   Goal 4: Patient will utilize a memory strategy to recall 4-6 words/pictures with stand by cues with 90% accuracy.      Treatment/Activity Tolerance:  Patient tolerated treatment well    Plan:  Continue per POC    Pain Assessment:  Patient does not c/o pain.    Pain Re-assessment:  Patient does not c/o pain.    Patient/Caregiver Education:  Patient educated on session and progression towards goals.    Safety Devices:  Bed alarm in place and Call light  within reach      Speech Therapy Level of Assistance Scale    AUDITORY COMPREHENSION  Rating:  Supervised Assistance    VERBAL EXPRESSION  Rating:  Supervised Assistance-Minimal Assistance    MOTOR SPEECH  Rating: Independent    PROBLEM SOLVING  Rating: Minimal Assistance    MEMORY  Rating:  Minimal Assistance-Moderate Assistance        Therapy Time  SLP Individual Minutes  Time In: 1300  Time Out: 1330  Minutes: 30            Signature: Electronically signed by STORMY Sanabria on 11/19/2024 at 3:21 PM

## 2024-11-19 NOTE — PROGRESS NOTES
Subjective:  The patient complains of severe acute on chronic progressive aphasia, fatigue and right-sided weakness partially relieved by rest, medications, PT,  OT,   SLP and rest and exacerbated by recent left thalamic CVA.       89 yo male was admitted to [facility] on 10/29/2024 after presenting through the ER after falling at home.  He is 89 years old and presented through the ER on 10/29/2024 with altered mental status after being found on the floor.  He was diagnosed with a left thalamic CVA.  He was not a TNK candidate is that he were not sure as to the timing window.       Post stroke he was also found to have acute on chronic renal failure and diabetes mellitus with hyperglycemia.     He was admitted under the care of the hospitalist with neurology consulting.  He had significant confusion felt to be secondary to his subarachnoid hemorrhage in the right parietal area antiplatelets therapy and subcutaneous heparin were held.    I am concerned about patient’s medical complexities and barriers to advancing in rehab goals including decreasing stroke risk and monitoring for UTI and urinary retention.    I am also concerned about his diabetes and have endocrinology on the case-he and family will get diabetic Ed and dietary add as well.    I reviewed current care and plans for further care with other rehab providers including nursing and case management.  According to recent nursing note, no substantive notes-- only form notes from the nursing over the past 24 hours any and all other notes reviewed as well.    Neurology note reviewed and appreciated, \"Acute anterior left thalamic ischemic infarct  Follow-up CT of the head done on 11/3/2024 showed small amount of subarachnoid hemorrhage in the right parietal area and evolving lacunar infarct of the left thalamus  Carotid duplex negative for significant stenosis  Echocardiogram with ejection fraction of 30%, left ventricular moderate dilation and  as well as medications effects on vital signs before during and after therapy with especial focus on preventing orthostasis and falls risk.    APRYL (acute kidney injury) -Eliminate toxic medications, monitor I's and O's focusing on urine output, recheck BMP.    Type 2 diabetes mellitus, with long-term current use of insulin-Continue blood sugar checks every shift, diet, add diabetic add dietary education, restrict carbohydrates to lowest effective and safe carb count per meal advising 4 carbs per meal, add at bedtime snack to prevent a.m. hypoglycemia, adjust/add medications.  Add endocrinology diabetic Ed and dietary add add at bedtime snack    Hyperkalemia-recheck BMP    Hyperparathyroidism due to renal insufficiency -monitor calcium levels.    Late effects of CVA (cerebrovascular accident)    [place] (hard of hearing)-AD for hearing-consult speech and language pathology    Parkinsonian features-consider Sinemet trial-  Acute anterior left thalamic ischemic infarct--Follow-up CT of the head done on 11/3/2024 showed small amount of subarachnoid hemorrhage in the right parietal area and evolving lacunar infarct of the left thalamus, Carotid duplex negative for significant stenosis, Echocardiogram with ejection fraction of 30%, left ventricular moderate dilation and severe hypokinesis, LDL 70, Hemoglobin A1c 8.4, Repeat CT of the head 11/11/2024 with resolving hemorrhage.  Aspirin resumed at 162mg.  Elevated blood pressure, improving          DNR CCA-- SP  pall care.           Christina Robison D.O., PM&R     Attending    [phone]   [facility]

## 2024-11-19 NOTE — PLAN OF CARE
Problem: Chronic Conditions and Co-morbidities  Goal: Patient's chronic conditions and co-morbidity symptoms are monitored and maintained or improved  11/18/2024 2340 by Ena Hassan RN  Outcome: Progressing  11/18/2024 1129 by Catrachita Pedraza RN  Outcome: Progressing     Problem: Discharge Planning  Goal: Discharge to home or other facility with appropriate resources  11/18/2024 2340 by Ena Hassan RN  Outcome: Progressing  11/18/2024 1129 by Catrachita Pedraza RN  Outcome: Progressing     Problem: Safety - Adult  Goal: Free from fall injury  11/18/2024 2340 by Ena Hassan RN  Outcome: Progressing  11/18/2024 1129 by Catrachita Pedraza RN  Outcome: Progressing     Problem: ABCDS Injury Assessment  Goal: Absence of physical injury  11/18/2024 2340 by Ena Hassan RN  Outcome: Progressing  11/18/2024 1129 by Catrachita Pedraza RN  Outcome: Progressing     Problem: Skin/Tissue Integrity  Goal: Absence of new skin breakdown  Description: 1.  Monitor for areas of redness and/or skin breakdown  2.  Assess vascular access sites hourly  3.  Every 4-6 hours minimum:  Change oxygen saturation probe site  4.  Every 4-6 hours:  If on nasal continuous positive airway pressure, respiratory therapy assess nares and determine need for appliance change or resting period.  11/18/2024 2340 by Ena Hassan RN  Outcome: Progressing  11/18/2024 1129 by Catrachita Pedraza RN  Outcome: Progressing     Problem: Pain  Goal: Verbalizes/displays adequate comfort level or baseline comfort level  11/18/2024 2340 by Ena Hassan RN  Outcome: Progressing  11/18/2024 1129 by Catrachita Pedraza RN  Outcome: Progressing     Problem: Neurosensory - Adult  Goal: Bowel movement at least every other day  11/18/2024 2340 by Ena Hassan RN  Outcome: Progressing  11/18/2024 1129 by Catrachita Pedraza RN  Outcome: Progressing     Problem: Nutrition Deficit:  Goal: Optimize nutritional status  11/18/2024 2340 by Mo

## 2024-11-19 NOTE — PROGRESS NOTES
Physical Therapy Rehab Treatment Note  Facility/Department: [facility]  Room: RUSTR246-01       NAME: Modesto Burgess  : 10/9/1933 (89 y.o.)  MRN: [id number]  CODE STATUS: DNR-CCA    Date of Service: 2024  Chart Reviewed: Yes    Restrictions:  Restrictions/Precautions: Fall Risk  Position Activity Restriction  Other position/activity restrictions: Avasys       SUBJECTIVE:   Subjective: They say I have to  my feet when I walk. I guess they are right.    Pain  Pain: Denies pain pre/post session.       OBJECTIVE:   Orientation  Overall Orientation Status: Within Functional Limits  Cognition  Overall Cognitive Status: Exceptions  Arousal/Alertness: Appears intact  Following Commands: Follows one step commands with increased time;Follows one step commands with repetition  Attention Span: Appears intact  Memory: Decreased recall of biographical Information;Decreased short term memory;Decreased recall of recent events  Safety Judgement: Decreased awareness of need for assistance  Problem Solving: Assistance required to correct errors made;Assistance required to identify errors made  Insights: Decreased awareness of deficits  Initiation: Requires cues for some  Sequencing: Requires cues for some         Bed mobility  Rolling to Left: Stand by assistance  Rolling to Right: Stand by assistance  Supine to Sit: Stand by assistance  Sit to Supine: Stand by assistance;Minimal assistance  Bed Mobility Comments: Bed flat with use of hand rails; increased time to complete.    Transfers  Sit to Stand: Stand by assistance  Stand to Sit: Stand by assistance  Bed to Chair: Stand by assistance  Comment: Increased time and effort to complete. vc's for improved technique with descends.    Ambulation  Surface: Level tile  Device: Rollator  Assistance: Stand by assistance  Quality of Gait: Steppage gait pattern, slow lakisha; improved posture. vc's to normalize gait pattern.  Gait Deviations: Slow Lakisha;Decreased step

## 2024-11-19 NOTE — PROGRESS NOTES
Physical Therapy Rehab Treatment Note  [facility]  [facility]       NAME: Modesto Burgess  : 10/9/1933 (89 y.o.)  MRN: [id number]  CODE STATUS: DNR-CCA    Date of Service: 2024  Chart Reviewed: Yes    Restrictions:  Restrictions/Precautions: Fall Risk  Position Activity Restriction  Other position/activity restrictions: Avasys       SUBJECTIVE:   Subjective: I'm doing pretty good.    Pain  Pain: Denies pain pre/post session.       OBJECTIVE:   Orientation  Overall Orientation Status: Within Functional Limits  Cognition  Overall Cognitive Status: Exceptions  Arousal/Alertness: Appears intact  Following Commands: Follows one step commands with increased time;Follows one step commands with repetition  Attention Span: Appears intact  Memory: Decreased recall of biographical Information;Decreased short term memory;Decreased recall of recent events  Safety Judgement: Decreased awareness of need for assistance  Problem Solving: Assistance required to correct errors made;Assistance required to identify errors made  Insights: Decreased awareness of deficits  Initiation: Requires cues for some  Sequencing: Requires cues for some         Bed mobility  Rolling to Left: Stand by assistance  Rolling to Right: Stand by assistance  Supine to Sit: Stand by assistance;Minimal assistance  Sit to Supine: Stand by assistance;Minimal assistance  Bed Mobility Comments: Bed flat without use of hand rails; increased time and effort to complete; Slight assist for BLE into and out of bed.    Transfers  Sit to Stand: Stand by assistance  Stand to Sit: Stand by assistance  Bed to Chair: Stand by assistance  Comment: vc's for hand placement and technique; Good follow through. Slow to complete.    Ambulation  Surface: Level tile  Device: Rollator  Assistance: Stand by assistance  Quality of Gait: FF posture, shuffling steps, downward gaze; vc's for posture and increased heel strike.  Gait Deviations: Slow

## 2024-11-19 NOTE — PROGRESS NOTES
OCCUPATIONAL THERAPY  INPATIENT REHAB TREATMENT NOTE  [facility]      NAME: Modesto Burgess  : 10/9/1933 (89 y.o.)  MRN: [id number]  CODE STATUS: DNR-CCA  Room: R246R246-01    Date of Service: 2024    Referring Physician: Christina Robison DO  Rehab Diagnosis: Impaired mobility and ADL's d/t left thalamic CVA    Hospital course:   Comments: 89 y.o. male patient who presented to ER 10/29 with altered mental status and was found on floor. Not TNK candidate for CVA due timing timing window. Patient found to have had an acute L thalamus infarct in addition to being hypernatremic and having APRYL on CKD. Patient admitted with neuro and cardiology consulting.      Restrictions  Restrictions/Precautions  Restrictions/Precautions: Fall Risk     Position Activity Restriction  Other position/activity restrictions: Avasys    Patient's date of birth confirmed: Yes    SAFETY:  Safety Devices  Safety Devices in place: Yes  Type of devices: All fall risk precautions in place    SUBJECTIVE: \"Thank you for all of your tonight.\"    Pain at start of treatment: No    Pain at end of treatment: No    COGNITION:  Orientation  Overall Orientation Status: Within Functional Limits  Cognition  Overall Cognitive Status: Exceptions  Arousal/Alertness: Appears intact  Following Commands: Follows one step commands with increased time;Follows one step commands with repetition  Attention Span: Appears intact  Memory: Decreased recall of biographical Information;Decreased short term memory;Decreased recall of recent events  Safety Judgement: Decreased awareness of need for assistance  Problem Solving: Assistance required to correct errors made;Assistance required to identify errors made  Insights: Decreased awareness of deficits  Initiation: Requires cues for some  Sequencing: Requires cues for some      Pt's current cognitive status is:  Comprehension: Mod I  Expression: Mod I  Social Interaction: Mod I  Problem Solving: Min A  Memory:

## 2024-11-19 NOTE — PROGRESS NOTES
Nephrology Progress Note    Assessment:  CKD-5 stable  CAD  DM type-2  Recent CVA        Plan:reviewed labs & Vitals   dischafrge next week    Patient Active Problem List:     APRYL (acute kidney injury) ([facility])     CKD (chronic kidney disease)     HTN (hypertension)     CAD (coronary artery disease)     Type 2 diabetes mellitus, with long-term current use of insulin ([facility])     HLD (hyperlipidemia)     Hyperkalemia     Fall at home, initial encounter     Altered mental status     Hyperparathyroidism due to renal insufficiency ([facility])     Palliative care encounter     Goals of care, counseling/discussion     Advanced care planning/counseling discussion     Full code status     Actinic keratosis     PVD (posterior vitreous detachment), both eyes     CVA  L Thalamic     Impaired mobility ADLs dt L thal CVA     Late effect of brain injury     Late effects of CVA (cerebrovascular accident)     [place] (hard of hearing)     Parkinsonian features     Cardiomyopathy ([facility])     SAH (subarachnoid hemorrhage) ([facility])     DNR (do not resuscitate)     Poorly controlled diabetes mellitus ([facility])      Subjective:  Admit Date: 11/5/2024    Interval History: doing fine    Medications:  Scheduled Meds:   insulin glargine  14 Units SubCUTAneous Nightly    patiromer sorbitex calcium  8.4 g Oral Lunch    epoetin emerson-epbx  10,000 Units SubCUTAneous Weekly    miconazole   Topical BID    menthol   Topical BID    aspirin  162 mg Oral Daily    sodium bicarbonate  650 mg Oral BID    polyethylene glycol  17 g Oral Daily    senna  1 tablet Oral Nightly    Vitamin D  2,000 Units Oral Dinner    cyanocobalamin  1,000 mcg IntraMUSCular Weekly    coenzyme Q10  100 mg Oral Daily    lidocaine  3 patch TransDERmal Daily    neomycin-bacitracin-polymyxin   Topical BID    allopurinol  100 mg Oral Daily    atorvastatin  40 mg Oral Daily    calcitRIOL  0.25 mcg Oral Daily    carvedilol  12.5 mg Oral BID WC    hydrALAZINE  50 mg Oral BID    insulin lispro  0-8 Units

## 2024-11-19 NOTE — PLAN OF CARE
Problem: Chronic Conditions and Co-morbidities  Goal: Patient's chronic conditions and co-morbidity symptoms are monitored and maintained or improved  11/19/2024 1117 by Catrachita Pedraza RN  Outcome: Progressing  11/18/2024 2340 by Ena Hassan RN  Outcome: Progressing     Problem: Discharge Planning  Goal: Discharge to home or other facility with appropriate resources  11/19/2024 1117 by Catrachita Pedraza RN  Outcome: Progressing  11/18/2024 2340 by Ena Hassan RN  Outcome: Progressing     Problem: Safety - Adult  Goal: Free from fall injury  11/19/2024 1117 by Catrachita Pedraza RN  Outcome: Progressing  11/18/2024 2340 by Ena Hassan RN  Outcome: Progressing     Problem: ABCDS Injury Assessment  Goal: Absence of physical injury  11/19/2024 1117 by Catrachita Pedraza RN  Outcome: Progressing  11/18/2024 2340 by Ena Hassan RN  Outcome: Progressing     Problem: Skin/Tissue Integrity  Goal: Absence of new skin breakdown  Description: 1.  Monitor for areas of redness and/or skin breakdown  2.  Assess vascular access sites hourly  3.  Every 4-6 hours minimum:  Change oxygen saturation probe site  4.  Every 4-6 hours:  If on nasal continuous positive airway pressure, respiratory therapy assess nares and determine need for appliance change or resting period.  11/19/2024 1117 by Catrachita Pedraza RN  Outcome: Progressing  11/18/2024 2340 by Ean Hassan RN  Outcome: Progressing     Problem: Pain  Goal: Verbalizes/displays adequate comfort level or baseline comfort level  11/19/2024 1117 by Catrachita Pedraza RN  Outcome: Progressing  11/18/2024 2340 by Ena Hassan RN  Outcome: Progressing     Problem: Neurosensory - Adult  Goal: Bowel movement at least every other day  11/19/2024 1117 by Catrachita Pedraza RN  Outcome: Progressing  11/18/2024 2340 by Ena Hassan RN  Outcome: Progressing     Problem: Nutrition Deficit:  Goal: Optimize nutritional status  11/19/2024 1117 by Keila

## 2024-11-20 LAB
ALBUMIN SERPL-MCNC: 3.3 G/DL (ref 3.5–4.6)
ALP SERPL-CCNC: 90 U/L (ref 35–104)
ALT SERPL-CCNC: 17 U/L (ref 0–41)
ANION GAP SERPL CALCULATED.3IONS-SCNC: 12 MEQ/L (ref 9–15)
AST SERPL-CCNC: 10 U/L (ref 0–40)
BILIRUB SERPL-MCNC: 0.3 MG/DL (ref 0.2–0.7)
BUN SERPL-MCNC: 76 MG/DL (ref 8–23)
CALCIUM SERPL-MCNC: 9.8 MG/DL (ref 8.5–9.9)
CHLORIDE SERPL-SCNC: 114 MEQ/L (ref 95–107)
CO2 SERPL-SCNC: 18 MEQ/L (ref 20–31)
CREAT SERPL-MCNC: 4.33 MG/DL (ref 0.7–1.2)
GLOBULIN SER CALC-MCNC: 2.5 G/DL (ref 2.3–3.5)
GLUCOSE BLD-MCNC: 135 MG/DL (ref 70–99)
GLUCOSE BLD-MCNC: 150 MG/DL (ref 70–99)
GLUCOSE BLD-MCNC: 157 MG/DL (ref 70–99)
GLUCOSE BLD-MCNC: 263 MG/DL (ref 70–99)
GLUCOSE SERPL-MCNC: 137 MG/DL (ref 70–99)
PERFORMED ON: ABNORMAL
POTASSIUM SERPL-SCNC: 4.6 MEQ/L (ref 3.4–4.9)
PROT SERPL-MCNC: 5.8 G/DL (ref 6.3–8)
SODIUM SERPL-SCNC: 144 MEQ/L (ref 135–144)

## 2024-11-20 PROCEDURE — 6370000000 HC RX 637 (ALT 250 FOR IP): Performed by: INTERNAL MEDICINE

## 2024-11-20 PROCEDURE — 6370000000 HC RX 637 (ALT 250 FOR IP): Performed by: PHYSICAL MEDICINE & REHABILITATION

## 2024-11-20 PROCEDURE — 6360000002 HC RX W HCPCS: Performed by: PHYSICAL MEDICINE & REHABILITATION

## 2024-11-20 PROCEDURE — 6370000000 HC RX 637 (ALT 250 FOR IP)

## 2024-11-20 PROCEDURE — 36415 COLL VENOUS BLD VENIPUNCTURE: CPT

## 2024-11-20 PROCEDURE — 97535 SELF CARE MNGMENT TRAINING: CPT

## 2024-11-20 PROCEDURE — 99232 SBSQ HOSP IP/OBS MODERATE 35: CPT | Performed by: PHYSICIAN ASSISTANT

## 2024-11-20 PROCEDURE — 1180000000 HC REHAB R&B

## 2024-11-20 PROCEDURE — 97116 GAIT TRAINING THERAPY: CPT

## 2024-11-20 PROCEDURE — 80053 COMPREHEN METABOLIC PANEL: CPT

## 2024-11-20 PROCEDURE — 6370000000 HC RX 637 (ALT 250 FOR IP): Performed by: NURSE PRACTITIONER

## 2024-11-20 PROCEDURE — 97110 THERAPEUTIC EXERCISES: CPT

## 2024-11-20 RX ORDER — INSULIN GLARGINE 100 [IU]/ML
8 INJECTION, SOLUTION SUBCUTANEOUS NIGHTLY
Status: DISCONTINUED | OUTPATIENT
Start: 2024-11-20 | End: 2024-11-21

## 2024-11-20 RX ORDER — INSULIN LISPRO 100 [IU]/ML
0-4 INJECTION, SOLUTION INTRAVENOUS; SUBCUTANEOUS
Status: DISCONTINUED | OUTPATIENT
Start: 2024-11-20 | End: 2024-11-23 | Stop reason: HOSPADM

## 2024-11-20 RX ADMIN — SENNOSIDES 8.6 MG: 8.6 TABLET, FILM COATED ORAL at 20:59

## 2024-11-20 RX ADMIN — CARVEDILOL 12.5 MG: 12.5 TABLET, FILM COATED ORAL at 08:38

## 2024-11-20 RX ADMIN — Medication 100 MG: at 08:37

## 2024-11-20 RX ADMIN — BACITRACIN ZINC, NEOMYCIN, POLYMYXIN B: 400; 3.5; 5 OINTMENT TOPICAL at 08:46

## 2024-11-20 RX ADMIN — ASPIRIN 81 MG CHEWABLE TABLET 162 MG: 81 TABLET CHEWABLE at 08:38

## 2024-11-20 RX ADMIN — SODIUM BICARBONATE 650 MG TABLET 650 MG: at 08:37

## 2024-11-20 RX ADMIN — CALCITRIOL 0.25 MCG: 0.25 CAPSULE ORAL at 08:38

## 2024-11-20 RX ADMIN — HYDRALAZINE HYDROCHLORIDE 50 MG: 50 TABLET ORAL at 08:38

## 2024-11-20 RX ADMIN — ATORVASTATIN CALCIUM 40 MG: 40 TABLET, FILM COATED ORAL at 08:37

## 2024-11-20 RX ADMIN — CARVEDILOL 12.5 MG: 12.5 TABLET, FILM COATED ORAL at 17:07

## 2024-11-20 RX ADMIN — ALLOPURINOL 100 MG: 100 TABLET ORAL at 08:38

## 2024-11-20 RX ADMIN — Medication 2000 UNITS: at 17:06

## 2024-11-20 RX ADMIN — CYANOCOBALAMIN 1000 MCG: 1000 INJECTION, SOLUTION INTRAMUSCULAR; SUBCUTANEOUS at 08:41

## 2024-11-20 RX ADMIN — SODIUM BICARBONATE 650 MG TABLET 650 MG: at 21:00

## 2024-11-20 RX ADMIN — ISOSORBIDE MONONITRATE 30 MG: 30 TABLET, EXTENDED RELEASE ORAL at 08:38

## 2024-11-20 RX ADMIN — HYDRALAZINE HYDROCHLORIDE 50 MG: 50 TABLET ORAL at 21:00

## 2024-11-20 ASSESSMENT — PAIN SCALES - GENERAL: PAINLEVEL_OUTOF10: 0

## 2024-11-20 NOTE — CARE COORDINATION
[facility]  INPATIENT REHABILITATION  TEAM CONFERENCE NOTE  Room: R246/R246-01  Admit Date: 2024       Date: 2024  Patient Name: Modesto Burgess        MRN: [id number]    : 10/9/1933  (89 y.o.)  Gender: male        REHAB DIAGNOSIS:   Diagnosis: Impaired mobility and ADL's due to left thalamic CVA    CO MORBIDITIES:      Past Medical History:   Diagnosis Date    CAD (coronary artery disease) 09/15/2020    HLD (hyperlipidemia) 09/15/2020    [place] (hard of hearing) 2024    Hyperparathyroidism due to renal insufficiency ([facility]CC) 10/30/2024    Late effects of CVA (cerebrovascular accident) 2024    Lumbosacral disc disease     Osteoarthritis     Type 2 diabetes mellitus, with long-term current use of insulin ([facility]) 09/15/2020     History reviewed. No pertinent surgical history.     Restrictions  Restrictions/Precautions: Fall Risk  Position Activity Restriction  Other position/activity restrictions: Avasys  CASE MANAGEMENT    Social/Functional History  Social/Functional History  Lives With: Son (2 sons, DIL)  Type of Home: House  Home Layout: Two level, Able to Live on Main level with bedroom/bathroom (pt stays on the first floor only)  Home Access: Stairs to enter with rails  Entrance Stairs - Number of Steps: 2 IZABELA w/ bilateral HR from garage or 2 IZABELA w/ one HR from front, has a portable ramp as needed  Entrance Stairs - Rails: Both  Bathroom Shower/Tub: Walk-in shower, Doors, Shower chair with back  Bathroom Toilet: Handicap height  Bathroom Equipment: Grab bars in shower, Shower chair, Grab bars around toilet, Hand-held shower  Bathroom Accessibility: Walker accessible  Home Equipment: Walker - Rolling, Wheelchair - Manual, Lift chair, Reacher (bed rails on adjustable bed)  Has the patient had two or more falls in the past year or any fall with injury in the past year?: No  Receives Help From: Family (sons and DIL help with ADLs)  ADL Assistance: Independent  Homemaking Assistance:  participation): good     Patient strengths: good family support     Patients goal: return home w/ family     Problems/Barriers: not goaled for independence but has son and dtr-in-law home all the time to assist as needed, cog deficits, will need 24/7 supervision        1. Safety:          - Intervention / Plan:    [x]  falls protocol     [x]  PT/OT    [x]  SP        - Results:         2. Potential DME needs:         - Intervention / Plan:  [x]  PT/OT     [x]  Assess equipment needs/access       - Results:         3.  Weakness:          - Intervention / Plan:  [x]  PT/OT      []  Other:         - Results:         4.  Discharge planning needs:          - Intervention / Plan:  [x]  Weekly team conference      [x]  family training        - Results:         5.            - Intervention / Plan:          - Results:         6.            - Intervention / Plan:         - Results:         7.            - Intervention / Plan:         - Results:           Discharge Plan   Estimated Length of Stay: 20 days    Tentative Discharge date: 11/23/24      Anticipated Discharge Destination:  Home      Team recommendations:    1. Follow up Therapy :    PT  OT  SLP  RN  HH Aide    2. Home Health    Other:     Equipment needed at Discharge: Other: none needed      Team Members Present at Conference:    Physician: Dr. Shayy Sanz  : JONNY Edmond LSW  RN: Mercy Baez, RN  Physical Therapist: Elizabeth Hollis, PT  Occupational Therapist: Giacomo Leos OTR  Speech Therapist: Ary Stokes, SLP      Electronically signed by JONNY Edmond LSW on 11/21/2024 at 9:06 AM

## 2024-11-20 NOTE — PROGRESS NOTES
ASSESSMENT    Before MT:      [x] No     [] Yes   Rating:  /10    Comment(s):    After MT:         [x] No     [] Yes   Rating:   /10    Comment(s):       ASSESSMENT/OBSERVATIONS:     Patient's music interests and/or background: Country Western    Patient tolerated today’s treatment session:      [x] Good          [] Fair          [] Poor         Comment(s): Patient found sitting up in a wheelchair in his room when [facility] arrived to offer Music Therapy Services. Patient accepted music therapy visit.  [facility] provided live, patient preferred music on guitar and voice. Patient actively engaged in the music therapy by discussing topics related to their hospital stay, discussing topics related to the music, actively choosing which songs he'd like to hear and by actively listening to the music. Patient responded positively to the music therapy as evidence by improved mood, increased emotional well being, increased socialization, increased relaxation, increased spiritual well being and by making positive comments about the music therapy. Patient experienced some emotional release throughout the session through appropriate tears when thinking of his late wife. Emotional support provided by [facility]. Songs used were \"Ring of Fire\" by Harinder Hopper, \"Crazy\" by Elizabeth Vela, \"Hey Good Lookin\" by Bob Trent, \"Amazing Wendy,\" and \"Give Thanks\" by Miles Santiago. Patient was very appreciative for music therapy visit today.     PLAN:      [x] Pt interested in having music therapy again.     [] [facility] will attempt to see pt again another day, if time allows.      [x] Pt's planned d/c date is before MT-BC is scheduled on unit again.     [] Pt NOT interested in having music therapy again.            Emily Baldwin, [facility], [facility]    11/20/2024  Electronically signed by Emily Baldwin on 11/20/2024 at 3:01 PM

## 2024-11-20 NOTE — PROGRESS NOTES
Physical Therapy Rehab Treatment Note  [facility]/Department[facility]  [facility]       NAME: Modesto Burgess  : 10/9/1933 (89 y.o.)  MRN: [id number]  CODE STATUS: DNR-CCA    Date of Service: 2024     Restrictions:  Restrictions/Precautions: Fall Risk  Position Activity Restriction  Other position/activity restrictions: Avasys       SUBJECTIVE:   Subjective: \"Looking forward to getting out\"    Pain  Pain: Denies pain pre/post session.      OBJECTIVE:         Bed Mobility  Additional Factors: Increased time to complete;Head of bed flat;Without handrails (on therapy mat this session)  Roll Left  Assistance Level: Stand by assist  Skilled Clinical Factors: decreased effort to complete, good carry over with technique  Roll Right  Assistance Level: Stand by assist  Skilled Clinical Factors: decreased effort to complete, good carry over with technique  Sit to Supine  Assistance Level: Stand by assist  Skilled Clinical Factors: increased effort with LEs, no assistance needed to complete  Supine to Sit  Assistance Level: Stand by assist  Skilled Clinical Factors: increased time and effort, vc's to improve technique with good carry over  Scooting  Assistance Level: Stand by assist    Transfers  Surface: Wheelchair;To mat;From mat  Additional Factors: Verbal cues;Increased time to complete;Hand placement cues  Device:  (rollator)  Sit to Stand  Assistance Level: Stand by assist  Stand to Sit  Assistance Level: Stand by assist  Skilled Clinical Factors: cues intermittently for alignment to surface and reach back for w/c prior to sitting  Bed To/From Chair  Technique: Stand step  Assistance Level: Stand by assist  Skilled Clinical Factors: with rollator    Ambulation  Surface: Level surface  Device: Rollator  Distance: 300', 2 x30', 25', 150'  Activity: Within Unit  Additional Factors: Verbal cues;Increased time to complete  Assistance Level: Stand by assist;Minimal assistance (intermittent touching assist and cues

## 2024-11-20 NOTE — PROGRESS NOTES
MD made aware patient has been refusing HS Lantus. Has not had since 11/14. Accucheck 157 at lunch. Electronically signed by Mercy Baez RN on 11/20/24 at 2:00 PM EST     Refused weekly Retacrit shot even after educated. Electronically signed by Mercy Baez RN on 11/20/24 at 5:12 PM EST

## 2024-11-20 NOTE — PROGRESS NOTES
Subjective:  The patient complains of severe acute on chronic progressive aphasia, fatigue and right-sided weakness partially relieved by rest, medications, PT,  OT,   SLP and rest and exacerbated by recent left thalamic CVA.    Ena Hassan RN  Registered Nurse     Plan of Care      Signed     Date of Service: 11/19/2024 10:48 PM     Signed            Problem: Chronic Conditions and Co-morbidities  Goal: Patient's chronic conditions and co-morbidity symptoms are monitored and maintained or improved  11/19/2024 2247 by Ena Hassan RN  Outcome: Progressing  11/19/2024 1117 by Catrachita Pedraza RN  Outcome: Progressing     Problem: Discharge Planning  Goal: Discharge to home or other facility with appropriate resources  11/19/2024 2247 by Ena Hassan RN  Outcome: Progressing  11/19/2024 1117 by Catrachita Pedraza RN  Outcome: Progressing     Problem: Safety - Adult  Goal: Free from fall injury  11/19/2024 2247 by Ena Hassan RN  Outcome: Progressing  11/19/2024 1117 by Catrachita Pedraza RN  Outcome: Progressing     Problem: ABCDS Injury Assessment  Goal: Absence of physical injury  11/19/2024 2247 by Ena Hassan RN  Outcome: Progressing  11/19/2024 1117 by Catrachita Pedraza RN  Outcome: Progressing     Problem: Skin/Tissue Integrity  Goal: Absence of new skin breakdown  Description: 1.  Monitor for areas of redness and/or skin breakdown  2.  Assess vascular access sites hourly  3.  Every 4-6 hours minimum:  Change oxygen saturation probe site  4.  Every 4-6 hours:  If on nasal continuous positive airway pressure, respiratory therapy assess nares and determine need for appliance change or resting period.  11/19/2024 2247 by Ena Hassan, RN  Outcome: Progressing  11/19/2024 1117 by Catrachita Pedraza RN  Outcome: Progressing     Problem: Pain  Goal: Verbalizes/displays adequate comfort level or baseline comfort level  11/19/2024 2247 by Ena Hassan RN  Outcome:  Hyperparathyroidism due to renal insufficiency -monitor calcium levels.    Late effects of CVA (cerebrovascular accident)    [place] (hard of hearing)-AD for hearing-consult speech and language pathology    Parkinsonian features-consider Sinemet trial-  Acute anterior left thalamic ischemic infarct--Follow-up CT of the head done on 11/3/2024 showed small amount of subarachnoid hemorrhage in the right parietal area and evolving lacunar infarct of the left thalamus, Carotid duplex negative for significant stenosis, Echocardiogram with ejection fraction of 30%, left ventricular moderate dilation and severe hypokinesis, LDL 70, Hemoglobin A1c 8.4, Repeat CT of the head 11/11/2024 with resolving hemorrhage.  Aspirin resumed at 162mg.  Elevated blood pressure, improving          DNR CCA-- SP  pall care.       MICHAEL Robison D.O., PM&R     Attending    [phone]   [facility]

## 2024-11-20 NOTE — PROGRESS NOTES
Nephrology Progress Note    Assessment:  CKD-5  Normal Potassium   Hx CVA  Hypertension  CAD      Plan:maintaining hid renal status  will sign off case    Patient Active Problem List:     APRYL (acute kidney injury) ([facility])     CKD (chronic kidney disease)     HTN (hypertension)     CAD (coronary artery disease)     Type 2 diabetes mellitus, with long-term current use of insulin ([facility])     HLD (hyperlipidemia)     Hyperkalemia     Fall at home, initial encounter     Altered mental status     Hyperparathyroidism due to renal insufficiency ([facility])     Palliative care encounter     Goals of care, counseling/discussion     Advanced care planning/counseling discussion     Full code status     Actinic keratosis     PVD (posterior vitreous detachment), both eyes     CVA  L Thalamic     Impaired mobility ADLs dt L thal CVA     Late effect of brain injury     Late effects of CVA (cerebrovascular accident)     [place] (hard of hearing)     Parkinsonian features     Cardiomyopathy ([facility])     SAH (subarachnoid hemorrhage) ([facility])     DNR (do not resuscitate)     Poorly controlled diabetes mellitus ([facility])      Subjective:  Admit Date: 11/5/2024    Interval History: stable    Medications:  Scheduled Meds:   insulin glargine  14 Units SubCUTAneous Nightly    patiromer sorbitex calcium  8.4 g Oral Lunch    epoetin emerson-epbx  10,000 Units SubCUTAneous Weekly    miconazole   Topical BID    menthol   Topical BID    aspirin  162 mg Oral Daily    sodium bicarbonate  650 mg Oral BID    polyethylene glycol  17 g Oral Daily    senna  1 tablet Oral Nightly    Vitamin D  2,000 Units Oral Dinner    cyanocobalamin  1,000 mcg IntraMUSCular Weekly    coenzyme Q10  100 mg Oral Daily    lidocaine  3 patch TransDERmal Daily    neomycin-bacitracin-polymyxin   Topical BID    allopurinol  100 mg Oral Daily    atorvastatin  40 mg Oral Daily    calcitRIOL  0.25 mcg Oral Daily    carvedilol  12.5 mg Oral BID WC    hydrALAZINE  50 mg Oral BID    insulin lispro  0-8  Units SubCUTAneous 4x Daily AC & HS    isosorbide mononitrate  30 mg Oral Daily     Continuous Infusions:   sodium chloride      sodium chloride      dextrose         CBC:   Recent Labs     11/18/24 0427   WBC 8.5   HGB 8.0*        CMP:    Recent Labs     11/18/24 0427 11/20/24 0442    144   K 4.7 4.6   * 114*   CO2 17* 18*   BUN 83* 76*   CREATININE 4.40* 4.33*   GLUCOSE 148* 137*   CALCIUM 9.6 9.8   LABGLOM 12.0* 12.2*     Troponin: No results for input(s): \"TROPONINI\" in the last 72 hours.  BNP: No results for input(s): \"BNP\" in the last 72 hours.  INR: No results for input(s): \"INR\" in the last 72 hours.  Lipids: No results for input(s): \"CHOL\", \"LDLDIRECT\", \"TRIG\", \"HDL\", \"AMYLASE\", \"LIPASE\" in the last 72 hours.  Liver:   Recent Labs     11/20/24 0442   AST 10   ALT 17   ALKPHOS 90   BILITOT 0.3     Iron:  No results for input(s): \"FERRITIN\" in the last 72 hours.    Invalid input(s): \"IRONS\", \"LABIRONS\"  Urinalysis: No results for input(s): \"UA\" in the last 72 hours.    Objective:  Vitals: BP (!) 152/85   Pulse 78   Temp 98.1 °F (36.7 °C) (Oral)   Resp 16   Ht 1.723 m (5' 7.84\")   Wt 86 kg (189 lb 9.5 oz)   SpO2 98%   BMI 28.97 kg/m²    Wt Readings from Last 3 Encounters:   11/14/24 86 kg (189 lb 9.5 oz)   11/05/24 93.6 kg (206 lb 5.6 oz)   04/03/24 82.1 kg (181 lb)      24HR INTAKE/OUTPUT:    Intake/Output Summary (Last 24 hours) at 11/20/2024 0830  Last data filed at 11/20/2024 0755  Gross per 24 hour   Intake --   Output 400 ml   Net -400 ml       General: alert, in no apparent distress  HEENT: normocephalic, atraumatic, anicteric  Neck: supple, no mass  Lungs: non-labored respirations, clear to auscultation bilaterally  Heart: regular rate and rhythm, no murmurs or rubs  Abdomen: soft, non-tender, non-distended  Ext: no cyanosis, no peripheral edema  Neuro: alert and oriented, no gross abnormalities  Psych: normal mood and affect  Skin: no rash      Electronically signed by

## 2024-11-20 NOTE — PLAN OF CARE
Problem: Chronic Conditions and Co-morbidities  Goal: Patient's chronic conditions and co-morbidity symptoms are monitored and maintained or improved  11/20/2024 1042 by Mercy Baez RN  Outcome: Progressing  11/19/2024 2247 by Ena Hassan RN  Outcome: Progressing     Problem: Discharge Planning  Goal: Discharge to home or other facility with appropriate resources  11/20/2024 1042 by Mercy Baez RN  Outcome: Progressing  11/19/2024 2247 by Ena Hassan RN  Outcome: Progressing     Problem: Safety - Adult  Goal: Free from fall injury  11/20/2024 1042 by Mercy Baez RN  Outcome: Progressing  11/19/2024 2247 by Ena Hassan RN  Outcome: Progressing     Problem: ABCDS Injury Assessment  Goal: Absence of physical injury  11/20/2024 1042 by Mercy Baez RN  Outcome: Progressing  11/19/2024 2247 by Ena Hassan RN  Outcome: Progressing     Problem: Skin/Tissue Integrity  Goal: Absence of new skin breakdown  Description: 1.  Monitor for areas of redness and/or skin breakdown  2.  Assess vascular access sites hourly  3.  Every 4-6 hours minimum:  Change oxygen saturation probe site  4.  Every 4-6 hours:  If on nasal continuous positive airway pressure, respiratory therapy assess nares and determine need for appliance change or resting period.  11/20/2024 1042 by Mercy Baez RN  Outcome: Progressing  11/19/2024 2247 by Ena Hassan RN  Outcome: Progressing     Problem: Pain  Goal: Verbalizes/displays adequate comfort level or baseline comfort level  11/20/2024 1042 by Mercy Baez RN  Outcome: Progressing  11/19/2024 2247 by Ena Hassan RN  Outcome: Progressing     Problem: Neurosensory - Adult  Goal: Bowel movement at least every other day  11/20/2024 1042 by Mercy Baez RN  Outcome: Progressing  11/19/2024 2247 by Ena Hassan RN  Outcome: Progressing     Problem: Nutrition Deficit:  Goal: Optimize nutritional status  11/20/2024 1042

## 2024-11-20 NOTE — PROGRESS NOTES
OCCUPATIONAL THERAPY  INPATIENT REHAB TREATMENT NOTE  [facility]      NAME: Modesto Burgess  : 10/9/1933 (89 y.o.)  MRN: [id number]  CODE STATUS: DNR-CCA  Room: 46R246-01    Date of Service: 2024    Referring Physician: Christina Robison DO  Rehab Diagnosis: Impaired mobility and ADL's d/t left thalamic CVA    Hospital course:   Comments: 89 y.o. male patient who presented to ER 10/29 with altered mental status and was found on floor. Not TNK candidate for CVA due timing timing window. Patient found to have had an acute L thalamus infarct in addition to being hypernatremic and having APRYL on CKD. Patient admitted with neuro and cardiology consulting.      Restrictions  Restrictions/Precautions  Restrictions/Precautions: Fall Risk     Patient's date of birth confirmed: Yes    SAFETY:  Safety Devices  Safety Devices in place: Yes  Type of devices: All fall risk precautions in place    SUBJECTIVE: \"Well life sometimes gives us curses and sometimes gives us blessings.\" - joking response to therapist working with patient     Pain at start of treatment: No    Pain at end of treatment: No    COGNITION:  Orientation  Overall Orientation Status: Within Functional Limits  Cognition  Overall Cognitive Status: Exceptions  Arousal/Alertness: Appears intact  Following Commands: Follows one step commands with increased time;Follows one step commands with repetition  Attention Span: Appears intact  Memory: Decreased recall of recent events  Safety Judgement: Appears intact  Problem Solving: Assistance required to correct errors made;Assistance required to identify errors made  Insights: Decreased awareness of deficits  Initiation: Appears intact  Sequencing: Appears intact      Pt's current cognitive status is:  Comprehension: Mod I  Expression: Mod I  Social Interaction: Mod I  Problem Solving: Supervision  Memory: Supervision    OBJECTIVE:    Grooming/Oral Hygiene  Assistance Level: Independent;Increased time to  complete  Skilled Clinical Factors: seated in armchair and in standing at bathroom sink  Upper Extremity Bathing  Assistance Level: Modified independent;Increased time to complete  Skilled Clinical Factors: seated in armchair and in standing at bathroom sink  Lower Extremity Bathing  Assistance Level: Supervision;Increased time to complete  Skilled Clinical Factors: seated in armchair and in standing at bathroom sink - figure four technique  Upper Extremity Dressing  Assistance Level: Independent;Increased time to complete  Skilled Clinical Factors: seated in armchair at bathroom sink  Lower Extremity Dressing  Assistance Level: Supervision;Increased time to complete  Skilled Clinical Factors: seated in armchair and in standing at bathroom sink - figure four technique  Putting On/Taking Off Footwear  Assistance Level: Modified independent;Increased time to complete  Skilled Clinical Factors: seated in armchair at bathroom sink - figure four technique  Toileting  Skilled Clinical Factors: Incontinent of urine while standing and washing at the bathroom sink  Tub/Shower Transfers  Skilled Clinical Factors: Patient completed sponge bath seated and in standing at bathroom sink.    Functional Mobility  Device:  (rollator)  Activity: To/From bathroom  Assistance Level: Supervision  Skilled Clinical Factors: slow pace - good safety awareness  Sit to Stand  Assistance Level: Supervision  Stand to Sit  Assistance Level: Modified independent        ASSESSMENT: Patient pleasant and cooperative while slowly completing ADL tasks.    Activity Tolerance: Patient tolerated treatment well      PLAN OF CARE:  Strengthening, Balance training, Functional mobility training, Endurance training, Safety education & training, Cognitive reorientation, Patient/Caregiver education & training, Equipment evaluation, education, & procurement, Positioning, Self-Care / ADL, Coordination training    Continue per OT POC for planned discharge on

## 2024-11-20 NOTE — PROGRESS NOTES
Physical Therapy Rehab Treatment Note  Facility/Department: [facility]  [facility]       NAME: Modesto Burgess  : 10/9/1933 (89 y.o.)  MRN: [id number]  CODE STATUS: DNR-CCA    Date of Service: 2024     Restrictions:  Restrictions/Precautions: Fall Risk  Position Activity Restriction  Other position/activity restrictions: [employer]       SUBJECTIVE:   Subjective: \"Companies never a pain\"    Pain  Pain: Denies pain pre/post session.      OBJECTIVE:         Transfers  Surface: Wheelchair  Additional Factors: Verbal cues;Increased time to complete;Hand placement cues  Device:  (rollator)  Sit to Stand  Assistance Level: Stand by assist  Stand to Sit  Assistance Level: Stand by assist  Skilled Clinical Factors: good carry over with reaching back for armrests and controlling descent    Ambulation  Surface: Level surface  Device: Rollator  Distance: 300', 90'  Activity: Within Unit  Additional Factors: Verbal cues;Increased time to complete  Assistance Level: Stand by assist  Gait Deviations: Decreased step length bilateral;Path deviations;Narrow base of support  Skilled Clinical Factors: improved balance reactions and safety in all aspects of environmental maneuvering and approach to chair       PT Exercises  A/AROM Exercises: seated: AP, LAQ, march, hip add with ball x20ea abisai  Motor Control/Coordination: obstacle course around bolsters with rollator x2 laps        Activity Tolerance  Activity Tolerance: Patient tolerated treatment well           ASSESSMENT/PROGRESS TOWARDS GOALS:   Assessment  Assessment: Treatment focused on gait with rollator and LE strengthening activities. Pt demonstrating improved balance reactions throughout gait and required minimal cuing to improve safety awareness in unfamiliar and busy environments. Vc's provided throughout seated therex to improve technique and body mechanics for max benefit, good carry over. Progress as able.  Activity Tolerance: Patient tolerated treatment

## 2024-11-20 NOTE — PROGRESS NOTES
Comprehensive Nutrition Assessment    Type and Reason for Visit:  Reassess    Nutrition Recommendations/Plan:   Continue Current Diet     Malnutrition Assessment:  Malnutrition Status:  No malnutrition (11/08/24 1555)      Nutrition Assessment:    Pt remains stable from a nutritional standpoint, with verbalized continued good appetite, noted good intake at meals, with no nutritional complaints. To continue Carb Control 4 ANGELA low K+ diet and provide diet education with pt and family prior to discharge.    Nutrition Related Findings:    PMH-DM2, CKD, CAD, GERD, hld, OA, [place]; admitted s/p CVA, fall. Labs/meds reviewed. (11/19) Noted elevated BUN/Cr, K-4.6; Gluc-137-200 x last 48hrs. Pt receiving-insulin, lipitor. Trace BLE edema noted. Wound Type:  (Abrasion L foot)       Current Nutrition Intake & Therapies:    Average Meal Intake: 51-75%, %     ADULT ORAL NUTRITION SUPPLEMENT; HS Snack; Snack; low sweet snack  ADULT DIET; Regular; 4 carb choices (60 gm/meal); Low Potassium (Less than 3000 mg/day); No Concentrated sweets  ADULT ORAL NUTRITION SUPPLEMENT; HS Snack; Snack; LOW CARB    Anthropometric Measures:  Height: 172.3 cm (5' 7.84\")  Ideal Body Weight (IBW): 153 lbs (70 kg)    Admission Body Weight: 93.4 kg (206 lb) (unknown wt source)  Current Body Weight:  (request new weight after bed is zeroed)  Current BMI (kg/m2):  29  Usual Body Weight: 82.1 kg (181 lb) (4/2024 stated)                          BMI Categories: Obese Class 1 (BMI 30.0-34.9)    Estimated Daily Nutrient Needs:  Energy Requirements Based On: Kcal/kg  Weight Used for Energy Requirements: Admission  Energy (kcal/day): 5240-2152 (kg x 17-18)  Weight Used for Protein Requirements: Ideal  Protein (g/day): ~56-70 (kg x .8-1.0)  Method Used for Fluid Requirements: 1 ml/kcal  Fluid (ml/day):  ~1700    Nutrition Diagnosis:   Altered nutrition-related lab values related to endocrine dysfunction, renal dysfunction as evidenced by lab  values    Nutrition Interventions:   Food and/or Nutrient Delivery: Continue Current Diet  Nutrition Education/Counseling: No recommendation at this time  Coordination of Nutrition Care: Continue to monitor while inpatient       Goals:  Goals: PO intake 75% or greater (improved edema, K+-wnl)          Nutrition Monitoring and Evaluation:      Food/Nutrient Intake Outcomes: Food and Nutrient Intake  Physical Signs/Symptoms Outcomes: Weight, Biochemical Data, Fluid Status or Edema    Discharge Planning:    Continue current diet     Tawanna Lugo RD, LD

## 2024-11-20 NOTE — PROGRESS NOTES
Endocrinology Progress Note    Assessment and Plan:   Assessment-  Type 2 diabetes  CVA  CKD  SAH    Plan-  Change Lantus 8 units mornings if glucose is greater than 180  Low-dose Humalog sliding scale coverage  Monitor glycemic control closely, avoid hypoglycemia  Patient may be discharged from endocrinology standpoint    POC Glucose:   Recent Labs     11/19/24  0606 11/19/24  1643 11/19/24  2040 11/20/24  0726 11/20/24  1210   POCGLU 140* 146* 200* 135* 157*     HGBA1C:  Lab Results   Component Value Date    LABA1C 8.4 (H) 11/01/2024    LABA1C 6.7 (H) 09/16/2020    LABA1C 7.2 (H) 01/13/2015     CBC:   Recent Labs     11/18/24  0427   WBC 8.5   HGB 8.0*        CMP:    Lab Results   Component Value Date     11/20/2024    K 4.6 11/20/2024     (H) 11/20/2024    CO2 18 (L) 11/20/2024    BUN 76 (HH) 11/20/2024    CREATININE 4.33 (H) 11/20/2024    GLUCOSE 137 (H) 11/20/2024    CALCIUM 9.8 11/20/2024    BILITOT 0.3 11/20/2024    ALKPHOS 90 11/20/2024    AST 10 11/20/2024    ALT 17 11/20/2024    LABGLOM 12.2 (L) 11/20/2024    GFRAA 16.3 (L) 09/17/2020    GLOB 2.5 11/20/2024       Lab Results   Component Value Date    TSH 0.744 10/30/2024    TSH 2.050 01/13/2015    TSH 2.010 12/22/2014     No results found for: \"TPOABS\"      CC: No chief complaint on file.      Subjective:   Interval History: This patient is a 89-year-old type II diabetic male, recent hemoglobin A1c is 8.4%.  He is admitted to our rehabilitation unit for strengthening and conditioning following CVA.  His variable glycemic control, we will monitor daily and make insulin dosing adjustments as needed.    Review of systems: denies polyuria, polydipsia, ABD pain, flank pain, N/V/D, or diaphoresis  Medications:   Scheduled Meds:   insulin glargine  14 Units SubCUTAneous Nightly    patiromer sorbitex calcium  8.4 g Oral Lunch    epoetin emerson-epbx  10,000 Units SubCUTAneous Weekly    miconazole   Topical BID    menthol   Topical BID    aspirin    Advanced care planning/counseling discussion     Full code status     Actinic keratosis     PVD (posterior vitreous detachment), both eyes     CVA  L Thalamic     Impaired mobility ADLs dt L thal CVA     Late effect of brain injury     Late effects of CVA (cerebrovascular accident)     [place] (hard of hearing)     Parkinsonian features     Cardiomyopathy (HCC)     SAH (subarachnoid hemorrhage) (HCC)     DNR (do not resuscitate)     Poorly controlled diabetes mellitus (HCC)        Electronically signed by MACARIO Magallanes on 11/20/2024 at 2:13 PM

## 2024-11-20 NOTE — PLAN OF CARE
Problem: Chronic Conditions and Co-morbidities  Goal: Patient's chronic conditions and co-morbidity symptoms are monitored and maintained or improved  11/20/2024 1042 by Mercy Baez RN  Outcome: Progressing  11/20/2024 1042 by Mercy Baez RN  Outcome: Progressing  11/19/2024 2247 by Ena Hassan RN  Outcome: Progressing     Problem: Discharge Planning  Goal: Discharge to home or other facility with appropriate resources  11/20/2024 1042 by Mercy Baez RN  Outcome: Progressing  11/20/2024 1042 by Mercy Baez RN  Outcome: Progressing  11/19/2024 2247 by Ena Hassan RN  Outcome: Progressing     Problem: Safety - Adult  Goal: Free from fall injury  11/20/2024 1042 by Mercy Baez RN  Outcome: Progressing  11/20/2024 1042 by Mercy Baez RN  Outcome: Progressing  11/19/2024 2247 by Ena Hassan RN  Outcome: Progressing     Problem: ABCDS Injury Assessment  Goal: Absence of physical injury  11/20/2024 1042 by Mercy Baez RN  Outcome: Progressing  11/20/2024 1042 by Mercy Baez RN  Outcome: Progressing  11/19/2024 2247 by Ena Hassan RN  Outcome: Progressing     Problem: Skin/Tissue Integrity  Goal: Absence of new skin breakdown  Description: 1.  Monitor for areas of redness and/or skin breakdown  2.  Assess vascular access sites hourly  3.  Every 4-6 hours minimum:  Change oxygen saturation probe site  4.  Every 4-6 hours:  If on nasal continuous positive airway pressure, respiratory therapy assess nares and determine need for appliance change or resting period.  11/20/2024 1042 by Mercy Baez RN  Outcome: Progressing  11/20/2024 1042 by Mercy Baez RN  Outcome: Progressing  11/19/2024 2247 by Ena Hassan RN  Outcome: Progressing     Problem: Pain  Goal: Verbalizes/displays adequate comfort level or baseline comfort level  11/20/2024 1042 by Mercy Baez RN  Outcome: Progressing  11/20/2024 1042 by Nestor

## 2024-11-20 NOTE — PLAN OF CARE
Problem: Chronic Conditions and Co-morbidities  Goal: Patient's chronic conditions and co-morbidity symptoms are monitored and maintained or improved  11/19/2024 2247 by Ena Hassan RN  Outcome: Progressing  11/19/2024 1117 by Catrachita Pedraza RN  Outcome: Progressing     Problem: Discharge Planning  Goal: Discharge to home or other facility with appropriate resources  11/19/2024 2247 by Ena Hassan RN  Outcome: Progressing  11/19/2024 1117 by Catrachita Pedraza RN  Outcome: Progressing     Problem: Safety - Adult  Goal: Free from fall injury  11/19/2024 2247 by Ena Hassan RN  Outcome: Progressing  11/19/2024 1117 by Catrachita Pedraza RN  Outcome: Progressing     Problem: ABCDS Injury Assessment  Goal: Absence of physical injury  11/19/2024 2247 by Ena Hassan RN  Outcome: Progressing  11/19/2024 1117 by Catrachita Pedraza RN  Outcome: Progressing     Problem: Skin/Tissue Integrity  Goal: Absence of new skin breakdown  Description: 1.  Monitor for areas of redness and/or skin breakdown  2.  Assess vascular access sites hourly  3.  Every 4-6 hours minimum:  Change oxygen saturation probe site  4.  Every 4-6 hours:  If on nasal continuous positive airway pressure, respiratory therapy assess nares and determine need for appliance change or resting period.  11/19/2024 2247 by Ena Hassan RN  Outcome: Progressing  11/19/2024 1117 by Catrachita Pedraza RN  Outcome: Progressing     Problem: Pain  Goal: Verbalizes/displays adequate comfort level or baseline comfort level  11/19/2024 2247 by Ena Hassan RN  Outcome: Progressing  11/19/2024 1117 by Catrachita Pedraza RN  Outcome: Progressing     Problem: Neurosensory - Adult  Goal: Bowel movement at least every other day  11/19/2024 2247 by Ena Hassan RN  Outcome: Progressing  11/19/2024 1117 by Catrachita Pedraza RN  Outcome: Progressing     Problem: Nutrition Deficit:  Goal: Optimize nutritional status  11/19/2024 2247 by Mo  Ena LANDIS, RN  Outcome: Progressing  11/19/2024 1117 by Catrachita Pedraza, RN  Outcome: Progressing

## 2024-11-21 LAB
ANION GAP SERPL CALCULATED.3IONS-SCNC: 11 MEQ/L (ref 9–15)
BASOPHILS # BLD: 0.1 K/UL (ref 0–0.2)
BASOPHILS NFR BLD: 0.7 %
BUN SERPL-MCNC: 75 MG/DL (ref 8–23)
CALCIUM SERPL-MCNC: 9.8 MG/DL (ref 8.5–9.9)
CHLORIDE SERPL-SCNC: 114 MEQ/L (ref 95–107)
CO2 SERPL-SCNC: 18 MEQ/L (ref 20–31)
CREAT SERPL-MCNC: 3.99 MG/DL (ref 0.7–1.2)
EOSINOPHIL # BLD: 0 K/UL (ref 0–0.7)
EOSINOPHIL NFR BLD: 0 %
ERYTHROCYTE [DISTWIDTH] IN BLOOD BY AUTOMATED COUNT: 14.6 % (ref 11.5–14.5)
GLUCOSE BLD-MCNC: 135 MG/DL (ref 70–99)
GLUCOSE BLD-MCNC: 145 MG/DL (ref 70–99)
GLUCOSE BLD-MCNC: 147 MG/DL (ref 70–99)
GLUCOSE BLD-MCNC: 148 MG/DL (ref 70–99)
GLUCOSE SERPL-MCNC: 135 MG/DL (ref 70–99)
HCT VFR BLD AUTO: 25.4 % (ref 42–52)
HGB BLD-MCNC: 8 G/DL (ref 14–18)
LYMPHOCYTES # BLD: 0.9 K/UL (ref 1–4.8)
LYMPHOCYTES NFR BLD: 13.8 %
MCH RBC QN AUTO: 31.1 PG (ref 27–31.3)
MCHC RBC AUTO-ENTMCNC: 31.5 % (ref 33–37)
MCV RBC AUTO: 98.8 FL (ref 79–92.2)
MONOCYTES # BLD: 0.4 K/UL (ref 0.2–0.8)
MONOCYTES NFR BLD: 5.7 %
NEUTROPHILS # BLD: 5.3 K/UL (ref 1.4–6.5)
NEUTS SEG NFR BLD: 79.5 %
PERFORMED ON: ABNORMAL
PLATELET # BLD AUTO: 182 K/UL (ref 130–400)
POTASSIUM SERPL-SCNC: 4.5 MEQ/L (ref 3.4–4.9)
RBC # BLD AUTO: 2.57 M/UL (ref 4.7–6.1)
SODIUM SERPL-SCNC: 143 MEQ/L (ref 135–144)
WBC # BLD AUTO: 6.7 K/UL (ref 4.8–10.8)

## 2024-11-21 PROCEDURE — 97129 THER IVNTJ 1ST 15 MIN: CPT

## 2024-11-21 PROCEDURE — 6370000000 HC RX 637 (ALT 250 FOR IP): Performed by: NURSE PRACTITIONER

## 2024-11-21 PROCEDURE — 6370000000 HC RX 637 (ALT 250 FOR IP): Performed by: PHYSICAL MEDICINE & REHABILITATION

## 2024-11-21 PROCEDURE — 80048 BASIC METABOLIC PNL TOTAL CA: CPT

## 2024-11-21 PROCEDURE — 1180000000 HC REHAB R&B

## 2024-11-21 PROCEDURE — 97535 SELF CARE MNGMENT TRAINING: CPT

## 2024-11-21 PROCEDURE — 97110 THERAPEUTIC EXERCISES: CPT

## 2024-11-21 PROCEDURE — 99231 SBSQ HOSP IP/OBS SF/LOW 25: CPT | Performed by: NURSE PRACTITIONER

## 2024-11-21 PROCEDURE — 97116 GAIT TRAINING THERAPY: CPT

## 2024-11-21 PROCEDURE — 97130 THER IVNTJ EA ADDL 15 MIN: CPT

## 2024-11-21 PROCEDURE — 85025 COMPLETE CBC W/AUTO DIFF WBC: CPT

## 2024-11-21 PROCEDURE — 6370000000 HC RX 637 (ALT 250 FOR IP): Performed by: INTERNAL MEDICINE

## 2024-11-21 PROCEDURE — 36415 COLL VENOUS BLD VENIPUNCTURE: CPT

## 2024-11-21 RX ORDER — ALLOPURINOL 100 MG/1
50 TABLET ORAL DAILY
Status: DISCONTINUED | OUTPATIENT
Start: 2024-11-22 | End: 2024-11-23 | Stop reason: HOSPADM

## 2024-11-21 RX ORDER — GLIPIZIDE 5 MG/1
2.5 TABLET ORAL
Status: DISCONTINUED | OUTPATIENT
Start: 2024-11-22 | End: 2024-11-22

## 2024-11-21 RX ADMIN — Medication 100 MG: at 08:32

## 2024-11-21 RX ADMIN — Medication 2000 UNITS: at 17:12

## 2024-11-21 RX ADMIN — CARVEDILOL 12.5 MG: 12.5 TABLET, FILM COATED ORAL at 08:32

## 2024-11-21 RX ADMIN — HYDRALAZINE HYDROCHLORIDE 50 MG: 50 TABLET ORAL at 08:32

## 2024-11-21 RX ADMIN — SODIUM BICARBONATE 650 MG TABLET 650 MG: at 08:32

## 2024-11-21 RX ADMIN — CARVEDILOL 12.5 MG: 12.5 TABLET, FILM COATED ORAL at 17:12

## 2024-11-21 RX ADMIN — CALCITRIOL 0.25 MCG: 0.25 CAPSULE ORAL at 08:32

## 2024-11-21 RX ADMIN — HYDRALAZINE HYDROCHLORIDE 50 MG: 50 TABLET ORAL at 20:36

## 2024-11-21 RX ADMIN — ATORVASTATIN CALCIUM 40 MG: 40 TABLET, FILM COATED ORAL at 08:32

## 2024-11-21 RX ADMIN — SODIUM BICARBONATE 650 MG TABLET 650 MG: at 20:36

## 2024-11-21 RX ADMIN — ASPIRIN 81 MG CHEWABLE TABLET 162 MG: 81 TABLET CHEWABLE at 08:32

## 2024-11-21 RX ADMIN — SENNOSIDES 8.6 MG: 8.6 TABLET, FILM COATED ORAL at 20:35

## 2024-11-21 RX ADMIN — ALLOPURINOL 100 MG: 100 TABLET ORAL at 08:32

## 2024-11-21 RX ADMIN — ISOSORBIDE MONONITRATE 30 MG: 30 TABLET, EXTENDED RELEASE ORAL at 08:32

## 2024-11-21 RX ADMIN — MENTHOL: 0 POWDER TOPICAL at 08:40

## 2024-11-21 ASSESSMENT — ENCOUNTER SYMPTOMS
CHEST TIGHTNESS: 0
TROUBLE SWALLOWING: 0
NAUSEA: 0
COUGH: 0
SHORTNESS OF BREATH: 0
VOMITING: 0
COLOR CHANGE: 0
WHEEZING: 0

## 2024-11-21 NOTE — CARE COORDINATION
DYLANW called Justo (dtr) and left a voicemail requesting a return call to provide an update. Electronically signed by JONNY Edmond, MOO on 11/21/2024 at 11:09 AM

## 2024-11-21 NOTE — PROGRESS NOTES
[facility] Neurology Daily Progress Note  Name: Modesto Burgess  Age: 89 y.o.  Gender: male  CodeStatus: DNR-CCA  Allergies: No Known Allergies    Chief Complaint:No chief complaint on file.    Primary Care Provider: Cb Rolon MD  InpatientTreatment Team: Treatment Team:   Christina Robison DO Patel, Dhruv R, MD Kaplan, Mark, MD Jeet, Anant, MD Chrosniak, Cheryl, Mercy Coe RN Medina, Julianna, MSW, DYLANW  Elyssa Sorenson OTA Young, James  Admission Date: 11/5/2024      HPI   Pt seen and examined on rehab unit for neurology follow-up.  Alert and oriented x 4.  Patient with some cognitive impairment and short-term memory loss.  Generalized weakness noted.  Denies headache.  Afebrile.  Blood pressure stable.  Appetite good.   Vitals:    11/21/24 0729   BP: (!) 167/83   Pulse: 71   Resp: 16   Temp: 98.1 °F (36.7 °C)   SpO2: 95%        Review of Systems   Constitutional:  Negative for appetite change, chills, fatigue and fever.   HENT:  Negative for hearing loss and trouble swallowing.    Eyes:  Negative for visual disturbance.   Respiratory:  Negative for cough, chest tightness, shortness of breath and wheezing.    Cardiovascular:  Negative for chest pain, palpitations and leg swelling.   Gastrointestinal:  Negative for nausea and vomiting.   Genitourinary:  Negative for difficulty urinating.   Musculoskeletal:  Positive for gait problem.   Skin:  Negative for color change and rash.   Neurological:  Positive for weakness. Negative for dizziness, tremors, seizures, syncope, facial asymmetry, speech difficulty, light-headedness, numbness and headaches.   Psychiatric/Behavioral:  Positive for confusion. Negative for agitation and hallucinations. The patient is not nervous/anxious.          Physical Exam  Vitals and nursing note reviewed.   Constitutional:       General: He is not in acute distress.     Appearance: He is not diaphoretic.   HENT:      Head: Normocephalic and atraumatic.   Eyes:      Pupils:  gradient echo sequences  to suggest sequela of acute or chronic hemorrhage.    ORBITS: Lens implants from prior cataract surgery are noted.  The orbits are  otherwise unremarkable.    SINUSES: Thickened proteinaceous secretions are noted in the right sphenoid  sinus that are bright on T1.  This could be related to inspissated secretions  versus fungal disease.  There is scattered mild paranasal sinus disease.  Trace right mastoid effusion.  The left mastoid air cells are clear.    BONES/SOFT TISSUES: The bone marrow signal intensity and craniocervical  junction are unremarkable.  The pituitary gland is unremarkable.  No areas of  abnormal soft tissue swelling.    Impression  1. Acute infarct in the anterior left thalamus.  2. Cerebral parenchymal volume loss with chronic microvascular white matter  ischemic disease.  3. Multifocal chronic infarcts, as above.                            MRA of the Head and Neck: No results found for this or any previous visit.  No results found for this or any previous visit.  No results found for this or any previous visit.                            CT of the Head: Results for orders placed during the hospital encounter of 10/29/24    CT HEAD WO CONTRAST    Narrative  EXAMINATION:  CT OF THE HEAD WITHOUT CONTRAST  11/5/2024 8:54 am    TECHNIQUE:  CT of the head was performed without the administration of intravenous  contrast. Automated exposure control, iterative reconstruction, and/or weight  based adjustment of the mA/kV was utilized to reduce the radiation dose to as  low as reasonably achievable.    COMPARISON:  11/03/2020    HISTORY:  ORDERING SYSTEM PROVIDED HISTORY: SAH  TECHNOLOGIST PROVIDED HISTORY:  Reason for exam:->SAH  Has a \"code stroke\" or \"stroke alert\" been called?->No  What reading provider will be dictating this exam?->CRC    FINDINGS:  BRAIN/VENTRICLES: Minimal residual hemorrhage seen right parietal region.  Findings are stable and unchanged.  There is atrophy

## 2024-11-21 NOTE — FLOWSHEET NOTE
Patient assessment completed.  See head to toe flowsheet for assessment findings. Patient continues to refuse his insulin stating, \"my doctor told me that I'm 89 years old, I don't need to take it if I don't want to!\"  Currently resting quietly in bed, AVASYS on/monitoring, call light in reach, bed alarm engaged, safety  maintained, plan of care ongoing.  Electronically signed by Minoo Lauren RN on 11/20/2024 at 9:05 PM    Lab reported critical BUN 75, Hospitalist updated via [employer].  Electronically signed by Minoo Lauren RN on 11/21/2024 at 6:19 AM

## 2024-11-21 NOTE — PROGRESS NOTES
OCCUPATIONAL THERAPY  INPATIENT REHAB TREATMENT NOTE  [facility]      NAME: Modesto Burgess  : 10/9/1933 (89 y.o.)  MRN: [id number]  CODE STATUS: DNR-CCA  Room: R246/R246-01    Date of Service: 2024    Referring Physician: Christina Robison DO  Rehab Diagnosis: Impaired mobility and ADL's d/t left thalamic CVA    Hospital course:   Comments: 89 y.o. male patient who presented to ER 10/29 with altered mental status and was found on floor. Not TNK candidate for CVA due timing timing window. Patient found to have had an acute L thalamus infarct in addition to being hypernatremic and having APRYL on CKD. Patient admitted with neuro and cardiology consulting.      Restrictions  Restrictions/Precautions  Restrictions/Precautions: Fall Risk     Patient's date of birth confirmed: Yes    SAFETY:  Safety Devices  Safety Devices in place: Yes  Type of devices: All fall risk precautions in place    SUBJECTIVE: \"I'm going to try to go without it today.\" - personal protective depends    Pain at start of treatment: No    Pain at end of treatment: No    COGNITION:  Orientation  Overall Orientation Status: Within Functional Limits  Cognition  Overall Cognitive Status: Exceptions  Arousal/Alertness: Appears intact  Following Commands: Follows one step commands with increased time;Follows one step commands with repetition  Attention Span: Appears intact  Memory: Decreased recall of recent events  Safety Judgement: Appears intact  Problem Solving: Assistance required to correct errors made;Assistance required to identify errors made  Insights: Decreased awareness of deficits  Initiation: Appears intact  Sequencing: Appears intact      Pt's current cognitive status is:  Comprehension: Mod I  Expression: Mod I  Social Interaction: Mod I  Problem Solving: Supervision  Memory: Supervision    OBJECTIVE:    Feeding  Assistance Level: Independent  Skilled Clinical Factors: Patient able to open packets/containers of lunch tray -

## 2024-11-21 NOTE — PROGRESS NOTES
[facility]  Facility/Department: [facility] REHAB  Speech Language Pathology   Treatment Note          Modesto Burgess  10/9/1933  R246/R246-01  [x]   confirmed    Date: 2024      Restrictions/Precautions: Fall Risk  Position Activity Restriction  Other position/activity restrictions: Avasys     ADULT ORAL NUTRITION SUPPLEMENT; HS Snack; Snack; low sweet snack  ADULT DIET; Regular; 4 carb choices (60 gm/meal); Low Potassium (Less than 3000 mg/day); No Concentrated sweets  ADULT ORAL NUTRITION SUPPLEMENT; HS Snack; Snack; LOW CARB     Respiratory Status:   Room air  No active isolations    Rehab Diagnosis: Impaired mobility and ADL's due to left thalamic CVA     Subjective:  Alert, Cooperative, and Pleasant        Interventions used this date:  Cognitive Skill Development    Objective/Assessment:  Patient progressing towards goals:  Goal 1: Patient will complete mid level problem solving tasks related to home/health/safety with min cues with 90%.  Pt answered inference questions related to story just heard aloud with 60% accuracy, increasing to 80% accuracy with min cues.  Decreased working memory noted.  Goal 2: Patient will carry out mid level verbal/written directives in everyday activities with stand by cues with 90% accuracy.  Good attention and follow through with all tasks given additional time and stand by cues.  Pt corrected sentence inconsistencies with repeating back sentence changing one word with 70% accuracy, increasing to 90% accuracy with min cues.  Goal 3: Patient will sequence 4-6 words/pictures/written steps with stand by cues with 90% accuracy.  Goal 4: Patient will utilize a memory strategy to recall 4-6 words/pictures with stand by cues with 90% accuracy.  Pt correctly recalled medicine RN gave during session and pt independently mixed with water and took.  Pt recalled reason he took medicine with increased time.      Treatment/Activity Tolerance:  Patient tolerated treatment  well    Plan:  Continue per POC    Pain Assessment:  Patient does not c/o pain.    Pain Re-assessment:  Patient does not c/o pain.    Patient/Caregiver Education:  Patient educated on session and progression towards goals.  Patient stated verbal understanding of directions.    Safety Devices:  Call light within reach, Chair alarm in place, and Telesitter in use      Speech Therapy Level of Assistance Scale    AUDITORY COMPREHENSION  Rating:  Supervised Assistance    VERBAL EXPRESSION  Rating:  Supervised Assistance    MOTOR SPEECH  Rating: Independent    PROBLEM SOLVING  Rating: Minimal Assistance    MEMORY  Rating:  Minimal Assistance        Therapy Time  SLP Individual Minutes  Time In: 1300  Time Out: 1330  Minutes: 30            Signature: Electronically signed by STORMY Graham on 11/21/2024 at 2:13 PM

## 2024-11-21 NOTE — PROGRESS NOTES
Subjective:  The patient complains of severe acute on chronic progressive aphasia, fatigue and right-sided weakness partially relieved by rest, medications, PT,  OT,   SLP and rest and exacerbated by recent left thalamic CVA.          89 yo male was admitted to [facility] on 10/29/2024 after presenting through the ER after falling at home.  He is 89 years old and presented through the ER on 10/29/2024 with altered mental status after being found on the floor.  He was diagnosed with a left thalamic CVA.  He was not a TNK candidate is that he were not sure as to the timing window.     Minoo Lauren, RN  Registered Nurse  Nursing     Flowsheet Note      Addendum     Date of Service: 11/20/2024  9:04 PM       Patient assessment completed.  See head to toe flowsheet for assessment findings. Patient continues to refuse his insulin stating, \"my doctor told me that I'm 89 years old, I don't need to take it if I don't want to!\"  Currently resting quietly in bed, AVASYS on/monitoring, call light in reach, bed alarm engaged, safety  maintained, plan of care ongoing.  Electronically signed by Minoo Lauren RN on 11/20/2024 at 9:05 PM     Lab reported critical BUN 75, Hospitalist updated via [employer].  Electronically signed by Minoo Lauren RN on 11/21/2024 at 6:19 AM           Post stroke he was also found to have acute on chronic renal failure and diabetes mellitus with hyperglycemia.     He was admitted under the care of the hospitalist with neurology consulting.  He had significant confusion felt to be secondary to his subarachnoid hemorrhage in the right parietal area antiplatelets therapy and subcutaneous heparin were held.    I am concerned about patient’s medical complexities and barriers to advancing in rehab goals including decreasing stroke risk and monitoring for UTI and urinary retention.    I am also concerned about his diabetes and have endocrinology on the case-he and family will  Medicine & Rehab Issues Assess & Plan:   Severe abnormality of gait and mobility and impaired self-care and ADL's secondary to Acute infarct in the anterior left thalamus.  Functional and medical status reassessed regarding patient’s ability to participate in therapies and patient found to be able to participate in acute intensive comprehensive inpatient rehabilitation program including PT/OT to improve balance, ambulation, ADL’s, and to improve the P/AROM.  Therapeutic modifications regarding activities in therapies, place, amount of time per day and intensity of therapy made daily.  In bed therapies or bedside therapies prn.   Bowel and Bladder dysfunction, Neurogenic bowel and bladder:  frequent toileting, ambulate to bathroom with assistance, check post void residuals.  Check for C.difficile x1 if >2 loose stools in 24 hours, continue bowel & bladder program.  Monitor bowel and bladder function.  Lactinex 2 PO every AC.  MOM prn, Brown Bomb prn, Glycerin suppository prn, enema prn.  Encourage therapy and nursing to co-treat and problem solve re continence.    Severe back pain,  , as well as generalized OA pain: reassess pain every shift and prior to and after each therapy session, give prn Tylenol and consider scheduled Tylenol, modalities prn in therapy, masage, Lidoderm, K-pad prn. Consider scheduled AM pain meds. Controlled Substance Monitoring:  Acute and Chronic Pain Monitoring:   RX Monitoring Acute Pain Prescriptions Periodic Controlled Substance Monitoring   11/7/2024   8:28 AM Prescription exceeds daily limit for a specific reason. See comments or note.;Severe pain not adequately treated with lower dose.;Not required given exclusionary diagnoses... Possible medication side effects, risk of tolerance/dependence & alternative treatments discussed.;No signs of potential drug abuse or diversion identified.;Assessed functional status (ability to engage in work or other purposeful activities, the pain

## 2024-11-21 NOTE — PROGRESS NOTES
Physical Therapy Rehab Treatment Note  Facility/Department: [facility]  [facility]       NAME: Modesto Burgess  : 10/9/1933 (89 y.o.)  MRN: [id number]  CODE STATUS: DNR-CCA    Date of Service: 2024     Restrictions:  Restrictions/Precautions: Fall Risk  Position Activity Restriction  Other position/activity restrictions: Avasys       SUBJECTIVE:   Subjective: \"I got up two or three times last night\"    Pain  Pain: Denies pain pre/post session.      OBJECTIVE:         Bed Mobility  Additional Factors: Increased time to complete;Head of bed flat;Without handrails (on therapy mat)  Roll Left  Assistance Level: Stand by assist  Roll Right  Assistance Level: Stand by assist  Sit to Supine  Assistance Level: Stand by assist  Supine to Sit  Assistance Level: Stand by assist  Skilled Clinical Factors: increased time and effort, vc's to improve technique with good carry over  Scooting  Assistance Level: Stand by assist    Transfers  Surface: Wheelchair;To mat;From mat  Additional Factors: Verbal cues;Increased time to complete;Hand placement cues  Device:  (rollator)  Sit to Stand  Assistance Level: Stand by assist  Stand to Sit  Assistance Level: Stand by assist  Bed To/From Chair  Technique: Stand step  Assistance Level: Stand by assist  Skilled Clinical Factors: with rollator    Ambulation  Surface: Level surface;Uneven surface;Carpet  Device: Rollator  Distance: 200', 75', 50'  Activity: Within Unit  Additional Factors: Verbal cues;Increased time to complete  Assistance Level: Stand by assist  Gait Deviations: Decreased step length bilateral;Path deviations;Narrow base of support  Skilled Clinical Factors: abisai step length and height decreased without shoes donned, continues to demonstrate good safety throughout all trials.    Stairs  Stair Height: 8''  Device: Bilateral handrails  Number of Stairs: 4  Additional Factors: Non-reciprocal going up;Non-reciprocal going down;Increased time to complete  Assistance  60  Transfer/Bed mobility training: 15  Gait trainin  Wheelchair trainin  Neuro re education: 0  Therapeutic ex: 15      BENNETT GRAHAM PTA, 24 at 12:42 PM

## 2024-11-21 NOTE — PLAN OF CARE
Problem: Chronic Conditions and Co-morbidities  Goal: Patient's chronic conditions and co-morbidity symptoms are monitored and maintained or improved  11/21/2024 1116 by Mercy Baez RN  Outcome: Progressing  11/20/2024 2245 by Minoo Lauren RN  Outcome: Progressing     Problem: Discharge Planning  Goal: Discharge to home or other facility with appropriate resources  11/21/2024 1116 by Mercy Baez RN  Outcome: Progressing  11/20/2024 2245 by Minoo Lauren RN  Outcome: Progressing     Problem: Safety - Adult  Goal: Free from fall injury  11/21/2024 1116 by Mercy Baez RN  Outcome: Progressing  11/20/2024 2245 by Minoo Lauren RN  Outcome: Progressing     Problem: ABCDS Injury Assessment  Goal: Absence of physical injury  11/21/2024 1116 by Mercy Baez RN  Outcome: Progressing  11/20/2024 2245 by Minoo Lauren RN  Outcome: Progressing     Problem: Skin/Tissue Integrity  Goal: Absence of new skin breakdown  Description: 1.  Monitor for areas of redness and/or skin breakdown  2.  Assess vascular access sites hourly  3.  Every 4-6 hours minimum:  Change oxygen saturation probe site  4.  Every 4-6 hours:  If on nasal continuous positive airway pressure, respiratory therapy assess nares and determine need for appliance change or resting period.  11/21/2024 1116 by Mercy Baez RN  Outcome: Progressing  11/20/2024 2245 by Minoo Lauren RN  Outcome: Progressing     Problem: Pain  Goal: Verbalizes/displays adequate comfort level or baseline comfort level  11/21/2024 1116 by Mercy Baez RN  Outcome: Progressing  11/20/2024 2245 by Minoo Lauren RN  Outcome: Progressing     Problem: Neurosensory - Adult  Goal: Bowel movement at least every other day  11/21/2024 1116 by Mercy Baez RN  Outcome: Progressing  11/20/2024 2245 by Minoo Lauren RN  Outcome: Progressing     Problem: Nutrition Deficit:  Goal: Optimize nutritional status  11/21/2024 1116

## 2024-11-21 NOTE — CARE COORDINATION
LSW spoke with Justo (dtr) and discussed recommendation to move discharge to Saturday 11/23. She explained that they would need to discharge on 11/24 at the earliest as that is when they will have the home modifications completed. Family training is scheduled for Friday 11/22 at 2PM with Hema (son) and possibly Justo (pending her work schedule). No new DME needed. Justo stated that she would speak to her family regarding arranging [facility] services. Justo was made aware that the completed Case Mix Tool [facility]. Electronically signed by JONNY Edmond, MOO on 11/21/2024 at 4:52 PM

## 2024-11-21 NOTE — PROGRESS NOTES
Term Goal 4: Pt to manage 2 steps with HR and Can  Long Term Goal 5: Pt to manage and propel WC indep 150ft    PLAN OF CARE/Safety:   Safety Devices  Type of Devices: All fall risk precautions in place;Chair alarm in place;Left in chair;Call light within reach;Telesitter in use      Therapy Time:   Individual   Time In 1452   Time Out 1522   Minutes 30      Minutes: 30  Transfer/Bed mobility trainin  Gait training: 15  Neuro re education: 0  Therapeutic ex: Cooper GRAHAM PTA, 24 at 4:41 PM

## 2024-11-21 NOTE — PLAN OF CARE
Problem: Chronic Conditions and Co-morbidities  Goal: Patient's chronic conditions and co-morbidity symptoms are monitored and maintained or improved  11/20/2024 2245 by Minoo Lauren RN  Outcome: Progressing  11/20/2024 1042 by Mercy Baez RN  Outcome: Progressing  11/20/2024 1042 by Mercy Baez RN  Outcome: Progressing     Problem: Discharge Planning  Goal: Discharge to home or other facility with appropriate resources  11/20/2024 2245 by Minoo Lauren RN  Outcome: Progressing  11/20/2024 1042 by Mercy Baez RN  Outcome: Progressing  11/20/2024 1042 by Mercy Baez RN  Outcome: Progressing     Problem: Safety - Adult  Goal: Free from fall injury  11/20/2024 2245 by Minoo Lauren RN  Outcome: Progressing  11/20/2024 1042 by Mercy Baez RN  Outcome: Progressing  11/20/2024 1042 by Mercy Baez RN  Outcome: Progressing     Problem: ABCDS Injury Assessment  Goal: Absence of physical injury  11/20/2024 2245 by Minoo Lauren RN  Outcome: Progressing  11/20/2024 1042 by Mercy Baez RN  Outcome: Progressing  11/20/2024 1042 by Mercy Baez RN  Outcome: Progressing     Problem: Skin/Tissue Integrity  Goal: Absence of new skin breakdown  Description: 1.  Monitor for areas of redness and/or skin breakdown  2.  Assess vascular access sites hourly  3.  Every 4-6 hours minimum:  Change oxygen saturation probe site  4.  Every 4-6 hours:  If on nasal continuous positive airway pressure, respiratory therapy assess nares and determine need for appliance change or resting period.  11/20/2024 2245 by Minoo Lauren RN  Outcome: Progressing  11/20/2024 1042 by Mercy Baez RN  Outcome: Progressing  11/20/2024 1042 by Mercy Baez RN  Outcome: Progressing     Problem: Pain  Goal: Verbalizes/displays adequate comfort level or baseline comfort level  11/20/2024 2245 by Minoo Lauren RN  Outcome: Progressing  11/20/2024 1042 by Nestor  ELDA Madrid  Outcome: Progressing  11/20/2024 1042 by Mercy Baez RN  Outcome: Progressing     Problem: Neurosensory - Adult  Goal: Bowel movement at least every other day  11/20/2024 2245 by Minoo Lauren RN  Outcome: Progressing  11/20/2024 1042 by Mercy Baez RN  Outcome: Progressing  11/20/2024 1042 by Mercy Baez RN  Outcome: Progressing     Problem: Nutrition Deficit:  Goal: Optimize nutritional status  11/20/2024 2245 by Minoo Lauren RN  Outcome: Progressing  11/20/2024 1042 by Mercy Baez RN  Outcome: Progressing  11/20/2024 1042 by Mercy Baez RN  Outcome: Progressing

## 2024-11-22 LAB
GLUCOSE BLD-MCNC: 103 MG/DL (ref 70–99)
GLUCOSE BLD-MCNC: 112 MG/DL (ref 70–99)
GLUCOSE BLD-MCNC: 127 MG/DL (ref 70–99)
PERFORMED ON: ABNORMAL

## 2024-11-22 PROCEDURE — 6370000000 HC RX 637 (ALT 250 FOR IP): Performed by: PHYSICAL MEDICINE & REHABILITATION

## 2024-11-22 PROCEDURE — 1180000000 HC REHAB R&B

## 2024-11-22 PROCEDURE — 6370000000 HC RX 637 (ALT 250 FOR IP): Performed by: INTERNAL MEDICINE

## 2024-11-22 PROCEDURE — 97542 WHEELCHAIR MNGMENT TRAINING: CPT

## 2024-11-22 PROCEDURE — 97130 THER IVNTJ EA ADDL 15 MIN: CPT

## 2024-11-22 PROCEDURE — APPSS15 APP SPLIT SHARED TIME 0-15 MINUTES: Performed by: NURSE PRACTITIONER

## 2024-11-22 PROCEDURE — 6370000000 HC RX 637 (ALT 250 FOR IP)

## 2024-11-22 PROCEDURE — 97110 THERAPEUTIC EXERCISES: CPT

## 2024-11-22 PROCEDURE — 97116 GAIT TRAINING THERAPY: CPT

## 2024-11-22 PROCEDURE — 99232 SBSQ HOSP IP/OBS MODERATE 35: CPT | Performed by: PSYCHIATRY & NEUROLOGY

## 2024-11-22 PROCEDURE — 97535 SELF CARE MNGMENT TRAINING: CPT

## 2024-11-22 PROCEDURE — 6370000000 HC RX 637 (ALT 250 FOR IP): Performed by: NURSE PRACTITIONER

## 2024-11-22 PROCEDURE — 97129 THER IVNTJ 1ST 15 MIN: CPT

## 2024-11-22 RX ORDER — SENNOSIDES A AND B 8.6 MG/1
1 TABLET, FILM COATED ORAL NIGHTLY
Qty: 30 TABLET | Refills: 0 | Status: SHIPPED | OUTPATIENT
Start: 2024-11-22 | End: 2024-12-22

## 2024-11-22 RX ORDER — HYDRALAZINE HYDROCHLORIDE 50 MG/1
50 TABLET, FILM COATED ORAL 2 TIMES DAILY
Qty: 90 TABLET | Refills: 3 | Status: SHIPPED | OUTPATIENT
Start: 2024-11-22

## 2024-11-22 RX ORDER — INSULIN GLARGINE 100 [IU]/ML
INJECTION, SOLUTION SUBCUTANEOUS
Qty: 15 ML | Refills: 3 | Status: SHIPPED | OUTPATIENT
Start: 2024-11-22

## 2024-11-22 RX ORDER — ALLOPURINOL 100 MG/1
50 TABLET ORAL DAILY
Qty: 30 TABLET | Refills: 3 | Status: SHIPPED | OUTPATIENT
Start: 2024-11-23

## 2024-11-22 RX ORDER — ASPIRIN 81 MG/1
162 TABLET, CHEWABLE ORAL DAILY
Qty: 30 TABLET | Refills: 3 | Status: SHIPPED | OUTPATIENT
Start: 2024-11-23

## 2024-11-22 RX ORDER — SODIUM BICARBONATE 650 MG/1
650 TABLET ORAL 3 TIMES DAILY
Qty: 90 TABLET | Refills: 1 | Status: SHIPPED | OUTPATIENT
Start: 2024-11-22

## 2024-11-22 RX ORDER — ATORVASTATIN CALCIUM 40 MG/1
40 TABLET, FILM COATED ORAL DAILY
Qty: 30 TABLET | Refills: 3 | Status: SHIPPED | OUTPATIENT
Start: 2024-11-23

## 2024-11-22 RX ADMIN — BACITRACIN ZINC, NEOMYCIN, POLYMYXIN B 1 G: 400; 3.5; 5 OINTMENT TOPICAL at 08:42

## 2024-11-22 RX ADMIN — SODIUM BICARBONATE 650 MG TABLET 650 MG: at 08:42

## 2024-11-22 RX ADMIN — HYDRALAZINE HYDROCHLORIDE 50 MG: 50 TABLET ORAL at 20:17

## 2024-11-22 RX ADMIN — Medication 100 MG: at 08:41

## 2024-11-22 RX ADMIN — ALLOPURINOL 50 MG: 100 TABLET ORAL at 08:40

## 2024-11-22 RX ADMIN — ATORVASTATIN CALCIUM 40 MG: 40 TABLET, FILM COATED ORAL at 08:42

## 2024-11-22 RX ADMIN — CARVEDILOL 12.5 MG: 12.5 TABLET, FILM COATED ORAL at 08:41

## 2024-11-22 RX ADMIN — Medication 2000 UNITS: at 17:20

## 2024-11-22 RX ADMIN — GLIPIZIDE 2.5 MG: 5 TABLET ORAL at 08:40

## 2024-11-22 RX ADMIN — MENTHOL: 0 POWDER TOPICAL at 08:49

## 2024-11-22 RX ADMIN — CARVEDILOL 12.5 MG: 12.5 TABLET, FILM COATED ORAL at 17:20

## 2024-11-22 RX ADMIN — CALCITRIOL 0.25 MCG: 0.25 CAPSULE ORAL at 08:42

## 2024-11-22 RX ADMIN — ISOSORBIDE MONONITRATE 30 MG: 30 TABLET, EXTENDED RELEASE ORAL at 08:41

## 2024-11-22 RX ADMIN — SODIUM BICARBONATE 650 MG TABLET 650 MG: at 20:17

## 2024-11-22 RX ADMIN — HYDRALAZINE HYDROCHLORIDE 50 MG: 50 TABLET ORAL at 08:40

## 2024-11-22 RX ADMIN — ASPIRIN 81 MG CHEWABLE TABLET 162 MG: 81 TABLET CHEWABLE at 08:42

## 2024-11-22 RX ADMIN — MICONAZOLE NITRATE: 2 POWDER TOPICAL at 08:50

## 2024-11-22 ASSESSMENT — ENCOUNTER SYMPTOMS
TROUBLE SWALLOWING: 0
NAUSEA: 0
COUGH: 0
SHORTNESS OF BREATH: 0
CHEST TIGHTNESS: 0
VOMITING: 0
COLOR CHANGE: 0
WHEEZING: 0

## 2024-11-22 ASSESSMENT — PAIN SCALES - GENERAL: PAINLEVEL_OUTOF10: 0

## 2024-11-22 NOTE — PROGRESS NOTES
I have not seen this patient before.  I was asked to evaluate his case and prepping for discharge.    Went to his room.  Patient is sitting in a chair.  Awake and alert.  Coherent.  No distress.  Patient stated that he is ready to go home.  He is not willing to stay any longer.  He feels stronger.  Patient stated that he has adequate help and support at home.  He has 2 sons who are ready to jump and take care of him    No distress.  Pale skin and buccal mucosa.  Chest is clear, heart is regular.  Abdomen soft.    *Functional impairment.  Please refer to physical and Occupational Therapy team notes for details on her functional status and treatment plan.  Ambulation instructions, restrictions, follow-up precautions are to be addressed by physiatrist.    *Renal failure stage IV, borderline stage V/end-stage.  Kidney failure, volume and electrolytes have been managed by nephrology team  Normal potassium  .  Sodium bicarbonate is 18.  He is already on sodium bicarbonate twice a day.  I increase it to 3 times a day.  His blood pressure is slightly on the low side.  Resume Demadex 20 mg daily to keep him in euvolemic state.    *Hypertension, slightly elevated.  Continue Coreg and hydralazine  Patient is off amlodipine  Resume Demadex 40 mg daily to keep him in euvolemic state    *Diabetes, managed by endocrinology.  Care is deferred    *Anemia.  Patient is on iron supplementation.  Patient has been getting erythropoietin injection.  This would need to be addressed by nephrology.  Furthermore, patient may require additional anemia workup which may include but not limited to GI investigation to be arranged by PCP postdischarge from rehab unit.    *Stroke.  Post ischemic stroke hemorrhagic transformation seen  .  Patient is recommended to be on aspirin by neurology team  .  Neuro  and neurovascular issues are deferred to be handled by neurology team.    Discharging the patient is by no means the end of the care that he would

## 2024-11-22 NOTE — CARE COORDINATION
MOO received a voicemail from Justo (hermelinda) stating that she spoke to Sukhwinder (pt's son that also lives with him) who is currently out of the country. He confirmed that everything can be ready for pt to discharge home on Saturday 11/23 instead. They would prefer to discharge pt home that day and are in agreement for 11/23 discharge. DYLANW will notify IDT. Electronically signed by JONNY Edmond LSW on 11/22/2024 at 7:16 AM    LSW spoke with hermelinda (Justo) and confirmed discharge for tomorrow 11/23. No new DME needed. Given freedom of choice, [facility] was chosen and a referral was made. Alondra at [facility] confirmed they will accept and orders were placed. Patient satisfaction survey to be completed later when family is present. Electronically signed by JONNY Edmond LSW on 11/22/2024 at 11:59 AM

## 2024-11-22 NOTE — PROGRESS NOTES
Endocrinology  Patient may be discharged home from endocrinology standpoint.  Patient was prescribed Lantus 10 units for glucose greater than 180.  Discontinue the glimepiride that was ordered.  The risk of hypoglycemia in patients over 70 with renal failure outweighs the benefit of this medication.  Target glucose range for this 89-year-old patient is 100-250.

## 2024-11-22 NOTE — CARE COORDINATION
During IDT meeting it was discussed for pt to leave on Sunday 11/24 due to new medications and wanting to monitor 48 hours. In addition, family had originally requested that discharge date. However, Justo (dtr) changed her decision and is set to discharge pt on 11/23. Justo did voice some concerns/complaints regarding pt's stay here and spoke to Rehab Unit Manager. Pt is set to receive [facility] and LSW notified Alondra that either Sukhwinder or Justo are to be called to schedule therapy visits. No new DME needed. IDT was made aware of d/c planned for 11/23. Electronically signed by JONNY Edmond, LSW on 11/22/2024 at 4:42 PM

## 2024-11-22 NOTE — PROGRESS NOTES
[facility]  Facility/Department: [facility] REHAB  Speech Language Pathology   Treatment Note          Modesto Burgess  10/9/1933  R246/R246-01  [x]   confirmed    Date: 2024      Restrictions/Precautions: Fall Risk  Position Activity Restriction  Other position/activity restrictions: Avasys     ADULT ORAL NUTRITION SUPPLEMENT; HS Snack; Snack; low sweet snack  ADULT DIET; Regular; 4 carb choices (60 gm/meal); Low Potassium (Less than 3000 mg/day); No Concentrated sweets  ADULT ORAL NUTRITION SUPPLEMENT; HS Snack; Snack; LOW CARB     Respiratory Status:   Room air  No active isolations    Rehab Diagnosis: Impaired mobility and ADL's due to left thalamic CVA     Subjective:  Alert, Cooperative, and Pleasant        Interventions used this date:  Cognitive Skill Development    Objective/Assessment:  Patient progressing towards goals:  Goal 1: Patient will complete mid level problem solving tasks related to home/health/safety with min cues with 90%.  Goal 2: Patient will carry out mid level verbal/written directives in everyday activities with stand by cues with 90% accuracy.  Goal 3: Patient will sequence 4-6 words/pictures/written steps with stand by cues with 90% accuracy.  Pt sequenced 3 pictures in correct order given additional time with stand by cues with 100% accuracy.  Goal 4: Patient will utilize a memory strategy to recall 4-6 words/pictures with stand by cues with 90% accuracy.  D/C BIMS completed- pt recalled 3/3 words after 1 minute delay given additional time.    Pt's son and daughter-in-law arrived at end of session to meet with dietician.  Educated on patient progress and current level of functioning.  Rec:  assistance post discharge.  Both gave verbal understanding.      Treatment/Activity Tolerance:  Patient tolerated treatment well    Plan:  Discharge date set for tomorrow    Pain Assessment:  Patient does not c/o pain.    Pain Re-assessment:  Patient does not c/o pain.    Patient/Caregiver

## 2024-11-22 NOTE — CARE COORDINATION
[facility]  INPATIENT REHABILITATION  UPDATE NOTE  Room: R246/R246-01  Admit Date: 2024       Date: 2024  Patient Name: Modesto Burgess        MRN: [id number]    : 10/9/1933  (89 y.o.)  Gender: male        Anticipated Discharge Plan: Pt to d/c  home w/ family. Pt is being followed by PMR, PT, OT, SLP, Neurology, Endocrinology, Internal Med, LSW, and Dietician. We believe that pt would benefit from continued rehab to maximize overall function and to ensure a safe discharge. Family training to be scheduled closer to discharge.     REHAB DIAGNOSIS:   Diagnosis: Impaired mobility and ADL's due to left thalamic CVA    CO MORBIDITIES:    Past Medical History:   Diagnosis Date    CAD (coronary artery disease) 09/15/2020    HLD (hyperlipidemia) 09/15/2020    [place] (hard of hearing) 2024    Hyperparathyroidism due to renal insufficiency (HCC) 10/30/2024    Late effects of CVA (cerebrovascular accident) 2024    Lumbosacral disc disease     Osteoarthritis     Type 2 diabetes mellitus, with long-term current use of insulin (HCC) 09/15/2020     History reviewed. No pertinent surgical history.     Last set of vitals:  Vital Signs  Temp: 97.5 °F (36.4 °C)  Temp Source: Oral  Pulse: 63  Heart Rate Source: Monitor  Respirations: 18  BP: (!) 149/56  MAP (Calculated): 87  MAP (mmHg): 106  BP Location: Right upper arm  BP Method: Automatic  Patient Position: High fowlers    Results/Findings:   Labs:   Recent Labs     24  0509   WBC 6.7   HGB 8.0*   HCT 25.4*        Recent Labs     24  0442 24  0509    143   K 4.6 4.5   * 114*   CO2 18* 18*   BUN 76* 75*   CREATININE 4.33* 3.99*   CALCIUM 9.8 9.8     Recent Labs     24  0442   AST 10   ALT 17   BILITOT 0.3   ALKPHOS 90     No results for input(s): \"INR\" in the last 72 hours.  No results for input(s): \"CKTOTAL\", \"TROPONINI\" in the last 72 hours.    Urinalysis:      Lab Results   Component Value

## 2024-11-22 NOTE — DISCHARGE INSTR - COC
Continuity of Care Form    Patient Name: Modesto Ware   :  10/9/1933  MRN:  [id number]    Admit date:  2024  Discharge date:  ***    Code Status Order: DNR-CCA   Advance Directives:   Advance Care Flowsheet Documentation             Admitting Physician:  Christina Robison DO  PCP: Cb Rolon MD    Discharging Nurse:Skip SCHROEDER  Discharging Hospital Unit/Room#: R246/R246-01  Discharging Unit Phone Number: [phone]    Emergency Contact:   Extended Emergency Contact Information  Primary Emergency Contact: little ware  Mobile Phone: [phone]  Relation: Child  Secondary Emergency Contact: Hema Ware  Home Phone: [phone]  Relation: Child  Preferred language: English   needed? No    Past Surgical History:  History reviewed. No pertinent surgical history.    Immunization History:     There is no immunization history on file for this patient.    Active Problems:  Patient Active Problem List   Diagnosis Code    APRYL (acute kidney injury) ([facility]) N17.9    CKD (chronic kidney disease) N18.9    HTN (hypertension) I10    CAD (coronary artery disease) I25.10    Type 2 diabetes mellitus, with long-term current use of insulin ([facility]) E11.9, Z79.4    HLD (hyperlipidemia) E78.5    Hyperkalemia E87.5    Fall at home, initial encounter W19.XXXA, Y92.009    Altered mental status R41.82    Hyperparathyroidism due to renal insufficiency ([facility]) N25.81    Palliative care encounter Z51.5    Goals of care, counseling/discussion Z71.89    Advanced care planning/counseling discussion Z71.89    Full code status Z78.9    Actinic keratosis L57.0    PVD (posterior vitreous detachment), both eyes H43.813    CVA  L Thalamic I63.9    Impaired mobility ADLs dt L thal CVA Z74.09, Z78.9    Late effect of brain injury S06.9XAS    Late effects of CVA (cerebrovascular accident) I69.90    [place] (hard of hearing) H91.90    Parkinsonian features R29.818    Cardiomyopathy (HCC) I42.9    SAH (subarachnoid hemorrhage) ([facility]) I60.9    DNR (do not  resuscitate) Z66    Poorly controlled diabetes mellitus (HCC) E11.65       Isolation/Infection:   Isolation            No Isolation          Patient Infection Status       None to display            Nurse Assessment:  Last Vital Signs: BP (!) 149/56   Pulse 63   Temp 97.5 °F (36.4 °C) (Oral)   Resp 18   Ht 1.723 m (5' 7.84\")   Wt 87.3 kg (192 lb 7.4 oz)   SpO2 96%   BMI 29.41 kg/m²     Last documented pain score (0-10 scale): Pain Level: 0  Last Weight:   Wt Readings from Last 1 Encounters:   11/22/24 87.3 kg (192 lb 7.4 oz)     Mental Status:  oriented    IV Access:  - None    Nursing Mobility/ADLs:  Walking   Assisted  Transfer  Assisted  Bathing  Assisted  Dressing  Assisted  Toileting  Assisted  Feeding  Independent  Med Admin  Assisted  Med Delivery   whole    Wound Care Documentation and Therapy:  Wound 11/07/24 Foot Left;Anterior abrasion, (Active)   Wound Etiology Traumatic 11/16/24 2030   Dressing Status Other (Comment) 11/16/24 2030   Wound Cleansed Betadine/povidone iodine 11/16/24 0700   Dressing/Treatment Open to air;Pharmaceutical agent (see MAR) 11/21/24 2030   Wound Assessment Pink/red;Other (Comment) 11/17/24 2045   Drainage Amount None (dry) 11/21/24 2030   Odor None 11/21/24 2030   Neha-wound Assessment Intact;Warm 11/17/24 2045   Margins Defined edges 11/17/24 2045   Number of days: 15        Elimination:  Continence:   Bowel:yes  Bladder: Yes  Urinary Catheter: None   Colostomy/Ileostomy/Ileal Conduit: No       Date of Last BM:11/22/24    Intake/Output Summary (Last 24 hours) at 11/22/2024 0844  Last data filed at 11/21/2024 2258  Gross per 24 hour   Intake --   Output 500 ml   Net -500 ml     I/O last 3 completed shifts:  In: 240 [P.O.:240]  Out: 1000 [Urine:1000]    Safety Concerns:     History of Falls (last 30 days) and At Risk for Falls    Impairments/Disabilities:      None    Nutrition Therapy:  Current Nutrition Therapy:   - Oral Diet:  General    Routes of Feeding: Oral  Liquids:

## 2024-11-22 NOTE — DISCHARGE INSTRUCTIONS
Endocrinology-    Check your blood sugars 3 times a day, before meals and at night  Document these numbers in a blood glucose log and bring them with you to your follow-up appointment.  If you are prescribed insulin, Do not take your  insulin if your blood sugars less than 180   Call our office if you have blood sugars less than 80 or greater then 300 on two or more occasions  Call our office if you have any questions regarding your blood sugars or insulin dosing regiment  Signs of low blood sugar may include tremors, feeling shaky, sweating, dizziness, confusion and weakness. Check your blood sugar immediatly if you have any of these symptoms.     Follow up with  @PCP@ in the next 7 days or sooner if needed.    Please return to ER or call 911 if you develop any significant signs or symptoms.    I may or may not have addressed all of your symptoms, medical issues,  Illnesses, or all of the abnormal blood work or imaging therefore please ask your PCP and other specialists to obtain [facility] record entirely to follow up on all of your symptoms, illnesses, abnormal labs, imaging and findings that I have and have not addressed during your hospitalization.    Please have her repeat the blood test ( BMP and CBC ) at your doctor's office next week.     Discharging you from the hospital does not mean that your medical care ends here and now. You may still need to have additional work up, investigation, monitoring, surveillance, and treatment to be handled from this point on by in the out patient setting by  out patient providers including your PCP, Specialists and other healthcare providers.     For medication questions, contact your retail pharmacy and your PCP.    Your medical team at [facility] appreciates the opportunity to work with you to get well!    Deidre Epperson MD     Pt awaken own his own. Pt now agreeing to take Klonopin and have vitals signs taken

## 2024-11-22 NOTE — PROGRESS NOTES
[facility] Neurology Daily Progress Note  Name: Modesto Burgess  Age: 89 y.o.  Gender: male  CodeStatus: DNR-CCA  Allergies: No Known Allergies    Chief Complaint:No chief complaint on file.    Primary Care Provider: Cb Rolon MD  InpatientTreatment Team: Treatment Team:   Christina Robison DO Patel, Dhruv R, MD Kaplan, Mark, MD Jeet, Anant, MD Crabtree, Martina Tom, Brooke, MSW, LSW  Stacey Jordan, Giacomo Booth, OT  Lopez, Beth S, OTR/L  Debbie Mancera OT  Admission Date: 11/5/2024      HPI   Pt seen and examined on rehab unit for neurology follow-up.  Alert and oriented x 4.  Patient with some cognitive impairment and short-term memory loss.  Generalized weakness noted.  Denies headache.  Afebrile.  Blood pressure stable.  Appetite good.   Vitals:    11/22/24 0839   BP: (!) 149/56   Pulse: 63   Resp: 18   Temp: 97.5 °F (36.4 °C)   SpO2: 96%        Review of Systems   Constitutional:  Negative for appetite change, chills, fatigue and fever.   HENT:  Negative for hearing loss and trouble swallowing.    Eyes:  Negative for visual disturbance.   Respiratory:  Negative for cough, chest tightness, shortness of breath and wheezing.    Cardiovascular:  Negative for chest pain, palpitations and leg swelling.   Gastrointestinal:  Negative for nausea and vomiting.   Genitourinary:  Negative for difficulty urinating.   Musculoskeletal:  Positive for gait problem.   Skin:  Negative for color change and rash.   Neurological:  Positive for weakness. Negative for dizziness, tremors, seizures, syncope, facial asymmetry, speech difficulty, light-headedness, numbness and headaches.   Psychiatric/Behavioral:  Positive for confusion. Negative for agitation and hallucinations. The patient is not nervous/anxious.          Physical Exam  Vitals and nursing note reviewed.   Constitutional:       General: He is not in acute distress.     Appearance: He is not diaphoretic.   HENT:      Head: Normocephalic and atraumatic.  Chronic sinusitis involving the sphenoid sinuses.  Findings are stable.    SOFT TISSUES/SKULL:  No acute abnormality of the visualized skull or soft  tissues.    Impression  1. Stable exam with no new findings in the interval.  2. Atrophy and chronic small vessel ischemic change.  3. Minimal residual subarachnoid hemorrhage seen right parietal region.  No results found for this or any previous visit.  No results found for this or any previous visit.      Carotid duplex: No results found for this or any previous visit.  No results found for this or any previous visit.  No results found for this or any previous visit.      Echo No results found for this or any previous visit.            Assessment/Plan:  Acute anterior left thalamic ischemic infarct  Carotid duplex negative for significant stenosis  Echocardiogram with ejection fraction of 30%, left ventricular moderate dilation and severe hypokinesis  LDL 70  Hemoglobin A1c 8.4  Repeat CT of the head 11/11/2024 with resolving hemorrhage.  Aspirin resumed at 162mg.  Elevated blood pressure, improving  Will need ongoing attention to risk factor modification.  Discharge plan for tomorrow.  Okay from neurology standpoint.  Follow-up 6 weeks.  Continue PT/OT/ST    I have personally performed a face to face diagnostic evaluation on this patient, reviewed all data and investigations, and am the sole provider of all clinical decisions on the neurological status of this patient.  Patient seen and examined weekly rehab rounds done.  Patient actually near normal to be discharged actually today.  Will follow him as an outpatient weekly rehab rounds done FIM scores noted      Ollie Rob MD, THUY  Diplomate, American Board of Psychiatry & Neurology  Board Certified in Vascular Neurology  Board Certified in Neuromuscular Medicine  Certified in Neurorehabilitation               Collaborating physicians: Dr Rob    Electronically signed by SERENA Huff - HIPOLITO on

## 2024-11-22 NOTE — PROGRESS NOTES
Subjective:  The patient complains of severe acute on chronic progressive aphasia, fatigue and right-sided weakness partially relieved by rest, medications, PT,  OT,   SLP and rest and exacerbated by recent left thalamic CVA.          89 yo male was admitted to [facility] on 10/29/2024 after presenting through the ER after falling at home.  He is 89 years old and presented through the ER on 10/29/2024 with altered mental status after being found on the floor.  He was diagnosed with a left thalamic CVA.  He was not a TNK candidate is that he were not sure as to the timing window.     Marbella Carrington RN  Registered Nurse     Plan of Care      Signed     Date of Service: 11/21/2024 10:07 PM     Signed            Problem: Chronic Conditions and Co-morbidities  Goal: Patient's chronic conditions and co-morbidity symptoms are monitored and maintained or improved  11/21/2024 2207 by Marbella Carrington RN  Outcome: Progressing  11/21/2024 1116 by Mercy Baez RN  Outcome: Progressing     Problem: Discharge Planning  Goal: Discharge to home or other facility with appropriate resources  11/21/2024 2207 by Marbella Carrington RN  Outcome: Progressing  11/21/2024 1116 by Mercy Baez RN  Outcome: Progressing     Problem: Safety - Adult  Goal: Free from fall injury  11/21/2024 2207 by Marbella Carrington RN  Outcome: Progressing  11/21/2024 1116 by Mercy Baez RN  Outcome: Progressing     Problem: ABCDS Injury Assessment  Goal: Absence of physical injury  11/21/2024 2207 by Marbella Carrington RN  Outcome: Progressing  11/21/2024 1116 by Mercy Baez RN  Outcome: Progressing     Problem: Skin/Tissue Integrity  Goal: Absence of new skin breakdown  Description: 1.  Monitor for areas of redness and/or skin breakdown  2.  Assess vascular access sites hourly  3.  Every 4-6 hours minimum:  Change oxygen saturation probe site  4.  Every 4-6 hours:  If on nasal continuous positive  effort is   normal at rest.     Heart:   S1 = S2,   RRR.       Abdomen:  Soft, non-tender, no enlargement of liver or spleen.  Extremities:    lower extremity edema and tenderness   Skin:   Intact to general survey, abrasions on  left foot.  Yeast rash on back    Rehabilitation:  Physical Therapy:   Bed mobility:  Bed mobility  Rolling to Left: Stand by assistance (11/19/24 1622)  Rolling to Right: Stand by assistance (11/19/24 1622)  Supine to Sit: Stand by assistance (11/19/24 1622)  Sit to Supine: Stand by assistance;Minimal assistance (11/19/24 1246)  Scooting: Stand by assistance (11/18/24 1528)  Bed Mobility Comments: Bed flat with use of hand rails; increased time to complete. (11/19/24 1622)  Bed Mobility  Overall Assistance Level: Stand By Assist (improved ability this session) (11/15/24 0933)  Additional Factors: Increased time to complete;Head of bed flat;Without handrails (on therapy mat) (11/21/24 0946)  Overall Assistance Level: Stand By Assist (improved ability this session) (11/15/24 0933)  Additional Factors: Increased time to complete;Head of bed flat;Without handrails (on therapy mat) (11/21/24 0946)  Roll Left  Assistance Level: Stand by assist (11/21/24 0946)  Skilled Clinical Factors: decreased effort to complete, good carry over with technique (11/20/24 0938)  Roll Right  Assistance Level: Stand by assist (11/21/24 0946)  Skilled Clinical Factors: decreased effort to complete, good carry over with technique (11/20/24 0938)  Sit to Supine  Assistance Level: Stand by assist (11/21/24 0946)  Skilled Clinical Factors: increased effort with LEs, no assistance needed to complete (11/20/24 0938)  Supine to Sit  Assistance Level: Stand by assist (11/21/24 0946)  Skilled Clinical Factors: increased time and effort, vc's to improve technique with good carry over (11/21/24 0946)  Scooting  Assistance Level: Stand by assist (11/21/24 0946)  Skilled Clinical Factors: increased time and effort without

## 2024-11-22 NOTE — PROGRESS NOTES
[facility] OCCUPATIONAL THERAPY DISCHARGE SUMMARY- REHAB     Date: 2024  Patient Name: Modesto Burgess        MRN: [id number]  Account: [id number]   : 10/9/1933  (89 y.o.)  Room: [place] [address]    Diagnosis:  Impaired mobility and ADL's d/t left thalamic CVA    Past Medical History:   Diagnosis Date    CAD (coronary artery disease) 09/15/2020    HLD (hyperlipidemia) 09/15/2020    [place] (hard of hearing) 2024    Hyperparathyroidism due to renal insufficiency (HCC) 10/30/2024    Late effects of CVA (cerebrovascular accident) 2024    Lumbosacral disc disease     Osteoarthritis     Type 2 diabetes mellitus, with long-term current use of insulin (HCC) 09/15/2020     History reviewed. No pertinent surgical history.    Precautions:   Restrictions/Precautions: Fall Risk  Other position/activity restrictions: Avasys     Social/Functional History:  Social/Functional History  Lives With: Son (2 sons, DIL)  Type of Home: House  Home Layout: Two level, Able to Live on Main level with bedroom/bathroom (pt stays on the first floor only)  Home Access: Stairs to enter with rails  Entrance Stairs - Number of Steps: 2 IZABELA w/ bilateral HR from garage or 2 IZABELA w/ one HR from front, has a portable ramp as needed  Entrance Stairs - Rails: Both  Bathroom Shower/Tub: Walk-in shower, Doors, Shower chair with back  Bathroom Toilet: Handicap height  Bathroom Equipment: Grab bars in shower, Shower chair, Grab bars around toilet, Hand-held shower  Bathroom Accessibility: Walker accessible  Home Equipment: Walker - Rolling, Wheelchair - Manual, Lift chair, Reacher (bed rails on adjustable bed)  Has the patient had two or more falls in the past year or any fall with injury in the past year?: Yes  Receives Help From: Family (sons and DIL help with ADLs)  ADL Assistance: Independent  Homemaking Assistance: Needs assistance (family completes. Pt reports he does his own laundry)  Homemaking Responsibilities: Yes  Meal Prep  Status:  Cognition  Overall Cognitive Status: Exceptions  Arousal/Alertness: Appears intact  Following Commands: Follows one step commands with increased time, Follows one step commands with repetition  Attention Span: Appears intact  Memory: Decreased recall of recent events  Safety Judgement: Appears intact  Problem Solving: Assistance required to identify errors made, Assistance required to generate solutions, Assistance required to implement solutions  Insights: Decreased awareness of deficits  Initiation: Appears intact  Sequencing: Appears intact  Cognition Comment: Comprehension: MOD I   Expression: MOD I   Social Interaction: IND   Problem Solving: MIN A   Memory: Supervision    Perception Status:  Perception  Overall Perceptual Status: WFL    Sensation Status:  Sensation  Overall Sensation Status: WFL    Vision and Hearing Status:  Vision  Vision: Impaired  Vision Exceptions: Wears glasses for reading  Hearing  Hearing: Exceptions to [facility]  Hearing Exceptions: Hard of hearing/hearing concerns, Bilateral hearing aid     UE Function Status:    ROM:   LUE AROM (degrees)  LUE AROM : WFL  Left Hand AROM (degrees)  Left Hand AROM: WFL  RUE AROM (degrees)  RUE AROM : WFL  Right Hand AROM (degrees)  Right Hand AROM: WFL    Strength:  LUE Strength  Gross LUE Strength: WFL  RUE Strength  Gross RUE Strength: WFL    Coordination, Tone, Quality of Movement:   Tone RUE  RUE Tone: Normotonic  Tone LUE  LUE Tone: Normotonic  Coordination  Coordination and Movement Description: Decreased speed, Decreased accuracy, Fine motor impairments    D/C Recommendations:    Equipment Recommendations:  OT D/C Equipment  Equipment Needed: No    OT Follow Up:  OT D/C RECOMMENDATIONS  REQUIRES OT FOLLOW-UP: Yes  Type: Home OT    Home Exercise Program Provided: [] Yes [x] No      Electronically signed by:    RADHA Chavez,    11/22/2024, 4:30 PM

## 2024-11-22 NOTE — PROGRESS NOTES
deficits.    Swallow: Patient demonstrates no dysphagia.                                 Patient is discharged to Home               [x] Recommend continued speech therapy   [] Speech Therapy is no longer warranted      Signature:  Electronically signed by STORMY Graham on 11/22/2024 at 2:01 PM

## 2024-11-22 NOTE — PROGRESS NOTES
Nutrition Education  Nutrition referral for diet education (with family members included) received. Pt, son, and daughter in law instructed on diabetic/low potassion/ANGELA guidelines. Pt/family stated that pt usually eats 2 meals per day/eats sweets at times/avoids high potassium foods/limits added salt. Pt/family instructed on diabetic dietary guidelines. Explained carbohydrate servings and amounts recommended at regular meals. Stressed foods to limit/portion-control/spacing of meals. Sample menus provided. Pt and family appeared to understand the information.   Educated on Diabetic/low potassium/ANGELA Dietary Guidelines  Learners: Patient and Family  Readiness: Acceptance  Method: Explanation and Handout  Response: Verbalizes Understanding  Contact name and number provided.    Tawanna Lugo, RD, LD

## 2024-11-22 NOTE — PROGRESS NOTES
OCCUPATIONAL THERAPY  INPATIENT REHAB TREATMENT NOTE  [facility]      NAME: Modesto Burgess  : 10/9/1933 (89 y.o.)  MRN: [id number]  CODE STATUS: DNR-CCA  Room: R246/R246-01    Date of Service: 2024    Referring Physician: Christina Robison DO  Rehab Diagnosis: Impaired mobility and ADL's d/t left thalamic CVA    Hospital course:   Comments: 89 y.o. male patient who presented to ER 10/29 with altered mental status and was found on floor. Not TNK candidate for CVA due timing timing window. Patient found to have had an acute L thalamus infarct in addition to being hypernatremic and having APRYL on CKD. Patient admitted with neuro and cardiology consulting.      Restrictions  Restrictions/Precautions  Restrictions/Precautions: Fall Risk     Patient's date of birth confirmed: Yes    SAFETY:  Safety Devices  Safety Devices in place: Yes  Type of devices: All fall risk precautions in place    SUBJECTIVE: \"Typically, if you're nice to people, they're typically nice back.\"    Pain at start of treatment: No    Pain at end of treatment: No    COGNITION:  Orientation  Overall Orientation Status: Within Functional Limits  Cognition  Overall Cognitive Status: Exceptions  Arousal/Alertness: Appears intact  Following Commands: Follows one step commands with increased time;Follows one step commands with repetition  Attention Span: Appears intact  Memory: Decreased recall of recent events  Safety Judgement: Appears intact  Problem Solving: Assistance required to identify errors made;Assistance required to generate solutions;Assistance required to implement solutions  Insights: Decreased awareness of deficits  Initiation: Appears intact  Sequencing: Appears intact  Cognition Comment: Comprehension: MOD I   Expression: MOD I   Social Interaction: IND   Problem Solving: MIN A   Memory: Supervision      Pt's current cognitive status is:  Comprehension: Mod I  Expression: Mod I  Social Interaction: Independent  Problem Solving:

## 2024-11-22 NOTE — PROGRESS NOTES
Physical Therapy Rehab Treatment Note  [place] [facility] [place]  [facility]       NAME: Modesto Burgess  : 10/9/1933 (89 y.o.)  MRN: [id number]  CODE STATUS: DNR-CCA    Date of Service: 2024     Restrictions:  Restrictions/Precautions: Fall Risk  Position Activity Restriction  Other position/activity restrictions: Avasys       SUBJECTIVE:   Subjective: Pt's son and DIL in for family training.    Pain  Pain: Denies pain pre/post session.      OBJECTIVE:         Bed Mobility  Additional Factors: Increased time to complete;Head of bed flat;Without handrails (on therapy mat)  Roll Left  Assistance Level: Independent  Roll Right  Assistance Level: Independent  Sit to Supine  Assistance Level: Independent  Skilled Clinical Factors: increased effort with LEs, no assistance needed to complete  Supine to Sit  Assistance Level: Independent  Skilled Clinical Factors: increased time and effort, good carry over with proper technique  Scooting  Assistance Level: Independent    Transfers  Surface: To mat;From mat;Wheelchair  Additional Factors: Verbal cues;Increased time to complete;Hand placement cues  Device:  (rollator)  Sit to Stand  Assistance Level: Supervision;Modified independent  Skilled Clinical Factors: Clint in familiar environments/supervision for safety in unfamiliar environments  Stand to Sit  Assistance Level: Supervision;Modified independent  Skilled Clinical Factors: Clint in familiar environments/supervision for safety in unfamiliar environments  Bed To/From Chair  Technique: Stand step;Stand pivot  Assistance Level: Supervision;Modified independent  Skilled Clinical Factors: with rollator, Clint protected environments/supervision in unfamiliar environments  Car Transfer  Assistance Level: Stand by assist;Minimal assistance  Skilled Clinical Factors: SBA to get into car, Amandeep to initiate stand from lower seat height    Ambulation  Surface: Level surface;Uneven surface;Carpet  Device:

## 2024-11-22 NOTE — PROGRESS NOTES
OCCUPATIONAL THERAPY  INPATIENT REHAB TREATMENT NOTE  [facility]      NAME: Modesto Burgess  : 10/9/1933 (89 y.o.)  MRN: [id number]  CODE STATUS: DNR-CCA  Room: R246R246-01    Date of Service: 2024    Referring Physician: Christina Robison DO  Rehab Diagnosis: Impaired mobility and ADL's d/t left thalamic CVA    Hospital course:   Comments: 89 y.o. male patient who presented to ER 10/29 with altered mental status and was found on floor. Not TNK candidate for CVA due timing timing window. Patient found to have had an acute L thalamus infarct in addition to being hypernatremic and having APRYL on CKD. Patient admitted with neuro and cardiology consulting.      Restrictions  Restrictions/Precautions  Restrictions/Precautions: Fall Risk     Position Activity Restriction  Other position/activity restrictions: Avasys    Patient's date of birth confirmed: Yes    SAFETY:  Safety Devices  Safety Devices in place: Yes  Type of devices: All fall risk precautions in place    SUBJECTIVE: \"You're the only pain that I have.\" - patient joking    Pain at start of treatment: No    Pain at end of treatment: No    COGNITION:  Orientation  Overall Orientation Status: Within Functional Limits  Cognition  Overall Cognitive Status: Exceptions  Arousal/Alertness: Appears intact  Following Commands: Follows one step commands with increased time;Follows one step commands with repetition  Attention Span: Appears intact  Memory: Decreased recall of recent events  Safety Judgement: Appears intact  Problem Solving: Assistance required to identify errors made;Assistance required to generate solutions;Assistance required to implement solutions  Insights: Decreased awareness of deficits  Initiation: Appears intact  Sequencing: Appears intact  Cognition Comment: Comprehension: MOD I   Expression: MOD I   Social Interaction: IND   Problem Solving: MIN A   Memory: Supervision      Pt's current cognitive status is:  Comprehension: Mod

## 2024-11-22 NOTE — PLAN OF CARE
Problem: Chronic Conditions and Co-morbidities  Goal: Patient's chronic conditions and co-morbidity symptoms are monitored and maintained or improved  11/21/2024 2207 by Marbella Carrington RN  Outcome: Progressing  11/21/2024 1116 by Mercy Baez RN  Outcome: Progressing     Problem: Discharge Planning  Goal: Discharge to home or other facility with appropriate resources  11/21/2024 2207 by Marbella Carrington RN  Outcome: Progressing  11/21/2024 1116 by Mercy Baez RN  Outcome: Progressing     Problem: Safety - Adult  Goal: Free from fall injury  11/21/2024 2207 by Marbella Carrington RN  Outcome: Progressing  11/21/2024 1116 by Mercy Baez RN  Outcome: Progressing     Problem: ABCDS Injury Assessment  Goal: Absence of physical injury  11/21/2024 2207 by Marbella Carrington RN  Outcome: Progressing  11/21/2024 1116 by Mercy Baez RN  Outcome: Progressing     Problem: Skin/Tissue Integrity  Goal: Absence of new skin breakdown  Description: 1.  Monitor for areas of redness and/or skin breakdown  2.  Assess vascular access sites hourly  3.  Every 4-6 hours minimum:  Change oxygen saturation probe site  4.  Every 4-6 hours:  If on nasal continuous positive airway pressure, respiratory therapy assess nares and determine need for appliance change or resting period.  11/21/2024 2207 by Marbella Carrington RN  Outcome: Progressing  11/21/2024 1116 by Mercy Baez RN  Outcome: Progressing     Problem: Pain  Goal: Verbalizes/displays adequate comfort level or baseline comfort level  11/21/2024 2207 by Marbella Carrington RN  Outcome: Progressing  11/21/2024 1116 by Mercy Baez RN  Outcome: Progressing     Problem: Neurosensory - Adult  Goal: Bowel movement at least every other day  11/21/2024 2207 by Marbella Carrington RN  Outcome: Progressing  11/21/2024 1116 by Mercy Baez RN  Outcome: Progressing     Problem: Nutrition Deficit:  Goal: Optimize nutritional

## 2024-11-22 NOTE — PROGRESS NOTES
CLINICAL PHARMACY NOTE: MEDS TO BEDS    Total # of Prescriptions Filled: 4   The following medications were delivered to the patient:  Atorvastatin 40mg tab  Sodium bicarbonate 650mg tab  Hydralazine 50mg tab  Senna 8.6mg tab    Additional Documentation:

## 2024-11-22 NOTE — DISCHARGE INSTR - OTHER ORDERS
Blood Glucose Logs            Check your glucose before each meal an bedtime. Write down any insulin dosing changes you make on this form.    Week of ___/___/___ Breakfast  units  Lunch units  Dinner units  Bedtime  units    Sunday Monday Tuesday Wednesday Thursday Friday Saturday                      Week of ___/___/___           Sunday Monday Tuesday Wednesday Thursday Friday Saturday                      Week of ___/___/___           Sunday Monday Tuesday Wednesday Thursday Friday Saturday             Blood Glucose Logs            Check your glucose before each meal an bedtime. Write down any insulin dosing changes you make on this form.    Week of ___/___/___ Breakfast  units  Lunch units  Dinner units  Bedtime  units    Sunday Monday Tuesday Wednesday Thursday Friday Saturday                      Week of ___/___/___           Sunday Monday Tuesday Wednesday Thursday Friday Saturday                      Week of ___/___/___           Sunday Monday Tuesday Wednesday Thursday Friday Saturday             Blood Glucose Logs            Check your glucose before each meal an bedtime. Write down any insulin dosing changes you make on this form.    Week of ___/___/___ Breakfast  units  Lunch units  Dinner units  Bedtime  units    Sunday Monday Tuesday Wednesday Thursday Friday Saturday                      Week of ___/___/___           Sunday Monday Tuesday Wednesday Thursday Friday Saturday                      Week of  ___/___/___           Sunday Monday Tuesday Wednesday Thursday Friday Saturday             Blood Glucose Logs            Check your glucose before each meal an bedtime. Write down any insulin dosing changes you make on this form.    Week of ___/___/___ Breakfast  units  Lunch units  Dinner units  Bedtime  units    Sunday Monday Tuesday Wednesday Thursday Friday Saturday                      Week of ___/___/___           Sunday Monday Tuesday Wednesday Thursday Friday Saturday                      Week of ___/___/___           Sunday Monday Tuesday Wednesday Thursday Friday Saturday

## 2024-11-22 NOTE — PROGRESS NOTES
Physical Therapy Rehab Treatment Note  [facility] [place] [facility]  [facility]       NAME: Modesto Burgess  : 10/9/1933 (89 y.o.)  MRN: [id number]  CODE STATUS: DNR-CCA    Date of Service: 2024     Restrictions:  Restrictions/Precautions: Fall Risk  Position Activity Restriction  Other position/activity restrictions: Avasys       SUBJECTIVE:   Subjective: \"My oldest son is coming in today\"    Pain  Pain: Denies pain pre/post session.      OBJECTIVE:            Transfers  Surface: Wheelchair  Additional Factors: Verbal cues;Increased time to complete;Hand placement cues  Device:  (rollator)  Sit to Stand  Assistance Level: Stand by assist  Stand to Sit  Assistance Level: Stand by assist    Ambulation  Surface: Level surface;Uneven surface;Carpet  Device: Rollator  Distance: 350', 125'  Activity: Within Unit  Additional Factors: Verbal cues;Increased time to complete  Assistance Level: Stand by assist  Gait Deviations: Decreased step length bilateral;Path deviations;Narrow base of support  Skilled Clinical Factors: continues to demonstrate good safety throughout all trials, much improved approach to chairs compared to previous sessions    Stairs  Stair Height: 6''  Device: Bilateral handrails  Number of Stairs: 4  Additional Factors: Reciprocal going up;Non-reciprocal going down;Increased time to complete  Assistance Level: Stand by assist    Wheelchair  Surface: Level surface;Uneven surface;Carpet  Device: Standard wheelchair  Additional Factors: Verbal cues;Increased time to complete  Assistance Required to Manage Parts: Left leg rest;Right leg rest  Assistance Level for Propulsion: Independent  Propulsion Method: Bilateral upper extremities  Propulsion Distance: 150'       PT Exercises  Exercise Treatment: stand with one UE support and SBA from therapist and use reacher to  bean bags x4  A/AROM Exercises: seated: AP, LAQ and march with 2# ankle weights, hip add with ball x20ea abisai  Resistive

## 2024-11-23 VITALS
OXYGEN SATURATION: 96 % | SYSTOLIC BLOOD PRESSURE: 164 MMHG | DIASTOLIC BLOOD PRESSURE: 77 MMHG | HEART RATE: 74 BPM | WEIGHT: 192.46 LBS | HEIGHT: 68 IN | TEMPERATURE: 98.2 F | BODY MASS INDEX: 29.17 KG/M2 | RESPIRATION RATE: 20 BRPM

## 2024-11-23 LAB
GLUCOSE BLD-MCNC: 160 MG/DL (ref 70–99)
GLUCOSE BLD-MCNC: 95 MG/DL (ref 70–99)
PERFORMED ON: ABNORMAL
PERFORMED ON: NORMAL

## 2024-11-23 PROCEDURE — 6370000000 HC RX 637 (ALT 250 FOR IP): Performed by: INTERNAL MEDICINE

## 2024-11-23 PROCEDURE — 6370000000 HC RX 637 (ALT 250 FOR IP): Performed by: PHYSICAL MEDICINE & REHABILITATION

## 2024-11-23 PROCEDURE — 97116 GAIT TRAINING THERAPY: CPT

## 2024-11-23 PROCEDURE — 6370000000 HC RX 637 (ALT 250 FOR IP): Performed by: NURSE PRACTITIONER

## 2024-11-23 PROCEDURE — 97110 THERAPEUTIC EXERCISES: CPT

## 2024-11-23 RX ADMIN — CARVEDILOL 12.5 MG: 12.5 TABLET, FILM COATED ORAL at 09:02

## 2024-11-23 RX ADMIN — ATORVASTATIN CALCIUM 40 MG: 40 TABLET, FILM COATED ORAL at 08:46

## 2024-11-23 RX ADMIN — ISOSORBIDE MONONITRATE 30 MG: 30 TABLET, EXTENDED RELEASE ORAL at 08:46

## 2024-11-23 RX ADMIN — MENTHOL: 0 POWDER TOPICAL at 09:01

## 2024-11-23 RX ADMIN — HYDRALAZINE HYDROCHLORIDE 50 MG: 50 TABLET ORAL at 08:46

## 2024-11-23 RX ADMIN — MICONAZOLE NITRATE: 2 POWDER TOPICAL at 08:59

## 2024-11-23 RX ADMIN — ALLOPURINOL 50 MG: 100 TABLET ORAL at 08:46

## 2024-11-23 RX ADMIN — ASPIRIN 81 MG CHEWABLE TABLET 162 MG: 81 TABLET CHEWABLE at 08:46

## 2024-11-23 RX ADMIN — CALCITRIOL 0.25 MCG: 0.25 CAPSULE ORAL at 08:47

## 2024-11-23 RX ADMIN — SODIUM BICARBONATE 650 MG TABLET 650 MG: at 08:47

## 2024-11-23 RX ADMIN — Medication 100 MG: at 08:46

## 2024-11-23 NOTE — PLAN OF CARE
Problem: Chronic Conditions and Co-morbidities  Goal: Patient's chronic conditions and co-morbidity symptoms are monitored and maintained or improved  11/22/2024 2150 by Marbella Carrington RN  Outcome: Progressing  11/22/2024 1126 by Stacey Jordan RN  Outcome: Progressing     Problem: Discharge Planning  Goal: Discharge to home or other facility with appropriate resources  11/22/2024 2150 by Marbella Carrington RN  Outcome: Progressing  11/22/2024 1126 by Stacey Jordan RN  Outcome: Progressing     Problem: Safety - Adult  Goal: Free from fall injury  11/22/2024 2150 by Marbella Carrington RN  Outcome: Progressing  11/22/2024 1126 by Stacey Jordan RN  Outcome: Progressing     Problem: ABCDS Injury Assessment  Goal: Absence of physical injury  11/22/2024 2150 by Marbella Carrington RN  Outcome: Progressing  11/22/2024 1126 by Stacey Jordan RN  Outcome: Progressing     Problem: Skin/Tissue Integrity  Goal: Absence of new skin breakdown  Description: 1.  Monitor for areas of redness and/or skin breakdown  2.  Assess vascular access sites hourly  3.  Every 4-6 hours minimum:  Change oxygen saturation probe site  4.  Every 4-6 hours:  If on nasal continuous positive airway pressure, respiratory therapy assess nares and determine need for appliance change or resting period.  11/22/2024 2150 by Marbella Carrington RN  Outcome: Progressing  11/22/2024 1126 by Stacey Jordan RN  Outcome: Progressing     Problem: Pain  Goal: Verbalizes/displays adequate comfort level or baseline comfort level  11/22/2024 2150 by Marbella Carrington RN  Outcome: Progressing  11/22/2024 1126 by Stacey Jordan RN  Outcome: Progressing     Problem: Neurosensory - Adult  Goal: Bowel movement at least every other day  11/22/2024 2150 by Marbella Carrington RN  Outcome: Progressing  11/22/2024 1126 by Stacey Jordan RN  Outcome: Progressing     Problem: Nutrition Deficit:  Goal: Optimize nutritional status  11/22/2024 2150 by  Marbella Carrington, RN  Outcome: Progressing  11/22/2024 1126 by Stacey Jordan, RN  Outcome: Progressing

## 2024-11-23 NOTE — PROGRESS NOTES
Physical Therapy Rehab Treatment Note  [facility] [place] [facility]  [facility]       NAME: Modesto Burgess  : 10/9/1933 (89 y.o.)  MRN: [id number]  CODE STATUS: DNR-CCA    Date of Service: 2024       Restrictions:  Restrictions/Precautions: Fall Risk  Position Activity Restriction  Other position/activity restrictions: Avasys       SUBJECTIVE:   Subjective: \"I'm good\"    Pain  Pain: Denies pain pre/post session.      OBJECTIVE:       Bed Mobility  Overall Assistance Level:  (unable to assess - in chair pre/post tx.)    Sit to Stand  Assistance Level: Supervision;Modified independent  Skilled Clinical Factors: Clint in familiar environments/supervision for safety in unfamiliar environments  Stand to Sit  Assistance Level: Supervision;Modified independent  Skilled Clinical Factors: Clint in familiar environments/supervision for safety in unfamiliar environments    Ambulation  Surface: Level surface  Device: Rollator  Distance: 350'  Activity: Within Unit  Additional Factors: Increased time to complete  Assistance Level: Supervision  Gait Deviations: Decreased step length bilateral;Path deviations;Narrow base of support  Skilled Clinical Factors: supervision for safety with all gait for safety.      PT Exercises  PROM Exercises: HS/Calf stretch- 1' each BLE  A/AROM Exercises: seated: AP, LAQ and march, hip add with ball, hip abd x20ea abisai  Resistive Exercises: seated GTB HS curls/Hip Abd x10 BLE       Activity Tolerance  Activity Tolerance: Patient tolerated treatment well      ASSESSMENT/PROGRESS TOWARDS GOALS:   Assessment  Assessment: Pt. tolerated treatment well this date. Pt. completed all exercises with good form and required little cueing throughout. Pt. pland to D/C this date in the afternoon and feels comfortable returning home at [facility].  Activity Tolerance: Patient tolerated treatment well  Discharge Recommendations: Continue to assess pending progress    Goals:  Long Term Goals  Long Term Goal 1:

## 2024-11-23 NOTE — PROGRESS NOTES
Physical Therapy  Facility/Department: [facility] [place] [facility]  Rehabilitation Discharge note    NAME: Modesto Burgess  : 10/9/1933  MRN: [id number]    Date of discharge: 24        Past Medical History:   Diagnosis Date    CAD (coronary artery disease) 09/15/2020    HLD (hyperlipidemia) 09/15/2020    [place] (hard of hearing) 2024    Hyperparathyroidism due to renal insufficiency (HCC) 10/30/2024    Late effects of CVA (cerebrovascular accident) 2024    Lumbosacral disc disease     Osteoarthritis     Type 2 diabetes mellitus, with long-term current use of insulin (HCC) 09/15/2020     History reviewed. No pertinent surgical history.    Restrictions  Restrictions/Precautions  Restrictions/Precautions: Fall Risk  Position Activity Restriction  Other position/activity restrictions: Avasys    Objective    Bed Mobility  Additional Factors: Increased time to complete;Head of bed flat;Without handrails (on therapy mat)  Roll Left  Assistance Level: Independent  Roll Right  Assistance Level: Independent  Sit to Supine  Assistance Level: Independent  Skilled Clinical Factors: increased effort with LEs, no assistance needed to complete  Supine to Sit  Assistance Level: Independent  Skilled Clinical Factors: increased time and effort, good carry over with proper technique  Scooting  Assistance Level: Independent     Transfers  Surface: To mat;From mat;Wheelchair  Additional Factors: Verbal cues;Increased time to complete;Hand placement cues  Device:  (rollator)  Sit to Stand  Assistance Level: Supervision;Modified independent  Skilled Clinical Factors: Clint in familiar environments/supervision for safety in unfamiliar environments  Stand to Sit  Assistance Level: Supervision;Modified independent  Skilled Clinical Factors: Clint in familiar environments/supervision for safety in unfamiliar environments  Bed To/From Chair  Technique: Stand step;Stand pivot  Assistance Level: Supervision;Modified independent  Skilled Clinical Factors:  with rollator, Clint protected environments/supervision in unfamiliar environments  Car Transfer  Assistance Level: Stand by assist;Minimal assistance  Skilled Clinical Factors: SBA to get into car, Amandeep to initiate stand from lower seat height     Ambulation  Surface: Level surface;Uneven surface;Carpet  Device: Rollator  Distance: 350', 2 x15'  Activity: Within Unit  Additional Factors: Verbal cues;Increased time to complete  Assistance Level: Supervision;Stand by assist  Gait Deviations: Decreased step length bilateral;Path deviations;Narrow base of support  Skilled Clinical Factors: SBA/supervision for safety with all gait for safety. occasional vc's for scanning environment and approach to sitting surfaces     Stairs  Stair Height: 8''  Device: Bilateral handrails  Number of Stairs: 4  Additional Factors: Reciprocal going up;Non-reciprocal going down;Increased time to complete  Assistance Level: Stand by assist  Skilled Clinical Factors: SBA for safety, increased time and effort with higher step height         Pt/ family education/training:  Pt has been educated in safe mobility. He is requiring occasional cues for safety.   Pt's family was able to observe and assist with bed mobility (on therapy mat to simulate pt's low bed at home), transfers (including car), gait, and stairs this session. Pt only requiring physical assistance to stand from lower car seat, family reports pt will be riding home in an SUV and are confident that pt will not require assistance with the higher seat height. Family reports feeling comfortable assisting pt at his CLOF and expressed no questions or concerns with anticipated discharge tomorrow.   Assessment: Pt has made excellent gains. She has met goals established at this time       LTG established:  Long Term Goal 1: Pt to complete bed mobility with indep - MET  Long Term Goal 2: Pt to complete transfers with indep in protected/known environments, and SBA/S for community

## 2024-11-23 NOTE — FLOWSHEET NOTE
Discharge instructions reviewed with pt, all questions answered. Belongings sent with pt. Family to transport home via car.Electronically signed by Jesika Bishop RN on 11/23/2024 at 1:49 PM

## 2024-11-24 NOTE — DISCHARGE SUMMARY
DISCHARGE SUMMARY    Hospital Course: The patient was admitted to the Rehabilitation Unit to address ADL and mobility deficits-as detailed above and below.  The patient was enrolled in acute PT, OT program.  Weekly team meetings were held to assess functional progress toward their goals-and modify the therapy program.  The patient's medical, emotional, psychosocial and functional issues were addressed.  The patient progressed in the rehab program and is now ready for discharge home.  Refer to functional assessments summary report for detailed functional status.  Refer to the medical problem list below to see the medical issues addressed.  The social and DC complexities are detailed in the DC planning section below.    Greater than 3 5 minutes was spent on coordinating patients discharge including follow-up care, medications and patient/family education.  Extended time needed because of the potential use of controlled medications are high risk medications and a high risk population individual.  Patient and family were instructed to use lowest effective dose of these medications and slowly titrate off over the next 2 to 4 weeks.  They are not to combine opiates with sedatives.     I reviewed her [place] prescription monitoring service data sheets in hopes of eliminating polypharmacy and weaning to the lowest effective dose of pain medications and eliminating the concomitant use of benzodiazepines.  I see   no medications of concern.  I see   no habits of combining sedatives and narcotics.  Controlled Substance Monitoring:    Acute and Chronic Pain Monitoring:   RX Monitoring Acute Pain Prescriptions Periodic Controlled Substance Monitoring   11/7/2024   8:28 AM Prescription exceeds daily limit for a specific reason. See comments or note.;Severe pain not adequately treated with lower dose.;Not required given exclusionary diagnoses... Possible medication side effects, risk of tolerance/dependence & alternative treatments

## 2024-11-26 RX ORDER — HYDRALAZINE HYDROCHLORIDE 50 MG/1
50 TABLET, FILM COATED ORAL 2 TIMES DAILY
COMMUNITY
Start: 2024-11-22

## 2024-12-02 ENCOUNTER — APPOINTMENT (OUTPATIENT)
Dept: PRIMARY CARE | Facility: CLINIC | Age: 89
End: 2024-12-02
Payer: MEDICARE

## 2024-12-02 VITALS
HEIGHT: 69 IN | DIASTOLIC BLOOD PRESSURE: 56 MMHG | BODY MASS INDEX: 28.14 KG/M2 | SYSTOLIC BLOOD PRESSURE: 116 MMHG | TEMPERATURE: 98.1 F | RESPIRATION RATE: 16 BRPM | HEART RATE: 52 BPM | WEIGHT: 190 LBS

## 2024-12-02 DIAGNOSIS — N18.5 STAGE 5 CHRONIC KIDNEY DISEASE NOT ON CHRONIC DIALYSIS (MULTI): ICD-10-CM

## 2024-12-02 DIAGNOSIS — I63.20 CEREBROVASCULAR ACCIDENT (CVA) DUE TO OCCLUSION OF PRECEREBRAL ARTERY (MULTI): ICD-10-CM

## 2024-12-02 DIAGNOSIS — E11.319 TYPE 2 DIABETES MELLITUS WITH RETINOPATHY, WITHOUT LONG-TERM CURRENT USE OF INSULIN, MACULAR EDEMA PRESENCE UNSPECIFIED, UNSPECIFIED LATERALITY, UNSPECIFIED RETINOPATHY SEVERITY: Primary | ICD-10-CM

## 2024-12-02 PROBLEM — I63.9 CVA (CEREBROVASCULAR ACCIDENT) (MULTI): Status: ACTIVE | Noted: 2024-12-02

## 2024-12-02 PROCEDURE — 3074F SYST BP LT 130 MM HG: CPT | Performed by: FAMILY MEDICINE

## 2024-12-02 PROCEDURE — 1036F TOBACCO NON-USER: CPT | Performed by: FAMILY MEDICINE

## 2024-12-02 PROCEDURE — 3078F DIAST BP <80 MM HG: CPT | Performed by: FAMILY MEDICINE

## 2024-12-02 PROCEDURE — 99214 OFFICE O/P EST MOD 30 MIN: CPT | Performed by: FAMILY MEDICINE

## 2024-12-02 PROCEDURE — G2211 COMPLEX E/M VISIT ADD ON: HCPCS | Performed by: FAMILY MEDICINE

## 2024-12-02 PROCEDURE — 1159F MED LIST DOCD IN RCRD: CPT | Performed by: FAMILY MEDICINE

## 2024-12-02 PROCEDURE — 1123F ACP DISCUSS/DSCN MKR DOCD: CPT | Performed by: FAMILY MEDICINE

## 2024-12-02 NOTE — ASSESSMENT & PLAN NOTE
The sulfonylurea was discontinued in the hospital for fears of hypoglycemia.  His last A1c was just over 8% so he may not need medication.  We will continue without meds and just monitor his diet.  His family is very good at giving him a low carbohydrate diet.  We will check another A1c in about 3 months to ensure that no additional treatment is needed.  Orders:    Follow Up In [facility] - PCP; Future

## 2024-12-02 NOTE — ASSESSMENT & PLAN NOTE
Lab Results   Component Value Date    GLUCOSE 178 (H) 05/01/2024    CALCIUM 10.2 05/01/2024     05/01/2024    K 4.7 05/01/2024    CO2 17 (L) 05/01/2024     (H) 05/01/2024    BUN 95 (HH) 05/01/2024    CREATININE 3.89 (H) 05/01/2024   Kidney function showed acute injury during hospitalization but returned to baseline prior to discharge.  He continues to follow with nephrology and avoid nephrotoxins.  We will plan to check his labs again in 3 months

## 2024-12-02 NOTE — PROGRESS NOTES
Subjective   Jamaal Balbuena is a 89 y.o. male who presents for Hospital Follow-up.     HPI         He was admitted to [facility] from 10/29/24 - 11/4/24 for acute CVA of anterior left thalamus and a fall. Recovery complicated by DALLAS on CKD as well as uncontrolled DM. He was discharged to their rehab unit where he completed therapy and went home on 11/23/24.  He is accompanied by his son who is his primary caretaker at home and also supplies a significant portion of the history today.  Visit Vitals  /56   Pulse 52   Temp 36.7 °C (98.1 °F)   Resp 16      Objective   Physical Exam  Constitutional:       Appearance: Normal appearance.   HENT:      Head: Normocephalic.   Pulmonary:      Effort: Pulmonary effort is normal.   Musculoskeletal:      Cervical back: Neck supple.   Skin:     General: Skin is warm and dry.   Psychiatric:         Mood and Affect: Mood normal.            Assessment & Plan  Type 2 diabetes mellitus with retinopathy, without long-term current use of insulin, macular edema presence unspecified, unspecified laterality, unspecified retinopathy severity  The sulfonylurea was discontinued in the hospital for fears of hypoglycemia.  His last A1c was just over 8% so he may not need medication.  We will continue without meds and just monitor his diet.  His family is very good at giving him a low carbohydrate diet.  We will check another A1c in about 3 months to ensure that no additional treatment is needed.  Orders:    Follow Up In [facility] - PCP; Future    Stage 5 chronic kidney disease not on chronic dialysis (Multi)  Lab Results   Component Value Date    GLUCOSE 178 (H) 05/01/2024    CALCIUM 10.2 05/01/2024     05/01/2024    K 4.7 05/01/2024    CO2 17 (L) 05/01/2024     (H) 05/01/2024    BUN 95 (HH) 05/01/2024    CREATININE 3.89 (H) 05/01/2024   Kidney function showed acute injury during hospitalization but returned to baseline prior to discharge.  He continues to follow  No with nephrology and avoid nephrotoxins.  We will plan to check his labs again in 3 months         Cerebrovascular accident (CVA) due to occlusion of precerebral artery (Multi)  This 89-year-old male had a CEA of the anterior left thalamus and a fall.  He was not a candidate for tPA showed stability over serial imaging.  His aspirin was switched to Plavix and he completed a course of rehabilitation at a skilled nursing facility.  He is now home and scheduled to get home physical therapy and his family will continue caring for him in the meantime.  He has lost his ability to walk and so hopefully will be able to regain this with ongoing therapy.  He will continue his statin which had a dose increase to 40 mg.              Please excuse any errors in grammar or translation related to this dictation. Voice recognition software was utilized to prepare this document.

## 2024-12-02 NOTE — ASSESSMENT & PLAN NOTE
This 89-year-old male had a CEA of the anterior left thalamus and a fall.  He was not a candidate for tPA showed stability over serial imaging.  His aspirin was switched to Plavix and he completed a course of rehabilitation at a skilled nursing facility.  He is now home and scheduled to get home physical therapy and his family will continue caring for him in the meantime.  He has lost his ability to walk and so hopefully will be able to regain this with ongoing therapy.  He will continue his statin which had a dose increase to 40 mg.

## 2024-12-03 ENCOUNTER — APPOINTMENT (OUTPATIENT)
Facility: CLINIC | Age: 89
End: 2024-12-03
Payer: MEDICARE

## 2024-12-19 ENCOUNTER — APPOINTMENT (OUTPATIENT)
Dept: NEPHROLOGY | Facility: CLINIC | Age: 89
End: 2024-12-19
Payer: MEDICARE

## 2024-12-19 VITALS
DIASTOLIC BLOOD PRESSURE: 62 MMHG | HEIGHT: 69 IN | BODY MASS INDEX: 27.99 KG/M2 | SYSTOLIC BLOOD PRESSURE: 111 MMHG | WEIGHT: 189 LBS

## 2024-12-19 DIAGNOSIS — N18.5 CKD (CHRONIC KIDNEY DISEASE) STAGE 5, GFR LESS THAN 15 ML/MIN (MULTI): Primary | ICD-10-CM

## 2024-12-19 DIAGNOSIS — E21.3 HYPERPARATHYROIDISM (MULTI): ICD-10-CM

## 2024-12-19 DIAGNOSIS — E08.22 DIABETES MELLITUS DUE TO UNDERLYING CONDITION WITH DIABETIC CHRONIC KIDNEY DISEASE, UNSPECIFIED CKD STAGE, UNSPECIFIED WHETHER LONG TERM INSULIN USE: ICD-10-CM

## 2024-12-19 DIAGNOSIS — I10 ESSENTIAL HYPERTENSION: ICD-10-CM

## 2024-12-19 PROCEDURE — 1123F ACP DISCUSS/DSCN MKR DOCD: CPT | Performed by: INTERNAL MEDICINE

## 2024-12-19 PROCEDURE — 1159F MED LIST DOCD IN RCRD: CPT | Performed by: INTERNAL MEDICINE

## 2024-12-19 PROCEDURE — 99214 OFFICE O/P EST MOD 30 MIN: CPT | Performed by: INTERNAL MEDICINE

## 2024-12-19 PROCEDURE — 3074F SYST BP LT 130 MM HG: CPT | Performed by: INTERNAL MEDICINE

## 2024-12-19 PROCEDURE — 1036F TOBACCO NON-USER: CPT | Performed by: INTERNAL MEDICINE

## 2024-12-19 PROCEDURE — 3078F DIAST BP <80 MM HG: CPT | Performed by: INTERNAL MEDICINE

## 2024-12-19 RX ORDER — SENNOSIDES 8.6 MG/1
1 TABLET ORAL DAILY
COMMUNITY

## 2024-12-19 NOTE — PROGRESS NOTES
Jamaal SHARIF Balbuena   89 y.o.    @@  [facility]/Room: [id number]/Room/bed info not found    Subjective:   The patient is being seen for a routine clinic follow-up of chronic kidney disease. Recently, the disease has been stable. Disease complications:  No hyperkalemia, no hypocalcemia, no hyperphosphatemia, no metabolic acidosis, no coagulopathy, no uremic encephalopathy, no neuropathy and no renal osteodystrophy. The patient is currently asymptomatic. No associated symptoms are reported.       Meds:   Current Outpatient Medications   Medication Sig Dispense Refill    allopurinol (Zyloprim) 100 mg tablet TAKE 1 TABLET ONCE DAILY 90 tablet 3    ascorbic acid (Vitamin C) 1,000 mg tablet Take 0.5 tablets (500 mg) by mouth once daily.      carvedilol (Coreg) 6.25 mg tablet Take 1 tablet (6.25 mg) by mouth 2 times a day. 180 tablet 1    clopidogrel (Plavix) 75 mg tablet Take 1 tablet (75 mg) by mouth once daily. 90 tablet 1    flash glucose sensor kit (FreeStyle Alphonso 14 Day Sensor) kit USE AS DIRECTED *CHANGE EVERY 14 DAYS* 6 each 1    glucosamine HCl 1,500 mg tablet Take 1,500 mg by mouth in the morning and at bedtime.      hydrALAZINE (Apresoline) 50 mg tablet Take 1 tablet (50 mg) by mouth twice a day.      isosorbide mononitrate ER (Imdur) 30 mg 24 hr tablet 1 TABLET DAILY 90 tablet 1    lisinopril 5 mg tablet TAKE 1 TABLET DAILY 90 tablet 3    nitroglycerin (Nitrostat) 0.4 mg SL tablet Place 1 tablet (0.4 mg) under the tongue every 5 minutes if needed for chest pain. 45 tablet 5    patiromer calcium sorbitex (Veltassa) 8.4 gram powder in packet Take 8.4 g by mouth early in the morning..      sennosides (Senokot) 8.6 mg tablet Take 1 tablet (8.6 mg) by mouth once daily.      sodium bicarbonate 650 mg tablet Take 1 tablet (650 mg) by mouth 2 times a day. Take 2 tablets PO twice daily with meals      torsemide (Demadex) 20 mg tablet TAKE 1 TABLET ONCE DAILY 90 tablet 3    atorvastatin (Lipitor) 10 mg tablet Take 1 tablet (10 mg) by  mouth once daily at bedtime. (Patient taking differently: Take 4 tablets (40 mg) by mouth once daily at bedtime.) 90 tablet 1     No current facility-administered medications for this visit.          ROS:  The patient is awake and oriented. No dizziness or lightheadedness. No chills and no fever. No headaches. No nausea and no vomiting. No shortness of breath. No cough. No sputum. No chest pain. No chest tightness. No abdominal pain. No diarrhea and no constipation. No hematemesis or hemoptysis. No hematuria. No rectal bleeding. No melena. No epistaxis. No urinary symptoms. No flank pain. No leg edema. No leg pain. No weakness. No itching. Overall, the rest of the review of systems is also negative.  12 point review of systems otherwise negative as stated in HPI.        Physical Examination:        Vitals:    12/19/24 1259   BP: 111/62     General: The patient is awake, oriented, and is not in any distress.  Head and Neck: Normocephalic. No periorbital edema.  Eyes: non-icteric  Respiratory: Symmetric air entry. Symmetric chest expansion.No respiratory distress.  Skin: No maculopapular rash.  Musculoskeletal: No peripheral edema in both left and right upper extremities.  No edema in either left or right lower extremities.  Neuro Exam: Speech is fluent. Moves extremities.    Imaging:  === 10/08/20 ===    US RENAL COMPLETE    - Impression -  Overall stable appearance of the kidneys including asymmetric right  renal atrophy with findings of medical renal disease, without  hydronephrosis.       Blood Labs:  No results found for this or any previous visit (from the past 24 hours).   Lab Results   Component Value Date    .4 (H) 05/01/2024    PROTUR 9 12/08/2022    PHOS 4.3 09/19/2023      Lab Results   Component Value Date    GLUCOSE 178 (H) 05/01/2024    CALCIUM 10.2 05/01/2024     05/01/2024    K 4.7 05/01/2024    CO2 17 (L) 05/01/2024     (H) 05/01/2024    BUN 95 (HH) 05/01/2024    CREATININE 3.89  (H) 05/01/2024         Assessment and Plan:  1. CKD stage 4. His last creatinine level is 3.99.  No major change in kidney function.  Normal potassium level.  We had discussion about potential need for renal replacement therapy sometime soon.  He does not want dialysis.  No uremic signs or symptoms.  Volume status is fine.     2. Hypertension. Blood pressure is under control.  Continue the current medications.    3. Diabetes. No proteinuria based on a spot urine protein to creatinine ratio.     4. Dyslipidemia. The patient is on a statin and he is tolerating his statin well.     5. Metabolic acidosis.  Continue sodium bicarb.     He will be seen in my office in about 3-4 months for followup.           Onel Murillo MD  Senior Attending Physician  Director of Onco-Nephrology Program  Division of Nephrology & Hypertension  [facility]

## 2024-12-20 ENCOUNTER — TELEPHONE (OUTPATIENT)
Dept: PRIMARY CARE | Facility: CLINIC | Age: 89
End: 2024-12-20
Payer: MEDICARE

## 2024-12-20 NOTE — TELEPHONE ENCOUNTER
Spring (Speech Therapist from [facility]) states that the patient was on her schedule and he has refused further speech therapy.

## 2025-01-24 ENCOUNTER — APPOINTMENT (OUTPATIENT)
Dept: OPHTHALMOLOGY | Facility: CLINIC | Age: OVER 89
End: 2025-01-24
Payer: MEDICARE

## 2025-02-06 ENCOUNTER — HOSPITAL ENCOUNTER (EMERGENCY)
Age: 89
Discharge: HOME | End: 2025-02-06

## 2025-02-06 VITALS
HEIGHT: 67 IN | BODY MASS INDEX: 29.82 KG/M2 | OXYGEN SATURATION: 97 % | HEART RATE: 76 BPM | RESPIRATION RATE: 20 BRPM | TEMPERATURE: 97 F | DIASTOLIC BLOOD PRESSURE: 89 MMHG | WEIGHT: 190 LBS | SYSTOLIC BLOOD PRESSURE: 183 MMHG

## 2025-02-06 ASSESSMENT — PAIN - FUNCTIONAL ASSESSMENT: PAIN_FUNCTIONAL_ASSESSMENT: NONE - DENIES PAIN

## 2025-02-06 NOTE — ED TRIAGE NOTES
Arrived with report of a fall hitting head on door jam has head laceration   No LOC   Is on Asprin

## 2025-02-18 ENCOUNTER — APPOINTMENT (OUTPATIENT)
Dept: CARDIOLOGY | Facility: CLINIC | Age: OVER 89
End: 2025-02-18
Payer: MEDICARE

## 2025-02-18 VITALS
BODY MASS INDEX: 28.82 KG/M2 | SYSTOLIC BLOOD PRESSURE: 144 MMHG | DIASTOLIC BLOOD PRESSURE: 60 MMHG | HEART RATE: 78 BPM | HEIGHT: 69 IN | WEIGHT: 194.6 LBS

## 2025-02-18 DIAGNOSIS — I25.10 CORONARY ARTERY DISEASE INVOLVING NATIVE CORONARY ARTERY OF NATIVE HEART, UNSPECIFIED WHETHER ANGINA PRESENT: ICD-10-CM

## 2025-02-18 DIAGNOSIS — I10 PRIMARY HYPERTENSION: ICD-10-CM

## 2025-02-18 PROCEDURE — 99214 OFFICE O/P EST MOD 30 MIN: CPT | Performed by: INTERNAL MEDICINE

## 2025-02-18 PROCEDURE — G2211 COMPLEX E/M VISIT ADD ON: HCPCS | Performed by: INTERNAL MEDICINE

## 2025-02-18 PROCEDURE — 1036F TOBACCO NON-USER: CPT | Performed by: INTERNAL MEDICINE

## 2025-02-18 PROCEDURE — 1123F ACP DISCUSS/DSCN MKR DOCD: CPT | Performed by: INTERNAL MEDICINE

## 2025-02-18 PROCEDURE — 3078F DIAST BP <80 MM HG: CPT | Performed by: INTERNAL MEDICINE

## 2025-02-18 PROCEDURE — 3077F SYST BP >= 140 MM HG: CPT | Performed by: INTERNAL MEDICINE

## 2025-02-18 RX ORDER — ISOSORBIDE MONONITRATE 30 MG/1
TABLET, EXTENDED RELEASE ORAL
Qty: 90 TABLET | Refills: 3 | Status: SHIPPED | OUTPATIENT
Start: 2025-02-18

## 2025-02-18 RX ORDER — CLOPIDOGREL BISULFATE 75 MG/1
75 TABLET ORAL DAILY
Qty: 90 TABLET | Refills: 3 | Status: SHIPPED | OUTPATIENT
Start: 2025-02-18 | End: 2026-02-18

## 2025-02-18 RX ORDER — CARVEDILOL 6.25 MG/1
6.25 TABLET ORAL 2 TIMES DAILY
Qty: 180 TABLET | Refills: 3 | Status: SHIPPED | OUTPATIENT
Start: 2025-02-18 | End: 2026-02-18

## 2025-02-18 RX ORDER — ATORVASTATIN CALCIUM 40 MG/1
40 TABLET, FILM COATED ORAL DAILY
COMMUNITY

## 2025-02-18 RX ORDER — HYDRALAZINE HYDROCHLORIDE 50 MG/1
50 TABLET, FILM COATED ORAL 3 TIMES DAILY
Qty: 270 TABLET | Refills: 3 | Status: SHIPPED | OUTPATIENT
Start: 2025-02-18 | End: 2026-02-18

## 2025-02-18 NOTE — PROGRESS NOTES
Patient:  Jamaal Balbuena  YOB: 1933  MRN: [id number]       Impression/Plan:     Diagnoses and all orders for this visit:  Primary hypertension  -    Increase hydralazine to 50 3 times daily would like to see blood pressure consistently less than 130 given his recent stroke  -     carvedilol (Coreg) 6.25 mg tablet; Take 1 tablet (6.25 mg) by mouth 2 times a day.  -     clopidogrel (Plavix) 75 mg tablet; Take 1 tablet (75 mg) by mouth once daily.  -     isosorbide mononitrate ER (Imdur) 30 mg 24 hr tablet; 1 TABLET DAILY  -     hydrALAZINE (Apresoline) 50 mg tablet; Take 1 tablet (50 mg) by mouth 3 times a day.  Coronary artery disease involving native coronary artery of native heart, unspecified whether angina present  -     No symptoms or signs to suggest progressive coronary disease  -     carvedilol (Coreg) 6.25 mg tablet; Take 1 tablet (6.25 mg) by mouth 2 times a day.  -     clopidogrel (Plavix) 75 mg tablet; Take 1 tablet (75 mg) by mouth once daily.  -     isosorbide mononitrate ER (Imdur) 30 mg 24 hr tablet; 1 TABLET DAILY  -     hydrALAZINE (Apresoline) 50 mg tablet; Take 1 tablet (50 mg) by mouth 3 times a day.  Stroke: Appears to be a vascular ischemic stroke no evidence of atrial fibrillation.  Had no hypotension or hemodynamic triggers.  There was mild hemorrhagic conversion but tolerating Plavix well at this time follows with Dr. Tang at [facility] neurology      Chief Complaint/Active Symptoms:       Jamaal Balbuena is a 89 y.o. male who presents with coronary angioplasty at the time of infarct in 2014. He also has ischemic cardiomyopathy ejection fraction 40 to 45%. He has not wished further evaluation unless significantly symptomatic. He also has progressive kidney disease creatinine is in excess of 3. He has chronic lower extremity edema likely related to his kidney disease and use of amlodipine but he feels well on the medical regimen and prefers to put up with the edema he says it is minimally  uncomfortable to him. He tolerates low-dose lisinopril only as any increase causes hyperkalemia.  He follows with nephrology regularly.    I had last seen him 8/20/2024 at which time he had mild edema but blood pressure well-controlled.  Suggested at that time amlodipine be discontinued with consideration of resumption of 2.5 should blood pressure increase and he had follow-up with nephrology thereafter.  He had no angina or CHF symptoms he was maintained on Plavix alone rather than combination aspirin Plavix.  Chronic anemia was unchanged at that time.    He was hospitalized at [facility] 10/29/2020 4 through 11//24 with an acute stroke of the anterior left thalamus.  It is associated with encephalopathy.  At time of discharge she had residual aphasia CT with small amount of subarachnoid hemorrhage right parietal area and evolving lacunar infarct left thalamus.  At that time lethargic and less responsive to conversation.  He was then transferred to the rehab facility [facility].  Eventually discharged 11/23/2024.  Patient was reported to have improved while on rehab.  Echocardiogram at there interpreted as 30-35% EF.    Seen by Dr. Murillo 12/19/2024 for nephrology.  Found to have stable though abnormal renal function with creatinine 3.99.  Blood pressure was controlled at that visit.    He presents with his son with whom he lives at this time.  He says he is doing quite well he is had no residual from the stroke.  His speech was a bit slowed at first but is back to normal now.  He has no focal weakness.  It was felt it was related to vascular disease he is monitored for several weeks in the hospital without evidence of atrial fibrillation.  He notes no angina or shortness of breath.  He has no claudication notes no lower extremity edema               Review of Systems: Unremarkable except as noted above    Meds     Current Outpatient Medications   Medication Instructions     allopurinol (ZYLOPRIM) 100 mg, oral, Daily    ascorbic acid (VITAMIN C) 500 mg, Daily    atorvastatin (LIPITOR) 10 mg, oral, Nightly    atorvastatin (LIPITOR) 40 mg, Daily    carvedilol (COREG) 6.25 mg, oral, 2 times daily    clopidogrel (PLAVIX) 75 mg, oral, Daily    flash glucose sensor kit (FreeStyle Alphonso 14 Day Sensor) kit USE AS DIRECTED *CHANGE EVERY 14 DAYS*    glucosamine HCl 1,500 mg, 2 times daily    hydrALAZINE (APRESOLINE) 50 mg, 2 times daily    isosorbide mononitrate ER (Imdur) 30 mg 24 hr tablet 1 TABLET DAILY    lisinopril 5 mg, oral, Daily    nitroglycerin (NITROSTAT) 0.4 mg, sublingual, Every 5 min PRN    sennosides (Senokot) 8.6 mg tablet 1 tablet, Daily    sodium bicarbonate 650 mg, 2 times daily    torsemide (DEMADEX) 20 mg, oral, Daily    Veltassa 8.4 g, Daily        Allergies   No Known Allergies      Annotated Problems     Specialty Problems          Cardiology Problems    HLD (hyperlipidemia)    HTN (hypertension), benign    CAD (coronary artery disease)    Ischemic cardiomyopathy    PVD (peripheral vascular disease) (CMS-[facility])    Venous insufficiency        Problem List     Patient Active Problem List    Diagnosis Date Noted    Cerebrovascular accident (CVA) due to occlusion of precerebral artery (Multi) 12/02/2024    Epistaxis 08/20/2024    Edema 04/23/2024    Degenerative disease of nervous system, unspecified 11/09/2023    Bronchiectasis, uncomplicated (Multi) 11/09/2023    Anemia in CKD (chronic kidney disease) 01/25/2023    Angioma of skin 01/25/2023    Arcus senilis, bilateral 01/25/2023    Astigmatism, bilateral 01/25/2023    Basal cell carcinoma 01/25/2023    Bilateral presbyopia 01/25/2023    Bilateral pseudophakia 01/25/2023    BPH (benign prostatic hyperplasia) 01/25/2023    CAD (coronary artery disease) 01/25/2023    Diabetic neuropathy (Multi) 01/25/2023    Hyperkalemia, diminished renal excretion 01/25/2023    Ischemic cardiomyopathy 01/25/2023    Hyperparathyroidism due to  "renal insufficiency (Multi) 01/25/2023    Lung nodule 01/25/2023    Malignant neoplasm of prostate (Multi) 01/25/2023    Onychomycosis 01/25/2023    PVD (peripheral vascular disease) (CMS-HCC) 01/25/2023    PVD (posterior vitreous detachment), both eyes 01/25/2023    Type 2 diabetes mellitus with retinopathy, without long-term current use of insulin 01/25/2023    Venous insufficiency 01/25/2023    Hypercalcemia 01/25/2023    Contracture of muscle of hand 01/25/2023    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/2023    HTN (hypertension), benign 09/15/2020    HLD (hyperlipidemia) 09/15/2020    Stage 5 chronic kidney disease not on chronic dialysis (Multi) 09/15/2020       Objective:     Vitals:    02/18/25 1255   BP: 144/60   BP Location: Left arm   Patient Position: Sitting   Pulse: 78   Weight: 88.3 kg (194 lb 9.6 oz)   Height: 1.753 m (5' 9\")      Wt Readings from Last 4 Encounters:   02/18/25 88.3 kg (194 lb 9.6 oz)   12/19/24 85.7 kg (189 lb)   12/02/24 86.2 kg (190 lb)   10/09/24 86.6 kg (191 lb)           LAB:     Lab Results   Component Value Date    WBC 8.9 12/01/2023    HGB 9.7 (L) 12/01/2023    HCT 30.5 (L) 12/01/2023     12/01/2023    ALT 16 01/02/2022    AST 24 01/02/2022     05/01/2024    K 4.7 05/01/2024     (H) 05/01/2024    CREATININE 3.89 (H) 05/01/2024    BUN 95 (HH) 05/01/2024    CO2 17 (L) 05/01/2024    INR 1.0 12/27/2021    HGBA1C 8.4 (H) 11/01/2024       Diagnostic Studies:     No results found.      Radiology:     No orders to display       Physical Exam     General Appearance: alert and oriented to person, place and time, in no acute distress  Cardiovascular: normal rate, regular rhythm, normal S1 and S2, no murmurs, rubs, clicks, or gallops,  no JVD  Pulmonary/Chest: clear to auscultation bilaterally- no wheezes, rales or rhonchi, normal air movement, no respiratory distress  Abdomen: soft, non-tender, non-distended, normal bowel sounds, no masses   Extremities: " no cyanosis, clubbing or edema  Skin: warm and dry, no rash or erythema  Eyes: EOMI  Neck: supple and non-tender without mass, no thyromegaly   Neurological: alert, oriented, normal speech, no focal findings or movement disorder noted

## 2025-02-18 NOTE — PATIENT INSTRUCTIONS
2 month follow up appointment   Increase Hydralazine 50mg to 3 times daily  Check your blood pressure twice daily. Once in the morning 1-2 hours after morning medication and again at bedtime. Please keep a blood pressure diary and LET US KNOW IF BLOOD PRESSURE IS GREATER THAN 140      Please have Fasting Labs done 1 week before your next cardiology appointment     DID YOU KNOW  We have a pharmacy here in the [facility].  They can fill all prescriptions, not just cardiac medications.  Prescriptions from other pharmacies can easily be transferred to the  pharmacy by the  pharmacist on site.   pharmacies offer FREE HOME DELIVERY on medications to anywhere in [place]. They can sync your medications. Typically prescriptions can be ready in 10 - 15 minutes. If pharmacy is unable to fill your  prescription or if cost is more than your paying now the Pharmacist can easily transfer back to your Pharmacy of choice. Pharmacy phone # [phone].     Please bring all medicines, vitamins, and herbal supplements with you in original bottles to every appointment  Prescriptions will not be filled unless you are compliant with your follow up appointments or have a follow up appointment scheduled as per instruction of your physician. Refills should be requested at the time of your visit.

## 2025-03-06 ENCOUNTER — APPOINTMENT (OUTPATIENT)
Dept: PRIMARY CARE | Facility: CLINIC | Age: OVER 89
End: 2025-03-06
Payer: MEDICARE

## 2025-03-06 VITALS
RESPIRATION RATE: 18 BRPM | TEMPERATURE: 97.9 F | BODY MASS INDEX: 28.44 KG/M2 | HEIGHT: 69 IN | SYSTOLIC BLOOD PRESSURE: 132 MMHG | HEART RATE: 72 BPM | WEIGHT: 192 LBS | DIASTOLIC BLOOD PRESSURE: 60 MMHG

## 2025-03-06 DIAGNOSIS — R73.9 HYPERGLYCEMIA: ICD-10-CM

## 2025-03-06 DIAGNOSIS — I63.20 CEREBROVASCULAR ACCIDENT (CVA) DUE TO OCCLUSION OF PRECEREBRAL ARTERY (MULTI): ICD-10-CM

## 2025-03-06 DIAGNOSIS — I25.5 ISCHEMIC CARDIOMYOPATHY: ICD-10-CM

## 2025-03-06 DIAGNOSIS — E11.319 TYPE 2 DIABETES MELLITUS WITH RETINOPATHY, WITHOUT LONG-TERM CURRENT USE OF INSULIN, MACULAR EDEMA PRESENCE UNSPECIFIED, UNSPECIFIED LATERALITY, UNSPECIFIED RETINOPATHY SEVERITY: Primary | ICD-10-CM

## 2025-03-06 LAB — POC HEMOGLOBIN A1C: 7.6 % (ref 4.2–6.5)

## 2025-03-06 PROCEDURE — 99213 OFFICE O/P EST LOW 20 MIN: CPT | Performed by: FAMILY MEDICINE

## 2025-03-06 PROCEDURE — G2211 COMPLEX E/M VISIT ADD ON: HCPCS | Performed by: FAMILY MEDICINE

## 2025-03-06 PROCEDURE — 1036F TOBACCO NON-USER: CPT | Performed by: FAMILY MEDICINE

## 2025-03-06 PROCEDURE — 3075F SYST BP GE 130 - 139MM HG: CPT | Performed by: FAMILY MEDICINE

## 2025-03-06 PROCEDURE — 3078F DIAST BP <80 MM HG: CPT | Performed by: FAMILY MEDICINE

## 2025-03-06 PROCEDURE — 1159F MED LIST DOCD IN RCRD: CPT | Performed by: FAMILY MEDICINE

## 2025-03-06 PROCEDURE — 1123F ACP DISCUSS/DSCN MKR DOCD: CPT | Performed by: FAMILY MEDICINE

## 2025-03-06 PROCEDURE — 83036 HEMOGLOBIN GLYCOSYLATED A1C: CPT | Performed by: FAMILY MEDICINE

## 2025-03-06 RX ORDER — ATORVASTATIN CALCIUM 40 MG/1
40 TABLET, FILM COATED ORAL DAILY
Qty: 90 TABLET | Refills: 3 | Status: SHIPPED | OUTPATIENT
Start: 2025-03-06

## 2025-03-06 NOTE — PROGRESS NOTES
Subjective   Jamaal Balbuena is a 89 y.o. male who presents for Diabetes.  HPI  This is a diabetes follow up visit for Jamaal Balbuena. The current treatment includes diet and he is compliant most of the time. Symptoms include none. Glucose is monitored: not checking. he reports good compliance with a low carbohydrate diet, and does not exercise.    Patient is prescribed an ACE/ARB/ARNI medication   Patient is prescribed a STATIN medication  Patient is not prescribed a SGLT2/GLP1 medication    Last Flu Vaccine given:            10/09/2024   Last PCV Vaccine given:   01/01/2016    Lab Results   Component Value Date    HGBA1C 8.4 (H) 11/01/2024    CREATININE 3.89 (H) 05/01/2024        Last Eye Exam: [employer] done 10/2024 (positive.)      Liver Screening: Computed FIB-4 Calculation unavailable. One or more values for this score either were not found within the given timeframe or did not fit some other criterion.    Interpretation: <1.45 Cirrhosis less likely, 1.45 - 3.25 Indeterminate, >3.25 Cirrhosis more likely    Review of Systems  Visit Vitals  /60   Pulse 72   Temp 36.6 °C (97.9 °F)   Resp 18   Body mass index is 28.35 kg/m².   Objective   Physical Exam        Assessment & Plan  Type 2 diabetes mellitus with retinopathy, without long-term current use of insulin, macular edema presence unspecified, unspecified laterality, unspecified retinopathy severity  Diabetic control has improved with A1c dropping from 8.1 to 7.6% which is at his age-adjusted goal.  He is doing this with diet therapy alone and maintaining his weight.  He did schedule an appointment with ophthalmology but this got canceled.  I pointed out that his retinopathy scan was positive and encouraged him to reschedule his appointment with ophthalmology.  He is accompanied by his son and caregiver.  Orders:    Follow Up In [facility] - PCP    atorvastatin (Lipitor) 40 mg tablet; Take 1 tablet (40 mg) by mouth once daily.    Follow Up In  [facility] - PCP; Future    Cerebrovascular accident (CVA) due to occlusion of precerebral artery (Multi)         Ischemic cardiomyopathy  I reviewed his most recent notes from cardiology indicating fairly good control of his heart function.  His hydralazine was increased slightly and all other medications remained the same.  His blood pressure continues to be stable with a systolic 132.                 Sivan Morse, LIDIA 03/06/25 1:16 PM

## 2025-03-06 NOTE — ASSESSMENT & PLAN NOTE
I reviewed his most recent notes from cardiology indicating fairly good control of his heart function.  His hydralazine was increased slightly and all other medications remained the same.  His blood pressure continues to be stable with a systolic 132.

## 2025-03-06 NOTE — ASSESSMENT & PLAN NOTE
Diabetic control has improved with A1c dropping from 8.1 to 7.6% which is at his age-adjusted goal.  He is doing this with diet therapy alone and maintaining his weight.  He did schedule an appointment with ophthalmology but this got canceled.  I pointed out that his retinopathy scan was positive and encouraged him to reschedule his appointment with ophthalmology.  He is accompanied by his son and caregiver.  Orders:    Follow Up In [facility] - PCP    atorvastatin (Lipitor) 40 mg tablet; Take 1 tablet (40 mg) by mouth once daily.    Follow Up In [facility] - PCP; Future

## 2025-03-26 ENCOUNTER — OFFICE VISIT (OUTPATIENT)
Dept: PRIMARY CARE | Facility: CLINIC | Age: OVER 89
End: 2025-03-26
Payer: MEDICARE

## 2025-03-26 VITALS
RESPIRATION RATE: 16 BRPM | SYSTOLIC BLOOD PRESSURE: 174 MMHG | HEART RATE: 76 BPM | HEIGHT: 69 IN | WEIGHT: 192 LBS | BODY MASS INDEX: 28.44 KG/M2 | TEMPERATURE: 98.1 F | DIASTOLIC BLOOD PRESSURE: 74 MMHG

## 2025-03-26 DIAGNOSIS — R53.1 WEAKNESS: Primary | ICD-10-CM

## 2025-03-26 DIAGNOSIS — I25.5 ISCHEMIC CARDIOMYOPATHY: ICD-10-CM

## 2025-03-26 DIAGNOSIS — N18.5 STAGE 5 CHRONIC KIDNEY DISEASE NOT ON CHRONIC DIALYSIS (MULTI): ICD-10-CM

## 2025-03-26 DIAGNOSIS — Z86.73 HISTORY OF CVA (CEREBROVASCULAR ACCIDENT): ICD-10-CM

## 2025-03-26 DIAGNOSIS — C61 MALIGNANT NEOPLASM OF PROSTATE (MULTI): ICD-10-CM

## 2025-03-26 PROBLEM — Z85.828 HISTORY OF BASAL CELL CARCINOMA: Status: ACTIVE | Noted: 2023-01-25

## 2025-03-26 PROBLEM — R04.0 EPISTAXIS: Status: RESOLVED | Noted: 2024-08-20 | Resolved: 2025-03-26

## 2025-03-26 PROBLEM — R60.9 EDEMA: Status: RESOLVED | Noted: 2024-04-23 | Resolved: 2025-03-26

## 2025-03-26 PROBLEM — G31.9 DEGENERATIVE DISEASE OF NERVOUS SYSTEM, UNSPECIFIED: Status: RESOLVED | Noted: 2023-11-09 | Resolved: 2025-03-26

## 2025-03-26 PROBLEM — M62.449: Status: RESOLVED | Noted: 2023-01-25 | Resolved: 2025-03-26

## 2025-03-26 PROCEDURE — 1159F MED LIST DOCD IN RCRD: CPT | Performed by: FAMILY MEDICINE

## 2025-03-26 PROCEDURE — G2211 COMPLEX E/M VISIT ADD ON: HCPCS | Performed by: FAMILY MEDICINE

## 2025-03-26 PROCEDURE — 99214 OFFICE O/P EST MOD 30 MIN: CPT | Performed by: FAMILY MEDICINE

## 2025-03-26 PROCEDURE — 1036F TOBACCO NON-USER: CPT | Performed by: FAMILY MEDICINE

## 2025-03-26 PROCEDURE — 1123F ACP DISCUSS/DSCN MKR DOCD: CPT | Performed by: FAMILY MEDICINE

## 2025-03-26 PROCEDURE — 3077F SYST BP >= 140 MM HG: CPT | Performed by: FAMILY MEDICINE

## 2025-03-26 PROCEDURE — 3078F DIAST BP <80 MM HG: CPT | Performed by: FAMILY MEDICINE

## 2025-03-26 ASSESSMENT — ENCOUNTER SYMPTOMS: EXTREMITY WEAKNESS: 1

## 2025-03-26 NOTE — ASSESSMENT & PLAN NOTE
Orders:    Comprehensive Metabolic Panel; Future    CBC and Auto Differential; Future    Thyroid Stimulating Hormone; Future

## 2025-03-26 NOTE — PROGRESS NOTES
Subjective   Jamaal Balbuena is a 89 y.o. male who presents for Extremity Weakness.     Extremity Weakness  This is a new problem. The current episode started 1 to 4 weeks ago. The problem has been gradually worsening.   Son reports a slow decline in his mobility over the last few weeks, especially with getting out of a chair and walking.     Last month he was able to ambulate on his own and getting out of the bathroom on his own.  Over the last 2 weeks he has lost his ability and now is chair bound and requires significant physical assistance for any transfers.  There has been no specific area of weakness, facial droop, numbness or tingling, or pain.  He is not feeling heart palpitations or having fevers or chills.  There been no changes in his medications although they forgot to give him his blood pressure medicines right prior to this visit    Visit Vitals  /74   Pulse 76   Temp 36.7 °C (98.1 °F)   Resp 16      Objective   Physical Exam  Constitutional:       Appearance: Normal appearance.   HENT:      Head: Normocephalic.   Eyes:      Conjunctiva/sclera: Conjunctivae normal.   Cardiovascular:      Rate and Rhythm: Normal rate and regular rhythm.      Heart sounds: Normal heart sounds.   Pulmonary:      Effort: Pulmonary effort is normal.      Breath sounds: Normal breath sounds.   Musculoskeletal:      Cervical back: Neck supple.      Right lower leg: Edema present.      Left lower leg: Edema present.      Comments: 2+ pitting edema distal two thirds both legs   Skin:     General: Skin is warm and dry.   Neurological:      Mental Status: He is alert.       No data recorded     No data recorded   No data recorded   Assessment & Plan  Weakness  This 89-year-old male with multiple severe end-stage medical problems including stage V chronic kidney disease developed a profound worsening of weakness and is no longer able to form ADLs independently.  On physical exam there is no evidence of acute disease.  He does  not appear to be in fluid overload or CHF exacerbation.  His respiratory status is comfortable without distress.  His heart rhythm is normal.  There is no clinical finding of infection.  There were no focal neurologic deficits and so a new CVA is unlikely.  I would be concerned about a progression of anemia, metabolic disturbance, or most significantly progression of his chronic kidney disease.  He has had lengthy discussions on advance care planning with his nephrologist and is not interested in pursuing hemodialysis if his kidney function does decline.  We will check labs to screen for anemia that could be corrected or an electrolyte disturbance which could be corrected and also to answer the question of kidney disease progression.  [facility] to discuss options for getting home health in the house to assist with ADLs.  Orders:    Comprehensive Metabolic Panel; Future    CBC and Auto Differential; Future    Thyroid Stimulating Hormone; Future    History of CVA (cerebrovascular accident)         Malignant neoplasm of prostate (Multi)         Stage 5 chronic kidney disease not on chronic dialysis (Multi)    Orders:    Comprehensive Metabolic Panel; Future    CBC and Auto Differential; Future    Thyroid Stimulating Hormone; Future    Ischemic cardiomyopathy                Please excuse any errors in grammar or translation related to this dictation. Voice recognition software was utilized to prepare this document.

## 2025-03-27 ENCOUNTER — HOSPITAL ENCOUNTER (INPATIENT)
Facility: HOSPITAL | Age: OVER 89
Discharge: HOSPICE | End: 2025-03-27
Attending: EMERGENCY MEDICINE | Admitting: STUDENT IN AN ORGANIZED HEALTH CARE EDUCATION/TRAINING PROGRAM
Payer: MEDICARE

## 2025-03-27 ENCOUNTER — APPOINTMENT (OUTPATIENT)
Dept: RADIOLOGY | Facility: HOSPITAL | Age: OVER 89
DRG: 641 | End: 2025-03-27
Payer: MEDICARE

## 2025-03-27 ENCOUNTER — TELEPHONE (OUTPATIENT)
Dept: PRIMARY CARE | Facility: CLINIC | Age: OVER 89
End: 2025-03-27
Payer: MEDICARE

## 2025-03-27 ENCOUNTER — APPOINTMENT (OUTPATIENT)
Dept: CARDIOLOGY | Facility: HOSPITAL | Age: OVER 89
DRG: 641 | End: 2025-03-27
Payer: MEDICARE

## 2025-03-27 DIAGNOSIS — E83.42 HYPOMAGNESEMIA: ICD-10-CM

## 2025-03-27 DIAGNOSIS — E87.6 HYPOKALEMIA: ICD-10-CM

## 2025-03-27 DIAGNOSIS — E87.0 HYPERNATREMIA: ICD-10-CM

## 2025-03-27 DIAGNOSIS — R79.89 ELEVATED TROPONIN: ICD-10-CM

## 2025-03-27 DIAGNOSIS — R53.1 WEAKNESS: Primary | ICD-10-CM

## 2025-03-27 LAB
ALBUMIN SERPL BCP-MCNC: 3.6 G/DL (ref 3.4–5)
ALBUMIN SERPL-MCNC: 3.9 G/DL (ref 3.6–5.1)
ALP SERPL-CCNC: 83 U/L (ref 33–136)
ALP SERPL-CCNC: 90 U/L (ref 35–144)
ALT SERPL W P-5'-P-CCNC: 9 U/L (ref 10–52)
ALT SERPL-CCNC: 9 U/L (ref 9–46)
ANION GAP SERPL CALC-SCNC: 12 MMOL/L (ref 10–20)
ANION GAP SERPL CALCULATED.4IONS-SCNC: 11 MMOL/L (CALC) (ref 7–17)
APPEARANCE UR: CLEAR
AST SERPL W P-5'-P-CCNC: 15 U/L (ref 9–39)
AST SERPL-CCNC: 13 U/L (ref 10–35)
ATRIAL RATE: 82 BPM
BASOPHILS # BLD AUTO: 0.08 X10*3/UL (ref 0–0.1)
BASOPHILS # BLD AUTO: 75 CELLS/UL (ref 0–200)
BASOPHILS NFR BLD AUTO: 0.5 %
BASOPHILS NFR BLD AUTO: 0.6 %
BILIRUB SERPL-MCNC: 1 MG/DL (ref 0.2–1.2)
BILIRUB SERPL-MCNC: 1 MG/DL (ref 0–1.2)
BILIRUB UR STRIP.AUTO-MCNC: NEGATIVE MG/DL
BUN SERPL-MCNC: 35 MG/DL (ref 6–23)
BUN SERPL-MCNC: 35 MG/DL (ref 7–25)
CALCIUM SERPL-MCNC: 11.5 MG/DL (ref 8.6–10.3)
CALCIUM SERPL-MCNC: 12.1 MG/DL (ref 8.6–10.3)
CARDIAC TROPONIN I PNL SERPL HS: 107 NG/L (ref 0–20)
CARDIAC TROPONIN I PNL SERPL HS: 120 NG/L (ref 0–20)
CHLORIDE SERPL-SCNC: 101 MMOL/L (ref 98–110)
CHLORIDE SERPL-SCNC: 104 MMOL/L (ref 98–107)
CK SERPL-CCNC: 159 U/L (ref 0–325)
CO2 SERPL-SCNC: 35 MMOL/L (ref 21–32)
CO2 SERPL-SCNC: 36 MMOL/L (ref 20–32)
COLOR UR: ABNORMAL
CREAT SERPL-MCNC: 3.35 MG/DL (ref 0.5–1.3)
CREAT SERPL-MCNC: 3.38 MG/DL (ref 0.7–1.22)
EGFRCR SERPLBLD CKD-EPI 2021: 16 ML/MIN/1.73M2
EGFRCR SERPLBLD CKD-EPI 2021: 17 ML/MIN/1.73M*2
EOSINOPHIL # BLD AUTO: 0 CELLS/UL (ref 15–500)
EOSINOPHIL # BLD AUTO: 0 X10*3/UL (ref 0–0.4)
EOSINOPHIL NFR BLD AUTO: 0 %
EOSINOPHIL NFR BLD AUTO: 0 %
ERYTHROCYTE [DISTWIDTH] IN BLOOD BY AUTOMATED COUNT: 13.6 % (ref 11–15)
ERYTHROCYTE [DISTWIDTH] IN BLOOD BY AUTOMATED COUNT: 15.1 % (ref 11.5–14.5)
FLUAV RNA RESP QL NAA+PROBE: NOT DETECTED
FLUBV RNA RESP QL NAA+PROBE: NOT DETECTED
GLUCOSE SERPL-MCNC: 225 MG/DL (ref 74–99)
GLUCOSE SERPL-MCNC: 245 MG/DL (ref 65–139)
GLUCOSE UR STRIP.AUTO-MCNC: ABNORMAL MG/DL
HCT VFR BLD AUTO: 29.5 % (ref 41–52)
HCT VFR BLD AUTO: 30.8 % (ref 38.5–50)
HGB BLD-MCNC: 9.4 G/DL (ref 13.5–17.5)
HGB BLD-MCNC: 9.9 G/DL (ref 13.2–17.1)
IMM GRANULOCYTES # BLD AUTO: 0.05 X10*3/UL (ref 0–0.5)
IMM GRANULOCYTES NFR BLD AUTO: 0.4 % (ref 0–0.9)
INR PPP: 1.1 (ref 0.9–1.1)
KETONES UR STRIP.AUTO-MCNC: NEGATIVE MG/DL
LACTATE SERPL-SCNC: 1.1 MMOL/L (ref 0.4–2)
LEUKOCYTE ESTERASE UR QL STRIP.AUTO: NEGATIVE
LYMPHOCYTES # BLD AUTO: 1.23 X10*3/UL (ref 0.8–3)
LYMPHOCYTES # BLD AUTO: 1050 CELLS/UL (ref 850–3900)
LYMPHOCYTES NFR BLD AUTO: 7 %
LYMPHOCYTES NFR BLD AUTO: 8.7 %
MAGNESIUM SERPL-MCNC: 1.33 MG/DL (ref 1.6–2.4)
MCH RBC QN AUTO: 31.8 PG (ref 26–34)
MCH RBC QN AUTO: 31.9 PG (ref 27–33)
MCHC RBC AUTO-ENTMCNC: 31.9 G/DL (ref 32–36)
MCHC RBC AUTO-ENTMCNC: 32.1 G/DL (ref 32–36)
MCV RBC AUTO: 100 FL (ref 80–100)
MCV RBC AUTO: 99.4 FL (ref 80–100)
MONOCYTES # BLD AUTO: 0.81 X10*3/UL (ref 0.05–0.8)
MONOCYTES # BLD AUTO: 750 CELLS/UL (ref 200–950)
MONOCYTES NFR BLD AUTO: 5 %
MONOCYTES NFR BLD AUTO: 5.8 %
NEUTROPHILS # BLD AUTO: 11.9 X10*3/UL (ref 1.6–5.5)
NEUTROPHILS # BLD AUTO: ABNORMAL CELLS/UL (ref 1500–7800)
NEUTROPHILS NFR BLD AUTO: 84.5 %
NEUTROPHILS NFR BLD AUTO: 87.5 %
NITRITE UR QL STRIP.AUTO: NEGATIVE
NRBC BLD-RTO: 0 /100 WBCS (ref 0–0)
PH UR STRIP.AUTO: 6 [PH]
PLATELET # BLD AUTO: 263 X10*3/UL (ref 150–450)
PLATELET # BLD AUTO: 269 THOUSAND/UL (ref 140–400)
PMV BLD REES-ECKER: 11.2 FL (ref 7.5–12.5)
POTASSIUM SERPL-SCNC: 2.1 MMOL/L (ref 3.5–5.3)
POTASSIUM SERPL-SCNC: 2.2 MMOL/L (ref 3.5–5.3)
PROT SERPL-MCNC: 7.3 G/DL (ref 6.4–8.2)
PROT SERPL-MCNC: 7.4 G/DL (ref 6.1–8.1)
PROT UR STRIP.AUTO-MCNC: ABNORMAL MG/DL
PROTHROMBIN TIME: 12.3 SECONDS (ref 9.8–12.4)
Q ONSET: 209 MS
QRS COUNT: 13 BEATS
QRS DURATION: 172 MS
QT INTERVAL: 428 MS
QTC CALCULATION(BAZETT): 493 MS
QTC FREDERICIA: 471 MS
R AXIS: -48 DEGREES
RBC # BLD AUTO: 2.96 X10*6/UL (ref 4.5–5.9)
RBC # BLD AUTO: 3.1 MILLION/UL (ref 4.2–5.8)
RBC # UR STRIP.AUTO: NEGATIVE MG/DL
RBC #/AREA URNS AUTO: NORMAL /HPF
RSV RNA RESP QL NAA+PROBE: NOT DETECTED
SARS-COV-2 RNA RESP QL NAA+PROBE: NOT DETECTED
SODIUM SERPL-SCNC: 148 MMOL/L (ref 135–146)
SODIUM SERPL-SCNC: 149 MMOL/L (ref 136–145)
SP GR UR STRIP.AUTO: 1.01
T AXIS: -55 DEGREES
T OFFSET: 423 MS
TSH SERPL-ACNC: 1.11 MIU/L (ref 0.4–4.5)
UROBILINOGEN UR STRIP.AUTO-MCNC: NORMAL MG/DL
VENTRICULAR RATE: 80 BPM
WBC # BLD AUTO: 14.1 X10*3/UL (ref 4.4–11.3)
WBC # BLD AUTO: 15 THOUSAND/UL (ref 3.8–10.8)
WBC #/AREA URNS AUTO: NORMAL /HPF

## 2025-03-27 PROCEDURE — G0378 HOSPITAL OBSERVATION PER HR: HCPCS

## 2025-03-27 PROCEDURE — 36415 COLL VENOUS BLD VENIPUNCTURE: CPT | Performed by: EMERGENCY MEDICINE

## 2025-03-27 PROCEDURE — 87040 BLOOD CULTURE FOR BACTERIA: CPT | Mod: ELYLAB | Performed by: EMERGENCY MEDICINE

## 2025-03-27 PROCEDURE — 81001 URINALYSIS AUTO W/SCOPE: CPT | Performed by: NURSE PRACTITIONER

## 2025-03-27 PROCEDURE — 83605 ASSAY OF LACTIC ACID: CPT | Performed by: NURSE PRACTITIONER

## 2025-03-27 PROCEDURE — 71045 X-RAY EXAM CHEST 1 VIEW: CPT

## 2025-03-27 PROCEDURE — 87637 SARSCOV2&INF A&B&RSV AMP PRB: CPT | Performed by: EMERGENCY MEDICINE

## 2025-03-27 PROCEDURE — 96366 THER/PROPH/DIAG IV INF ADDON: CPT

## 2025-03-27 PROCEDURE — 85610 PROTHROMBIN TIME: CPT | Performed by: NURSE PRACTITIONER

## 2025-03-27 PROCEDURE — 96368 THER/DIAG CONCURRENT INF: CPT

## 2025-03-27 PROCEDURE — 96372 THER/PROPH/DIAG INJ SC/IM: CPT

## 2025-03-27 PROCEDURE — 99223 1ST HOSP IP/OBS HIGH 75: CPT

## 2025-03-27 PROCEDURE — 87075 CULTR BACTERIA EXCEPT BLOOD: CPT | Mod: ELYLAB | Performed by: EMERGENCY MEDICINE

## 2025-03-27 PROCEDURE — 71045 X-RAY EXAM CHEST 1 VIEW: CPT | Performed by: RADIOLOGY

## 2025-03-27 PROCEDURE — 96365 THER/PROPH/DIAG IV INF INIT: CPT | Mod: 59

## 2025-03-27 PROCEDURE — 36415 COLL VENOUS BLD VENIPUNCTURE: CPT | Performed by: NURSE PRACTITIONER

## 2025-03-27 PROCEDURE — 2500000001 HC RX 250 WO HCPCS SELF ADMINISTERED DRUGS (ALT 637 FOR MEDICARE OP)

## 2025-03-27 PROCEDURE — 70450 CT HEAD/BRAIN W/O DYE: CPT

## 2025-03-27 PROCEDURE — 99285 EMERGENCY DEPT VISIT HI MDM: CPT | Mod: 25 | Performed by: EMERGENCY MEDICINE

## 2025-03-27 PROCEDURE — 2500000004 HC RX 250 GENERAL PHARMACY W/ HCPCS (ALT 636 FOR OP/ED): Performed by: NURSE PRACTITIONER

## 2025-03-27 PROCEDURE — 72125 CT NECK SPINE W/O DYE: CPT

## 2025-03-27 PROCEDURE — 82550 ASSAY OF CK (CPK): CPT | Performed by: NURSE PRACTITIONER

## 2025-03-27 PROCEDURE — 84075 ASSAY ALKALINE PHOSPHATASE: CPT | Performed by: NURSE PRACTITIONER

## 2025-03-27 PROCEDURE — 72125 CT NECK SPINE W/O DYE: CPT | Performed by: RADIOLOGY

## 2025-03-27 PROCEDURE — 83735 ASSAY OF MAGNESIUM: CPT | Performed by: NURSE PRACTITIONER

## 2025-03-27 PROCEDURE — 2500000004 HC RX 250 GENERAL PHARMACY W/ HCPCS (ALT 636 FOR OP/ED)

## 2025-03-27 PROCEDURE — 93005 ELECTROCARDIOGRAM TRACING: CPT

## 2025-03-27 PROCEDURE — 70450 CT HEAD/BRAIN W/O DYE: CPT | Performed by: RADIOLOGY

## 2025-03-27 PROCEDURE — 80053 COMPREHEN METABOLIC PANEL: CPT | Performed by: NURSE PRACTITIONER

## 2025-03-27 PROCEDURE — 84484 ASSAY OF TROPONIN QUANT: CPT | Performed by: NURSE PRACTITIONER

## 2025-03-27 PROCEDURE — 85025 COMPLETE CBC W/AUTO DIFF WBC: CPT | Performed by: NURSE PRACTITIONER

## 2025-03-27 RX ORDER — CLOPIDOGREL BISULFATE 75 MG/1
75 TABLET ORAL DAILY
Status: DISPENSED | OUTPATIENT
Start: 2025-03-28

## 2025-03-27 RX ORDER — SODIUM CHLORIDE 9 MG/ML
75 INJECTION, SOLUTION INTRAVENOUS CONTINUOUS
Status: ACTIVE | OUTPATIENT
Start: 2025-03-27 | End: 2025-03-28

## 2025-03-27 RX ORDER — LISINOPRIL 5 MG/1
5 TABLET ORAL DAILY
Status: DISPENSED | OUTPATIENT
Start: 2025-03-28

## 2025-03-27 RX ORDER — HYDRALAZINE HYDROCHLORIDE 50 MG/1
50 TABLET, FILM COATED ORAL 3 TIMES DAILY
Status: DISPENSED | OUTPATIENT
Start: 2025-03-27

## 2025-03-27 RX ORDER — POLYETHYLENE GLYCOL 3350 17 G/17G
17 POWDER, FOR SOLUTION ORAL DAILY
Status: DISPENSED | OUTPATIENT
Start: 2025-03-27

## 2025-03-27 RX ORDER — ATORVASTATIN CALCIUM 20 MG/1
40 TABLET, FILM COATED ORAL DAILY
Status: DISPENSED | OUTPATIENT
Start: 2025-03-28

## 2025-03-27 RX ORDER — CARVEDILOL 6.25 MG/1
6.25 TABLET ORAL 2 TIMES DAILY
Status: DISPENSED | OUTPATIENT
Start: 2025-03-27

## 2025-03-27 RX ORDER — ISOSORBIDE MONONITRATE 30 MG/1
30 TABLET, EXTENDED RELEASE ORAL DAILY
Status: DISPENSED | OUTPATIENT
Start: 2025-03-28

## 2025-03-27 RX ORDER — ENOXAPARIN SODIUM 100 MG/ML
30 INJECTION SUBCUTANEOUS EVERY 24 HOURS
Status: DISPENSED | OUTPATIENT
Start: 2025-03-27

## 2025-03-27 RX ORDER — MAGNESIUM SULFATE 1 G/100ML
1 INJECTION INTRAVENOUS ONCE
Status: COMPLETED | OUTPATIENT
Start: 2025-03-27 | End: 2025-03-27

## 2025-03-27 RX ORDER — TORSEMIDE 20 MG/1
20 TABLET ORAL DAILY
Status: DISPENSED | OUTPATIENT
Start: 2025-03-28

## 2025-03-27 RX ORDER — ALLOPURINOL 100 MG/1
100 TABLET ORAL DAILY
Status: DISPENSED | OUTPATIENT
Start: 2025-03-28

## 2025-03-27 RX ORDER — POTASSIUM CHLORIDE 14.9 MG/ML
20 INJECTION INTRAVENOUS
Status: COMPLETED | OUTPATIENT
Start: 2025-03-27 | End: 2025-03-27

## 2025-03-27 RX ADMIN — POTASSIUM CHLORIDE 20 MEQ: 14.9 INJECTION, SOLUTION INTRAVENOUS at 14:35

## 2025-03-27 RX ADMIN — SODIUM CHLORIDE 500 ML: 0.9 INJECTION, SOLUTION INTRAVENOUS at 12:31

## 2025-03-27 RX ADMIN — HYDRALAZINE HYDROCHLORIDE 50 MG: 50 TABLET ORAL at 20:45

## 2025-03-27 RX ADMIN — SODIUM CHLORIDE 75 ML/HR: 9 INJECTION, SOLUTION INTRAVENOUS at 19:03

## 2025-03-27 RX ADMIN — MAGNESIUM SULFATE HEPTAHYDRATE 1 G: 1 INJECTION, SOLUTION INTRAVENOUS at 12:31

## 2025-03-27 RX ADMIN — CARVEDILOL 6.25 MG: 6.25 TABLET, FILM COATED ORAL at 20:45

## 2025-03-27 RX ADMIN — ENOXAPARIN SODIUM 30 MG: 30 INJECTION SUBCUTANEOUS at 20:45

## 2025-03-27 RX ADMIN — POTASSIUM CHLORIDE 20 MEQ: 14.9 INJECTION, SOLUTION INTRAVENOUS at 12:31

## 2025-03-27 SDOH — SOCIAL STABILITY: SOCIAL INSECURITY: WITHIN THE LAST YEAR, HAVE YOU BEEN HUMILIATED OR EMOTIONALLY ABUSED IN OTHER WAYS BY YOUR PARTNER OR EX-PARTNER?: NO

## 2025-03-27 SDOH — ECONOMIC STABILITY: HOUSING INSECURITY: IN THE PAST 12 MONTHS, HOW MANY TIMES HAVE YOU MOVED WHERE YOU WERE LIVING?: 0

## 2025-03-27 SDOH — SOCIAL STABILITY: SOCIAL INSECURITY: HAVE YOU HAD THOUGHTS OF HARMING ANYONE ELSE?: NO

## 2025-03-27 SDOH — ECONOMIC STABILITY: HOUSING INSECURITY: IN THE LAST 12 MONTHS, WAS THERE A TIME WHEN YOU WERE NOT ABLE TO PAY THE MORTGAGE OR RENT ON TIME?: NO

## 2025-03-27 SDOH — ECONOMIC STABILITY: FOOD INSECURITY: WITHIN THE PAST 12 MONTHS, THE FOOD YOU BOUGHT JUST DIDN'T LAST AND YOU DIDN'T HAVE MONEY TO GET MORE.: NEVER TRUE

## 2025-03-27 SDOH — SOCIAL STABILITY: SOCIAL INSECURITY: DOES ANYONE TRY TO KEEP YOU FROM HAVING/CONTACTING OTHER FRIENDS OR DOING THINGS OUTSIDE YOUR HOME?: NO

## 2025-03-27 SDOH — ECONOMIC STABILITY: TRANSPORTATION INSECURITY: IN THE PAST 12 MONTHS, HAS LACK OF TRANSPORTATION KEPT YOU FROM MEDICAL APPOINTMENTS OR FROM GETTING MEDICATIONS?: NO

## 2025-03-27 SDOH — ECONOMIC STABILITY: INCOME INSECURITY: IN THE PAST 12 MONTHS HAS THE ELECTRIC, GAS, OIL, OR WATER COMPANY THREATENED TO SHUT OFF SERVICES IN YOUR HOME?: NO

## 2025-03-27 SDOH — SOCIAL STABILITY: SOCIAL INSECURITY: WITHIN THE LAST YEAR, HAVE YOU BEEN AFRAID OF YOUR PARTNER OR EX-PARTNER?: NO

## 2025-03-27 SDOH — SOCIAL STABILITY: SOCIAL INSECURITY: ARE YOU OR HAVE YOU BEEN THREATENED OR ABUSED PHYSICALLY, EMOTIONALLY, OR SEXUALLY BY ANYONE?: NO

## 2025-03-27 SDOH — SOCIAL STABILITY: SOCIAL INSECURITY
WITHIN THE LAST YEAR, HAVE YOU BEEN RAPED OR FORCED TO HAVE ANY KIND OF SEXUAL ACTIVITY BY YOUR PARTNER OR EX-PARTNER?: NO

## 2025-03-27 SDOH — SOCIAL STABILITY: SOCIAL INSECURITY: ABUSE: ADULT

## 2025-03-27 SDOH — SOCIAL STABILITY: SOCIAL INSECURITY: ARE THERE ANY APPARENT SIGNS OF INJURIES/BEHAVIORS THAT COULD BE RELATED TO ABUSE/NEGLECT?: NO

## 2025-03-27 SDOH — ECONOMIC STABILITY: FOOD INSECURITY: WITHIN THE PAST 12 MONTHS, YOU WORRIED THAT YOUR FOOD WOULD RUN OUT BEFORE YOU GOT THE MONEY TO BUY MORE.: NEVER TRUE

## 2025-03-27 SDOH — SOCIAL STABILITY: SOCIAL INSECURITY
WITHIN THE LAST YEAR, HAVE YOU BEEN KICKED, HIT, SLAPPED, OR OTHERWISE PHYSICALLY HURT BY YOUR PARTNER OR EX-PARTNER?: NO

## 2025-03-27 SDOH — SOCIAL STABILITY: SOCIAL INSECURITY: DO YOU FEEL ANYONE HAS EXPLOITED OR TAKEN ADVANTAGE OF YOU FINANCIALLY OR OF YOUR PERSONAL PROPERTY?: NO

## 2025-03-27 SDOH — ECONOMIC STABILITY: HOUSING INSECURITY: AT ANY TIME IN THE PAST 12 MONTHS, WERE YOU HOMELESS OR LIVING IN A SHELTER (INCLUDING NOW)?: NO

## 2025-03-27 SDOH — SOCIAL STABILITY: SOCIAL INSECURITY: HAS ANYONE EVER THREATENED TO HURT YOUR FAMILY OR YOUR PETS?: NO

## 2025-03-27 SDOH — ECONOMIC STABILITY: FOOD INSECURITY: HOW HARD IS IT FOR YOU TO PAY FOR THE VERY BASICS LIKE FOOD, HOUSING, MEDICAL CARE, AND HEATING?: NOT VERY HARD

## 2025-03-27 SDOH — SOCIAL STABILITY: SOCIAL INSECURITY: WERE YOU ABLE TO COMPLETE ALL THE BEHAVIORAL HEALTH SCREENINGS?: YES

## 2025-03-27 SDOH — SOCIAL STABILITY: SOCIAL INSECURITY: DO YOU FEEL UNSAFE GOING BACK TO THE PLACE WHERE YOU ARE LIVING?: NO

## 2025-03-27 ASSESSMENT — ACTIVITIES OF DAILY LIVING (ADL)
DRESSING YOURSELF: INDEPENDENT
JUDGMENT_ADEQUATE_SAFELY_COMPLETE_DAILY_ACTIVITIES: YES
WALKS IN HOME: NEEDS ASSISTANCE
BATHING: INDEPENDENT
LACK_OF_TRANSPORTATION: NO
FEEDING YOURSELF: INDEPENDENT
LACK_OF_TRANSPORTATION: NO
HEARING - LEFT EAR: HEARING AID
ADEQUATE_TO_COMPLETE_ADL: YES
ASSISTIVE_DEVICE: WALKER;HEARING AID - RIGHT;HEARING AID - LEFT;EYEGLASSES
LACK_OF_TRANSPORTATION: NO
PATIENT'S MEMORY ADEQUATE TO SAFELY COMPLETE DAILY ACTIVITIES?: YES
HEARING - RIGHT EAR: HEARING AID
TOILETING: INDEPENDENT
GROOMING: INDEPENDENT

## 2025-03-27 ASSESSMENT — PATIENT HEALTH QUESTIONNAIRE - PHQ9
SUM OF ALL RESPONSES TO PHQ9 QUESTIONS 1 & 2: 0
1. LITTLE INTEREST OR PLEASURE IN DOING THINGS: NOT AT ALL
2. FEELING DOWN, DEPRESSED OR HOPELESS: NOT AT ALL

## 2025-03-27 ASSESSMENT — COGNITIVE AND FUNCTIONAL STATUS - GENERAL
HELP NEEDED FOR BATHING: A LITTLE
DAILY ACTIVITIY SCORE: 20
WALKING IN HOSPITAL ROOM: A LITTLE
TOILETING: A LITTLE
DRESSING REGULAR LOWER BODY CLOTHING: A LITTLE
CLIMB 3 TO 5 STEPS WITH RAILING: A LOT
STANDING UP FROM CHAIR USING ARMS: A LITTLE
DRESSING REGULAR UPPER BODY CLOTHING: A LITTLE
MOBILITY SCORE: 20
PATIENT BASELINE BEDBOUND: NO

## 2025-03-27 ASSESSMENT — LIFESTYLE VARIABLES
HOW MANY STANDARD DRINKS CONTAINING ALCOHOL DO YOU HAVE ON A TYPICAL DAY: PATIENT DOES NOT DRINK
AUDIT-C TOTAL SCORE: 0
HOW OFTEN DO YOU HAVE 6 OR MORE DRINKS ON ONE OCCASION: NEVER
SKIP TO QUESTIONS 9-10: 1
HOW OFTEN DO YOU HAVE A DRINK CONTAINING ALCOHOL: NEVER
AUDIT-C TOTAL SCORE: 0

## 2025-03-27 ASSESSMENT — COLUMBIA-SUICIDE SEVERITY RATING SCALE - C-SSRS
6. HAVE YOU EVER DONE ANYTHING, STARTED TO DO ANYTHING, OR PREPARED TO DO ANYTHING TO END YOUR LIFE?: NO
2. HAVE YOU ACTUALLY HAD ANY THOUGHTS OF KILLING YOURSELF?: NO
1. IN THE PAST MONTH, HAVE YOU WISHED YOU WERE DEAD OR WISHED YOU COULD GO TO SLEEP AND NOT WAKE UP?: NO

## 2025-03-27 ASSESSMENT — PAIN SCALES - GENERAL
PAINLEVEL_OUTOF10: 0 - NO PAIN
PAINLEVEL_OUTOF10: 0 - NO PAIN

## 2025-03-27 ASSESSMENT — PAIN SCALES - WONG BAKER: WONGBAKER_NUMERICALRESPONSE: NO HURT

## 2025-03-27 ASSESSMENT — PAIN - FUNCTIONAL ASSESSMENT: PAIN_FUNCTIONAL_ASSESSMENT: 0-10

## 2025-03-27 NOTE — RESULT ENCOUNTER NOTE
Jamaal Balbuena was called.  I spoke to his son and medical power of .  His blood work does show a significantly elevated white blood cell count with a left shift indicating there is probably a significant infection.  He also has severe hypokalemia with a potassium of 2.2 but his kidney function is about stable.  I advised him to go to the emergency room for urgent correction of the potassium and identification of the infection source.  They will take him there this morning

## 2025-03-27 NOTE — ED PROVIDER NOTES
HPI   Chief Complaint   Patient presents with    Blood Infection     Dr. Sandoval called this morning to say pt. Has a bacterial infection and low potasium.         89-year-old male, referred by primary care, per family the patient's had a significant change in his abilities, progressive weakness over the last few weeks, no longer able to perform any of his ADLs independently.  Was seen by primary care yesterday who ordered laboratory workup, notified today that his white blood cell count was high, potassium low, sodium high.    Does have history of CKD 5, CHF    Patient did have a fall on Monday, was found on the ground by family early morning      History provided by:  Patient   used: No            Patient History   Past Medical History:   Diagnosis Date    Atherosclerotic heart disease of native coronary artery without angina pectoris     CAD, multiple vessel    Encounter for screening for eye and ear disorders     Diabetic retinopathy screening    Old myocardial infarction     History of myocardial infarction    Personal history of other diseases of the circulatory system 08/27/2014    History of hypertension    Personal history of other diseases of the circulatory system     History of cardiac disorder    Personal history of other diseases of urinary system     History of kidney disease    Personal history of other endocrine, nutritional and metabolic disease     History of diabetes mellitus    Personal history of other specified conditions     History of bradycardia    Personal history of other specified conditions     History of chest pain    Personal history of transient ischemic attack (TIA), and cerebral infarction without residual deficits     History of stroke    Presence of intraocular lens 05/19/2017    Pseudophakia    Stroke (cerebrum) (Multi) 11/2024     Past Surgical History:   Procedure Laterality Date    CATARACT EXTRACTION  05/19/2017    Cataract Extraction    MR ABDOMEN ANGIO W  IV CONTRAST  9/16/2014    MR ABDOMEN ANGIO W IV CONTRAST 9/16/2014 [facility] [place] [facility]CILLARY LEGACY    MR PELVIS ANGIO W IV CONTRAST  9/16/2014    MR PELVIS ANGIO W IV CONTRAST 9/16/2014 [facility] [place] [facility]CILLARY LEGACY    OTHER SURGICAL HISTORY  08/27/2014    Enteroscopic Polypectomy    OTHER SURGICAL HISTORY  04/12/2022    Mohs surgery    OTHER SURGICAL HISTORY  06/01/2022    Cath Placement Of Stent 2    OTHER SURGICAL HISTORY  11/14/2021    Complete colonoscopy    OTHER SURGICAL HISTORY  11/14/2021    Tonsillectomy with adenoidectomy    OTHER SURGICAL HISTORY  11/14/2021    Cataract surgery    OTHER SURGICAL HISTORY  11/14/2021    Tooth extraction    TONSILLECTOMY  08/27/2014    Tonsillectomy     Family History   Problem Relation Name Age of Onset    Other (malignant neoplasm) Mother      Other (cardiac disorder) Father       Social History     Tobacco Use    Smoking status: Never    Smokeless tobacco: Never   Substance Use Topics    Alcohol use: Never    Drug use: Never       Physical Exam   ED Triage Vitals [03/27/25 1101]   Temperature Heart Rate Respirations BP   36.3 °C (97.3 °F) 71 20 (!) 202/93      Pulse Ox Temp Source Heart Rate Source Patient Position   96 % Temporal Monitor Sitting      BP Location FiO2 (%)     Right arm --       Physical Exam  Constitutional: Vitals noted, no distress. Afebrile.   Cardiovascular: Regular, rate, rhythm, no murmur.   Pulmonary: Lungs clear bilaterally with good aeration. No adventitious breath sounds.   Gastrointestinal: Soft, nonsurgical. Nontender. No peritoneal signs. Normoactive bowel sounds.   Musculoskeletal: No peripheral edema. Negative Homans bilaterally, no cords.   Skin: No rash.   Neuro: No focal neurologic deficits, NIH score of 0.  Generalized weakness but no focal deficits appreciated      ED Course & MDM   Diagnoses as of 03/27/25 1354   Weakness   Hypokalemia   Hypernatremia   Hypomagnesemia   Elevated troponin     Labs Reviewed   CBC WITH AUTO DIFFERENTIAL - Abnormal        Result Value    WBC 14.1 (*)     nRBC 0.0      RBC 2.96 (*)     Hemoglobin 9.4 (*)     Hematocrit 29.5 (*)           MCH 31.8      MCHC 31.9 (*)     RDW 15.1 (*)     Platelets 263      Neutrophils % 84.5      Immature Granulocytes %, Automated 0.4      Lymphocytes % 8.7      Monocytes % 5.8      Eosinophils % 0.0      Basophils % 0.6      Neutrophils Absolute 11.90 (*)     Immature Granulocytes Absolute, Automated 0.05      Lymphocytes Absolute 1.23      Monocytes Absolute 0.81 (*)     Eosinophils Absolute 0.00      Basophils Absolute 0.08     COMPREHENSIVE METABOLIC PANEL - Abnormal    Glucose 225 (*)     Sodium 149 (*)     Potassium 2.1 (*)     Chloride 104      Bicarbonate 35 (*)     Anion Gap 12      Urea Nitrogen 35 (*)     Creatinine 3.35 (*)     eGFR 17 (*)     Calcium 11.5 (*)     Albumin 3.6      Alkaline Phosphatase 83      Total Protein 7.3      AST 15      Bilirubin, Total 1.0      ALT 9 (*)    MAGNESIUM - Abnormal    Magnesium 1.33 (*)    TROPONIN I, HIGH SENSITIVITY - Abnormal    Troponin I, High Sensitivity 120 (*)     Narrative:     Less than 99th percentile of normal range cutoff-  Female and children under 18 years old <14 ng/L; Male <21 ng/L: Negative  Repeat testing should be performed if clinically indicated.     Female and children under 18 years old 14-50 ng/L; Male 21-50 ng/L:  Consistent with possible cardiac damage and possible increased clinical   risk. Serial measurements may help to assess extent of myocardial damage.     >50 ng/L: Consistent with cardiac damage, increased clinical risk and  myocardial infarction. Serial measurements may help assess extent of   myocardial damage.      NOTE: Children less than 1 year old may have higher baseline troponin   levels and results should be interpreted in conjunction with the overall   clinical context.     NOTE: Troponin I testing is performed using a different   testing methodology at [facility] than at other   system  [facility]. Direct result comparisons should only   be made within the same method.   URINALYSIS WITH REFLEX CULTURE AND MICROSCOPIC - Abnormal    Color, Urine Light-Yellow      Appearance, Urine Clear      Specific Gravity, Urine 1.010      pH, Urine 6.0      Protein, Urine 30 (1+) (*)     Glucose, Urine 200 (2+) (*)     Blood, Urine NEGATIVE      Ketones, Urine NEGATIVE      Bilirubin, Urine NEGATIVE      Urobilinogen, Urine Normal      Nitrite, Urine NEGATIVE      Leukocyte Esterase, Urine NEGATIVE     TROPONIN I, HIGH SENSITIVITY - Abnormal    Troponin I, High Sensitivity 107 (*)     Narrative:     Less than 99th percentile of normal range cutoff-  Female and children under 18 years old <14 ng/L; Male <21 ng/L: Negative  Repeat testing should be performed if clinically indicated.     Female and children under 18 years old 14-50 ng/L; Male 21-50 ng/L:  Consistent with possible cardiac damage and possible increased clinical   risk. Serial measurements may help to assess extent of myocardial damage.     >50 ng/L: Consistent with cardiac damage, increased clinical risk and  myocardial infarction. Serial measurements may help assess extent of   myocardial damage.      NOTE: Children less than 1 year old may have higher baseline troponin   levels and results should be interpreted in conjunction with the overall   clinical context.     NOTE: Troponin I testing is performed using a different   testing methodology at [facility] than at other   [facility]. Direct result comparisons should only   be made within the same method.   LACTATE - Normal    Lactate 1.1      Narrative:     Venipuncture immediately after or during the administration of Metamizole may lead to falsely low results. Testing should be performed immediately prior to Metamizole dosing.   PROTIME-INR - Normal    Protime 12.3      INR 1.1     SARS-COV-2, INFLUENZA A/B AND RSV PCR - Normal    Coronavirus 2019, PCR Not Detected      Flu A  Result Not Detected      Flu B Result Not Detected      RSV PCR Not Detected      Narrative:     This assay is an FDA-cleared, in vitro diagnostic nucleic acid amplification test for the qualitative detection and differentiation of SARS CoV-2/ Influenza A/B/ RSV from nasopharyngeal specimens collected from individuals with signs and symptoms of respiratory tract infections, and has been validated for use at [facility]. Negative results do not preclude COVID-19/ Influenza A/B/ RSV infections and should not be used as the sole basis for diagnosis, treatment, or other management decisions. Testing for SARS CoV-2 is recommended only for patients who meet current clinical and/or epidemiological criteria defined by federal, state, or local public health directives.   URINALYSIS MICROSCOPIC WITH REFLEX CULTURE - Normal    WBC, Urine NONE      RBC, Urine 1-2     BLOOD CULTURE   BLOOD CULTURE   URINALYSIS WITH REFLEX CULTURE AND MICROSCOPIC    Narrative:     The following orders were created for panel order Urinalysis with Reflex Culture and Microscopic.  Procedure                               Abnormality         Status                     ---------                               -----------         ------                     Urinalysis with Reflex C...[[id number]]  Abnormal            Final result               Extra Urine Gray Tube[[id number]]                            In process                   Please view results for these tests on the individual orders.   EXTRA URINE GRAY TUBE        XR chest 1 view   Final Result   No acute cardiopulmonary abnormality.        Signed by: Chepe Denton 3/27/2025 12:11 PM   Dictation workstation:   [id number]      CT head wo IV contrast   Final Result   No acute intracranial abnormality. Consider follow-up with MRI as   warranted.        Mild global cerebral atrophy with concordant prominence of the   ventricles and sulci. Ventriculomegaly out of proportion  to sulcal   prominence raising question of normal pressure hydrocephalus.        Signed by: Chepe Denton 3/27/2025 12:00 PM   Dictation workstation:   [id number]      CT cervical spine wo IV contrast   Final Result   No evidence for an acute fracture or subluxation of the cervical   spine. Degenerative changes.        Signed by: Chepe Denton 3/27/2025 12:10 PM   Dictation workstation:   [id number]                    No data recorded     Shimon Coma Scale Score: 15 (03/27/25 1058 : Lucas Lund RN)                     Medical Decision Making    Patient presents with family member, weakness send abnormal laboratory workup by primary care yesterday.    No focal deficits noted.    EKG at 1113 with ventricular of 80, as interpreted by me, appears to be a sinus rhythm with wide QRS, right bundle branch block, nonspecific T wave abnormality noted, no evidence of acute ischemia.    Laboratory workup with several concerning findings.  Patient's white count 14.1 , Yesterday 15.  Hemoglobin 9.4, platelets 263.  Lactate level 1.1.  INR 1.1.  Metabolic panel with a glucose of 225, sodium 149, potassium 2.1, magnesium 1.33 and creatinine 3.35.  Creatinine is overall improved from his recent baseline initial troponin 120, delta troponin 107.  Urinalysis unremarkable.    I did initiate electrolyte correction, given 500 cc IV fluid bolus for the hypernatremia, as well as given 40 mEq of potassium for the hypokalemia, 1 g of magnesium for the hypomagnesemia.    Imaging of the head and C-spine obtained due to the weakness and recent fall, unremarkable for evidence of acute trauma or other acute findings.  chest x-ray with no acute cardiopulmonary process noted    Ultimately patient will require hospitalization for electrolyte correction and close monitoring of his electrolytes.  Discussed with Chaya of the hospitalist service, she accepts the patient    Shared MELISSA Attestation:    I personally saw the patient and  made/approved the management plan and take responsibility for the patient management.    History: Patient presenting with generalized weakness and abnormal outpatient labs.    Exam: Regular rate and rhythm cardiac exam with clear breath sounds bilaterally.  Abdomen is soft and nontender.  Neurological exam is grossly intact except the patient is generally weak.  Negative Homans' sign bilaterally.    MDM:     Differential Diagnosis: ACS, arrhythmia, electrolyte disorder, infection    Labs Reviewed   CBC WITH AUTO DIFFERENTIAL - Abnormal       Result Value    WBC 14.1 (*)     nRBC 0.0      RBC 2.96 (*)     Hemoglobin 9.4 (*)     Hematocrit 29.5 (*)           MCH 31.8      MCHC 31.9 (*)     RDW 15.1 (*)     Platelets 263      Neutrophils % 84.5      Immature Granulocytes %, Automated 0.4      Lymphocytes % 8.7      Monocytes % 5.8      Eosinophils % 0.0      Basophils % 0.6      Neutrophils Absolute 11.90 (*)     Immature Granulocytes Absolute, Automated 0.05      Lymphocytes Absolute 1.23      Monocytes Absolute 0.81 (*)     Eosinophils Absolute 0.00      Basophils Absolute 0.08     COMPREHENSIVE METABOLIC PANEL - Abnormal    Glucose 225 (*)     Sodium 149 (*)     Potassium 2.1 (*)     Chloride 104      Bicarbonate 35 (*)     Anion Gap 12      Urea Nitrogen 35 (*)     Creatinine 3.35 (*)     eGFR 17 (*)     Calcium 11.5 (*)     Albumin 3.6      Alkaline Phosphatase 83      Total Protein 7.3      AST 15      Bilirubin, Total 1.0      ALT 9 (*)    MAGNESIUM - Abnormal    Magnesium 1.33 (*)    TROPONIN I, HIGH SENSITIVITY - Abnormal    Troponin I, High Sensitivity 120 (*)     Narrative:     Less than 99th percentile of normal range cutoff-  Female and children under 18 years old <14 ng/L; Male <21 ng/L: Negative  Repeat testing should be performed if clinically indicated.     Female and children under 18 years old 14-50 ng/L; Male 21-50 ng/L:  Consistent with possible cardiac damage and possible increased clinical    risk. Serial measurements may help to assess extent of myocardial damage.     >50 ng/L: Consistent with cardiac damage, increased clinical risk and  myocardial infarction. Serial measurements may help assess extent of   myocardial damage.      NOTE: Children less than 1 year old may have higher baseline troponin   levels and results should be interpreted in conjunction with the overall   clinical context.     NOTE: Troponin I testing is performed using a different   testing methodology at [facility] than at other   [facility]. Direct result comparisons should only   be made within the same method.   LACTATE - Normal    Lactate 1.1      Narrative:     Venipuncture immediately after or during the administration of Metamizole may lead to falsely low results. Testing should be performed immediately prior to Metamizole dosing.   PROTIME-INR - Normal    Protime 12.3      INR 1.1     BLOOD CULTURE   BLOOD CULTURE   URINALYSIS WITH REFLEX CULTURE AND MICROSCOPIC    Narrative:     The following orders were created for panel order Urinalysis with Reflex Culture and Microscopic.  Procedure                               Abnormality         Status                     ---------                               -----------         ------                     Urinalysis with Reflex C...[[id number]]                                                 Extra Urine Gray Tube[[id number]]                                                         Please view results for these tests on the individual orders.   URINALYSIS WITH REFLEX CULTURE AND MICROSCOPIC   EXTRA URINE GRAY TUBE   SARS-COV-2, INFLUENZA A/B AND RSV PCR   TROPONIN I, HIGH SENSITIVITY       XR chest 1 view   Final Result   No acute cardiopulmonary abnormality.        Signed by: Chepe Denton 3/27/2025 12:11 PM   Dictation workstation:   [id number]      CT head wo IV contrast   Final Result   No acute intracranial abnormality. Consider follow-up with MRI as    warranted.        Mild global cerebral atrophy with concordant prominence of the   ventricles and sulci. Ventriculomegaly out of proportion to sulcal   prominence raising question of normal pressure hydrocephalus.        Signed by: Chepe Denton 3/27/2025 12:00 PM   Dictation workstation:   [id number]      CT cervical spine wo IV contrast   Final Result   No evidence for an acute fracture or subluxation of the cervical   spine. Degenerative changes.        Signed by: Chepe Denton 3/27/2025 12:10 PM   Dictation workstation:   [id number]            Isauro Rasmussen MD      Procedure  Procedures     Sivan Hummel, ANTONIETTA-CNP  03/27/25 0905

## 2025-03-27 NOTE — H&P
History Of Present Illness  Jamaal Balbuena is a 89 y.o. male with PMH CKD stage V, HTN, HLD, CAD, cardiomyopathy, T2DM, Parkinson's, and history of stroke who is presenting with generalized weakness with a fall 3 days ago.  Patient lives home alone and had a fall on Monday without injury.  Per family over the past couple days patient has become more weak and went to his primary care physician today.  Patient had a lengthy discussion with his PCP and is not interested in pursuing hemodialysis.  Patient's labs came back with low potassium and was sent to the emergency room.  Patient is not requiring supplemental O2.  Patient denies chest pain or shortness of breath.  Denies abdominal pain, nausea vomiting or diarrhea.  Denies fever or chills and states he has not been around anyone ill.  Labs on admission potassium 2.1, creatinine 3.35 and magnesium 1.33.  WBCs 15.0 and hemoglobin 9.4.  UA negative.  Influenza and COVID-negative.  CT imaging negative for fracture.  Patient will be admitted for further evaluation and treatment.     Past Medical History  He has a past medical history of Atherosclerotic heart disease of native coronary artery without angina pectoris, Encounter for screening for eye and ear disorders, Old myocardial infarction, Personal history of other diseases of the circulatory system (08/27/2014), Personal history of other diseases of the circulatory system, Personal history of other diseases of urinary system, Personal history of other endocrine, nutritional and metabolic disease, Personal history of other specified conditions, Personal history of other specified conditions, Personal history of transient ischemic attack (TIA), and cerebral infarction without residual deficits, Presence of intraocular lens (05/19/2017), and Stroke (cerebrum) (Multi) (11/2024).    Surgical History  He has a past surgical history that includes Other surgical history (08/27/2014); Tonsillectomy (08/27/2014); Other surgical  history (04/12/2022); Other surgical history (06/01/2022); Other surgical history (11/14/2021); Other surgical history (11/14/2021); Other surgical history (11/14/2021); Other surgical history (11/14/2021); Cataract extraction (05/19/2017); MR angio abdomen w IV contrast (9/16/2014); and MR angio pelvis w IV contrast (9/16/2014).     Social History  He reports that he has never smoked. He has never used smokeless tobacco. He reports that he does not drink alcohol and does not use drugs.    Family History  Family History   Problem Relation Name Age of Onset    Other (malignant neoplasm) Mother      Other (cardiac disorder) Father          Allergies  Patient has no known allergies.    Review of Systems   Review of systems: 10 system were reviewed and were negative except what was mentioned in history of present illness    Physical Exam  Constitutional:       Appearance: He is ill-appearing.      Comments: Frail and elderly   HENT:      Head: Normocephalic.      Mouth/Throat:      Mouth: Mucous membranes are moist.   Eyes:      Pupils: Pupils are equal, round, and reactive to light.   Cardiovascular:      Rate and Rhythm: Normal rate and regular rhythm.      Heart sounds: Normal heart sounds, S1 normal and S2 normal.   Pulmonary:      Effort: Pulmonary effort is normal.      Breath sounds: Normal breath sounds.   Abdominal:      General: Bowel sounds are normal.      Palpations: Abdomen is soft.   Musculoskeletal:         General: Normal range of motion.      Cervical back: Neck supple.   Skin:     General: Skin is warm.   Neurological:      Mental Status: He is alert and oriented to person, place, and time.      Motor: Weakness present.   Psychiatric:         Mood and Affect: Mood normal.         Behavior: Behavior normal.          Last Recorded Vitals  /78   Pulse 68   Temp 36.3 °C (97.3 °F)   Resp 16   Wt 87.1 kg (192 lb)   SpO2 (!) 91%     Relevant Results  CBC:   Results from last 7 days   Lab Units  03/27/25  1122 03/26/25  1320   WBC AUTO x10*3/uL 14.1*  --    [facility] WBC AUTO [place]/uL  --  15.0*   RBC AUTO x10*6/uL 2.96*  --    [facility] RBC AUTO [place]/uL  --  3.10*   HEMOGLOBIN g/dL 9.4*  --    [facility] HEMOGLOBIN g/dL  --  9.9*   HEMATOCRIT % 29.5*  --    [facility] HEMATOCRIT %  --  30.8*   MCV fL 100  --    [facility] MCV fL  --  99.4   MCH pg 31.8  --    [facility] MCH pg  --  31.9   MCHC g/dL 31.9*  --    [facility] MCHC g/dL  --  32.1   RDW % 15.1*  --    [facility] RDW %  --  13.6   PLATELETS AUTO x10*3/uL 263  --    [facility] PLATELETS AUTO [place]/uL  --  269   [facility] MPV fL  --  11.2     CMP:    Results from last 7 days   Lab Units 03/27/25  1122 03/26/25  1320   [facility] SODIUM mmol/L  --  148*   SODIUM mmol/L 149*  --    [facility] POTASSIUM mmol/L  --  2.2*   POTASSIUM mmol/L 2.1*  --    [facility] CHLORIDE mmol/L  --  101   CHLORIDE mmol/L 104  --    [facility] CO2 mmol/L  --  36*   CO2 mmol/L 35*  --    BUN mg/dL 35*  --    [facility] BUN mg/dL  --  35*   CREATININE mg/dL 3.35*  --    [facility] CREATININE mg/dL  --  3.38*   [facility] GLUCOSE mg/dL  --  245*   GLUCOSE mg/dL 225*  --    [facility] PROTEIN TOTAL g/dL  --  7.4   PROTEIN TOTAL g/dL 7.3  --    [facility] CALCIUM mg/dL  --  12.1*   CALCIUM mg/dL 11.5*  --    BILIRUBIN TOTAL mg/dL 1.0  --    [facility] BILIRUBIN TOTAL mg/dL  --  1.0   [facility] ALK PHOS U/L  --  90   ALK PHOS U/L 83  --    [facility] AST U/L  --  13   AST U/L 15  --    [facility] ALT U/L  --  9   ALT U/L 9*  --      BMP:    Results from last 7 days   Lab Units 03/27/25  1122 03/26/25  1320   [facility] SODIUM mmol/L  --  148*   SODIUM mmol/L 149*  --    [facility] POTASSIUM mmol/L  --  2.2*   POTASSIUM mmol/L 2.1*  --    [facility] CHLORIDE mmol/L  --  101   CHLORIDE mmol/L 104  --    [facility] CO2 mmol/L  --  36*   CO2 mmol/L 35*  --    BUN mg/dL 35*  --    [facility] BUN mg/dL  --  35*   CREATININE mg/dL 3.35*  --    [facility] CREATININE mg/dL  --  3.38*   [facility] CALCIUM mg/dL  --  12.1*   CALCIUM mg/dL 11.5*  --    [facility] GLUCOSE mg/dL  --  245*   GLUCOSE mg/dL 225*  --       Magnesium:  Results from last 7 days   Lab Units 03/27/25  1122   MAGNESIUM mg/dL 1.33*     Troponin:    Results from last 7 days   Lab Units 03/27/25  1237 03/27/25  1122   TROPHS ng/L 107* 120*       Assessment/Plan    Weakness/Fall  Hypokalemia  Hypomagnesia  -Imaging negative for fractures  -Patient was given magnesium, potassium and a bolus in the ER  -Mag 1.34, potassium 2.1 will recheck this evening  -Gentle IV fluids  -CBC and BMP daily  -UA negative  -Patient will need PT/OT evaluation          CKD stage V  HTN/HLD  CAD  Cardiomyopathy  T2DM  Parkinson's  History of stroke  -Diabetic diet with Accu-Cheks and sliding scale  -Resume home meds when warranted  -Echo shows LVEF 30%  -Creatinine at baseline 3.3 patient not interested in dialysis    CODE STATUS is DNR CCA    DVTp: Lovenox    PLAN: Patient lives home alone    Jaqueline Mckoy, APRN-CNP    Plan of care was discussed extensively with patient.  Patient verbalized understanding through teach back method.  All question and concerns addressed upon examination.    Of note, this documentation is completed using the Dragon Dictation system (voice recognition software). There may be spelling and/or grammatical errors that were not corrected prior to final submission.

## 2025-03-27 NOTE — TELEPHONE ENCOUNTER
I followed up with Jeff and they wanted to inform you that the patient had critical labs.     Potassium is 2.2

## 2025-03-28 PROBLEM — R53.1 WEAKNESS: Status: ACTIVE | Noted: 2025-03-28

## 2025-03-28 LAB
ANION GAP SERPL CALC-SCNC: 11 MMOL/L (ref 10–20)
ANION GAP SERPL CALC-SCNC: 9 MMOL/L (ref 10–20)
BNP SERPL-MCNC: 430 PG/ML (ref 0–99)
BUN SERPL-MCNC: 33 MG/DL (ref 6–23)
BUN SERPL-MCNC: 34 MG/DL (ref 6–23)
CALCIUM SERPL-MCNC: 10.4 MG/DL (ref 8.6–10.3)
CALCIUM SERPL-MCNC: 10.7 MG/DL (ref 8.6–10.3)
CHLORIDE SERPL-SCNC: 106 MMOL/L (ref 98–107)
CHLORIDE SERPL-SCNC: 106 MMOL/L (ref 98–107)
CO2 SERPL-SCNC: 33 MMOL/L (ref 21–32)
CO2 SERPL-SCNC: 35 MMOL/L (ref 21–32)
CREAT SERPL-MCNC: 3.2 MG/DL (ref 0.5–1.3)
CREAT SERPL-MCNC: 3.22 MG/DL (ref 0.5–1.3)
D DIMER PPP FEU-MCNC: 1532 NG/ML FEU
EGFRCR SERPLBLD CKD-EPI 2021: 17 ML/MIN/1.73M*2
EGFRCR SERPLBLD CKD-EPI 2021: 18 ML/MIN/1.73M*2
ERYTHROCYTE [DISTWIDTH] IN BLOOD BY AUTOMATED COUNT: 15 % (ref 11.5–14.5)
ERYTHROCYTE [DISTWIDTH] IN BLOOD BY AUTOMATED COUNT: 15.3 % (ref 11.5–14.5)
GLUCOSE SERPL-MCNC: 207 MG/DL (ref 74–99)
GLUCOSE SERPL-MCNC: 219 MG/DL (ref 74–99)
HCT VFR BLD AUTO: 24.3 % (ref 41–52)
HCT VFR BLD AUTO: 27.1 % (ref 41–52)
HGB BLD-MCNC: 8 G/DL (ref 13.5–17.5)
HGB BLD-MCNC: 8.6 G/DL (ref 13.5–17.5)
HOLD SPECIMEN: NORMAL
MAGNESIUM SERPL-MCNC: 1.45 MG/DL (ref 1.6–2.4)
MAGNESIUM SERPL-MCNC: 1.63 MG/DL (ref 1.6–2.4)
MCH RBC QN AUTO: 31.5 PG (ref 26–34)
MCH RBC QN AUTO: 32.5 PG (ref 26–34)
MCHC RBC AUTO-ENTMCNC: 31.7 G/DL (ref 32–36)
MCHC RBC AUTO-ENTMCNC: 32.9 G/DL (ref 32–36)
MCV RBC AUTO: 99 FL (ref 80–100)
MCV RBC AUTO: 99 FL (ref 80–100)
NRBC BLD-RTO: 0 /100 WBCS (ref 0–0)
NRBC BLD-RTO: 0 /100 WBCS (ref 0–0)
PLATELET # BLD AUTO: 227 X10*3/UL (ref 150–450)
PLATELET # BLD AUTO: 239 X10*3/UL (ref 150–450)
POTASSIUM SERPL-SCNC: 1.9 MMOL/L (ref 3.5–5.3)
POTASSIUM SERPL-SCNC: 2.2 MMOL/L (ref 3.5–5.3)
RBC # BLD AUTO: 2.46 X10*6/UL (ref 4.5–5.9)
RBC # BLD AUTO: 2.73 X10*6/UL (ref 4.5–5.9)
SODIUM SERPL-SCNC: 148 MMOL/L (ref 136–145)
SODIUM SERPL-SCNC: 148 MMOL/L (ref 136–145)
WBC # BLD AUTO: 10.5 X10*3/UL (ref 4.4–11.3)
WBC # BLD AUTO: 8.7 X10*3/UL (ref 4.4–11.3)

## 2025-03-28 PROCEDURE — 80048 BASIC METABOLIC PNL TOTAL CA: CPT

## 2025-03-28 PROCEDURE — 2500000002 HC RX 250 W HCPCS SELF ADMINISTERED DRUGS (ALT 637 FOR MEDICARE OP, ALT 636 FOR OP/ED)

## 2025-03-28 PROCEDURE — 2500000001 HC RX 250 WO HCPCS SELF ADMINISTERED DRUGS (ALT 637 FOR MEDICARE OP)

## 2025-03-28 PROCEDURE — 36415 COLL VENOUS BLD VENIPUNCTURE: CPT

## 2025-03-28 PROCEDURE — 99223 1ST HOSP IP/OBS HIGH 75: CPT | Performed by: INTERNAL MEDICINE

## 2025-03-28 PROCEDURE — 2500000004 HC RX 250 GENERAL PHARMACY W/ HCPCS (ALT 636 FOR OP/ED)

## 2025-03-28 PROCEDURE — 85379 FIBRIN DEGRADATION QUANT: CPT | Performed by: STUDENT IN AN ORGANIZED HEALTH CARE EDUCATION/TRAINING PROGRAM

## 2025-03-28 PROCEDURE — 99232 SBSQ HOSP IP/OBS MODERATE 35: CPT

## 2025-03-28 PROCEDURE — 83735 ASSAY OF MAGNESIUM: CPT

## 2025-03-28 PROCEDURE — 85027 COMPLETE CBC AUTOMATED: CPT

## 2025-03-28 PROCEDURE — 1200000002 HC GENERAL ROOM WITH TELEMETRY DAILY

## 2025-03-28 PROCEDURE — 2500000004 HC RX 250 GENERAL PHARMACY W/ HCPCS (ALT 636 FOR OP/ED): Performed by: NURSE PRACTITIONER

## 2025-03-28 PROCEDURE — 82374 ASSAY BLOOD CARBON DIOXIDE: CPT

## 2025-03-28 PROCEDURE — 83880 ASSAY OF NATRIURETIC PEPTIDE: CPT | Performed by: NURSE PRACTITIONER

## 2025-03-28 RX ORDER — POTASSIUM CHLORIDE 20 MEQ/1
40 TABLET, EXTENDED RELEASE ORAL EVERY 4 HOURS
Status: COMPLETED | OUTPATIENT
Start: 2025-03-28 | End: 2025-03-28

## 2025-03-28 RX ORDER — POTASSIUM CHLORIDE 20 MEQ/1
40 TABLET, EXTENDED RELEASE ORAL ONCE
Status: COMPLETED | OUTPATIENT
Start: 2025-03-28 | End: 2025-03-28

## 2025-03-28 RX ORDER — MAGNESIUM SULFATE 1 G/100ML
1 INJECTION INTRAVENOUS ONCE
Status: COMPLETED | OUTPATIENT
Start: 2025-03-28 | End: 2025-03-28

## 2025-03-28 RX ORDER — POTASSIUM CHLORIDE 14.9 MG/ML
20 INJECTION INTRAVENOUS ONCE
Status: COMPLETED | OUTPATIENT
Start: 2025-03-28 | End: 2025-03-28

## 2025-03-28 RX ORDER — SODIUM CHLORIDE AND POTASSIUM CHLORIDE 150; 900 MG/100ML; MG/100ML
50 INJECTION, SOLUTION INTRAVENOUS CONTINUOUS
Status: ACTIVE | OUTPATIENT
Start: 2025-03-28

## 2025-03-28 RX ADMIN — CARVEDILOL 6.25 MG: 6.25 TABLET, FILM COATED ORAL at 20:34

## 2025-03-28 RX ADMIN — ALLOPURINOL 100 MG: 100 TABLET ORAL at 08:28

## 2025-03-28 RX ADMIN — LISINOPRIL 5 MG: 5 TABLET ORAL at 08:28

## 2025-03-28 RX ADMIN — POTASSIUM CHLORIDE 40 MEQ: 1500 TABLET, EXTENDED RELEASE ORAL at 08:29

## 2025-03-28 RX ADMIN — ISOSORBIDE MONONITRATE 30 MG: 30 TABLET, EXTENDED RELEASE ORAL at 08:28

## 2025-03-28 RX ADMIN — POTASSIUM CHLORIDE 40 MEQ: 1500 TABLET, EXTENDED RELEASE ORAL at 20:34

## 2025-03-28 RX ADMIN — CLOPIDOGREL BISULFATE 75 MG: 75 TABLET, FILM COATED ORAL at 08:28

## 2025-03-28 RX ADMIN — POTASSIUM CHLORIDE 20 MEQ: 14.9 INJECTION, SOLUTION INTRAVENOUS at 08:05

## 2025-03-28 RX ADMIN — HYDRALAZINE HYDROCHLORIDE 50 MG: 50 TABLET ORAL at 15:45

## 2025-03-28 RX ADMIN — HYDRALAZINE HYDROCHLORIDE 50 MG: 50 TABLET ORAL at 08:28

## 2025-03-28 RX ADMIN — CARVEDILOL 6.25 MG: 6.25 TABLET, FILM COATED ORAL at 08:27

## 2025-03-28 RX ADMIN — MAGNESIUM SULFATE HEPTAHYDRATE 1 G: 1 INJECTION, SOLUTION INTRAVENOUS at 14:29

## 2025-03-28 RX ADMIN — MAGNESIUM SULFATE HEPTAHYDRATE 1 G: 1 INJECTION, SOLUTION INTRAVENOUS at 10:49

## 2025-03-28 RX ADMIN — POTASSIUM CHLORIDE 40 MEQ: 1500 TABLET, EXTENDED RELEASE ORAL at 15:45

## 2025-03-28 RX ADMIN — ATORVASTATIN CALCIUM 40 MG: 20 TABLET, FILM COATED ORAL at 08:28

## 2025-03-28 ASSESSMENT — COGNITIVE AND FUNCTIONAL STATUS - GENERAL
CLIMB 3 TO 5 STEPS WITH RAILING: A LOT
STANDING UP FROM CHAIR USING ARMS: A LITTLE
DRESSING REGULAR LOWER BODY CLOTHING: A LITTLE
STANDING UP FROM CHAIR USING ARMS: A LITTLE
TOILETING: A LITTLE
HELP NEEDED FOR BATHING: A LITTLE
DRESSING REGULAR UPPER BODY CLOTHING: A LITTLE
MOBILITY SCORE: 20
DAILY ACTIVITIY SCORE: 20
TOILETING: A LITTLE
WALKING IN HOSPITAL ROOM: A LITTLE
HELP NEEDED FOR BATHING: A LITTLE
DRESSING REGULAR UPPER BODY CLOTHING: A LITTLE
WALKING IN HOSPITAL ROOM: A LITTLE
DRESSING REGULAR LOWER BODY CLOTHING: A LITTLE
CLIMB 3 TO 5 STEPS WITH RAILING: A LOT

## 2025-03-28 ASSESSMENT — PAIN - FUNCTIONAL ASSESSMENT: PAIN_FUNCTIONAL_ASSESSMENT: 0-10

## 2025-03-28 ASSESSMENT — PAIN SCALES - GENERAL
PAINLEVEL_OUTOF10: 0 - NO PAIN
PAINLEVEL_OUTOF10: 0 - NO PAIN

## 2025-03-28 ASSESSMENT — ACTIVITIES OF DAILY LIVING (ADL): LACK_OF_TRANSPORTATION: NO

## 2025-03-28 NOTE — CONSULTS
Cardiology Consult Note      Date:   3/28/2025  Patient name:  Jamaal Balbuena  Date of admission:  3/27/2025 11:03 AM  MRN:   [id number]  YOB: 1933  Time of Consult:  1:19 PM    Consulting Cardiologist: Dr. Kelvin Palacios, APRN, CNP  Primary Cardiologist:  Dr. Kelvin Oro     Referring Provider: Dr Mehta      Admission Diagnosis:     Weakness generalized      History of Present Illness:      Jamaal Balbuena is a 89 y.o.  male patient who is being at the request of Dr. Mehta for inpatient consultation of CHF. He was admitted on 3/27/2025.  Previous [facility] and [facility] records have been reviewed in detail.  With a history of dementia, CAD, hypertension, dyslipidemia, history of a CVA, subarachnoid hemorrhage, CKD, Parkinson's, CHF  Patient is a very poor historian and the majority of the information I am getting from the chart and from the nursing staff.  Approximately 3 days ago the patient was having generalized weakness and fatigue had a fall had no injuries from it but was extremely weak and fatigue so family brings him into the emergency department found to have very abnormal electrolytes admitted to the ninth floor asked cardiology to get involved with his care.  He has a DNR with no intubations.  He is awake but he only answers questions at different times.  He is very confused.  They had found that he had very low potassium and magnesium levels.  They are replacing with electrolytes.  He is got combined systolic and diastolic heart failure he was on diuretics.  Family is requesting to see renal at this time.  Patient is resting quietly denies chest pain    EKG normal sinus rhythm with a right bundle branch block  Magnesium 1.45  Potassium 1.9  Sodium 148  Creatinine 3.22      2/18/25  Jamaal Balbuena is a 89 y.o. male who presents with coronary angioplasty at the time of infarct in 2014. He also has ischemic cardiomyopathy ejection fraction 40 to 45%. He has not wished further  evaluation unless significantly symptomatic. He also has progressive kidney disease creatinine is in excess of 3. He has chronic lower extremity edema likely related to his kidney disease and use of amlodipine but he feels well on the medical regimen and prefers to put up with the edema he says it is minimally uncomfortable to him. He tolerates low-dose lisinopril only as any increase causes hyperkalemia.  He follows with nephrology regularly.     I had last seen him 8/20/2024 at which time he had mild edema but blood pressure well-controlled.  Suggested at that time amlodipine be discontinued with consideration of resumption of 2.5 should blood pressure increase and he had follow-up with nephrology thereafter.  He had no angina or CHF symptoms he was maintained on Plavix alone rather than combination aspirin Plavix.  Chronic anemia was unchanged at that time.     He was hospitalized at [facility] 10/29/2020 4 through 11//24 with an acute stroke of the anterior left thalamus.  It is associated with encephalopathy.  At time of discharge she had residual aphasia CT with small amount of subarachnoid hemorrhage right parietal area and evolving lacunar infarct left thalamus.  At that time lethargic and less responsive to conversation.  He was then transferred to the rehab facility [facility].  Eventually discharged 11/23/2024.  Patient was reported to have improved while on rehab.  Echocardiogram at there interpreted as 30-35% EF.     Seen by Dr. Murillo 12/19/2024 for nephrology.  Found to have stable though abnormal renal function with creatinine 3.99.  Blood pressure was controlled at that visit.     He presents with his son with whom he lives at this time.  He says he is doing quite well he is had no residual from the stroke.  His speech was a bit slowed at first but is back to normal now.  He has no focal weakness.  It was felt it was related to vascular disease he is monitored  for several weeks in the hospital without evidence of atrial fibrillation.  He notes no angina or shortness of breath.  He has no claudication notes no lower extremity edema               Cardiac history  10/30/24 echo    Left Ventricle: Severely reduced left ventricular systolic function   with a visually estimated EF of 30 - 35%. Left ventricle is moderately   dilated. Mildly increased wall thickness. Severe global hypokinesis   present. Normal diastolic function.     Right Ventricle: Normal systolic function.     Mitral Valve: Mild regurgitation.     Tricuspid Valve: Mild regurgitation. Normal RVSP. The estimated RVSP is   34 mmHg.     Pericardium: No pericardial effusion.     Image quality is technically difficult. Contrast used: Definity.     Allergies:     No Known Allergies      Past Medical History:     Past Medical History:   Diagnosis Date    Atherosclerotic heart disease of native coronary artery without angina pectoris     CAD, multiple vessel    Encounter for screening for eye and ear disorders     Diabetic retinopathy screening    Old myocardial infarction     History of myocardial infarction    Personal history of other diseases of the circulatory system 08/27/2014    History of hypertension    Personal history of other diseases of the circulatory system     History of cardiac disorder    Personal history of other diseases of urinary system     History of kidney disease    Personal history of other endocrine, nutritional and metabolic disease     History of diabetes mellitus    Personal history of other specified conditions     History of bradycardia    Personal history of other specified conditions     History of chest pain    Personal history of transient ischemic attack (TIA), and cerebral infarction without residual deficits     History of stroke    Presence of intraocular lens 05/19/2017    Pseudophakia    Stroke (cerebrum) (Multi) 11/2024       Past Surgical History:     Past Surgical History:    Procedure Laterality Date    CATARACT EXTRACTION  05/19/2017    Cataract Extraction    MR ABDOMEN ANGIO W IV CONTRAST  9/16/2014    MR ABDOMEN ANGIO W IV CONTRAST 9/16/2014 [facility]    MR PELVIS ANGIO W IV CONTRAST  9/16/2014    MR PELVIS ANGIO W IV CONTRAST 9/16/2014 [facility]    OTHER SURGICAL HISTORY  08/27/2014    Enteroscopic Polypectomy    OTHER SURGICAL HISTORY  04/12/2022    Mohs surgery    OTHER SURGICAL HISTORY  06/01/2022    Cath Placement Of Stent 2    OTHER SURGICAL HISTORY  11/14/2021    Complete colonoscopy    OTHER SURGICAL HISTORY  11/14/2021    Tonsillectomy with adenoidectomy    OTHER SURGICAL HISTORY  11/14/2021    Cataract surgery    OTHER SURGICAL HISTORY  11/14/2021    Tooth extraction    TONSILLECTOMY  08/27/2014    Tonsillectomy       Family History:     Family History   Problem Relation Name Age of Onset    Other (malignant neoplasm) Mother      Other (cardiac disorder) Father         Social History:     Social History     Tobacco Use    Smoking status: Never    Smokeless tobacco: Never   Substance Use Topics    Alcohol use: Never    Drug use: Never       CURRENT INPATIENT MEDICATIONS    allopurinol, 100 mg, oral, Daily  atorvastatin, 40 mg, oral, Daily  carvedilol, 6.25 mg, oral, BID  clopidogrel, 75 mg, oral, Daily  enoxaparin, 30 mg, subcutaneous, q24h  hydrALAZINE, 50 mg, oral, TID  isosorbide mononitrate ER, 30 mg, oral, Daily  lisinopril, 5 mg, oral, Daily  polyethylene glycol, 17 g, oral, Daily  [Held by provider] torsemide, 20 mg, oral, Daily      potassium chloride in 0.9%NaCl, 50 mL/hr, Last Rate: 50 mL/hr (03/28/25 1016)      Current Outpatient Medications   Medication Instructions    allopurinol (ZYLOPRIM) 100 mg, oral, Daily    APPLE CIDER VINEGAR ORAL 2 capsules, Daily    ascorbic acid (VITAMIN C) 500 mg, Daily    atorvastatin (LIPITOR) 40 mg, oral, Daily    carvedilol (COREG) 6.25 mg, oral, 2 times daily    cinnamon bark (CINNAMON ORAL) 2 capsules,  Daily    clopidogrel (PLAVIX) 75 mg, oral, Daily    CRANBERRY ORAL 1 capsule, Daily    hydrALAZINE (APRESOLINE) 50 mg, oral, 3 times daily    isosorbide mononitrate ER (Imdur) 30 mg 24 hr tablet 1 TABLET DAILY    lisinopril 5 mg, oral, Daily    nitroglycerin (NITROSTAT) 0.4 mg, sublingual, Every 5 min PRN    sodium bicarbonate 650 mg, 2 times daily    torsemide (DEMADEX) 20 mg, oral, Daily    Veltassa 8.4 g, Daily        Review of Systems:      12 point review of systems was obtained in detail and is negative other than that detailed above.    Vital Signs:     Vitals:    03/28/25 0700 03/28/25 0827 03/28/25 1040 03/28/25 1140   BP: 178/83 178/83     BP Location: Right arm      Patient Position: Lying      Pulse: 67 67     Resp: 15      Temp: 37.1 °C (98.8 °F)      TempSrc: Temporal      SpO2: 96%  96% (!) 80%   Weight:       Height:           Intake/Output Summary (Last 24 hours) at 3/28/2025 1319  Last data filed at 3/28/2025 1117  Gross per 24 hour   Intake 550 ml   Output 900 ml   Net -350 ml       Wt Readings from Last 4 Encounters:   03/27/25 76.1 kg (167 lb 12.3 oz)   03/26/25 87.1 kg (192 lb)   03/06/25 87.1 kg (192 lb)   02/18/25 88.3 kg (194 lb 9.6 oz)       Physical Examination:     GENERAL APPEARANCE: ill Appearance  CHEST: Symmetric and non-tender.  INTEGUMENT: Skin warm and dry, without gross excoriationis or lesions.  HEENT: No gross abnormalities of conjunctiva, teeth, gums, oral mucosa  NECK: Supple, no JVD, no bruit. Thyroid not palpable. Carotid upstrokes normal.  NEURO/PSHCY: awake, confused  LUNGS: clear diminished bases  HEART: Rate and rhythm regular with no evident murmur; no gallop appreciated. There are no rubs, clicks or heaves. PMI nondisplaced.  ABDOMEN: Soft, nontender, no palpable hepatosplenomegaly, no mases, no bruits. Abdominal aorta not noted to be enlarged.  EXTREMITIES: Warm with good color, no clubbing or cyanois. There is no edema noted.  PERIPHERAL VASCULAR: Pulses present and  equally palpable; 1+ throughout. No femoral bruits.      Lab:     CBC:   Results from last 7 days   Lab Units 03/28/25  0639 03/27/25  1122 03/26/25  1320   WBC AUTO x10*3/uL 10.5 14.1*  --    [facility] WBC AUTO Thousand/uL  --   --  15.0*   RBC AUTO x10*6/uL 2.73* 2.96*  --    [facility] RBC AUTO Million/uL  --   --  3.10*   HEMOGLOBIN g/dL 8.6* 9.4*  --    [facility] HEMOGLOBIN g/dL  --   --  9.9*   HEMATOCRIT % 27.1* 29.5*  --    [facility] HEMATOCRIT %  --   --  30.8*   MCV fL 99 100  --    [facility] MCV fL  --   --  99.4   MCH pg 31.5 31.8  --    [facility] MCH pg  --   --  31.9   MCHC g/dL 31.7* 31.9*  --    [facility] MCHC g/dL  --   --  32.1   RDW % 15.0* 15.1*  --    [facility] RDW %  --   --  13.6   PLATELETS AUTO x10*3/uL 239 263  --    [facility] PLATELETS AUTO Thousand/uL  --   --  269   [facility] MPV fL  --   --  11.2     CMP:    Results from last 7 days   Lab Units 03/28/25  0638 03/27/25  1122 03/26/25  1320   [facility] SODIUM mmol/L  --   --  148*   SODIUM mmol/L 148* 149*  --    [facility] POTASSIUM mmol/L  --   --  2.2*   POTASSIUM mmol/L 1.9* 2.1*  --    [facility] CHLORIDE mmol/L  --   --  101   CHLORIDE mmol/L 106 104  --    [facility] CO2 mmol/L  --   --  36*   CO2 mmol/L 35* 35*  --    BUN mg/dL 34* 35*  --    [facility] BUN mg/dL  --   --  35*   CREATININE mg/dL 3.22* 3.35*  --    [facility] CREATININE mg/dL  --   --  3.38*   [facility] GLUCOSE mg/dL  --   --  245*   GLUCOSE mg/dL 207* 225*  --    [facility] PROTEIN TOTAL g/dL  --   --  7.4   PROTEIN TOTAL g/dL  --  7.3  --    [facility] CALCIUM mg/dL  --   --  12.1*   CALCIUM mg/dL 10.7* 11.5*  --    BILIRUBIN TOTAL mg/dL  --  1.0  --    [facility] BILIRUBIN TOTAL mg/dL  --   --  1.0   [facility] ALK PHOS U/L  --   --  90   ALK PHOS U/L  --  83  --    [facility] AST U/L  --   --  13   AST U/L  --  15  --    [facility] ALT U/L  --   --  9   ALT U/L  --  9*  --      BMP:    Results from last 7 days   Lab Units 03/28/25  0638 03/27/25  1122 03/26/25  1320   [facility] SODIUM mmol/L  --   --  148*   SODIUM mmol/L 148* 149*  --    [facility] POTASSIUM mmol/L  --    --  2.2*   POTASSIUM mmol/L 1.9* 2.1*  --    [facility] CHLORIDE mmol/L  --   --  101   CHLORIDE mmol/L 106 104  --    [facility] CO2 mmol/L  --   --  36*   CO2 mmol/L 35* 35*  --    BUN mg/dL 34* 35*  --    [facility] BUN mg/dL  --   --  35*   CREATININE mg/dL 3.22* 3.35*  --    [facility] CREATININE mg/dL  --   --  3.38*   [facility] CALCIUM mg/dL  --   --  12.1*   CALCIUM mg/dL 10.7* 11.5*  --    [facility] GLUCOSE mg/dL  --   --  245*   GLUCOSE mg/dL 207* 225*  --      Magnesium:  Results from last 7 days   Lab Units 03/28/25  0638 03/27/25  1122   MAGNESIUM mg/dL 1.45* 1.33*     Troponin:    Results from last 7 days   Lab Units 03/27/25  1237 03/27/25  1122   TROPHS ng/L 107* 120*     BNP:     Lipid Panel:         Diagnostic Studies:   @No results found for this or any previous visit.      XR chest 1 view    Result Date: 3/27/2025  Interpreted By:  Chepe Denton, STUDY: XR CHEST 1 VIEW;  3/27/2025 11:39 am   INDICATION: Signs/Symptoms:cough.   COMPARISON: 12/30/2021   ACCESSION NUMBER(S): [id number]   ORDERING CLINICIAN: CHAPO WHITMAN   FINDINGS: No consolidation. No pleural effusion or pneumothorax. Borderline heart size. No acute osseous abnormality. Multilevel degenerative change in the spine.       No acute cardiopulmonary abnormality.   Signed by: Chepe Denton 3/27/2025 12:11 PM Dictation workstation:   [id number]    CT cervical spine wo IV contrast    Result Date: 3/27/2025  Interpreted By:  Chepe Denton, STUDY: CT CERVICAL SPINE WO IV CONTRAST;  3/27/2025 11:37 am   INDICATION: Signs/Symptoms:fall.   COMPARISON: 12/27/2021   ACCESSION NUMBER(S): [id number]   ORDERING CLINICIAN: CHAPO WHITMAN   TECHNIQUE: Axial CT images of the cervical spine are obtained. Axial, coronal and sagittal reconstructions are provided for review.   FINDINGS: No acute fracture or subluxation. No vertebral body height loss. Fusion of C5-C6 vertebral bodies. Mild disc height loss C6-C7 vertebral body. Multilevel marginal osteophytes C3-C7.   multilevel facet arthropathy throughout the cervical spine bilaterally. Mild left-sided neural foraminal narrowing at C3-C4 and C4-C5. No prevertebral hematoma. Atherosclerosis.       No evidence for an acute fracture or subluxation of the cervical spine. Degenerative changes.   Signed by: Chepe Denton 3/27/2025 12:10 PM Dictation workstation:   [id number]    CT head wo IV contrast    Result Date: 3/27/2025  Interpreted By:  Chepe Denton, STUDY: CT HEAD WO IV CONTRAST;  3/27/2025 11:37 am   INDICATION: Signs/Symptoms:weakness, fall.   COMPARISON: 12/27/2021.   ACCESSION NUMBER(S): [id number]   ORDERING CLINICIAN: CHAPO WHITMAN   TECHNIQUE: Noncontrast axial CT scan of head was performed. Multiplanar reconstructions   FINDINGS: No acute intracranial hemorrhage, mass effect, midline shift, or herniation. No evidence of hydrocephalus. Periventricular and subcortical deep white matter hypodensity suggestive of chronic microangiopathic change. Chronic lacunar infarct left basal ganglia. Mild global cerebral atrophy with concordant prominence of the ventricles and sulci. Ventriculomegaly out of proportion to sulcal prominence raising question of normal pressure hydrocephalus. Chronic sphenoid sinus mucosal thickening with thickening of the osseous wall and hyperattenuating material suggestive of inspissated mucus versus fungal colonization. No acute osseous abnormality of the calvarium. No destructive bone lesion.       No acute intracranial abnormality. Consider follow-up with MRI as warranted.   Mild global cerebral atrophy with concordant prominence of the ventricles and sulci. Ventriculomegaly out of proportion to sulcal prominence raising question of normal pressure hydrocephalus.   Signed by: Chepe Denton 3/27/2025 12:00 PM Dictation workstation:   [id number]      No echocardiogram results found for the past 14 days    Radiology:     XR chest 1 view   Final Result   No acute cardiopulmonary  abnormality.        Signed by: Chepe Denton 3/27/2025 12:11 PM   Dictation workstation:   [id number]      CT head wo IV contrast   Final Result   No acute intracranial abnormality. Consider follow-up with MRI as   warranted.        Mild global cerebral atrophy with concordant prominence of the   ventricles and sulci. Ventriculomegaly out of proportion to sulcal   prominence raising question of normal pressure hydrocephalus.        Signed by: Chepe Denton 3/27/2025 12:00 PM   Dictation workstation:   [id number]      CT cervical spine wo IV contrast   Final Result   No evidence for an acute fracture or subluxation of the cervical   spine. Degenerative changes.        Signed by: Chepe Denton 3/27/2025 12:10 PM   Dictation workstation:   [id number]          Problem List:     Patient Active Problem List   Diagnosis    Anemia in CKD (chronic kidney disease)    Angioma of skin    Arcus senilis, bilateral    Astigmatism, bilateral    History of basal cell carcinoma    Bilateral presbyopia    Bilateral pseudophakia    BPH (benign prostatic hyperplasia)    CAD (coronary artery disease)    Diabetic neuropathy (Multi)    Primary hypertension    Hyperkalemia, diminished renal excretion    HLD (hyperlipidemia)    Ischemic cardiomyopathy    Hyperparathyroidism due to renal insufficiency (Multi)    Lung nodule    Malignant neoplasm of prostate (Multi)    Onychomycosis    PVD (peripheral vascular disease) (CMS-HCC)    PVD (posterior vitreous detachment), both eyes    Stage 5 chronic kidney disease not on chronic dialysis (Multi)    Type 2 diabetes mellitus with retinopathy, without long-term current use of insulin    Venous insufficiency    Hypercalcemia    Retinal pigment epithelial mottling of macula    Bronchiectasis, uncomplicated (Multi)    History of CVA (cerebrovascular accident)    Weakness generalized    Weakness       Assessment:   Generalized weakness  Hypokalemia  Hypomagnesia  Status post fall  CKD  stage IV/V  History of CAD  History of chronic combined systolic and diastolic heart failure  Dementia  History of stroke  Parkinson  EF 30%    Plan:   Tele monitoring  Echo done EF 30%  Daily EKGs  Renal input thank you  Lipitor 40 mg daily  Coreg 6.25 mg p.o. twice daily  Plavix 75 mg daily  Imdur 30 mg daily  Demadex  Will discuss with team palliative care    Madhu Palacios CNP  [facility]    CARDIOLOGIST NOTE  I have personally seen and examined patient as well as personally formulated treatment plan.  I have reviewed this note as created by ANTONIETTA Mayers CNP and agree with his findings and physical exam.    89-year-old man with known coronary disease ischemic cardiomyopathy EF 40-45% and stage IV kidney disease.  He has hypertension hyperlipidemia and has not wanted any sort of invasive investigations.  He experienced an ischemic stroke October 2024 involving the anterior left thalamus no evidence of embolic phenomena no atrial fibrillation.  Has progressive renal failure.    I had seen him February 2025 at which time he was appropriate oriented comfortable no focal weakness he was living with his son and able to dress himself and care for himself and it was eating fairly well    Within the last 2 or 3 weeks his son tells me he has simply stopped eating.  Became profoundly weak unable to walk even with a walker.  On 1 occasion had fallen to the ground and was incontinent of stool.  At the current time he does not recognize me though I have known him for many years.  He is lying supine he is not dyspneic but does get hypoxic off of O2.  He has not been O2 dependent in the past.    Lab studies are as noted above particular remarkable for elevated calcium markedly reduced Agnesian and potassium levels.    Exam generally he is comfortable does not appear in any distress lungs are clear but very poor inspiratory effort.  He appears very fatigued fairly  frail and fragile.  Cardiac exam shows a regular rhythm and rate abdomen is soft extremities show no edema    ECG shows sinus rhythm right bundle branch block left anterior hemiblock unchanged from previously    Impression  Patient with marked change in mental status over the last several weeks no clear new finding on CT.  Pressure is stable here but perhaps at home when he was not eating or drinking as much as he should he became somewhat hypotensive.  He has had no evidence of atrial fibrillation thus far to cause micro emboli.  I am concerned with his weight loss not eating and high calcium perhaps he has a malignancy and this is a paraneoplastic syndrome.  Needs further neurologic assessment  Elevated BNP possible CHF: Chest x-ray does not show CHF exam is not suspicious for particular volume overload he denies shortness of breath but does have hypoxia.  Patient has never wanted particularly aggressive evaluation.  Family is similarly stating comfort measures primarily.  I think it important to keep control of his blood pressure but would not put him through other further testing at this point in time  Marked electrolyte abnormalities: Does not seem attributable to volume depletion alone as creatinine although elevated is not severely elevated compared to previously.  Need to consider other medical issues that could cause electrolyte abnormalities paraneoplastic syndrome should be considered.  From a cardiovascular standpoint would focus on control of blood pressure and symptoms  He has very shallow respiratory effort may be the cause of his intermittent hypoxia here in the hospital.  Pulmonary embolism is in the differential as he has been much more sedentary.  May want to consider VQ scan  Cardiology will sign off      Of note, this documentation is completed using the Dragon Dictation system (voice recognition software). There may be spelling and/or grammatical errors that were not corrected prior to final  submission.      Electronically signed by ANTONIETTA Leija-CNP, on 3/28/2025 at 1:19 PM

## 2025-03-28 NOTE — CARE PLAN
The patient's goals for the shift include sleep and comfort    The clinical goals for the shift include safety and comfort    Over the shift, the patient did make progress toward the following goals.

## 2025-03-28 NOTE — PROGRESS NOTES
"Daily Progress Note    Jamaal Balbuena is a 89 y.o. male on day 0 of admission presenting with Weakness generalized.    Subjective   Patient seen resting comfortably, dementia at baseline.  Remains on supplemental O2 will continue to wean.  Denies chest pain or shortness of breath.  Potassium and magnesium being replaced will recheck.  Appreciate cardiology recommendations for Demadex being on hold.       Objective     Physical Exam    Physical Exam  Constitutional:       Appearance: He is underweight.      Comments: Frail and elderly   HENT:      Head: Normocephalic.      Mouth/Throat:      Mouth: Mucous membranes are moist.   Eyes:      Pupils: Pupils are equal, round, and reactive to light.   Cardiovascular:      Rate and Rhythm: Normal rate and regular rhythm.      Heart sounds: Normal heart sounds, S1 normal and S2 normal.   Pulmonary:      Effort: Pulmonary effort is normal.      Breath sounds: Normal breath sounds.      Comments: Diminished  Abdominal:      General: Bowel sounds are normal.      Palpations: Abdomen is soft.   Musculoskeletal:         General: Normal range of motion.      Cervical back: Neck supple.   Skin:     General: Skin is warm.   Neurological:      Mental Status: He is alert and oriented to person, place, and time.      Motor: Weakness present.   Psychiatric:         Mood and Affect: Mood normal.         Behavior: Behavior normal.         Cognition and Memory: Cognition is impaired. Memory is impaired.         Last Recorded Vitals  Blood pressure 178/83, pulse 67, temperature 37.1 °C (98.8 °F), temperature source Temporal, resp. rate 15, height 1.737 m (5' 8.39\"), weight 76.1 kg (167 lb 12.3 oz), SpO2 96%.  Intake/Output last 3 Shifts:  I/O last 3 completed shifts:  In: 450 (5.9 mL/kg) [I.V.:350 (4.6 mL/kg); IV Piggyback:100]  Out: 400 (5.3 mL/kg) [Urine:400 (0.1 mL/kg/hr)]  Weight: 76.1 kg     Medications  Scheduled medications  allopurinol, 100 mg, oral, Daily  atorvastatin, 40 mg, oral, " Daily  carvedilol, 6.25 mg, oral, BID  clopidogrel, 75 mg, oral, Daily  enoxaparin, 30 mg, subcutaneous, q24h  hydrALAZINE, 50 mg, oral, TID  isosorbide mononitrate ER, 30 mg, oral, Daily  lisinopril, 5 mg, oral, Daily  polyethylene glycol, 17 g, oral, Daily  [Held by provider] torsemide, 20 mg, oral, Daily      Continuous medications  potassium chloride in 0.9%NaCl, 50 mL/hr, Last Rate: 50 mL/hr (03/28/25 1016)      PRN medications      Labs  CBC:   Results from last 7 days   Lab Units 03/28/25  0639 03/27/25  1122 03/26/25  1320   WBC AUTO x10*3/uL 10.5 14.1*  --    [facility] WBC AUTO Thousand/uL  --   --  15.0*   RBC AUTO x10*6/uL 2.73* 2.96*  --    [facility] RBC AUTO Million/uL  --   --  3.10*   HEMOGLOBIN g/dL 8.6* 9.4*  --    [facility] HEMOGLOBIN g/dL  --   --  9.9*   HEMATOCRIT % 27.1* 29.5*  --    [facility] HEMATOCRIT %  --   --  30.8*   MCV fL 99 100  --    [facility] MCV fL  --   --  99.4   MCH pg 31.5 31.8  --    [facility] MCH pg  --   --  31.9   MCHC g/dL 31.7* 31.9*  --    [facility] MCHC g/dL  --   --  32.1   RDW % 15.0* 15.1*  --    [facility] RDW %  --   --  13.6   PLATELETS AUTO x10*3/uL 239 263  --    [facility] PLATELETS AUTO Thousand/uL  --   --  269   [facility] MPV fL  --   --  11.2     CMP:    Results from last 7 days   Lab Units 03/28/25  0638 03/27/25  1122 03/26/25  1320   [facility] SODIUM mmol/L  --   --  148*   SODIUM mmol/L 148* 149*  --    [facility] POTASSIUM mmol/L  --   --  2.2*   POTASSIUM mmol/L 1.9* 2.1*  --    [facility] CHLORIDE mmol/L  --   --  101   CHLORIDE mmol/L 106 104  --    [facility] CO2 mmol/L  --   --  36*   CO2 mmol/L 35* 35*  --    BUN mg/dL 34* 35*  --    [facility] BUN mg/dL  --   --  35*   CREATININE mg/dL 3.22* 3.35*  --    [facility] CREATININE mg/dL  --   --  3.38*   [facility] GLUCOSE mg/dL  --   --  245*   GLUCOSE mg/dL 207* 225*  --    [facility] PROTEIN TOTAL g/dL  --   --  7.4   PROTEIN TOTAL g/dL  --  7.3  --    [facility] CALCIUM mg/dL  --   --  12.1*   CALCIUM mg/dL 10.7* 11.5*  --    BILIRUBIN TOTAL mg/dL  --  1.0  --    [facility] BILIRUBIN  TOTAL mg/dL  --   --  1.0   [facility] ALK PHOS U/L  --   --  90   ALK PHOS U/L  --  83  --    [facility] AST U/L  --   --  13   AST U/L  --  15  --    [facility] ALT U/L  --   --  9   ALT U/L  --  9*  --      BMP:    Results from last 7 days   Lab Units 03/28/25  0638 03/27/25  1122 03/26/25  1320   [facility] SODIUM mmol/L  --   --  148*   SODIUM mmol/L 148* 149*  --    [facility] POTASSIUM mmol/L  --   --  2.2*   POTASSIUM mmol/L 1.9* 2.1*  --    [facility] CHLORIDE mmol/L  --   --  101   CHLORIDE mmol/L 106 104  --    [facility] CO2 mmol/L  --   --  36*   CO2 mmol/L 35* 35*  --    BUN mg/dL 34* 35*  --    [facility] BUN mg/dL  --   --  35*   CREATININE mg/dL 3.22* 3.35*  --    [facility] CREATININE mg/dL  --   --  3.38*   [facility] CALCIUM mg/dL  --   --  12.1*   CALCIUM mg/dL 10.7* 11.5*  --    [facility] GLUCOSE mg/dL  --   --  245*   GLUCOSE mg/dL 207* 225*  --      Magnesium:  Results from last 7 days   Lab Units 03/28/25  0638 03/27/25  1122   MAGNESIUM mg/dL 1.45* 1.33*     Troponin:    Results from last 7 days   Lab Units 03/27/25  1237 03/27/25  1122   TROPHS ng/L 107* 120*     BNP:     Lipid Panel:  Results from last 7 days   Lab Units 03/27/25  1122   INR  1.1   PROTIME seconds 12.3        Relevant Results  Results from last 7 days   Lab Units 03/28/25  0638 03/27/25  1122 03/26/25  1320   [facility] GLUCOSE mg/dL  --   --  245*   GLUCOSE mg/dL 207* 225*  --      Lab Results   Component Value Date    HGBA1C 7.6 (A) 03/06/2025        Assessment/Plan    Generalized weakness  Chronic falls  Hypokalemia  Hypomagnesia  [place] on CKD    -Imaging negative for fractures  -Patient was given magnesium potassium and a bolus in the ER  -Change IV fluids to normal saline with 20 of K  -Will continue to replace potassium and mag  -CBC and BMP daily  -UA positive for leukocytes  -will hold Demadex for now  -Consult cardio  -Echo shows LVEF 30%, October 2024  -Creatinine at baseline patient not interested in dialysis  -Patient will need PT/OT evaluation      CKD stage  V  HTN/HLD  CAD  Cardiomyopathy  T2DM  Parkinson's  History of stroke    -Diabetic diet with Accu-Cheks and sliding scale  -Resume home meds when warranted    CODE STATUS is DNR [facility]      DVTp: Lovenox    PLAN: Home Alone, may need placement    ANTONIETTA Gonsalez-CNP    Plan of care was discussed extensively with patient.  Patient verbalized understanding through teach back method.  All question and concerns addressed upon examination.    Of note, this documentation is completed using the Dragon Dictation system (voice recognition software). There may be spelling and/or grammatical errors that were not corrected prior to final submission.

## 2025-03-28 NOTE — PROGRESS NOTES
"Daily Progress Note    Jamaal Balbuena is a 89 y.o. male on day 0 of admission presenting with Weakness generalized.    Subjective   Patient resting comfortably will continue to wean O2.  Patient denies shortness of breath or chest pain.  Will continue to replace mag and potassium.       Objective     Physical Exam    Physical Exam  Constitutional:       Appearance: He is ill-appearing.      Comments: Frail and elderly   HENT:      Head: Normocephalic.      Mouth/Throat:      Mouth: Mucous membranes are moist.   Eyes:      Pupils: Pupils are equal, round, and reactive to light.   Cardiovascular:      Rate and Rhythm: Normal rate and regular rhythm.      Heart sounds: Normal heart sounds, S1 normal and S2 normal.   Pulmonary:      Effort: Pulmonary effort is normal.      Breath sounds: Normal breath sounds.   Abdominal:      General: Bowel sounds are normal.      Palpations: Abdomen is soft.   Musculoskeletal:         General: Normal range of motion.      Cervical back: Neck supple.   Skin:     General: Skin is warm.   Neurological:      Mental Status: He is alert and oriented to person, place, and time.      Motor: Weakness present.   Psychiatric:         Mood and Affect: Mood normal.         Behavior: Behavior normal.         Cognition and Memory: Cognition is impaired. Memory is impaired.         Last Recorded Vitals  Blood pressure 178/83, pulse 67, temperature 37.1 °C (98.8 °F), temperature source Temporal, resp. rate 15, height 1.737 m (5' 8.39\"), weight 76.1 kg (167 lb 12.3 oz), SpO2 96%.  Intake/Output last 3 Shifts:  I/O last 3 completed shifts:  In: 450 (5.9 mL/kg) [I.V.:350 (4.6 mL/kg); IV Piggyback:100]  Out: - (0 mL/kg)   Weight: 76.1 kg     Medications  Scheduled medications  allopurinol, 100 mg, oral, Daily  atorvastatin, 40 mg, oral, Daily  carvedilol, 6.25 mg, oral, BID  clopidogrel, 75 mg, oral, Daily  enoxaparin, 30 mg, subcutaneous, q24h  hydrALAZINE, 50 mg, oral, TID  isosorbide mononitrate ER, 30 " mg, oral, Daily  lisinopril, 5 mg, oral, Daily  magnesium sulfate, 1 g, intravenous, Once  polyethylene glycol, 17 g, oral, Daily  [Held by provider] torsemide, 20 mg, oral, Daily      Continuous medications  potassium chloride in 0.9%NaCl, 50 mL/hr, Last Rate: 50 mL/hr (03/28/25 1016)      PRN medications      Labs  CBC:   Results from last 7 days   Lab Units 03/28/25  0639 03/27/25  1122 03/26/25  1320   WBC AUTO x10*3/uL 10.5 14.1*  --    [facility] WBC AUTO Thousand/uL  --   --  15.0*   RBC AUTO x10*6/uL 2.73* 2.96*  --    [facility] RBC AUTO Million/uL  --   --  3.10*   HEMOGLOBIN g/dL 8.6* 9.4*  --    [facility] HEMOGLOBIN g/dL  --   --  9.9*   HEMATOCRIT % 27.1* 29.5*  --    [facility] HEMATOCRIT %  --   --  30.8*   MCV fL 99 100  --    [facility] MCV fL  --   --  99.4   MCH pg 31.5 31.8  --    [facility] MCH pg  --   --  31.9   MCHC g/dL 31.7* 31.9*  --    [facility] MCHC g/dL  --   --  32.1   RDW % 15.0* 15.1*  --    [facility] RDW %  --   --  13.6   PLATELETS AUTO x10*3/uL 239 263  --    [facility] PLATELETS AUTO Thousand/uL  --   --  269   [facility] MPV fL  --   --  11.2     CMP:    Results from last 7 days   Lab Units 03/28/25  0638 03/27/25  1122 03/26/25  1320   [facility] SODIUM mmol/L  --   --  148*   SODIUM mmol/L 148* 149*  --    [facility] POTASSIUM mmol/L  --   --  2.2*   POTASSIUM mmol/L 1.9* 2.1*  --    [facility] CHLORIDE mmol/L  --   --  101   CHLORIDE mmol/L 106 104  --    [facility] CO2 mmol/L  --   --  36*   CO2 mmol/L 35* 35*  --    BUN mg/dL 34* 35*  --    [facility] BUN mg/dL  --   --  35*   CREATININE mg/dL 3.22* 3.35*  --    [facility] CREATININE mg/dL  --   --  3.38*   [facility] GLUCOSE mg/dL  --   --  245*   GLUCOSE mg/dL 207* 225*  --    [facility] PROTEIN TOTAL g/dL  --   --  7.4   PROTEIN TOTAL g/dL  --  7.3  --    [facility] CALCIUM mg/dL  --   --  12.1*   CALCIUM mg/dL 10.7* 11.5*  --    BILIRUBIN TOTAL mg/dL  --  1.0  --    [facility] BILIRUBIN TOTAL mg/dL  --   --  1.0   [facility] ALK PHOS U/L  --   --  90   ALK PHOS U/L  --  83  --    [facility] AST U/L  --   --  13   AST U/L  --   15  --    [facility] ALT U/L  --   --  9   ALT U/L  --  9*  --      BMP:    Results from last 7 days   Lab Units 03/28/25  0638 03/27/25  1122 03/26/25  1320   [facility] SODIUM mmol/L  --   --  148*   SODIUM mmol/L 148* 149*  --    [facility] POTASSIUM mmol/L  --   --  2.2*   POTASSIUM mmol/L 1.9* 2.1*  --    [facility] CHLORIDE mmol/L  --   --  101   CHLORIDE mmol/L 106 104  --    [facility] CO2 mmol/L  --   --  36*   CO2 mmol/L 35* 35*  --    BUN mg/dL 34* 35*  --    [facility] BUN mg/dL  --   --  35*   CREATININE mg/dL 3.22* 3.35*  --    [facility] CREATININE mg/dL  --   --  3.38*   [facility] CALCIUM mg/dL  --   --  12.1*   CALCIUM mg/dL 10.7* 11.5*  --    [facility] GLUCOSE mg/dL  --   --  245*   GLUCOSE mg/dL 207* 225*  --      Magnesium:  Results from last 7 days   Lab Units 03/28/25  0638 03/27/25  1122   MAGNESIUM mg/dL 1.45* 1.33*     Troponin:    Results from last 7 days   Lab Units 03/27/25  1237 03/27/25  1122   TROPHS ng/L 107* 120*     BNP:     Lipid Panel:  Results from last 7 days   Lab Units 03/27/25  1122   INR  1.1   PROTIME seconds 12.3        Nutrition             Relevant Results  Results from last 7 days   Lab Units 03/28/25  0638 03/27/25  1122 03/26/25  1320   [facility] GLUCOSE mg/dL  --   --  245*   GLUCOSE mg/dL 207* 225*  --      Lab Results   Component Value Date    HGBA1C 7.6 (A) 03/06/2025        Assessment/Plan    Generalized weakness  Hypokalemia  Hypomagnesia    -Imaging negative for fractures  -Patient was given magnesium potassium and a bolus in the ER  -Change IV fluids to normal saline with 20 of K  -Will continue to replace potassium and mag  -CBC and BMP daily  -UA positive for leukocytes  -Patient will need PT/OT evaluation    CKD stage V  HTN/HLD  CAD  Cardiomyopathy  T2DM  Parkinson's  History of stroke    -Diabetic diet with Accu-Cheks and sliding scale  -Resume home meds when warranted  -Echo shows LVEF 30%  -Creatinine at baseline patient not interested in dialysis    CODE STATUS is DNR [facility]    DVTp: Lovenox    PLAN:  Home with son    Jaqueline Mckoy, APRN-CNP    Plan of care was discussed extensively with patient.  Patient verbalized understanding through teach back method.  All question and concerns addressed upon examination.    Of note, this documentation is completed using the Dragon Dictation system (voice recognition software). There may be spelling and/or grammatical errors that were not corrected prior to final submission.

## 2025-03-28 NOTE — CARE PLAN
The patient's goals for the shift include sleep and comfort.    The clinical goals for the shift include safety and comfort.    Over the shift, the patient did not make progress toward the following goals. Barriers to progression include pt had low oxygen saturation. Recommendations to address these barriers include started 2L oxygen.

## 2025-03-28 NOTE — PROGRESS NOTES
03/28/25 1500   Discharge Planning   Living Arrangements Children   Support Systems Children   Assistance Needed yes   Type of Residence Private residence   Who is requesting discharge planning? Provider   Home or Post Acute Services Other (Comment)   Type of Home Care Services Hospice   Expected Discharge Disposition Othe  (Home vs LTC w/ Hospice)   Does the patient need discharge transport arranged? Yes   RoundTrip coordination needed? Yes   Has discharge transport been arranged? No   Financial Resource Strain   How hard is it for you to pay for the very basics like food, housing, medical care, and heating? Not very   Housing Stability   In the last 12 months, was there a time when you were not able to pay the mortgage or rent on time? N   In the past 12 months, how many times have you moved where you were living? 0   At any time in the past 12 months, were you homeless or living in a shelter (including now)? N   Transportation Needs   In the past 12 months, has lack of transportation kept you from medical appointments or from getting medications? no   In the past 12 months, has lack of transportation kept you from meetings, work, or from getting things needed for daily living? No   Stroke Family Assessment   Stroke Family Assessment Needed No   Intensity of Service   Intensity of Service 0-30 min     Spoke with pt son Juan Diego explained TCC role in Care Transitions and verified address, phone number and emergency contact information. PCP is Lori last seen yesterday and preferred pharmacy is [facility], denies issues obtaining or affording medications and takes as ordered. Pt is dependent others now that he is unable to stand, lives at home with son. Son and daughter both present in the room and they both feel it is time to discuss Hospice as an option. I have notified the SW on today of their desire to speak with her regarding Hospice. [facility] be [facility] on [facility] for an  evaluation but son does not feel that will be happening at this point. Pt is in in Stage 5 kidney failure and does not want dialysis and he has stopped eating and now he can not stand up. Electrolyte levels are way off as well. TCC asked son if he wanted Hospice at home or in a facility and he believes he would want it in a facility. I explained Hospice would provide all the equipment to care for his father needed at home if he wants him at home. I also explained I would have the SW come talk to him more about Hospice and provide him choices. Care Transitions to follow. Eneida Barrett BSN/RN-TCC

## 2025-03-29 ENCOUNTER — APPOINTMENT (OUTPATIENT)
Dept: CARDIOLOGY | Facility: HOSPITAL | Age: OVER 89
DRG: 641 | End: 2025-03-29
Payer: MEDICARE

## 2025-03-29 LAB
ANION GAP SERPL CALC-SCNC: 9 MMOL/L (ref 10–20)
ATRIAL RATE: 65 BPM
BUN SERPL-MCNC: 31 MG/DL (ref 6–23)
CALCIUM SERPL-MCNC: 10.3 MG/DL (ref 8.6–10.3)
CHLORIDE SERPL-SCNC: 106 MMOL/L (ref 98–107)
CO2 SERPL-SCNC: 33 MMOL/L (ref 21–32)
CREAT SERPL-MCNC: 2.82 MG/DL (ref 0.5–1.3)
EGFRCR SERPLBLD CKD-EPI 2021: 20 ML/MIN/1.73M*2
ERYTHROCYTE [DISTWIDTH] IN BLOOD BY AUTOMATED COUNT: 15.1 % (ref 11.5–14.5)
GLUCOSE SERPL-MCNC: 201 MG/DL (ref 74–99)
HCT VFR BLD AUTO: 26.1 % (ref 41–52)
HGB BLD-MCNC: 8.4 G/DL (ref 13.5–17.5)
MAGNESIUM SERPL-MCNC: 1.86 MG/DL (ref 1.6–2.4)
MCH RBC QN AUTO: 31.7 PG (ref 26–34)
MCHC RBC AUTO-ENTMCNC: 32.2 G/DL (ref 32–36)
MCV RBC AUTO: 99 FL (ref 80–100)
NRBC BLD-RTO: 0 /100 WBCS (ref 0–0)
P AXIS: 41 DEGREES
P OFFSET: 125 MS
P ONSET: 44 MS
PLATELET # BLD AUTO: 220 X10*3/UL (ref 150–450)
POTASSIUM SERPL-SCNC: 2.5 MMOL/L (ref 3.5–5.3)
PR INTERVAL: 348 MS
Q ONSET: 218 MS
QRS COUNT: 11 BEATS
QRS DURATION: 170 MS
QT INTERVAL: 472 MS
QTC CALCULATION(BAZETT): 490 MS
QTC FREDERICIA: 484 MS
R AXIS: -54 DEGREES
RBC # BLD AUTO: 2.65 X10*6/UL (ref 4.5–5.9)
SODIUM SERPL-SCNC: 145 MMOL/L (ref 136–145)
T AXIS: -14 DEGREES
T OFFSET: 454 MS
VENTRICULAR RATE: 65 BPM
WBC # BLD AUTO: 10.5 X10*3/UL (ref 4.4–11.3)

## 2025-03-29 PROCEDURE — 85027 COMPLETE CBC AUTOMATED: CPT

## 2025-03-29 PROCEDURE — 1200000002 HC GENERAL ROOM WITH TELEMETRY DAILY

## 2025-03-29 PROCEDURE — 83735 ASSAY OF MAGNESIUM: CPT

## 2025-03-29 PROCEDURE — 36415 COLL VENOUS BLD VENIPUNCTURE: CPT

## 2025-03-29 PROCEDURE — 99232 SBSQ HOSP IP/OBS MODERATE 35: CPT | Performed by: INTERNAL MEDICINE

## 2025-03-29 PROCEDURE — 99232 SBSQ HOSP IP/OBS MODERATE 35: CPT

## 2025-03-29 PROCEDURE — 2500000004 HC RX 250 GENERAL PHARMACY W/ HCPCS (ALT 636 FOR OP/ED)

## 2025-03-29 PROCEDURE — 93005 ELECTROCARDIOGRAM TRACING: CPT

## 2025-03-29 PROCEDURE — 80048 BASIC METABOLIC PNL TOTAL CA: CPT

## 2025-03-29 PROCEDURE — 2500000001 HC RX 250 WO HCPCS SELF ADMINISTERED DRUGS (ALT 637 FOR MEDICARE OP)

## 2025-03-29 PROCEDURE — 93010 ELECTROCARDIOGRAM REPORT: CPT | Performed by: INTERNAL MEDICINE

## 2025-03-29 PROCEDURE — 2500000002 HC RX 250 W HCPCS SELF ADMINISTERED DRUGS (ALT 637 FOR MEDICARE OP, ALT 636 FOR OP/ED)

## 2025-03-29 RX ORDER — POTASSIUM CHLORIDE 20 MEQ/1
40 TABLET, EXTENDED RELEASE ORAL EVERY 4 HOURS
Status: COMPLETED | OUTPATIENT
Start: 2025-03-29 | End: 2025-03-29

## 2025-03-29 RX ADMIN — POTASSIUM CHLORIDE AND SODIUM CHLORIDE 50 ML/HR: 900; 150 INJECTION, SOLUTION INTRAVENOUS at 04:47

## 2025-03-29 RX ADMIN — CLOPIDOGREL BISULFATE 75 MG: 75 TABLET, FILM COATED ORAL at 08:57

## 2025-03-29 RX ADMIN — ALLOPURINOL 100 MG: 100 TABLET ORAL at 08:58

## 2025-03-29 RX ADMIN — CARVEDILOL 6.25 MG: 6.25 TABLET, FILM COATED ORAL at 21:34

## 2025-03-29 RX ADMIN — POTASSIUM CHLORIDE 40 MEQ: 1500 TABLET, EXTENDED RELEASE ORAL at 09:03

## 2025-03-29 RX ADMIN — HYDRALAZINE HYDROCHLORIDE 50 MG: 50 TABLET ORAL at 08:57

## 2025-03-29 RX ADMIN — POTASSIUM CHLORIDE 40 MEQ: 1500 TABLET, EXTENDED RELEASE ORAL at 13:02

## 2025-03-29 RX ADMIN — ATORVASTATIN CALCIUM 40 MG: 20 TABLET, FILM COATED ORAL at 08:57

## 2025-03-29 RX ADMIN — CARVEDILOL 6.25 MG: 6.25 TABLET, FILM COATED ORAL at 08:58

## 2025-03-29 RX ADMIN — HYDRALAZINE HYDROCHLORIDE 50 MG: 50 TABLET ORAL at 00:47

## 2025-03-29 RX ADMIN — HYDRALAZINE HYDROCHLORIDE 50 MG: 50 TABLET ORAL at 14:57

## 2025-03-29 RX ADMIN — ISOSORBIDE MONONITRATE 30 MG: 30 TABLET, EXTENDED RELEASE ORAL at 08:58

## 2025-03-29 RX ADMIN — HYDRALAZINE HYDROCHLORIDE 50 MG: 50 TABLET ORAL at 21:34

## 2025-03-29 ASSESSMENT — COGNITIVE AND FUNCTIONAL STATUS - GENERAL
CLIMB 3 TO 5 STEPS WITH RAILING: A LOT
MOVING TO AND FROM BED TO CHAIR: A LITTLE
MOBILITY SCORE: 18
DRESSING REGULAR LOWER BODY CLOTHING: A LITTLE
STANDING UP FROM CHAIR USING ARMS: A LITTLE
STANDING UP FROM CHAIR USING ARMS: A LITTLE
MOBILITY SCORE: 20
DRESSING REGULAR UPPER BODY CLOTHING: A LITTLE
TOILETING: A LITTLE
DAILY ACTIVITIY SCORE: 20
WALKING IN HOSPITAL ROOM: A LITTLE
HELP NEEDED FOR BATHING: A LITTLE
HELP NEEDED FOR BATHING: A LITTLE
TURNING FROM BACK TO SIDE WHILE IN FLAT BAD: A LITTLE
DRESSING REGULAR UPPER BODY CLOTHING: A LITTLE
TOILETING: A LITTLE
DRESSING REGULAR LOWER BODY CLOTHING: A LITTLE
CLIMB 3 TO 5 STEPS WITH RAILING: A LOT
WALKING IN HOSPITAL ROOM: A LITTLE

## 2025-03-29 ASSESSMENT — PAIN SCALES - GENERAL
PAINLEVEL_OUTOF10: 0 - NO PAIN

## 2025-03-29 ASSESSMENT — PAIN - FUNCTIONAL ASSESSMENT
PAIN_FUNCTIONAL_ASSESSMENT: 0-10

## 2025-03-29 NOTE — CARE PLAN
Problem: Skin  Goal: Prevent/manage excess moisture  Flowsheets (Taken 3/29/2025 1104)  Prevent/manage excess moisture:   Cleanse incontinence/protect with barrier cream   Moisturize dry skin  Goal: Prevent/minimize sheer/friction injuries  Flowsheets (Taken 3/29/2025 1104)  Prevent/minimize sheer/friction injuries:   HOB 30 degrees or less   Use pull sheet  Goal: Promote/optimize nutrition  Flowsheets (Taken 3/29/2025 1104)  Promote/optimize nutrition: Consume > 50% meals/supplements   The patient's goals for the shift include sleep and comfort    The clinical goals for the shift include patient will have no falls during this day

## 2025-03-29 NOTE — CARE PLAN
The patient's goals for the shift include sleep and comfort    The clinical goals for the shift include patient will have no falls during this day    Over the shift, the patient did make progress toward the following goals.  Recommendations to address these barriers include hour checks on patient  Problem: Pain - Adult  Goal: Verbalizes/displays adequate comfort level or baseline comfort level  Outcome: Progressing     Problem: Safety - Adult  Goal: Free from fall injury  Outcome: Progressing     Problem: Discharge Planning  Goal: Discharge to home or other facility with appropriate resources  Outcome: Progressing     Problem: Chronic Conditions and Co-morbidities  Goal: Patient's chronic conditions and co-morbidity symptoms are monitored and maintained or improved  Outcome: Progressing     Problem: Nutrition  Goal: Nutrient intake appropriate for maintaining nutritional needs  Outcome: Progressing     Problem: Diabetes  Goal: Achieve decreasing blood glucose levels by end of shift  Outcome: Progressing  Goal: Increase stability of blood glucose readings by end of shift  Outcome: Progressing  Goal: Decrease in ketones present in urine by end of shift  Outcome: Progressing  Goal: Maintain electrolyte levels within acceptable range throughout shift  Outcome: Progressing  Goal: Maintain glucose levels >70mg/dl to <250mg/dl throughout shift  Outcome: Progressing  Goal: No changes in neurological exam by end of shift  Outcome: Progressing  Goal: Learn about and adhere to nutrition recommendations by end of shift  Outcome: Progressing  Goal: Vital signs within normal range for age by end of shift  Outcome: Progressing  Goal: Increase self care and/or family involovement by end of shift  Outcome: Progressing  Goal: Receive DSME education by end of shift  Outcome: Progressing     Problem: Fall/Injury  Goal: Not fall by end of shift  Outcome: Progressing  Goal: Be free from injury by end of the shift  Outcome:  Progressing  Goal: Verbalize understanding of personal risk factors for fall in the hospital  Outcome: Progressing  Goal: Verbalize understanding of risk factor reduction measures to prevent injury from fall in the home  Outcome: Progressing  Goal: Use assistive devices by end of the shift  Outcome: Progressing  Goal: Pace activities to prevent fatigue by end of the shift  Outcome: Progressing

## 2025-03-29 NOTE — PROGRESS NOTES
"Daily Progress Note    Jamaal Balbuena is a 89 y.o. male on day 1 of admission presenting with Weakness generalized.    Subjective   Patient resting but arousable.  Denies chest pain or shortness of breath.  Continues on supplemental O2 with family at bedside.  Plan for hospice meeting this afternoon.       Objective     Physical Exam    Physical Exam  Constitutional:       Appearance: He is underweight.      Comments: Frail and elderly   HENT:      Head: Normocephalic.      Mouth/Throat:      Mouth: Mucous membranes are moist.   Eyes:      Pupils: Pupils are equal, round, and reactive to light.   Cardiovascular:      Rate and Rhythm: Normal rate and regular rhythm.      Heart sounds: Normal heart sounds, S1 normal and S2 normal.   Pulmonary:      Effort: Pulmonary effort is normal.      Breath sounds: Normal breath sounds.      Comments: Diminished  Abdominal:      General: Bowel sounds are normal.      Palpations: Abdomen is soft.   Musculoskeletal:         General: Normal range of motion.      Cervical back: Neck supple.   Skin:     General: Skin is warm.   Neurological:      Mental Status: He is alert and oriented to person, place, and time.      Motor: Weakness present.   Psychiatric:         Mood and Affect: Mood normal.         Behavior: Behavior normal.         Cognition and Memory: Cognition is impaired. Memory is impaired.        Last Recorded Vitals  Blood pressure 172/79, pulse 70, temperature 35.9 °C (96.6 °F), temperature source Temporal, resp. rate 20, height 1.737 m (5' 8.39\"), weight 76.1 kg (167 lb 12.3 oz), SpO2 95%.  Intake/Output last 3 Shifts:  I/O last 3 completed shifts:  In: 1514.2 (19.9 mL/kg) [P.O.:750; I.V.:664.2 (8.7 mL/kg); IV Piggyback:100]  Out: 750 (9.9 mL/kg) [Urine:750 (0.3 mL/kg/hr)]  Weight: 76.1 kg     Medications  Scheduled medications  allopurinol, 100 mg, oral, Daily  atorvastatin, 40 mg, oral, Daily  carvedilol, 6.25 mg, oral, BID  clopidogrel, 75 mg, oral, Daily  enoxaparin, " 30 mg, subcutaneous, q24h  hydrALAZINE, 50 mg, oral, TID  isosorbide mononitrate ER, 30 mg, oral, Daily  [Held by provider] lisinopril, 5 mg, oral, Daily  polyethylene glycol, 17 g, oral, Daily  potassium chloride CR, 40 mEq, oral, q4h  [Held by provider] torsemide, 20 mg, oral, Daily      Continuous medications  potassium chloride in 0.9%NaCl, 50 mL/hr, Last Rate: 50 mL/hr (03/29/25 0447)      PRN medications      Labs  CBC:   Results from last 7 days   Lab Units 03/29/25  0652 03/28/25  1405 03/28/25  0639 03/27/25  1122 03/26/25  1320   WBC AUTO x10*3/uL 10.5 8.7 10.5   < >  --    [facility] WBC AUTO Thousand/uL  --   --   --   --  15.0*   RBC AUTO x10*6/uL 2.65* 2.46* 2.73*   < >  --    [facility] RBC AUTO Million/uL  --   --   --   --  3.10*   HEMOGLOBIN g/dL 8.4* 8.0* 8.6*   < >  --    [facility] HEMOGLOBIN g/dL  --   --   --   --  9.9*   HEMATOCRIT % 26.1* 24.3* 27.1*   < >  --    [facility] HEMATOCRIT %  --   --   --   --  30.8*   MCV fL 99 99 99   < >  --    [facility] MCV fL  --   --   --   --  99.4   MCH pg 31.7 32.5 31.5   < >  --    [facility] MCH pg  --   --   --   --  31.9   MCHC g/dL 32.2 32.9 31.7*   < >  --    [facility] MCHC g/dL  --   --   --   --  32.1   RDW % 15.1* 15.3* 15.0*   < >  --    [facility] RDW %  --   --   --   --  13.6   PLATELETS AUTO x10*3/uL 220 227 239   < >  --    [facility] PLATELETS AUTO Thousand/uL  --   --   --   --  269   [facility] MPV fL  --   --   --   --  11.2    < > = values in this interval not displayed.     CMP:    Results from last 7 days   Lab Units 03/29/25  0652 03/28/25  1405 03/28/25  0638 03/27/25  1122 03/27/25  1122 03/26/25  1320   [facility] SODIUM mmol/L  --   --   --   --   --  148*   SODIUM mmol/L 145 148* 148*   < > 149*  --    [facility] POTASSIUM mmol/L  --   --   --   --   --  2.2*   POTASSIUM mmol/L 2.5* 2.2* 1.9*   < > 2.1*  --    [facility] CHLORIDE mmol/L  --   --   --   --   --  101   CHLORIDE mmol/L 106 106 106   < > 104  --    [facility] CO2 mmol/L  --   --   --   --   --  36*   CO2 mmol/L 33* 33* 35*   < >  35*  --    BUN mg/dL 31* 33* 34*   < > 35*  --    [facility] BUN mg/dL  --   --   --   --   --  35*   CREATININE mg/dL 2.82* 3.20* 3.22*   < > 3.35*  --    [facility] CREATININE mg/dL  --   --   --   --   --  3.38*   [facility] GLUCOSE mg/dL  --   --   --   --   --  245*   GLUCOSE mg/dL 201* 219* 207*   < > 225*  --    [facility] PROTEIN TOTAL g/dL  --   --   --   --   --  7.4   PROTEIN TOTAL g/dL  --   --   --   --  7.3  --    [facility] CALCIUM mg/dL  --   --   --   --   --  12.1*   CALCIUM mg/dL 10.3 10.4* 10.7*   < > 11.5*  --    BILIRUBIN TOTAL mg/dL  --   --   --   --  1.0  --    [facility] BILIRUBIN TOTAL mg/dL  --   --   --   --   --  1.0   [facility] ALK PHOS U/L  --   --   --   --   --  90   ALK PHOS U/L  --   --   --   --  83  --    [facility] AST U/L  --   --   --   --   --  13   AST U/L  --   --   --   --  15  --    [facility] ALT U/L  --   --   --   --   --  9   ALT U/L  --   --   --   --  9*  --     < > = values in this interval not displayed.     BMP:    Results from last 7 days   Lab Units 03/29/25  0652 03/28/25  1405 03/28/25  0638   SODIUM mmol/L 145 148* 148*   POTASSIUM mmol/L 2.5* 2.2* 1.9*   CHLORIDE mmol/L 106 106 106   CO2 mmol/L 33* 33* 35*   BUN mg/dL 31* 33* 34*   CREATININE mg/dL 2.82* 3.20* 3.22*   CALCIUM mg/dL 10.3 10.4* 10.7*   GLUCOSE mg/dL 201* 219* 207*     Magnesium:  Results from last 7 days   Lab Units 03/29/25  0652 03/28/25  1405 03/28/25  0638   MAGNESIUM mg/dL 1.86 1.63 1.45*     Troponin:    Results from last 7 days   Lab Units 03/27/25  1237 03/27/25  1122   TROPHS ng/L 107* 120*     BNP:   Results from last 7 days   Lab Units 03/28/25  0639   BNP pg/mL 430*     Lipid Panel:  Results from last 7 days   Lab Units 03/27/25  1122   INR  1.1   PROTIME seconds 12.3      Relevant Results  Results from last 7 days   Lab Units 03/29/25  0652 03/28/25  1405 03/28/25  0638 03/27/25  1122 03/26/25  1320   [facility] GLUCOSE mg/dL  --   --   --   --  245*   GLUCOSE mg/dL 201* 219* 207* 225*  --      Lab Results   Component Value  Date    HGBA1C 7.6 (A) 03/06/2025        Assessment/Plan    Failure to thrive  Mechanical fall  Hypokalemia  Hypomagnesia    -Imaging negative for fractures  -Patient was given magnesium potassium and a bolus in the ER  -Change IV fluids to normal saline with 20 of K  -Will continue to replace potassium and mag  -CBC and BMP daily  -UA negative   -will hold Demadex for now  -Consult cardio focus of blood pressure and symptoms may consider VQ scan, at this point cardiology will sign off  -Echo shows LVEF 30%, October 2024  -Creatinine at baseline patient not interested in dialysis  -Patient will need PT/OT evaluation  -Family has meeting with hospice today      CKD stage V  HTN/HLD  CAD  Cardiomyopathy  T2DM  Parkinson's  History of stroke  -Diabetic diet with Accu-Cheks and sliding scale  -Resume home meds when warranted      CODE STATUS is DNR CCA    DVTp: Lovenox    PLAN: Home with hospice    ANTONIETTA Gonsalez-CNP    Plan of care was discussed extensively with patient.  Patient verbalized understanding through teach back method.  All question and concerns addressed upon examination.    Of note, this documentation is completed using the Dragon Dictation system (voice recognition software). There may be spelling and/or grammatical errors that were not corrected prior to final submission.

## 2025-03-29 NOTE — CARE PLAN
The patient's goals for the shift include sleep and comfort    The clinical goals for the shift include safety and comfort until the end of the shift    Over the shift, the patient did progress toward the following goals.       Problem: Safety - Adult  Goal: Free from fall injury  Outcome: Progressing     Problem: Chronic Conditions and Co-morbidities  Goal: Patient's chronic conditions and co-morbidity symptoms are monitored and maintained or improved  Outcome: Progressing     Problem: Fall/Injury  Goal: Not fall by end of shift  Outcome: Progressing

## 2025-03-29 NOTE — PROGRESS NOTES
RAMIREZ spoke with daughter Tyler this AM regarding plan for hospice care. RAMIREZ provided a list of hospice agencies for review and explained the referral process. Tyler states her brother is arriving to town today and they will review hospice agencies later today. Tyler requesting follow up visit from RAMIREZ tomorrow 3/30 regarding hospice choice.

## 2025-03-29 NOTE — PROGRESS NOTES
[facility] Progress Note               Rounding Cardiologist:  Raphael Hyman MD, MD   Primary Cardiologist: Dr. Raphael Hyman    Date:  3/29/2025  Patient:  Jamaal Balbuena  YOB: 1933  MRN:  [id number]   Admit Date:  3/27/2025      SUBJECTIVE    Jamaal Balbuena was seen and examined today at bedside.  No new events  Telemetry shows sinus rhythm    EKG today shows sinus rhythm first-degree block right bundle branch block left anterior fascicular block at a rate of 65 bpm QRS ration 170 ms QT corrected 490 ms  Sodium 145 potassium 2.5 BUN 31 creatinine 2.82.  Magnesium 1.86.  WBC 3.5.  Hemoglobin 8.4.  Hematocrit 26.1.  Platelet count 220    VITALS     Vitals:    03/28/25 1948 03/29/25 0049 03/29/25 0800 03/29/25 1130   BP: 137/62 145/67 172/79 136/63   BP Location:   Left arm Right arm   Patient Position:   Lying Lying   Pulse: 65 62 70 70   Resp: 16 18 20 16   Temp: 36.7 °C (98.1 °F) 37 °C (98.6 °F) 35.9 °C (96.6 °F) 35.9 °C (96.6 °F)   TempSrc:   Temporal Temporal   SpO2: 94% 95% 95% 97%   Weight:       Height:           Intake/Output Summary (Last 24 hours) at 3/29/2025 1206  Last data filed at 3/28/2025 1833  Gross per 24 hour   Intake 864.17 ml   Output 250 ml   Net 614.17 ml       [unfilled]    PHYSICAL EXAM   Constitutional:       Appearance: He is underweight.      Comments: Frail and elderly   HENT:      Head: Normocephalic.      Mouth/Throat:      Mouth: Mucous membranes are moist.   Eyes:      Pupils: Pupils are equal, round, and reactive to light.   Cardiovascular:      Rate and Rhythm: Normal rate and regular rhythm.      Heart sounds: Normal heart sounds, S1 normal and S2 normal.   Pulmonary:      Effort: Pulmonary effort is normal.      Breath sounds: Normal breath sounds.      Comments: Diminished  Abdominal:      General: Bowel sounds are normal.      Palpations: Abdomen is soft.   Musculoskeletal:         General: Normal range of motion.      Cervical back: Neck supple.   Skin:     General:  "Skin is warm.   Neurological:      Mental Status: He is alert and oriented to person, place, and time.      Motor: Weakness present.   Psychiatric:         Mood and Affect: Mood normal.         Behavior: Behavior normal.         Cognition and Memory: Cognition is impaired. Memory is impaired.     DIAGNOSTIC RESULTS   EKG:     Telemetry: Sinus rhythm.      LAB DATA   BMP:  @LABRCNT(Na:3,K:3,CL:3,CO2:3,Bun:3,Creatinine:3,Glu:3, CA:3,LABGLOM)@    Cardiac Enzymes:  @LABRCNT(CKTOTAL:3,CKMB:3,CKMBINDEX:3,TROPONINI:3)@    CBC:   Lab Results   Component Value Date    WBC 10.5 03/29/2025    RBC 2.65 (L) 03/29/2025    HGB 8.4 (L) 03/29/2025    HCT 26.1 (L) 03/29/2025     03/29/2025       CMP:    Lab Results   Component Value Date     03/29/2025    K 2.5 (LL) 03/29/2025     03/29/2025    CO2 33 (H) 03/29/2025    BUN 31 (H) 03/29/2025    CREATININE 2.82 (H) 03/29/2025    GLUCOSE 201 (H) 03/29/2025    CALCIUM 10.3 03/29/2025       Hepatic Function Panel:    Lab Results   Component Value Date    ALKPHOS 83 03/27/2025    ALT 9 (L) 03/27/2025    AST 15 03/27/2025    PROT 7.3 03/27/2025    BILITOT 1.0 03/27/2025       Magnesium:    Lab Results   Component Value Date    MG 1.86 03/29/2025       PT/INR:    Lab Results   Component Value Date    PROTIME 12.3 03/27/2025    INR 1.1 03/27/2025     @LABNT(inr:3)@     HgBA1c:  No components found for: \"LABA1C\"    Lipid Profile:  No results found for: \"CHLPL\", \"TRIG\", \"HDL\", \"LDLCALC\", \"LDLDIRECT\"    TSH:  No results found for: \"TSH\"    ABG:  No results found for: \"PH\"    PRO-BNP: No results found for: \"PROBNP\"       RADIOLOGY     XR chest 1 view   Final Result   No acute cardiopulmonary abnormality.        Signed by: Chepe Denton 3/27/2025 12:11 PM   Dictation workstation:   [id number]      CT head wo IV contrast   Final Result   No acute intracranial abnormality. Consider follow-up with MRI as   warranted.        Mild global cerebral atrophy with concordant " prominence of the   ventricles and sulci. Ventriculomegaly out of proportion to sulcal   prominence raising question of normal pressure hydrocephalus.        Signed by: Chepe Denton 3/27/2025 12:00 PM   Dictation workstation:   [id number]      CT cervical spine wo IV contrast   Final Result   No evidence for an acute fracture or subluxation of the cervical   spine. Degenerative changes.        Signed by: Chepe Denton 3/27/2025 12:10 PM   Dictation workstation:   [id number]          @[facility]      CURRENT MEDICATIONS    allopurinol, 100 mg, oral, Daily  atorvastatin, 40 mg, oral, Daily  carvedilol, 6.25 mg, oral, BID  clopidogrel, 75 mg, oral, Daily  enoxaparin, 30 mg, subcutaneous, q24h  hydrALAZINE, 50 mg, oral, TID  isosorbide mononitrate ER, 30 mg, oral, Daily  [Held by provider] lisinopril, 5 mg, oral, Daily  polyethylene glycol, 17 g, oral, Daily  potassium chloride CR, 40 mEq, oral, q4h  [Held by provider] torsemide, 20 mg, oral, Daily      potassium chloride in 0.9%NaCl, 50 mL/hr, Last Rate: 50 mL/hr (03/29/25 6623)          ASSESSMENT     Assessment & Plan  Weakness generalized    Weakness        Patient Active Problem List   Diagnosis    Anemia in CKD (chronic kidney disease)    Angioma of skin    Arcus senilis, bilateral    Astigmatism, bilateral    History of basal cell carcinoma    Bilateral presbyopia    Bilateral pseudophakia    BPH (benign prostatic hyperplasia)    CAD (coronary artery disease)    Diabetic neuropathy (Multi)    Primary hypertension    Hyperkalemia, diminished renal excretion    HLD (hyperlipidemia)    Ischemic cardiomyopathy    Hyperparathyroidism due to renal insufficiency (Multi)    Lung nodule    Malignant neoplasm of prostate (Multi)    Onychomycosis    PVD (peripheral vascular disease) (CMS-[facility])    PVD (posterior vitreous detachment), both eyes    Stage 5 chronic kidney disease not on chronic dialysis (Multi)    Type 2 diabetes mellitus with retinopathy, without  long-term current use of insulin    Venous insufficiency    Hypercalcemia    Retinal pigment epithelial mottling of macula    Bronchiectasis, uncomplicated (Multi)    History of CVA (cerebrovascular accident)    Weakness generalized    Weakness     Generalized weakness  Hypokalemia  Hypomagnesia  Status post fall  CKD stage IV/V  History of CAD  History of chronic combined systolic and diastolic heart failure  Dementia  History of stroke  Parkinson  EF 30%    PLAN     Continue with current medical therapy for heart failure that includes hydralazine, nitrates  Continue beta-blocker therapy  So far maintaining sinus rhythm  Rest of plan per primary team  Family updated about plan to for cardiology standpoint    Please excuse any errors in grammar or translation related to this dictation.  Voice recognition software was utilized to prepare this document.      Please do not hesitate to call with questions.  Electronically signed by Raphael Hyman MD, FACC on 3/29/2025 at 12:06 PM

## 2025-03-30 ENCOUNTER — APPOINTMENT (OUTPATIENT)
Dept: CARDIOLOGY | Facility: HOSPITAL | Age: OVER 89
DRG: 641 | End: 2025-03-30
Payer: MEDICARE

## 2025-03-30 VITALS
BODY MASS INDEX: 25.43 KG/M2 | HEIGHT: 68 IN | DIASTOLIC BLOOD PRESSURE: 69 MMHG | RESPIRATION RATE: 16 BRPM | OXYGEN SATURATION: 94 % | WEIGHT: 167.77 LBS | SYSTOLIC BLOOD PRESSURE: 147 MMHG | TEMPERATURE: 99.5 F | HEART RATE: 75 BPM

## 2025-03-30 LAB
ANION GAP SERPL CALC-SCNC: 10 MMOL/L (ref 10–20)
ATRIAL RATE: 71 BPM
BACTERIA BLD CULT: NORMAL
BACTERIA BLD CULT: NORMAL
BUN SERPL-MCNC: 28 MG/DL (ref 6–23)
CALCIUM SERPL-MCNC: 10.3 MG/DL (ref 8.6–10.3)
CHLORIDE SERPL-SCNC: 109 MMOL/L (ref 98–107)
CO2 SERPL-SCNC: 29 MMOL/L (ref 21–32)
CREAT SERPL-MCNC: 2.81 MG/DL (ref 0.5–1.3)
EGFRCR SERPLBLD CKD-EPI 2021: 21 ML/MIN/1.73M*2
ERYTHROCYTE [DISTWIDTH] IN BLOOD BY AUTOMATED COUNT: 15.5 % (ref 11.5–14.5)
GLUCOSE SERPL-MCNC: 198 MG/DL (ref 74–99)
HCT VFR BLD AUTO: 26.1 % (ref 41–52)
HGB BLD-MCNC: 8.2 G/DL (ref 13.5–17.5)
HOLD SPECIMEN: NORMAL
HOLD SPECIMEN: NORMAL
MAGNESIUM SERPL-MCNC: 1.83 MG/DL (ref 1.6–2.4)
MCH RBC QN AUTO: 32 PG (ref 26–34)
MCHC RBC AUTO-ENTMCNC: 31.4 G/DL (ref 32–36)
MCV RBC AUTO: 102 FL (ref 80–100)
NRBC BLD-RTO: 0 /100 WBCS (ref 0–0)
P AXIS: 54 DEGREES
P OFFSET: 142 MS
P ONSET: 83 MS
PLATELET # BLD AUTO: 214 X10*3/UL (ref 150–450)
POTASSIUM SERPL-SCNC: 2.9 MMOL/L (ref 3.5–5.3)
PR INTERVAL: 266 MS
Q ONSET: 216 MS
QRS COUNT: 12 BEATS
QRS DURATION: 164 MS
QT INTERVAL: 438 MS
QTC CALCULATION(BAZETT): 475 MS
QTC FREDERICIA: 463 MS
R AXIS: -51 DEGREES
RBC # BLD AUTO: 2.56 X10*6/UL (ref 4.5–5.9)
SODIUM SERPL-SCNC: 145 MMOL/L (ref 136–145)
T AXIS: 2 DEGREES
T OFFSET: 435 MS
VENTRICULAR RATE: 71 BPM
WBC # BLD AUTO: 10.2 X10*3/UL (ref 4.4–11.3)

## 2025-03-30 PROCEDURE — 99232 SBSQ HOSP IP/OBS MODERATE 35: CPT

## 2025-03-30 PROCEDURE — 36415 COLL VENOUS BLD VENIPUNCTURE: CPT

## 2025-03-30 PROCEDURE — 2500000002 HC RX 250 W HCPCS SELF ADMINISTERED DRUGS (ALT 637 FOR MEDICARE OP, ALT 636 FOR OP/ED)

## 2025-03-30 PROCEDURE — 2500000001 HC RX 250 WO HCPCS SELF ADMINISTERED DRUGS (ALT 637 FOR MEDICARE OP)

## 2025-03-30 PROCEDURE — 1200000002 HC GENERAL ROOM WITH TELEMETRY DAILY

## 2025-03-30 PROCEDURE — 2500000004 HC RX 250 GENERAL PHARMACY W/ HCPCS (ALT 636 FOR OP/ED)

## 2025-03-30 PROCEDURE — 93005 ELECTROCARDIOGRAM TRACING: CPT

## 2025-03-30 PROCEDURE — 93010 ELECTROCARDIOGRAM REPORT: CPT | Performed by: INTERNAL MEDICINE

## 2025-03-30 PROCEDURE — 83735 ASSAY OF MAGNESIUM: CPT

## 2025-03-30 PROCEDURE — 80048 BASIC METABOLIC PNL TOTAL CA: CPT

## 2025-03-30 PROCEDURE — 85027 COMPLETE CBC AUTOMATED: CPT

## 2025-03-30 PROCEDURE — 99232 SBSQ HOSP IP/OBS MODERATE 35: CPT | Performed by: INTERNAL MEDICINE

## 2025-03-30 RX ORDER — POTASSIUM CHLORIDE 20 MEQ/1
40 TABLET, EXTENDED RELEASE ORAL ONCE
Status: COMPLETED | OUTPATIENT
Start: 2025-03-30 | End: 2025-03-30

## 2025-03-30 RX ADMIN — CLOPIDOGREL BISULFATE 75 MG: 75 TABLET, FILM COATED ORAL at 08:41

## 2025-03-30 RX ADMIN — POTASSIUM CHLORIDE AND SODIUM CHLORIDE 50 ML/HR: 900; 150 INJECTION, SOLUTION INTRAVENOUS at 01:08

## 2025-03-30 RX ADMIN — POLYETHYLENE GLYCOL 3350 17 G: 17 POWDER, FOR SOLUTION ORAL at 08:41

## 2025-03-30 RX ADMIN — CARVEDILOL 6.25 MG: 6.25 TABLET, FILM COATED ORAL at 08:41

## 2025-03-30 RX ADMIN — CARVEDILOL 6.25 MG: 6.25 TABLET, FILM COATED ORAL at 20:34

## 2025-03-30 RX ADMIN — ISOSORBIDE MONONITRATE 30 MG: 30 TABLET, EXTENDED RELEASE ORAL at 08:41

## 2025-03-30 RX ADMIN — ALLOPURINOL 100 MG: 100 TABLET ORAL at 08:41

## 2025-03-30 RX ADMIN — HYDRALAZINE HYDROCHLORIDE 50 MG: 50 TABLET ORAL at 14:57

## 2025-03-30 RX ADMIN — HYDRALAZINE HYDROCHLORIDE 50 MG: 50 TABLET ORAL at 08:41

## 2025-03-30 RX ADMIN — POTASSIUM CHLORIDE 40 MEQ: 1500 TABLET, EXTENDED RELEASE ORAL at 08:41

## 2025-03-30 RX ADMIN — POTASSIUM CHLORIDE AND SODIUM CHLORIDE 50 ML/HR: 900; 150 INJECTION, SOLUTION INTRAVENOUS at 20:33

## 2025-03-30 RX ADMIN — ATORVASTATIN CALCIUM 40 MG: 20 TABLET, FILM COATED ORAL at 08:41

## 2025-03-30 ASSESSMENT — COGNITIVE AND FUNCTIONAL STATUS - GENERAL
EATING MEALS: A LITTLE
CLIMB 3 TO 5 STEPS WITH RAILING: TOTAL
TOILETING: A LITTLE
TOILETING: A LITTLE
DRESSING REGULAR UPPER BODY CLOTHING: A LITTLE
MOBILITY SCORE: 18
TURNING FROM BACK TO SIDE WHILE IN FLAT BAD: A LITTLE
MOVING TO AND FROM BED TO CHAIR: TOTAL
DRESSING REGULAR LOWER BODY CLOTHING: A LITTLE
CLIMB 3 TO 5 STEPS WITH RAILING: A LOT
MOVING FROM LYING ON BACK TO SITTING ON SIDE OF FLAT BED WITH BEDRAILS: A LITTLE
PERSONAL GROOMING: A LITTLE
DAILY ACTIVITIY SCORE: 20
DRESSING REGULAR LOWER BODY CLOTHING: A LITTLE
STANDING UP FROM CHAIR USING ARMS: TOTAL
HELP NEEDED FOR BATHING: A LITTLE
TURNING FROM BACK TO SIDE WHILE IN FLAT BAD: A LITTLE
STANDING UP FROM CHAIR USING ARMS: A LITTLE
MOVING TO AND FROM BED TO CHAIR: A LITTLE
WALKING IN HOSPITAL ROOM: TOTAL
WALKING IN HOSPITAL ROOM: A LITTLE
DRESSING REGULAR UPPER BODY CLOTHING: A LITTLE
HELP NEEDED FOR BATHING: A LITTLE

## 2025-03-30 ASSESSMENT — PAIN - FUNCTIONAL ASSESSMENT: PAIN_FUNCTIONAL_ASSESSMENT: 0-10

## 2025-03-30 ASSESSMENT — PAIN SCALES - GENERAL
PAINLEVEL_OUTOF10: 0 - NO PAIN
PAINLEVEL_OUTOF10: 0 - NO PAIN

## 2025-03-30 NOTE — PROGRESS NOTES
"Daily Progress Note    Jamaal Balbuena is a 89 y.o. male on day 2 of admission presenting with Weakness generalized.    Subjective   Patient seen resting comfortably more awake today than yesterday.  Pending family to pick hospice company for discharge home.  Denies shortness of breath or chest pain.       Objective     Physical Exam    Physical Exam  Constitutional:       Appearance: He is underweight.      Comments: Frail and elderly   HENT:      Head: Normocephalic.      Mouth/Throat:      Mouth: Mucous membranes are moist.   Eyes:      Pupils: Pupils are equal, round, and reactive to light.   Cardiovascular:      Rate and Rhythm: Normal rate and regular rhythm.      Heart sounds: Normal heart sounds, S1 normal and S2 normal.   Pulmonary:      Effort: Pulmonary effort is normal.      Breath sounds: Normal breath sounds.      Comments: Diminished  Abdominal:      General: Bowel sounds are normal.      Palpations: Abdomen is soft.   Musculoskeletal:         General: Normal range of motion.      Cervical back: Neck supple.   Skin:     General: Skin is warm.   Neurological:      Mental Status: He is alert and oriented to person, place, and time.      Motor: Weakness present.   Psychiatric:         Mood and Affect: Mood normal.         Behavior: Behavior normal.         Cognition and Memory: Cognition is impaired. Memory is impaired.    Last Recorded Vitals  Blood pressure 179/86, pulse 72, temperature 36.5 °C (97.7 °F), resp. rate 16, height 1.737 m (5' 8.39\"), weight 76.1 kg (167 lb 12.3 oz), SpO2 97%.  Intake/Output last 3 Shifts:  I/O last 3 completed shifts:  In: 1659.2 (21.8 mL/kg) [I.V.:1659.2 (21.8 mL/kg)]  Out: 700 (9.2 mL/kg) [Urine:700 (0.3 mL/kg/hr)]  Weight: 76.1 kg     Medications  Scheduled medications  allopurinol, 100 mg, oral, Daily  atorvastatin, 40 mg, oral, Daily  carvedilol, 6.25 mg, oral, BID  clopidogrel, 75 mg, oral, Daily  enoxaparin, 30 mg, subcutaneous, q24h  hydrALAZINE, 50 mg, oral, " TID  isosorbide mononitrate ER, 30 mg, oral, Daily  [Held by provider] lisinopril, 5 mg, oral, Daily  polyethylene glycol, 17 g, oral, Daily  [Held by provider] torsemide, 20 mg, oral, Daily      Continuous medications  potassium chloride in 0.9%NaCl, 50 mL/hr, Last Rate: 50 mL/hr (03/30/25 1142)      PRN medications      Labs  CBC:   Results from last 7 days   Lab Units 03/30/25  0658 03/29/25  0652 03/28/25  1405 03/27/25  1122 03/26/25  1320   WBC AUTO x10*3/uL 10.2 10.5 8.7   < >  --    [facility] WBC AUTO Thousand/uL  --   --   --   --  15.0*   RBC AUTO x10*6/uL 2.56* 2.65* 2.46*   < >  --    [facility] RBC AUTO Million/uL  --   --   --   --  3.10*   HEMOGLOBIN g/dL 8.2* 8.4* 8.0*   < >  --    [facility] HEMOGLOBIN g/dL  --   --   --   --  9.9*   HEMATOCRIT % 26.1* 26.1* 24.3*   < >  --    [facility] HEMATOCRIT %  --   --   --   --  30.8*   MCV fL 102* 99 99   < >  --    [facility] MCV fL  --   --   --   --  99.4   MCH pg 32.0 31.7 32.5   < >  --    [facility] MCH pg  --   --   --   --  31.9   MCHC g/dL 31.4* 32.2 32.9   < >  --    [facility] MCHC g/dL  --   --   --   --  32.1   RDW % 15.5* 15.1* 15.3*   < >  --    [facility] RDW %  --   --   --   --  13.6   PLATELETS AUTO x10*3/uL 214 220 227   < >  --    [facility] PLATELETS AUTO Thousand/uL  --   --   --   --  269   [facility] MPV fL  --   --   --   --  11.2    < > = values in this interval not displayed.     CMP:    Results from last 7 days   Lab Units 03/30/25  0636 03/29/25  0652 03/28/25  1405 03/28/25  0638 03/27/25  1122 03/26/25  1320   [facility] SODIUM mmol/L  --   --   --   --   --  148*   SODIUM mmol/L 145 145 148*   < > 149*  --    [facility] POTASSIUM mmol/L  --   --   --   --   --  2.2*   POTASSIUM mmol/L 2.9* 2.5* 2.2*   < > 2.1*  --    [facility] CHLORIDE mmol/L  --   --   --   --   --  101   CHLORIDE mmol/L 109* 106 106   < > 104  --    [facility] CO2 mmol/L  --   --   --   --   --  36*   CO2 mmol/L 29 33* 33*   < > 35*  --    BUN mg/dL 28* 31* 33*   < > 35*  --    [facility] BUN mg/dL  --   --   --   --   --  35*    CREATININE mg/dL 2.81* 2.82* 3.20*   < > 3.35*  --    [facility] CREATININE mg/dL  --   --   --   --   --  3.38*   [facility] GLUCOSE mg/dL  --   --   --   --   --  245*   GLUCOSE mg/dL 198* 201* 219*   < > 225*  --    [facility] PROTEIN TOTAL g/dL  --   --   --   --   --  7.4   PROTEIN TOTAL g/dL  --   --   --   --  7.3  --    [facility] CALCIUM mg/dL  --   --   --   --   --  12.1*   CALCIUM mg/dL 10.3 10.3 10.4*   < > 11.5*  --    BILIRUBIN TOTAL mg/dL  --   --   --   --  1.0  --    [facility] BILIRUBIN TOTAL mg/dL  --   --   --   --   --  1.0   [facility] ALK PHOS U/L  --   --   --   --   --  90   ALK PHOS U/L  --   --   --   --  83  --    [facility] AST U/L  --   --   --   --   --  13   AST U/L  --   --   --   --  15  --    [facility] ALT U/L  --   --   --   --   --  9   ALT U/L  --   --   --   --  9*  --     < > = values in this interval not displayed.     BMP:    Results from last 7 days   Lab Units 03/30/25 0636 03/29/25 0652 03/28/25  1405   SODIUM mmol/L 145 145 148*   POTASSIUM mmol/L 2.9* 2.5* 2.2*   CHLORIDE mmol/L 109* 106 106   CO2 mmol/L 29 33* 33*   BUN mg/dL 28* 31* 33*   CREATININE mg/dL 2.81* 2.82* 3.20*   CALCIUM mg/dL 10.3 10.3 10.4*   GLUCOSE mg/dL 198* 201* 219*     Magnesium:  Results from last 7 days   Lab Units 03/30/25 0636 03/29/25  0652 03/28/25  1405   MAGNESIUM mg/dL 1.83 1.86 1.63     Troponin:    Results from last 7 days   Lab Units 03/27/25  1237 03/27/25  1122   TROPHS ng/L 107* 120*     BNP:   Results from last 7 days   Lab Units 03/28/25  0639   BNP pg/mL 430*     Lipid Panel:  Results from last 7 days   Lab Units 03/27/25  1122   INR  1.1   PROTIME seconds 12.3        Nutrition             Relevant Results  Results from last 7 days   Lab Units 03/30/25  0636 03/29/25  0652 03/28/25  1405 03/28/25  0638 03/27/25  1122   GLUCOSE mg/dL 198* 201* 219* 207* 225*     Lab Results   Component Value Date    HGBA1C 7.6 (A) 03/06/2025        Assessment/Plan    Failure to thrive  Mechanical  fall  Hypokalemia  Hypomagnesia    -Imaging negative for fractures  -Patient was given magnesium potassium and a bolus in the ER  -Change IV fluids to normal saline with 20 of K  -Will continue to replace potassium and mag  -CBC and BMP daily  -UA negative   -will hold Demadex for now  -Consult cardio focus of blood pressure and symptoms may consider VQ scan, at this point cardiology will sign  -Echo shows LVEF 30%, October 2024  -Creatinine at baseline patient not interested in dialysis  -Patient will need PT/OT evaluation  -Family has meeting with hospice today      CKD stage V  HTN/HLD  CAD  Cardiomyopathy  T2DM  Parkinson's  History of stroke  -Diabetic diet with Accu-Cheks and sliding scale  -Resume home meds when warranted      CODE STATUS is DNR CCA    DVTp: Lovenox    PLAN: Hospice consult discharge home    ANTONIETTA Gonsalez-CNP    Plan of care was discussed extensively with patient.  Patient verbalized understanding through teach back method.  All question and concerns addressed upon examination.    Of note, this documentation is completed using the Dragon Dictation system (voice recognition software). There may be spelling and/or grammatical errors that were not corrected prior to final submission.

## 2025-03-30 NOTE — PROGRESS NOTES
03/30/25 1433   Discharge Planning   Home or Post Acute Services In home services   Type of Home Care Services Hospice  (Agency of choice is [facility])     Return call received from pt son Miko. Miko requesting referral be [facility] team updated.    Update- SW notified that hospice consult meeting is scheduled for tomorrow 3/31 at 5pm. Care team updated.

## 2025-03-30 NOTE — CARE PLAN
The patient's goals for the shift include sleep and comfort    The clinical goals for the shift include safety and comfort until the end of the shift    Over the shift, the patient did progress toward the following goals.     Problem: Safety - Adult  Goal: Free from fall injury  Outcome: Progressing     Problem: Pain - Adult  Goal: Verbalizes/displays adequate comfort level or baseline comfort level  Outcome: Progressing     Problem: Chronic Conditions and Co-morbidities  Goal: Patient's chronic conditions and co-morbidity symptoms are monitored and maintained or improved  Outcome: Progressing     Problem: Nutrition  Goal: Nutrient intake appropriate for maintaining nutritional needs  Outcome: Progressing

## 2025-03-30 NOTE — CARE PLAN
The patient's goals for the shift include sleep and comfort    The clinical goals for the shift include safety and comfort until the end of the shift

## 2025-03-30 NOTE — DISCHARGE INSTRUCTIONS
Thank you for choosing [facility]. It has been a pleasure taking part in your medical care. Please follow up with your primary care provider as instructed. If your symptoms should persist or worsen, please contact your primary care physician, or in the case of an emergency proceed to the nearest Emergency Room for further care. If you have any questions about the care you received, please call [facility] at [phone]. Thank you again! FRANNIE Parsons

## 2025-03-30 NOTE — PROGRESS NOTES
[facility] Progress Note               Rounding Cardiologist:  Raphael Hyman MD, MD   Primary Cardiologist: Dr. Raphael Hyman    Date:  3/30/2025  Patient:  Jamaal Balbuena  YOB: 1933  MRN:  [id number]   Admit Date:  3/27/2025      SUBJECTIVE    Jamaal Balbuena was seen and examined today at bedside.  No new events  Telemetry shows sinus rhythm    EKG today shows sinus rhythm with sinus arrhythmia first-degree AV block right bundle branch block left anterior fascicular block at a rate of 71 bpm QRS duration 164 ms QT corrected 175 ms.    Sodium 145 pot Assium 2.9 BUN 29 creatinine 2.81 WBC 10.2 hemoglobin 8.2    Vitals:    03/29/25 1430 03/29/25 1941 03/29/25 2355 03/30/25 0733   BP: 131/63 154/72 143/67 179/86   BP Location: Right arm Right arm Right arm    Patient Position: Lying Lying Lying    Pulse: 68 78  72   Resp: 18 18 16    Temp: 36.2 °C (97.2 °F) 37.2 °C (99 °F) 36.9 °C (98.4 °F) 36.5 °C (97.7 °F)   TempSrc: Temporal Temporal Temporal    SpO2: 94% 94% 95% 97%   Weight:       Height:           Intake/Output Summary (Last 24 hours) at 3/30/2025 1135  Last data filed at 3/30/2025 0700  Gross per 24 hour   Intake 1659.17 ml   Output 1200 ml   Net 459.17 ml       [unfilled]    PHYSICAL EXAM   Constitutional:       Appearance: He is underweight.      Comments: Frail and elderly   HENT:      Head: Normocephalic.      Mouth/Throat:      Mouth: Mucous membranes are moist.   Eyes:      Pupils: Pupils are equal, round, and reactive to light.   Cardiovascular:      Rate and Rhythm: Normal rate and regular rhythm.      Heart sounds: Normal heart sounds, S1 normal and S2 normal.   Pulmonary:      Effort: Pulmonary effort is normal.      Breath sounds: Normal breath sounds.      Comments: Diminished  Abdominal:      General: Bowel sounds are normal.      Palpations: Abdomen is soft.   Musculoskeletal:         General: Normal range of motion.      Cervical back: Neck supple.   Skin:     General: Skin is warm.  "  Neurological:      Mental Status: He is alert and oriented to person, place, and time.      Motor: Weakness present.   Psychiatric:         Mood and Affect: Mood normal.         Behavior: Behavior normal.         Cognition and Memory: Cognition is impaired. Memory is impaired.       DIAGNOSTIC RESULTS   EKG:     Telemetry: Sinus rhythm.      LAB DATA   BMP:  @LABRCNT(Na:3,K:3,CL:3,CO2:3,Bun:3,Creatinine:3,Glu:3, CA:3,LABGLOM)@    Cardiac Enzymes:  @LABRCNT(CKTOTAL:3,CKMB:3,CKMBINDEX:3,TROPONINI:3)@    CBC:   Lab Results   Component Value Date    WBC 10.2 03/30/2025    RBC 2.56 (L) 03/30/2025    HGB 8.2 (L) 03/30/2025    HCT 26.1 (L) 03/30/2025     03/30/2025       CMP:    Lab Results   Component Value Date     03/30/2025    K 2.9 (LL) 03/30/2025     (H) 03/30/2025    CO2 29 03/30/2025    BUN 28 (H) 03/30/2025    CREATININE 2.81 (H) 03/30/2025    GLUCOSE 198 (H) 03/30/2025    CALCIUM 10.3 03/30/2025       Hepatic Function Panel:  No results found for: \"ALKPHOS\", \"ALT\", \"AST\", \"PROT\", \"BILITOT\", \"BILIDIR\"    Magnesium:    Lab Results   Component Value Date    MG 1.83 03/30/2025       PT/INR:  No results found for: \"PROTIME\", \"INR\"  @LABRCNT(inr:3)@     HgBA1c:  No components found for: \"LABA1C\"    Lipid Profile:  No results found for: \"CHLPL\", \"TRIG\", \"HDL\", \"LDLCALC\", \"LDLDIRECT\"    TSH:  No results found for: \"TSH\"    ABG:  No results found for: \"PH\"    PRO-BNP: No results found for: \"PROBNP\"       RADIOLOGY     XR chest 1 view   Final Result   No acute cardiopulmonary abnormality.        Signed by: Chepe Denton 3/27/2025 12:11 PM   Dictation workstation:   [id number]      CT head wo IV contrast   Final Result   No acute intracranial abnormality. Consider follow-up with MRI as   warranted.        Mild global cerebral atrophy with concordant prominence of the   ventricles and sulci. Ventriculomegaly out of proportion to sulcal   prominence raising question of normal pressure hydrocephalus. "        Signed by: Chepe Denton 3/27/2025 12:00 PM   Dictation workstation:   [id number]      CT cervical spine wo IV contrast   Final Result   No evidence for an acute fracture or subluxation of the cervical   spine. Degenerative changes.        Signed by: Chepe Denton 3/27/2025 12:10 PM   Dictation workstation:   [id number]          @[facility]@      CURRENT MEDICATIONS    allopurinol, 100 mg, oral, Daily  atorvastatin, 40 mg, oral, Daily  carvedilol, 6.25 mg, oral, BID  clopidogrel, 75 mg, oral, Daily  enoxaparin, 30 mg, subcutaneous, q24h  hydrALAZINE, 50 mg, oral, TID  isosorbide mononitrate ER, 30 mg, oral, Daily  [Held by provider] lisinopril, 5 mg, oral, Daily  polyethylene glycol, 17 g, oral, Daily  [Held by provider] torsemide, 20 mg, oral, Daily      potassium chloride in 0.9%NaCl, 50 mL/hr, Last Rate: 50 mL/hr (03/30/25 0344)          ASSESSMENT     Assessment & Plan  Weakness generalized    Weakness        Patient Active Problem List   Diagnosis    Anemia in CKD (chronic kidney disease)    Angioma of skin    Arcus senilis, bilateral    Astigmatism, bilateral    History of basal cell carcinoma    Bilateral presbyopia    Bilateral pseudophakia    BPH (benign prostatic hyperplasia)    CAD (coronary artery disease)    Diabetic neuropathy (Multi)    Primary hypertension    Hyperkalemia, diminished renal excretion    HLD (hyperlipidemia)    Ischemic cardiomyopathy    Hyperparathyroidism due to renal insufficiency (Multi)    Lung nodule    Malignant neoplasm of prostate (Multi)    Onychomycosis    PVD (peripheral vascular disease) (CMS-[facility])    PVD (posterior vitreous detachment), both eyes    Stage 5 chronic kidney disease not on chronic dialysis (Multi)    Type 2 diabetes mellitus with retinopathy, without long-term current use of insulin    Venous insufficiency    Hypercalcemia    Retinal pigment epithelial mottling of macula    Bronchiectasis, uncomplicated (Multi)    History of CVA (cerebrovascular  accident)    Weakness generalized    Weakness        Generalized weakness  Hypokalemia  Hypomagnesia  Status post fall  CKD stage IV/V  History of CAD  History of chronic combined systolic and diastolic heart failure  Dementia  History of stroke  Parkinson  EF 30%    PLAN   Continue with heart failure therapy  Continue rest of plan per primary team  Telemetry shows sinus rhythm  Apparently pending hospice evaluation by primary team      Please do not hesitate to call with questions.  Electronically signed by Raphael Hyman MD, FACC on 3/30/2025 at 11:35 AM

## 2025-03-30 NOTE — PROGRESS NOTES
RAMIREZ arrived to pt room to follow up with family regarding Hospice agency choice. Family not currently present at bedside. RAMIREZ contacted son Miko and inquired about choice. Miko states they have not received any information about hospice, RAMIREZ advised that information was provided to [facility] yesterday. Miko states he is currently with [facility] and will review the information and update RAMIREZ tomorrow. Encouraged Miko to reach out with any questions.

## 2025-03-30 NOTE — DISCHARGE SUMMARY
Discharge Diagnosis  Weakness generalized    Issues Requiring Follow-Up  Home with Hospice of     Discharge Meds     Your medication list        CONTINUE taking these medications        Instructions Last Dose Given Next Dose Due   allopurinol 100 mg tablet  Commonly known as: Zyloprim      TAKE 1 TABLET ONCE DAILY       APPLE CIDER VINEGAR ORAL           ascorbic acid 1,000 mg tablet  Commonly known as: Vitamin C           atorvastatin 40 mg tablet  Commonly known as: Lipitor      Take 1 tablet (40 mg) by mouth once daily.       carvedilol 6.25 mg tablet  Commonly known as: Coreg      Take 1 tablet (6.25 mg) by mouth 2 times a day.       CINNAMON ORAL           clopidogrel 75 mg tablet  Commonly known as: Plavix      Take 1 tablet (75 mg) by mouth once daily.       CRANBERRY ORAL           hydrALAZINE 50 mg tablet  Commonly known as: Apresoline      Take 1 tablet (50 mg) by mouth 3 times a day.       isosorbide mononitrate ER 30 mg 24 hr tablet  Commonly known as: Imdur      1 TABLET DAILY       nitroglycerin 0.4 mg SL tablet  Commonly known as: Nitrostat      Place 1 tablet (0.4 mg) under the tongue every 5 minutes if needed for chest pain.       sodium bicarbonate 650 mg tablet           Veltassa 8.4 gram powder in packet  Generic drug: patiromer calcium sorbitex                  STOP taking these medications      lisinopril 5 mg tablet        torsemide 20 mg tablet  Commonly known as: Demadex                 Test Results Pending At Discharge  Pending Labs       Order Current Status    Blood Culture Preliminary result    Blood Culture Preliminary result            Last Vitals  Vitals:    03/29/25 1430 03/29/25 1941 03/29/25 2355 03/30/25 0733   BP: 131/63 154/72 143/67 179/86   BP Location: Right arm Right arm Right arm    Patient Position: Lying Lying Lying    Pulse: 68 78  72   Resp: 18 18 16    Temp: 36.2 °C (97.2 °F) 37.2 °C (99 °F) 36.9 °C (98.4 °F) 36.5 °C (97.7 °F)   TempSrc: Temporal Temporal Temporal     SpO2: 94% 94% 95% 97%   Weight:       Height:           Hospital Course   Jamaal Balbuena is a 89 y.o. male with PMH CKD stage V, HTN, HLD, CAD, cardiomyopathy, T2DM, Parkinson's, and history of stroke who is presenting with generalized weakness with a fall 3 days ago.  Patient lives home alone and had a fall on Monday without injury.  Per family over the past couple days patient has become more weak and went to his primary care physician today.  Patient had a lengthy discussion with his PCP and is not interested in pursuing hemodialysis.  Patient's labs came back with low potassium and was sent to the emergency room.  Patient is not requiring supplemental O2.  Patient denies chest pain or shortness of breath.  Patient's potassium 2.1 replaced daily and rechecked.  Patient was given magnesium and IV fluids.  Patient was seen by cardiology continue with heart failure therapy.  Spoke to family regarding kidney disease comorbidities and poor prognosis.  At this time family proceeding with hospice at home with [facility].    Pertinent Physical Exam At Time of Discharge  Physical Exam  Constitutional:       Appearance: He is underweight.      Comments: Frail and elderly   HENT:      Head: Normocephalic.      Mouth/Throat:      Mouth: Mucous membranes are moist.   Eyes:      Pupils: Pupils are equal, round, and reactive to light.   Cardiovascular:      Rate and Rhythm: Normal rate and regular rhythm.      Heart sounds: Normal heart sounds, S1 normal and S2 normal.   Pulmonary:      Effort: Pulmonary effort is normal.      Breath sounds: Normal breath sounds.      Comments: Diminished  Abdominal:      General: Bowel sounds are normal.      Palpations: Abdomen is soft.   Musculoskeletal:         General: Normal range of motion.      Cervical back: Neck supple.   Skin:     General: Skin is warm.   Neurological:      Mental Status: He is alert and oriented to person, place, and time.      Motor: Weakness present.    Psychiatric:         Mood and Affect: Mood normal.         Behavior: Behavior normal.         Cognition and Memory: Cognition is impaired. Memory is impaired.  Outpatient Follow-Up  Future Appointments   Date Time Provider Department Center   4/15/2025  3:30 PM Kelvin Oro MD [id number] [place]   4/22/2025  2:50 PM Onel Murillo MD [id number] [place]   9/10/2025  2:20 PM Mark Sandoval MD [id number] [place]         Jaqueline Mckoy, APRN-CNP

## 2025-03-31 LAB
ANION GAP SERPL CALC-SCNC: 11 MMOL/L (ref 10–20)
ATRIAL RATE: 71 BPM
BACTERIA BLD CULT: NORMAL
BACTERIA BLD CULT: NORMAL
BUN SERPL-MCNC: 28 MG/DL (ref 6–23)
CALCIUM SERPL-MCNC: 10.4 MG/DL (ref 8.6–10.3)
CHLORIDE SERPL-SCNC: 110 MMOL/L (ref 98–107)
CO2 SERPL-SCNC: 28 MMOL/L (ref 21–32)
CREAT SERPL-MCNC: 2.72 MG/DL (ref 0.5–1.3)
EGFRCR SERPLBLD CKD-EPI 2021: 21 ML/MIN/1.73M*2
ERYTHROCYTE [DISTWIDTH] IN BLOOD BY AUTOMATED COUNT: 15.4 % (ref 11.5–14.5)
GLUCOSE SERPL-MCNC: 212 MG/DL (ref 74–99)
HCT VFR BLD AUTO: 26.1 % (ref 41–52)
HGB BLD-MCNC: 8.3 G/DL (ref 13.5–17.5)
HOLD SPECIMEN: NORMAL
MAGNESIUM SERPL-MCNC: 1.78 MG/DL (ref 1.6–2.4)
MCH RBC QN AUTO: 31.6 PG (ref 26–34)
MCHC RBC AUTO-ENTMCNC: 31.8 G/DL (ref 32–36)
MCV RBC AUTO: 99 FL (ref 80–100)
NRBC BLD-RTO: 0 /100 WBCS (ref 0–0)
P AXIS: 54 DEGREES
P OFFSET: 142 MS
P ONSET: 83 MS
PLATELET # BLD AUTO: 208 X10*3/UL (ref 150–450)
POTASSIUM SERPL-SCNC: 3.4 MMOL/L (ref 3.5–5.3)
PR INTERVAL: 266 MS
Q ONSET: 216 MS
QRS COUNT: 12 BEATS
QRS DURATION: 164 MS
QT INTERVAL: 438 MS
QTC CALCULATION(BAZETT): 475 MS
QTC FREDERICIA: 463 MS
R AXIS: -51 DEGREES
RBC # BLD AUTO: 2.63 X10*6/UL (ref 4.5–5.9)
SODIUM SERPL-SCNC: 146 MMOL/L (ref 136–145)
T AXIS: 2 DEGREES
T OFFSET: 435 MS
VENTRICULAR RATE: 71 BPM
WBC # BLD AUTO: 12.9 X10*3/UL (ref 4.4–11.3)

## 2025-03-31 PROCEDURE — 2500000001 HC RX 250 WO HCPCS SELF ADMINISTERED DRUGS (ALT 637 FOR MEDICARE OP)

## 2025-03-31 PROCEDURE — 2500000002 HC RX 250 W HCPCS SELF ADMINISTERED DRUGS (ALT 637 FOR MEDICARE OP, ALT 636 FOR OP/ED)

## 2025-03-31 PROCEDURE — 1210000001 HC SEMI-PRIVATE ROOM DAILY

## 2025-03-31 PROCEDURE — 80048 BASIC METABOLIC PNL TOTAL CA: CPT

## 2025-03-31 PROCEDURE — 2500000004 HC RX 250 GENERAL PHARMACY W/ HCPCS (ALT 636 FOR OP/ED)

## 2025-03-31 PROCEDURE — 83735 ASSAY OF MAGNESIUM: CPT

## 2025-03-31 PROCEDURE — 36415 COLL VENOUS BLD VENIPUNCTURE: CPT

## 2025-03-31 PROCEDURE — 99232 SBSQ HOSP IP/OBS MODERATE 35: CPT

## 2025-03-31 PROCEDURE — 85027 COMPLETE CBC AUTOMATED: CPT

## 2025-03-31 RX ORDER — POTASSIUM CHLORIDE 20 MEQ/1
40 TABLET, EXTENDED RELEASE ORAL ONCE
Status: DISCONTINUED | OUTPATIENT
Start: 2025-03-31 | End: 2025-03-31

## 2025-03-31 RX ORDER — POTASSIUM CHLORIDE 14.9 MG/ML
20 INJECTION INTRAVENOUS ONCE
Status: COMPLETED | OUTPATIENT
Start: 2025-03-31 | End: 2025-03-31

## 2025-03-31 RX ORDER — HYDROXYZINE PAMOATE 25 MG/1
25 CAPSULE ORAL ONCE
Status: COMPLETED | OUTPATIENT
Start: 2025-03-31 | End: 2025-03-31

## 2025-03-31 RX ADMIN — ENOXAPARIN SODIUM 30 MG: 30 INJECTION SUBCUTANEOUS at 21:47

## 2025-03-31 RX ADMIN — POTASSIUM CHLORIDE 20 MEQ: 14.9 INJECTION, SOLUTION INTRAVENOUS at 16:38

## 2025-03-31 RX ADMIN — CLOPIDOGREL BISULFATE 75 MG: 75 TABLET, FILM COATED ORAL at 08:21

## 2025-03-31 RX ADMIN — HYDRALAZINE HYDROCHLORIDE 50 MG: 50 TABLET ORAL at 00:17

## 2025-03-31 RX ADMIN — ISOSORBIDE MONONITRATE 30 MG: 30 TABLET, EXTENDED RELEASE ORAL at 08:21

## 2025-03-31 RX ADMIN — HYDRALAZINE HYDROCHLORIDE 50 MG: 50 TABLET ORAL at 08:21

## 2025-03-31 RX ADMIN — HYDROXYZINE PAMOATE 25 MG: 25 CAPSULE ORAL at 16:38

## 2025-03-31 RX ADMIN — HYDRALAZINE HYDROCHLORIDE 50 MG: 50 TABLET ORAL at 14:50

## 2025-03-31 RX ADMIN — POLYETHYLENE GLYCOL 3350 17 G: 17 POWDER, FOR SOLUTION ORAL at 08:19

## 2025-03-31 RX ADMIN — CARVEDILOL 6.25 MG: 6.25 TABLET, FILM COATED ORAL at 08:21

## 2025-03-31 RX ADMIN — ALLOPURINOL 100 MG: 100 TABLET ORAL at 08:25

## 2025-03-31 RX ADMIN — ATORVASTATIN CALCIUM 40 MG: 20 TABLET, FILM COATED ORAL at 08:25

## 2025-03-31 ASSESSMENT — COGNITIVE AND FUNCTIONAL STATUS - GENERAL
STANDING UP FROM CHAIR USING ARMS: TOTAL
MOVING FROM LYING ON BACK TO SITTING ON SIDE OF FLAT BED WITH BEDRAILS: A LOT
MOVING TO AND FROM BED TO CHAIR: TOTAL
STANDING UP FROM CHAIR USING ARMS: TOTAL
DAILY ACTIVITIY SCORE: 8
CLIMB 3 TO 5 STEPS WITH RAILING: TOTAL
WALKING IN HOSPITAL ROOM: TOTAL
DRESSING REGULAR UPPER BODY CLOTHING: TOTAL
PERSONAL GROOMING: A LITTLE
CLIMB 3 TO 5 STEPS WITH RAILING: TOTAL
TOILETING: TOTAL
DRESSING REGULAR UPPER BODY CLOTHING: A LOT
PERSONAL GROOMING: A LOT
WALKING IN HOSPITAL ROOM: TOTAL
MOBILITY SCORE: 9
MOVING TO AND FROM BED TO CHAIR: TOTAL
DRESSING REGULAR LOWER BODY CLOTHING: TOTAL
EATING MEALS: A LOT
DRESSING REGULAR LOWER BODY CLOTHING: TOTAL
HELP NEEDED FOR BATHING: TOTAL
TURNING FROM BACK TO SIDE WHILE IN FLAT BAD: A LOT
TURNING FROM BACK TO SIDE WHILE IN FLAT BAD: A LOT
HELP NEEDED FOR BATHING: TOTAL
TOILETING: TOTAL
MOVING FROM LYING ON BACK TO SITTING ON SIDE OF FLAT BED WITH BEDRAILS: A LITTLE
MOBILITY SCORE: 8

## 2025-03-31 ASSESSMENT — PAIN - FUNCTIONAL ASSESSMENT: PAIN_FUNCTIONAL_ASSESSMENT: 0-10

## 2025-03-31 ASSESSMENT — PAIN SCALES - GENERAL
PAINLEVEL_OUTOF10: 0 - NO PAIN
PAINLEVEL_OUTOF10: 0 - NO PAIN

## 2025-03-31 NOTE — DOCUMENTATION CLARIFICATION NOTE
"    PATIENT:               NANCY BURNS  ACCT #:                  [id number]  MRN:                       [id number]  :                       10/9/1933  ADMIT DATE:       3/27/2025 11:03 AM  DISCH DATE:  RESPONDING PROVIDER #:        [id number]          PROVIDER RESPONSE TEXT:    Age related physical debility    CDI QUERY TEXT:    Clarification        Instruction:    Based on your assessment of the patient and the clinical information, please provide the requested documentation by clicking on the appropriate radio button and enter any additional information if prompted.    Question: Please further clarify the most likely etiology of weakness on admission after final work up    When answering this query, please exercise your independent professional judgment. The fact that a question is being asked, does not imply that any particular answer is desired or expected.    The patient's clinical indicators include:  Clinical Information: 89 yom with weakness    Clinical Indicators:    3/30 Internal Medicine: \"Patient will need PT/OT evaluation, He is underweight. Frail and elderly, Failure to thrive, Mechanical fall, Hypokalemia,  hypomagnesemia\"    3/30 DC Summary: \"Discharge Diagnosis: Weakness generalized...fall on Monday...is not interested in pursuing hemodialysis...Patient's potassium 2.1 replaced daily and rechecked...poor prognosis...proceeding with hospice at home...\"    Treatment: 3/27 Sodium Chloride 0.9 percent 75 ml/hr continuous IV infusion, 3/27 Sodium Chloride 0.9 percent IVF 500ml bolus, 3/27 IV Potassium Chloride 20mEq, 3/27 Magnesium Sulfate 1G IV once, 3/28 Magnesium Sulfate 2G total IV, 3/28 Potassium Chloride total of 80 mEq PO, 3/28-3/31 Sodium Chloride 0.9 percent with KCL 20 mEq/L continuous IV infusion at 50ml/hr, 3/29-3/30 Potassium Chloride 40 mEq PO, Hospice consult    Risk Factors: Dementia, Parkinson's, CKD 5, hx of stroke  Options provided:  -- Age related physical debility  -- Hypokalemia, " hypomagnesemia  -- Age related physical debility, hypokalemia, hypomagnesemia  -- Other - I will add my own diagnosis  -- Refer to Clinical Documentation Reviewer    Query created by: Paz Chadwick on 3/31/2025 9:44 AM      Electronically signed by:  KETAN SHELBY 3/31/2025 10:00 AM

## 2025-03-31 NOTE — SIGNIFICANT EVENT
Had  a lengthy discussion with daughter and son Miko at bedside.  All questions answered.    Agreeable to move forward with Hospice meeting planned for 1730 this evening.    Pt becoming agitated and pulling oxygen off.  Will add Vistaril for now.      Hemodynamically stable at this time  Reviewed labs  Please call if acute needs or requested assessment is needed    Jaqueline Mckoy, APRN-CNP    Plan of care was discussed extensively with patient.  Patient verbalized understanding through teach back method.  All question and concerns addressed upon examination.    Of note, this documentation is completed using the Dragon Dictation system (voice recognition software). There may be spelling and/or grammatical errors that were not corrected prior to final submission.

## 2025-03-31 NOTE — PROGRESS NOTES
"Daily Progress Note    Jamaal Balbuena is a 89 y.o. male on day 3 of admission presenting with Weakness generalized.    Subjective   Patient resting comfortably.  Family has been meeting with hospice at 5:30 PM.  Plan for discharge home with hospice.       Objective     Physical Exam    Physical Exam  Constitutional:       Appearance: He is underweight.      Comments: Frail and elderly   HENT:      Head: Normocephalic.      Mouth/Throat:      Mouth: Mucous membranes are moist.   Eyes:      Pupils: Pupils are equal, round, and reactive to light.   Cardiovascular:      Rate and Rhythm: Normal rate and regular rhythm.      Heart sounds: Normal heart sounds, S1 normal and S2 normal.   Pulmonary:      Effort: Pulmonary effort is normal.      Breath sounds: Normal breath sounds.      Comments: Diminished  Abdominal:      General: Bowel sounds are normal.      Palpations: Abdomen is soft.   Musculoskeletal:         General: Normal range of motion.      Cervical back: Neck supple.   Skin:     General: Skin is warm.   Neurological:      Mental Status: He is alert and oriented to person, place, and time.      Motor: Weakness present.   Psychiatric:         Mood and Affect: Mood normal.         Behavior: Behavior normal.         Cognition and Memory: Cognition is impaired. Memory is impaired.    Last Recorded Vitals  Blood pressure (!) 189/85, pulse 83, temperature 36.7 °C (98.1 °F), resp. rate 16, height 1.737 m (5' 8.39\"), weight 76.1 kg (167 lb 12.3 oz), SpO2 96%.  Intake/Output last 3 Shifts:  I/O last 3 completed shifts:  In: 3029.2 (39.8 mL/kg) [I.V.:3029.2 (39.8 mL/kg)]  Out: 1900 (25 mL/kg) [Urine:1900 (0.7 mL/kg/hr)]  Weight: 76.1 kg     Medications  Scheduled medications  allopurinol, 100 mg, oral, Daily  atorvastatin, 40 mg, oral, Daily  carvedilol, 6.25 mg, oral, BID  clopidogrel, 75 mg, oral, Daily  enoxaparin, 30 mg, subcutaneous, q24h  hydrALAZINE, 50 mg, oral, TID  isosorbide mononitrate ER, 30 mg, oral, " Daily  [Held by provider] lisinopril, 5 mg, oral, Daily  polyethylene glycol, 17 g, oral, Daily  [Held by provider] torsemide, 20 mg, oral, Daily      Continuous medications     PRN medications      Labs  CBC:   Results from last 7 days   Lab Units 03/30/25  0658 03/29/25  0652 03/28/25  1405 03/27/25  1122 03/26/25  1320   WBC AUTO x10*3/uL 10.2 10.5 8.7   < >  --    [facility] WBC AUTO Thousand/uL  --   --   --   --  15.0*   RBC AUTO x10*6/uL 2.56* 2.65* 2.46*   < >  --    [facility] RBC AUTO Million/uL  --   --   --   --  3.10*   HEMOGLOBIN g/dL 8.2* 8.4* 8.0*   < >  --    [facility] HEMOGLOBIN g/dL  --   --   --   --  9.9*   HEMATOCRIT % 26.1* 26.1* 24.3*   < >  --    [facility] HEMATOCRIT %  --   --   --   --  30.8*   MCV fL 102* 99 99   < >  --    [facility] MCV fL  --   --   --   --  99.4   MCH pg 32.0 31.7 32.5   < >  --    [facility] MCH pg  --   --   --   --  31.9   MCHC g/dL 31.4* 32.2 32.9   < >  --    [facility] MCHC g/dL  --   --   --   --  32.1   RDW % 15.5* 15.1* 15.3*   < >  --    [facility] RDW %  --   --   --   --  13.6   PLATELETS AUTO x10*3/uL 214 220 227   < >  --    [facility] PLATELETS AUTO Thousand/uL  --   --   --   --  269   [facility] MPV fL  --   --   --   --  11.2    < > = values in this interval not displayed.     CMP:    Results from last 7 days   Lab Units 03/30/25  0636 03/29/25  0652 03/28/25  1405 03/28/25  0638 03/27/25  1122 03/26/25  1320   [facility] SODIUM mmol/L  --   --   --   --   --  148*   SODIUM mmol/L 145 145 148*   < > 149*  --    [facility] POTASSIUM mmol/L  --   --   --   --   --  2.2*   POTASSIUM mmol/L 2.9* 2.5* 2.2*   < > 2.1*  --    [facility] CHLORIDE mmol/L  --   --   --   --   --  101   CHLORIDE mmol/L 109* 106 106   < > 104  --    [facility] CO2 mmol/L  --   --   --   --   --  36*   CO2 mmol/L 29 33* 33*   < > 35*  --    BUN mg/dL 28* 31* 33*   < > 35*  --    [facility] BUN mg/dL  --   --   --   --   --  35*   CREATININE mg/dL 2.81* 2.82* 3.20*   < > 3.35*  --    [facility] CREATININE mg/dL  --   --   --   --   --  3.38*   [facility] GLUCOSE  mg/dL  --   --   --   --   --  245*   GLUCOSE mg/dL 198* 201* 219*   < > 225*  --    [facility] PROTEIN TOTAL g/dL  --   --   --   --   --  7.4   PROTEIN TOTAL g/dL  --   --   --   --  7.3  --    [facility] CALCIUM mg/dL  --   --   --   --   --  12.1*   CALCIUM mg/dL 10.3 10.3 10.4*   < > 11.5*  --    BILIRUBIN TOTAL mg/dL  --   --   --   --  1.0  --    [facility] BILIRUBIN TOTAL mg/dL  --   --   --   --   --  1.0   [facility] ALK PHOS U/L  --   --   --   --   --  90   ALK PHOS U/L  --   --   --   --  83  --    [facility] AST U/L  --   --   --   --   --  13   AST U/L  --   --   --   --  15  --    [facility] ALT U/L  --   --   --   --   --  9   ALT U/L  --   --   --   --  9*  --     < > = values in this interval not displayed.     BMP:    Results from last 7 days   Lab Units 03/30/25  0636 03/29/25  0652 03/28/25  1405   SODIUM mmol/L 145 145 148*   POTASSIUM mmol/L 2.9* 2.5* 2.2*   CHLORIDE mmol/L 109* 106 106   CO2 mmol/L 29 33* 33*   BUN mg/dL 28* 31* 33*   CREATININE mg/dL 2.81* 2.82* 3.20*   CALCIUM mg/dL 10.3 10.3 10.4*   GLUCOSE mg/dL 198* 201* 219*     Magnesium:  Results from last 7 days   Lab Units 03/30/25  0636 03/29/25  0652 03/28/25  1405   MAGNESIUM mg/dL 1.83 1.86 1.63     Troponin:    Results from last 7 days   Lab Units 03/27/25  1237 03/27/25  1122   TROPHS ng/L 107* 120*     BNP:   Results from last 7 days   Lab Units 03/28/25  0639   BNP pg/mL 430*     Lipid Panel:  Results from last 7 days   Lab Units 03/27/25  1122   INR  1.1   PROTIME seconds 12.3        Nutrition             Relevant Results  Results from last 7 days   Lab Units 03/30/25  0636 03/29/25  0652 03/28/25  1405 03/28/25  0638 03/27/25  1122   GLUCOSE mg/dL 198* 201* 219* 207* 225*     Lab Results   Component Value Date    HGBA1C 7.6 (A) 03/06/2025        Assessment/Plan    Failure to thrive  Mechanical fall  Hypokalemia  Hypomagnesia    -Imaging negative for fractures  -Patient was given magnesium potassium and a bolus in the ER  -Change IV fluids to normal  saline with 20 of K  -Will continue to replace potassium and mag  -CBC and BMP daily  -UA negative   -will hold Demadex for now  -Consult cardio focus of blood pressure and symptoms may consider VQ scan, at this point cardiology will sign  -Echo shows LVEF 30%, October 2024  -Creatinine at baseline patient not interested in dialysis  -Patient will need PT/OT evaluation  -Family has meeting with hospice today      CKD stage V  HTN/HLD  CAD  Cardiomyopathy  T2DM  Parkinson's  History of stroke  -Diabetic diet with Accu-Cheks and sliding scale  -Resume home meds when warranted      CODE STATUS is DNR [facility]    DVTp: Lovenox    PLAN: Home with hospice    Jaqueline Mckoy, APRN-CNP    Plan of care was discussed extensively with patient.  Patient verbalized understanding through teach back method.  All question and concerns addressed upon examination.    Of note, this documentation is completed using the Dragon Dictation system (voice recognition software). There may be spelling and/or grammatical errors that were not corrected prior to final submission.

## 2025-03-31 NOTE — CARE PLAN
The patient's goals for the shift include N/A    The clinical goals for the shift include Pt will refrain from injury/falls throughout shift

## 2025-03-31 NOTE — CARE PLAN
The patient's goals for the shift include sleep and comfort    The clinical goals for the shift include safety and comfort until the end of the shift    Over the shift, the patient did progress toward the following goals.     Problem: Safety - Adult  Goal: Free from fall injury  Outcome: Progressing     Problem: Pain - Adult  Goal: Verbalizes/displays adequate comfort level or baseline comfort level  Outcome: Progressing     Problem: Chronic Conditions and Co-morbidities  Goal: Patient's chronic conditions and co-morbidity symptoms are monitored and maintained or improved  Outcome: Progressing     Problem: Discharge Planning  Goal: Discharge to home or other facility with appropriate resources  Outcome: Progressing     Problem: Fall/Injury  Goal: Not fall by end of shift  Outcome: Progressing

## 2025-04-01 ENCOUNTER — APPOINTMENT (OUTPATIENT)
Dept: CARDIOLOGY | Facility: HOSPITAL | Age: OVER 89
DRG: 641 | End: 2025-04-01
Payer: MEDICARE

## 2025-04-01 ENCOUNTER — TELEPHONE (OUTPATIENT)
Dept: PRIMARY CARE | Facility: CLINIC | Age: OVER 89
End: 2025-04-01
Payer: MEDICARE

## 2025-04-01 VITALS
BODY MASS INDEX: 25.43 KG/M2 | TEMPERATURE: 97.5 F | OXYGEN SATURATION: 92 % | HEIGHT: 68 IN | DIASTOLIC BLOOD PRESSURE: 78 MMHG | SYSTOLIC BLOOD PRESSURE: 136 MMHG | HEART RATE: 76 BPM | RESPIRATION RATE: 18 BRPM | WEIGHT: 167.77 LBS

## 2025-04-01 DIAGNOSIS — R32 URINARY INCONTINENCE, UNSPECIFIED TYPE: Primary | ICD-10-CM

## 2025-04-01 LAB
ANION GAP SERPL CALC-SCNC: 9 MMOL/L (ref 10–20)
ATRIAL RATE: 83 BPM
BUN SERPL-MCNC: 31 MG/DL (ref 6–23)
CALCIUM SERPL-MCNC: 10.7 MG/DL (ref 8.6–10.3)
CHLORIDE SERPL-SCNC: 109 MMOL/L (ref 98–107)
CO2 SERPL-SCNC: 30 MMOL/L (ref 21–32)
CREAT SERPL-MCNC: 2.8 MG/DL (ref 0.5–1.3)
EGFRCR SERPLBLD CKD-EPI 2021: 21 ML/MIN/1.73M*2
ERYTHROCYTE [DISTWIDTH] IN BLOOD BY AUTOMATED COUNT: 15.4 % (ref 11.5–14.5)
GLUCOSE SERPL-MCNC: 215 MG/DL (ref 74–99)
HCT VFR BLD AUTO: 28.9 % (ref 41–52)
HGB BLD-MCNC: 9 G/DL (ref 13.5–17.5)
MCH RBC QN AUTO: 31 PG (ref 26–34)
MCHC RBC AUTO-ENTMCNC: 31.1 G/DL (ref 32–36)
MCV RBC AUTO: 100 FL (ref 80–100)
NRBC BLD-RTO: 0 /100 WBCS (ref 0–0)
P AXIS: 80 DEGREES
P OFFSET: 144 MS
P ONSET: 103 MS
PLATELET # BLD AUTO: 223 X10*3/UL (ref 150–450)
POTASSIUM SERPL-SCNC: 3.7 MMOL/L (ref 3.5–5.3)
PR INTERVAL: 230 MS
Q ONSET: 218 MS
QRS COUNT: 14 BEATS
QRS DURATION: 146 MS
QT INTERVAL: 424 MS
QTC CALCULATION(BAZETT): 498 MS
QTC FREDERICIA: 472 MS
R AXIS: -54 DEGREES
RBC # BLD AUTO: 2.9 X10*6/UL (ref 4.5–5.9)
SODIUM SERPL-SCNC: 144 MMOL/L (ref 136–145)
T AXIS: 28 DEGREES
T OFFSET: 430 MS
VENTRICULAR RATE: 83 BPM
WBC # BLD AUTO: 11 X10*3/UL (ref 4.4–11.3)

## 2025-04-01 PROCEDURE — 85027 COMPLETE CBC AUTOMATED: CPT

## 2025-04-01 PROCEDURE — 93010 ELECTROCARDIOGRAM REPORT: CPT | Performed by: INTERNAL MEDICINE

## 2025-04-01 PROCEDURE — 80048 BASIC METABOLIC PNL TOTAL CA: CPT

## 2025-04-01 PROCEDURE — 99231 SBSQ HOSP IP/OBS SF/LOW 25: CPT | Performed by: STUDENT IN AN ORGANIZED HEALTH CARE EDUCATION/TRAINING PROGRAM

## 2025-04-01 PROCEDURE — 93005 ELECTROCARDIOGRAM TRACING: CPT

## 2025-04-01 PROCEDURE — 36415 COLL VENOUS BLD VENIPUNCTURE: CPT

## 2025-04-01 PROCEDURE — 2500000001 HC RX 250 WO HCPCS SELF ADMINISTERED DRUGS (ALT 637 FOR MEDICARE OP)

## 2025-04-01 PROCEDURE — 2500000004 HC RX 250 GENERAL PHARMACY W/ HCPCS (ALT 636 FOR OP/ED)

## 2025-04-01 RX ADMIN — CARVEDILOL 6.25 MG: 6.25 TABLET, FILM COATED ORAL at 09:15

## 2025-04-01 RX ADMIN — ATORVASTATIN CALCIUM 40 MG: 20 TABLET, FILM COATED ORAL at 09:14

## 2025-04-01 RX ADMIN — CLOPIDOGREL BISULFATE 75 MG: 75 TABLET, FILM COATED ORAL at 09:15

## 2025-04-01 RX ADMIN — POLYETHYLENE GLYCOL 3350 17 G: 17 POWDER, FOR SOLUTION ORAL at 09:14

## 2025-04-01 RX ADMIN — HYDRALAZINE HYDROCHLORIDE 50 MG: 50 TABLET ORAL at 09:14

## 2025-04-01 RX ADMIN — ALLOPURINOL 100 MG: 100 TABLET ORAL at 09:15

## 2025-04-01 RX ADMIN — ISOSORBIDE MONONITRATE 30 MG: 30 TABLET, EXTENDED RELEASE ORAL at 09:21

## 2025-04-01 ASSESSMENT — COGNITIVE AND FUNCTIONAL STATUS - GENERAL
MOBILITY SCORE: 9
DAILY ACTIVITIY SCORE: 8
MOVING FROM LYING ON BACK TO SITTING ON SIDE OF FLAT BED WITH BEDRAILS: A LITTLE
DRESSING REGULAR LOWER BODY CLOTHING: TOTAL
HELP NEEDED FOR BATHING: TOTAL
CLIMB 3 TO 5 STEPS WITH RAILING: TOTAL
STANDING UP FROM CHAIR USING ARMS: TOTAL
TOILETING: TOTAL
DRESSING REGULAR UPPER BODY CLOTHING: TOTAL
MOVING TO AND FROM BED TO CHAIR: TOTAL
EATING MEALS: A LOT
PERSONAL GROOMING: A LOT
WALKING IN HOSPITAL ROOM: TOTAL
TURNING FROM BACK TO SIDE WHILE IN FLAT BAD: A LOT

## 2025-04-01 ASSESSMENT — PAIN - FUNCTIONAL ASSESSMENT: PAIN_FUNCTIONAL_ASSESSMENT: 0-10

## 2025-04-01 ASSESSMENT — PAIN SCALES - GENERAL: PAINLEVEL_OUTOF10: 0 - NO PAIN

## 2025-04-01 NOTE — CARE PLAN
The patient's goals for the shift include sleep and comfort    The clinical goals for the shift include patient's safety the whole shift

## 2025-04-01 NOTE — PROGRESS NOTES
SW notified by [facility] that family signed consents and plan is for pt to discharge home today with Hospice care. Hospice ordered DME for the home and arranged transport for 2pm pickup today. Care team notified.

## 2025-04-01 NOTE — PROGRESS NOTES
"Daily Progress Note    Jamaal Balbuena is a 89 y.o. male on day 4 of admission presenting with Weakness generalized.    Subjective   Patient had no acute events overnight. Resting comfortably       Objective     Physical Exam    Physical Exam  General: Ill appearing elderly male in mild distress  HEENT: Clear sclera, EOMI, trachea midline, moist mucous membranes  Skin: Warm, dry    Last Recorded Vitals  Blood pressure 179/87, pulse 81, temperature 36.7 °C (98.1 °F), temperature source Temporal, resp. rate 20, height 1.737 m (5' 8.39\"), weight 76.1 kg (167 lb 12.3 oz), SpO2 95%.  Intake/Output last 3 Shifts:  I/O last 3 completed shifts:  In: 1052.8 (13.8 mL/kg) [I.V.:952.8 (12.5 mL/kg); IV Piggyback:100]  Out: 1700 (22.3 mL/kg) [Urine:1700 (0.6 mL/kg/hr)]  Weight: 76.1 kg     Medications  Scheduled medications  allopurinol, 100 mg, oral, Daily  atorvastatin, 40 mg, oral, Daily  carvedilol, 6.25 mg, oral, BID  clopidogrel, 75 mg, oral, Daily  enoxaparin, 30 mg, subcutaneous, q24h  hydrALAZINE, 50 mg, oral, TID  isosorbide mononitrate ER, 30 mg, oral, Daily  [Held by provider] lisinopril, 5 mg, oral, Daily  polyethylene glycol, 17 g, oral, Daily  [Held by provider] torsemide, 20 mg, oral, Daily      Continuous medications     PRN medications      Labs  CBC:   Results from last 7 days   Lab Units 04/01/25  0712 03/31/25  1040 03/30/25  0658 03/27/25  1122 03/26/25  1320   WBC AUTO x10*3/uL 11.0 12.9* 10.2   < >  --    [facility] WBC AUTO [place]/uL  --   --   --   --  15.0*   RBC AUTO x10*6/uL 2.90* 2.63* 2.56*   < >  --    [facility] RBC AUTO Million/uL  --   --   --   --  3.10*   HEMOGLOBIN g/dL 9.0* 8.3* 8.2*   < >  --    [facility] HEMOGLOBIN g/dL  --   --   --   --  9.9*   HEMATOCRIT % 28.9* 26.1* 26.1*   < >  --    [facility] HEMATOCRIT %  --   --   --   --  30.8*   MCV fL 100 99 102*   < >  --    [facility] MCV fL  --   --   --   --  99.4   MCH pg 31.0 31.6 32.0   < >  --    [facility] MCH pg  --   --   --   --  31.9   MCHC g/dL 31.1* 31.8* " 31.4*   < >  --    [facility] MCHC g/dL  --   --   --   --  32.1   RDW % 15.4* 15.4* 15.5*   < >  --    [facility] RDW %  --   --   --   --  13.6   PLATELETS AUTO x10*3/uL 223 208 214   < >  --    [facility] PLATELETS AUTO [place]/uL  --   --   --   --  269   [facility] MPV fL  --   --   --   --  11.2    < > = values in this interval not displayed.     CMP:    Results from last 7 days   Lab Units 04/01/25  0712 03/31/25  1039 03/30/25  0636 03/28/25  0638 03/27/25  1122 03/26/25  1320   [facility] SODIUM mmol/L  --   --   --   --   --  148*   SODIUM mmol/L 144 146* 145   < > 149*  --    [facility] POTASSIUM mmol/L  --   --   --   --   --  2.2*   POTASSIUM mmol/L 3.7 3.4* 2.9*   < > 2.1*  --    [facility] CHLORIDE mmol/L  --   --   --   --   --  101   CHLORIDE mmol/L 109* 110* 109*   < > 104  --    [facility] CO2 mmol/L  --   --   --   --   --  36*   CO2 mmol/L 30 28 29   < > 35*  --    BUN mg/dL 31* 28* 28*   < > 35*  --    [facility] BUN mg/dL  --   --   --   --   --  35*   CREATININE mg/dL 2.80* 2.72* 2.81*   < > 3.35*  --    [facility] CREATININE mg/dL  --   --   --   --   --  3.38*   [facility] GLUCOSE mg/dL  --   --   --   --   --  245*   GLUCOSE mg/dL 215* 212* 198*   < > 225*  --    [facility] PROTEIN TOTAL g/dL  --   --   --   --   --  7.4   PROTEIN TOTAL g/dL  --   --   --   --  7.3  --    [facility] CALCIUM mg/dL  --   --   --   --   --  12.1*   CALCIUM mg/dL 10.7* 10.4* 10.3   < > 11.5*  --    BILIRUBIN TOTAL mg/dL  --   --   --   --  1.0  --    [facility] BILIRUBIN TOTAL mg/dL  --   --   --   --   --  1.0   [facility] ALK PHOS U/L  --   --   --   --   --  90   ALK PHOS U/L  --   --   --   --  83  --    [facility] AST U/L  --   --   --   --   --  13   AST U/L  --   --   --   --  15  --    [facility] ALT U/L  --   --   --   --   --  9   ALT U/L  --   --   --   --  9*  --     < > = values in this interval not displayed.     BMP:    Results from last 7 days   Lab Units 04/01/25  0712 03/31/25  1039 03/30/25  0636   SODIUM mmol/L 144 146* 145   POTASSIUM mmol/L 3.7 3.4* 2.9*   CHLORIDE  mmol/L 109* 110* 109*   CO2 mmol/L 30 28 29   BUN mg/dL 31* 28* 28*   CREATININE mg/dL 2.80* 2.72* 2.81*   CALCIUM mg/dL 10.7* 10.4* 10.3   GLUCOSE mg/dL 215* 212* 198*     Magnesium:  Results from last 7 days   Lab Units 03/31/25  1039 03/30/25  0636 03/29/25  0652   MAGNESIUM mg/dL 1.78 1.83 1.86     Troponin:    Results from last 7 days   Lab Units 03/27/25  1237 03/27/25  1122   TROPHS ng/L 107* 120*     BNP:   Results from last 7 days   Lab Units 03/28/25  0639   BNP pg/mL 430*     Lipid Panel:  Results from last 7 days   Lab Units 03/27/25  1122   INR  1.1   PROTIME seconds 12.3        Nutrition             Relevant Results  Results from last 7 days   Lab Units 04/01/25  0712 03/31/25  1039 03/30/25  0636 03/29/25  0652 03/28/25  1405   GLUCOSE mg/dL 215* 212* 198* 201* 219*     Lab Results   Component Value Date    HGBA1C 7.6 (A) 03/06/2025        Assessment/Plan    Failure to thrive  Mechanical fall  Hypokalemia  Hypomagnesia  Hx CKD V, HTN, HLD, Cardiomyopathy, T2DM, Parkinsons's Disease, CVA    -Continue comfort focused care  -Plan to DC home with Home hospice later today      CODE STATUS is DNR [facility]    DVTp: Lovenox    PLAN: Home with hospice    Rakan Ba MD    Plan of care was discussed extensively with patient.  Patient verbalized understanding through teach back method.  All question and concerns addressed upon examination.    Of note, this documentation is completed using the Dragon Dictation system (voice recognition software). There may be spelling and/or grammatical errors that were not corrected prior to final submission.

## 2025-04-01 NOTE — TELEPHONE ENCOUNTER
Pt is being discharged home today with hospice care. [facility] with a doctor's order. Juan Diego his son is asking if you would place an order for these? Please advise.

## 2025-04-15 ENCOUNTER — APPOINTMENT (OUTPATIENT)
Dept: CARDIOLOGY | Facility: CLINIC | Age: OVER 89
End: 2025-04-15
Payer: MEDICARE

## 2025-04-22 ENCOUNTER — APPOINTMENT (OUTPATIENT)
Dept: NEPHROLOGY | Facility: CLINIC | Age: OVER 89
End: 2025-04-22
Payer: MEDICARE

## 2025-04-24 ENCOUNTER — TELEPHONE (OUTPATIENT)
Dept: PRIMARY CARE | Facility: CLINIC | Age: OVER 89
End: 2025-04-24
Payer: MEDICARE

## 2025-09-10 ENCOUNTER — APPOINTMENT (OUTPATIENT)
Dept: PRIMARY CARE | Facility: CLINIC | Age: OVER 89
End: 2025-09-10
Payer: MEDICARE